# Patient Record
Sex: FEMALE | Race: WHITE | Employment: UNEMPLOYED | URBAN - METROPOLITAN AREA
[De-identification: names, ages, dates, MRNs, and addresses within clinical notes are randomized per-mention and may not be internally consistent; named-entity substitution may affect disease eponyms.]

---

## 2017-05-30 ENCOUNTER — OFFICE VISIT (OUTPATIENT)
Dept: NEUROSURGERY | Facility: CLINIC | Age: 52
End: 2017-05-30

## 2017-05-30 VITALS
WEIGHT: 282 LBS | DIASTOLIC BLOOD PRESSURE: 73 MMHG | RESPIRATION RATE: 20 BRPM | OXYGEN SATURATION: 99 % | HEIGHT: 68 IN | HEART RATE: 61 BPM | SYSTOLIC BLOOD PRESSURE: 154 MMHG | BODY MASS INDEX: 42.74 KG/M2

## 2017-05-30 DIAGNOSIS — R56.9 SEIZURE (H): ICD-10-CM

## 2017-05-30 DIAGNOSIS — D32.0 BENIGN NEOPLASM OF CEREBRAL MENINGES (H): Primary | ICD-10-CM

## 2017-05-30 RX ORDER — FLURANDRENOLIDE 0.5 MG/G
1 OINTMENT TOPICAL DAILY PRN
Refills: 1 | COMMUNITY
Start: 2017-04-13 | End: 2020-10-26

## 2017-05-30 RX ORDER — METHOTREXATE 2.5 MG/1
17.5 TABLET ORAL WEEKLY
COMMUNITY

## 2017-05-30 RX ORDER — LISINOPRIL 2.5 MG/1
2.5 TABLET ORAL DAILY
COMMUNITY

## 2017-05-30 RX ORDER — LEVOTHYROXINE SODIUM 125 UG/1
125 TABLET ORAL DAILY
Status: ON HOLD | COMMUNITY
End: 2021-07-07

## 2017-05-30 ASSESSMENT — PAIN SCALES - GENERAL: PAINLEVEL: MODERATE PAIN (4)

## 2017-05-30 NOTE — LETTER
"5/30/2017      RE: Marlyn Wilder  [address] [facility] [address]       Dear Dr. Neal:    We saw Mrs. 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 She continues to work full time but she struggles towards the end of the work week. She has been having \"foot issues\", presumably arthritis, and is having trouble walking. Her hands are also bothering her after recent carpel tunnel surgery. She plans on seeing Dr. Castañeda in the next week. Her vision has been stable. She continues to have episodes of nausea, but no other seizure symptoms. These occur 2-3 times a week, and will vary in length. Some with be very short, and feel more similar to a seizure, while others are prolonged and can last for a few hours. She recently discovered her neurologist is no longer here and is currently seeking a new one. She started Humira, but it has caused two persistent colds/cough in the last few months and needed antibiotics. She denies any other problems with her right extremity. She also reports her liver function has declined once she started Humira, and she gets blood work done regularly. Her vision has been stable, but is blurred in her left eye.  On exam, she appears well. Extraocular movements intact. She moves upper and lower extremities equally and with good coordination. Fluent and coherent speech. Visual fields on confrontation show diffusely constricted fields in the left eye.  We reviewed her MRI from earlier this month and compared to previous studies. Her complex residual left sided medial sphenoid wing meningioma appears stable at 30 mm. The left supraclinoid ICA and MCA appear to be very small and may be occluded. Overall, there has been no major changes.  We will refer her to Dr. Madrid to establish care for her continued seizures and follow-up. We continue to favor observation over surgical intervention and will see her " back in 1 year with another MRI. As before, our threshold to consider any more surgery will be if the right sided vision is threatened. She will contact us in the interim if anything changes or worsens. Please do not hesitate to contact us with questions. We will keep you informed of her progress.     RUBY SIDHU MD  I, Josefa Bernard, am serving as a scribe to document services personally performed by Ruby Sidhu MD, based upon my observations and the provider's statements to me. All documentation has been reviewed by the aforementioned doctor prior to being entered into the official medical record.    I, Ruby Sidhu, attest that above named individual is acting in scribe capacity, has observed my performance of the services and has documented them in accordance with my direction. The documentation recorded by the scribe accurately reflects the service I personally performed and the decisions made by me.    Ruby Sidhu MD

## 2017-05-30 NOTE — LETTER
"5/30/2017       RE: Marlyn Wilder  [address] [facility] [address]     Dear Colleague,    Thank you for referring your patient, Marlyn Wilder, to the [facility] NEUROSURGERY at [facility]. Please see a copy of my visit note below.    Dear Dr. Neal:    We saw Mrs. 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 She continues to work full time but she struggles towards the end of the work week. She has been having \"foot issues\", presumably arthritis, and is having trouble walking. Her hands are also bothering her after recent carpel tunnel surgery. She plans on seeing Dr. Castañeda in the next week. Her vision has been stable. She continues to have episodes of nausea, but no other seizure symptoms. These occur 2-3 times a week, and will vary in length. Some with be very short, and feel more similar to a seizure, while others are prolonged and can last for a few hours. She recently discovered her neurologist is no longer here and is currently seeking a new one. She started Humira, but it has caused two persistent colds/cough in the last few months and needed antibiotics. She denies any other problems with her right extremity. She also reports her liver function has declined once she started Humira, and she gets blood work done regularly. Her vision has been stable, but is blurred in her left eye.  On exam, she appears well. Extraocular movements intact. She moves upper and lower extremities equally and with good coordination. Fluent and coherent speech. Visual fields on confrontation show diffusely constricted fields in the left eye.  We reviewed her MRI from earlier this month and compared to previous studies. Her complex residual left sided medial sphenoid wing meningioma appears stable at 30 mm. The left supraclinoid ICA and MCA appear to be very small and may be occluded. Overall, there " has been no major changes.  We will refer her to Dr. Madrid to establish care for her continued seizures and follow-up. We continue to favor observation over surgical intervention and will see her back in 1 year with another MRI. As before, our threshold to consider any more surgery will be if the right sided vision is threatened. She will contact us in the interim if anything changes or worsens. Please do not hesitate to contact us with questions. We will keep you informed of her progress.   RUBY SIDHU MD  I, Josefa Bernard, am serving as a scribe to document services personally performed by Ruby Sidhu MD, based upon my observations and the provider's statements to me. All documentation has been reviewed by the aforementioned doctor prior to being entered into the official medical record.    I, Ruby Sidhu, attest that above named individual is acting in scribe capacity, has observed my performance of the services and has documented them in accordance with my direction. The documentation recorded by the scribe accurately reflects the service I personally performed and the decisions made by me.    Again, thank you for allowing me to participate in the care of your patient.      Sincerely,  Ruby Sidhu MD

## 2017-05-30 NOTE — NURSING NOTE
Chief Complaint   Patient presents with     RECHECK     UMP- SPHENOID WING MENINGIOMA F/U, MRI PRIOR

## 2017-05-30 NOTE — MR AVS SNAPSHOT
After Visit Summary   5/30/2017    Marlyn Wilder    MRN: [id number]           Patient Information     Date Of Birth          1965        Visit Information        Provider Department      5/30/2017 11:00 AM Pan Calero MD [facility] Neurosurgery        Today's Diagnoses     Benign neoplasm of cerebral meninges (H)    -  1    Seizure (H)           Follow-ups after your visit        Additional Services     Neurology Referral [3646]       Your provider has referred you to: [facility]: Neurology Clinic - [place] [phone]   [email or web address]  Epilepsy    Reason for Referral: transfer care - Dr Madrid, please    Please be aware that coverage of these services is subject to the terms and limitations of your health insurance plan.  Call member services at your health plan with any benefit or coverage questions.      Please bring the following with you to your appointment:    (1) Any X-Rays, CTs or MRIs which have been performed.  Contact the facility where they were done to arrange for  prior to your scheduled appointment.    (2) List of current medications  (3) This referral request   (4) Any documents/labs given to you for this referral                  Follow-up notes from your care team     Return in about 1 year (around 5/30/2018).      Your next 10 appointments already scheduled     Jun 13, 2017  8:30 AM CDT   (Arrive by 8:15 AM)   New Seizure with Emmanuel Madrid MD   [facility] Neurology ([facility])    [address]  3rd Floor  [place] [address] [phone]            May 17, 2018  9:15 AM CDT   (Arrive by 9:00 AM)   MR BRAIN W/O & W CONTRAST with [id number]   [facility] Imaging Center MRI ([facility])    [address]  1st Floor  [place] [address] [phone]           Take your medicines as usual, unless your doctor tells you not to. Bring a list of your  current medicines to your exam (including vitamins, minerals and over-the-counter drugs).  You will be given intravenous contrast for this exam. To prepare:   The day before your exam, drink extra fluids at least six 8-ounce glasses (unless your doctor tells you to restrict your fluids).   Have a blood test (creatinine test) within 30 days of your exam. Go to your clinic or Diagnostic Imaging Department for this test.  The MRI machine uses a strong magnet. Please wear clothes without metal (snaps, zippers). A sweatsuit works well, or we may give you a hospital gown.  Please remove any body piercings and hair extensions before you arrive. You will also remove watches, jewelry, hairpins, wallets, dentures, partial dental plates and hearing aids. You may wear contact lenses, and you may be able to wear your rings. We have a safe place to keep your personal items, but it is safer to leave them at home.   **IMPORTANT** THE INSTRUCTIONS BELOW ARE ONLY FOR THOSE PATIENTS WHO HAVE BEEN TOLD THEY WILL RECEIVE SEDATION OR GENERAL ANESTHESIA DURING THEIR MRI PROCEDURE:  IF YOU WILL RECEIVE SEDATION (take medicine to help you relax during your exam):   You must get the medicine from your doctor before you arrive. Bring the medicine to the exam. Do not take it at home.   Arrive one hour early. Bring someone who can take you home after the test. Your medicine will make you sleepy. After the exam, you may not drive, take a bus or take a taxi by yourself.   No eating 8 hours before your exam. You may have clear liquids up until 4 hours before your exam. (Clear liquids include water, clear tea, black coffee and fruit juice without pulp.)  IF YOU WILL RECEIVE ANESTHESIA (be asleep for your exam):   Arrive 1 1/2 hours early. Bring someone who can take you home after the test. You may not drive, take a bus or take a taxi by yourself.   No eating 8 hours before your exam. You may have clear liquids up until 4 hours before your exam.  (Clear liquids include water, clear tea, black coffee and fruit juice without pulp.)  Please call the Imaging Department at your exam site with any questions.            May 29, 2018 10:00 AM CDT   (Arrive by 9:45 AM)   Return Visit with Pan Calero MD   [facility] Neurosurgery ([facility])    [address]  [address] [facility] [id number] [phone]              Future tests that were ordered for you today     Open Future Orders        Priority Expected Expires Ordered    MRI Brain with & without gadolinium for tumor follow-up [HOR468] Routine 5/30/2018 5/30/2018 5/30/2017            Who to contact     Please call your clinic at [phone] to:    Ask questions about your health    Make or cancel appointments    Discuss your medicines    Learn about your test results    Speak to your doctor   If you have compliments or concerns about an experience at your clinic, or if you wish to file a complaint, please contact [facility] Patient Relations at [phone] or email us at [email or web address]         Additional Information About Your Visit        [email or web address] Information     [employer] gives you secure access to your electronic health record. If you see a primary care provider, you can also send messages to your care team and make appointments. If you have questions, please call your primary care clinic.  If you do not have a primary care provider, please call [phone] and they will assist you.      [employer] is an electronic gateway that provides easy, online access to your medical records. With [employer], you can request a clinic appointment, read your test results, renew a prescription or communicate with your care team.     To access your existing account, please contact your [facility] or call [phone] for assistance.        Care EveryWhere ID     This is your Care EveryWhere ID. This could  "be used by other organizations to access your [facility] medical records  [id number]        Your Vitals Were     Pulse Respirations Height Last Period Pulse Oximetry Breastfeeding?    61 20 1.727 m (5' 8\") 10/17/2012 99% No    BMI (Body Mass Index)                   42.88 kg/m2            Blood Pressure from Last 3 Encounters:   05/30/17 154/73   06/21/16 155/48   09/29/15 155/68    Weight from Last 3 Encounters:   05/30/17 127.9 kg (282 lb)   06/21/16 124 kg (273 lb 6.4 oz)   02/24/15 113.4 kg (250 lb)              We Performed the Following     Neurology Referral [9019]          Today's Medication Changes          These changes are accurate as of: 5/30/17 11:47 AM.  If you have any questions, ask your nurse or doctor.               These medicines have changed or have updated prescriptions.        Dose/Directions    LISINOPRIL PO   Indication:  High Blood Pressure   This may have changed:  Another medication with the same name was removed. Continue taking this medication, and follow the directions you see here.   Changed by:  Pan Calero MD        Dose:  2.5 mg   Take 2.5 mg by mouth daily   Refills:  0       methotrexate sodium 2.5 MG Tabs   This may have changed:  Another medication with the same name was removed. Continue taking this medication, and follow the directions you see here.   Changed by:  Pan Calero MD        Dose:  5 tablet   Take 5 tablets by mouth once a week   Refills:  0       SYNTHROID 100 MCG tablet   Indication:  Underactive Thyroid   This may have changed:  Another medication with the same name was removed. Continue taking this medication, and follow the directions you see here.   Generic drug:  levothyroxine   Changed by:  Pan Calero MD        Dose:  100 mcg   Take 100 mcg by mouth daily   Refills:  0                Primary Care Provider Office Phone # Fax #    Carlos Enrique Ibarrakayleen [phone]       [facility] [place] [address] " [address] [address]        Thank you!     Thank you for choosing [facility] NEUROSURGERY  for your care. Our goal is always to provide you with excellent care. Hearing back from our patients is one way we can continue to improve our services. Please take a few minutes to complete the written survey that you may receive in the mail after your visit with us. Thank you!             Your Updated Medication List - Protect others around you: Learn how to safely use, store and throw away your medicines at www.disposemymeds.org.          This list is accurate as of: 5/30/17 11:47 AM.  Always use your most recent med list.                   Brand Name Dispense Instructions for use    adalimumab 10 MG/0.2ML prefilled syringe kit    HUMIRA     Inject 10 mg Subcutaneous every 14 days       aspirin 81 MG tablet      Take 1 tablet by mouth daily.       Flurandrenolide 0.05 % Oint      Apply 1 Application topically daily as needed       FOLIC ACID PO      Take 1 mg by mouth daily       gabapentin 300 MG capsule    NEURONTIN     Take 600 mg by mouth daily as needed       KEPPRA PO      Take 500 mg by mouth 2 times daily 3 tabs in AM, 4 tabs in PM       LASIX 20 MG tablet   Generic drug:  furosemide      Take 20 mg by mouth as needed       LISINOPRIL PO      Take 2.5 mg by mouth daily       methotrexate sodium 2.5 MG Tabs      Take 5 tablets by mouth once a week       MULTIVITAMIN & MINERAL PO      Take 1 tablet by mouth daily       SYNTHROID 100 MCG tablet   Generic drug:  levothyroxine      Take 100 mcg by mouth daily

## 2017-05-30 NOTE — PROGRESS NOTES
"Dear Dr. Neal:    We saw Mrs. 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 She continues to work full time but she struggles towards the end of the work week. She has been having \"foot issues\", presumably arthritis, and is having trouble walking. Her hands are also bothering her after recent carpel tunnel surgery. She plans on seeing Dr. Castañeda in the next week. Her vision has been stable. She continues to have episodes of nausea, but no other seizure symptoms. These occur 2-3 times a week, and will vary in length. Some with be very short, and feel more similar to a seizure, while others are prolonged and can last for a few hours. She recently discovered her neurologist is no longer here and is currently seeking a new one. She started Humira, but it has caused two persistent colds/cough in the last few months and needed antibiotics. She denies any other problems with her right extremity. She also reports her liver function has declined once she started Humira, and she gets blood work done regularly. Her vision has been stable, but is blurred in her left eye.  On exam, she appears well. Extraocular movements intact. She moves upper and lower extremities equally and with good coordination. Fluent and coherent speech. Visual fields on confrontation show diffusely constricted fields in the left eye.  We reviewed her MRI from earlier this month and compared to previous studies. Her complex residual left sided medial sphenoid wing meningioma appears stable at 30 mm. The left supraclinoid ICA and MCA appear to be very small and may be occluded. Overall, there has been no major changes.  We will refer her to Dr. Madrid to establish care for her continued seizures and follow-up. We continue to favor observation over surgical intervention and will see her back in 1 year with another MRI. As before, our threshold to consider any more " surgery will be if the right sided vision is threatened. She will contact us in the interim if anything changes or worsens. Please do not hesitate to contact us with questions. We will keep you informed of her progress.     MD SHAKEEL PATEL, Josefa Bernard, am serving as a scribe to document services personally performed by Pan Calero MD, based upon my observations and the provider's statements to me. All documentation has been reviewed by the aforementioned doctor prior to being entered into the official medical record.    I, Pan Calero, attest that above named individual is acting in scribe capacity, has observed my performance of the services and has documented them in accordance with my direction. The documentation recorded by the scribe accurately reflects the service I personally performed and the decisions made by me.

## 2017-06-07 ENCOUNTER — PRE VISIT (OUTPATIENT)
Dept: NEUROLOGY | Facility: CLINIC | Age: 52
End: 2017-06-07

## 2017-06-07 NOTE — TELEPHONE ENCOUNTER
1.  Date/reason for appt: 6/13/17, Seizures   2.  Referring provider: RUBY SIDHU  3.  Call to patient (Yes / No - short description): No, referred   4.  [place]   [facility] [place]

## 2017-06-10 ASSESSMENT — ENCOUNTER SYMPTOMS
POSTURAL DYSPNEA: 0
FEVER: 0
HEMOPTYSIS: 0
WHEEZING: 0
HEADACHES: 1
RESPIRATORY PAIN: 0
DECREASED APPETITE: 0
DOUBLE VISION: 0
WEIGHT GAIN: 1
TINGLING: 0
EYE PAIN: 0
DISTURBANCES IN COORDINATION: 0
MYALGIAS: 1
CHILLS: 0
WEIGHT LOSS: 0
EYE REDNESS: 0
SKIN CHANGES: 0
EYE WATERING: 0
LOSS OF CONSCIOUSNESS: 0
WEAKNESS: 0
HALLUCINATIONS: 0
NECK PAIN: 0
PARALYSIS: 0
SNORES LOUDLY: 0
FATIGUE: 1
SPUTUM PRODUCTION: 0
POLYPHAGIA: 0
TREMORS: 0
DYSPNEA ON EXERTION: 0
MEMORY LOSS: 0
MUSCLE CRAMPS: 0
COUGH DISTURBING SLEEP: 1
BACK PAIN: 0
SPEECH CHANGE: 0
MUSCLE WEAKNESS: 0
EYE IRRITATION: 0
NUMBNESS: 0
COUGH: 1
SEIZURES: 1
ALTERED TEMPERATURE REGULATION: 0
INCREASED ENERGY: 0
NAIL CHANGES: 0
NIGHT SWEATS: 0
POLYDIPSIA: 0
STIFFNESS: 1
JOINT SWELLING: 0
POOR WOUND HEALING: 0
ARTHRALGIAS: 1
SHORTNESS OF BREATH: 0
DIZZINESS: 0

## 2017-06-13 ENCOUNTER — OFFICE VISIT (OUTPATIENT)
Dept: NEUROLOGY | Facility: CLINIC | Age: 52
End: 2017-06-13

## 2017-06-13 VITALS
HEIGHT: 68 IN | HEART RATE: 70 BPM | WEIGHT: 284 LBS | BODY MASS INDEX: 43.04 KG/M2 | DIASTOLIC BLOOD PRESSURE: 53 MMHG | SYSTOLIC BLOOD PRESSURE: 146 MMHG

## 2017-06-13 DIAGNOSIS — N20.0 CALCULUS OF KIDNEY: ICD-10-CM

## 2017-06-13 DIAGNOSIS — G40.119 PARTIAL SYMPTOMATIC EPILEPSY WITH SIMPLE PARTIAL SEIZURES, INTRACTABLE, WITHOUT STATUS EPILEPTICUS (H): Primary | ICD-10-CM

## 2017-06-13 RX ORDER — LEVETIRACETAM 500 MG/1
TABLET ORAL
Qty: 210 TABLET | Refills: 11 | Status: SHIPPED | OUTPATIENT
Start: 2017-06-13 | End: 2017-09-12

## 2017-06-13 ASSESSMENT — PAIN SCALES - GENERAL: PAINLEVEL: NO PAIN (0)

## 2017-06-13 NOTE — LETTER
6/13/2017       RE: Marlyn Wilder  [address]  [facility] [address]     Dear Colleague,    Thank you for referring your patient, Marlyn Wilder, to the [facility] NEUROLOGY at [facility]. Please see a copy of my visit note below.      [facility]:  NEW PATIENT EVALUATION    HISTORY:  Ms. Marlyn Wilder is a 51-year-old, right-handed woman who was referred by Dr. Pan Caleor for evaluation of seizures in the setting of a left-sided intracranial meningioma.  She came to the visit today with her  and both were able to provide detailed medical history.  I reviewed extensive medical records on her [facility] chart.        Ictal semiology-history:   The patient reported that she had a single febrile convulsion of early childhood, but otherwise had no seizures until the initial manifestation of her meningioma in 2011.  She has had only 1 type of seizure since 2011.      The patient has had exclusively simple partial seizures.  These first began in 2011, and have continued on since then.  They have been quite stable in semiology and frequency.  She has at least 20 per year, but has only up to 2 on a single day and usually only one at a time.  The full form of the aura lasts almost 1 minute and features a froilan vu phenomenon associated with a sense that she has just viewed a complex visual scene briefly, although she cannot remember the nature of the scene, with an olfactory hallucination of burning electronics, and waves of intense nausea.  She does not have a postictal state with this.  She is always able to speak if she is with someone.  Her  has seen many of these, which she described the auras in detail to him and she is always able to respond to him quickly.  She never has any automatic behaviors as best he can determine.  Sometimes she has briefer events that feature only 1-2 of her 3 aura phenomena.      The patient has not had  any staring spells with unresponsiveness or confusion, and has not had any grand mal seizures following that of early childhood.  She only has involuntary jerks on falling asleep in the evening.      Auras appear to have been triggered or exacerbated by forgetting to take medications, which happens only rarely, by emotional stress, and by tiredness.      Epilepsy-seizure predispositions:   The patient's epilepsy risk factor is a left middle fossa meningioma with extension into the left parasellar space.  This was resected 3 times in , and pathology showed that there was a grade I meningothelial meningioma.  The portion that is in the parasellar space has surrounded the left optic nerve, and she does have visual deficits on the left.  She had radiation therapy following the last resection by Dr. Calero.  She has had followup scans since that time, which showed a stable lesion as recently as 2017.        There is no family history of seizures or epilepsy.      The patient had a single febrile convulsion of early childhood when she was approximately 1 years old.      She has no history of gestational or  injury, developmental delay, stroke, meningitis, encephalitis, and significant head injury.  She denied a history of physical or sexual abuse by an adult during her childhood or adulthood.      Laboratory evaluations:   The most recent brain MRI was performed at [facility] on 2017, and was reported to show a stable left medial middle fossa lesion extending into the parasellar space in the left orbital apex, with mild mass effect and with enhancement.      A routine EEG was performed in [place] in , and the report indicated left hemispheric slowing and spikes.      Epilepsy therapeutics:   The only anti-seizure medication that the patient has used has been levetiracetam.  This was started in .  It has been consistently associated with irritability.  The patient has been able to control her  irritability and has continued to function well as an  for the [employer] [facility] [employer].      PAST MEDICAL-SURGICAL HISTORY:    1. 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   2. History of left middle fossa meningothelial meningioma, grade 1, with extension to the left parasellar area, left orbital apex and cavernous sinus; status post resections (2012) and radiation therapy.   3. History of probable transient ischemic attack with aphasia (2013).   4. Obstructive sleep apnea, treated with CPAP.   5. Systemic hypertension, treated.   6. Hyperlipidemia.   7. History of recurring renal calculi.   8. History of arthralgia attributed to atypical rheumatoid arthritis or fibromyalgia.   9. Hypothyroidism.   10. Status post left carpal tunnel release.   11. History of TMJ syndrome.     FAMILY HISTORY:  There is no family history of seizures, epilepsy or other neurological conditions.      PERSONAL AND SOCIAL HISTORY:  The patient completed her education through a [employer] degree.  She is working as an  for a [place] [employer].  She lives with her .  They have 3 adult children, no longer in the home.      REVIEW OF SYSTEMS:  The patient has noted recent left flank pains over the last week, which are somewhat reminiscent of a milder version of her pain with renal calculi; she has not noted any gross hematuria.   She denied history of attention and memory disturbance, difficulties with comprehension and expression, difficulty in solving problems, weakness, tremors or other abnormal involuntary movements, numbness or tingling, incoordination, falling or difficulty in walking, urinary or fecal incontinence, headache and other pain, except as described above.  The patient denied any history of severe depression or suicidal ideation, severe anxiety, panic attacks, hallucinations, delusions, psychosis, substance abuse, or psychiatric hospitalization.  She denied rashes and easy bruising.  The  remainder of a 14-system symptom review was negative except as noted above.       MEDICATIONS:  Levetiracetam 2000/1500 mg daily, and other medications as per the electronic medical record.     PHYSICAL EXAMINATION:    The patient was an alert woman in no apparent distress.  Pulse was 70 and regular.  Blood pressure was 146/53.  Respirations were 18 per minute.  Height was 68 inches.  Weight was 284 pounds.  There was a skull defect consistent with reported surgery.  Neck was supple, without signs of meningeal irritation.      On neurological examination the patient appeared alert and was fully oriented to person, place, time, and reason for visit.  Speech showed normal articulation, fluency, repetitions, naming, syntax and comprehension.  She accurately repeated digits sequences, repeating 8 forward and 5 reversed.  At 10 minutes from presentation, 4 of 4 phrases were recalled.  No apraxias or atavistic signs were elicited.  Fundoscopy was normal bilaterally.  Cranial nerves III through XII were normal.  Muscle masses, tones and strengths were normal throughout.  There was no pronator drift.  Sequential fine finger movements were performed normally with each hand.  No spontaneous tremors, myoclonus, or other abnormal movements were observed.  Sensations of light touch, pin prick, vibration and proprioception were reportedly normal throughout.  The rapid alternating movements, and finger-nose-finger and heel-shin maneuvers were performed normally bilaterally.  Romberg maneuver was negative.  Regular, heel, toe, tandem and reverse tandem walking were normal.  Deep tendon reflexes were normal and symmetric throughout.  Toes were downgoing bilaterally.      IMPRESSION:    The patient has symptoms virtually diagnostic of simple partial seizures of mesial temporal origin, given the location of the meningioma adjacent to the left mesial temporal lobe.  Fortunately, she has not had any seizures with impaired awareness,  either as complex partial seizures or grand mal seizures (except for a single febrile convulsion of early childhood).  I told her that it will be very important to stay on an anti-seizure medication given the location of the tumor and her history.  We discussed in detail the appearance of complex partial seizures or grand mal seizures, and her  would take her to the emergency room if either occurred.      I told the patient that there are many other medication options other than levetiracetam.  Apparently she has been working hard to keep her irritability under control for many years on levetiracetam, with good success.  Nonetheless, this is rather bothersome, and crossover to lamotrigine monotherapy or other therapies may be indicated.  She would like to consider this after we completed a brief reevaluation with electroencephalography.  We also will obtain a levetiracetam level today.      The patient has not seen her urologist for many years.  I told her that we could check a urinalysis today, and if there is microhematuria, she will definitely contact her urologist back again for further evaluation.  She is continuing to drink large amounts of water, in the interim.      We discussed [place] regulations on seizures and driving.  She has not had any seizures in adulthood of types that would impair her driving.      PLAN:    1. Check serum levetiracetam level today.     2. Urinalysis today.   3. Continue levetiracetam at 2000/1500 mg daily.   4. Outpatient 3-hour video EEG.   5. Return visit in approximately 3 months.   I spent 65 minutes in this patient care, more than 50% of which consisted of counseling and coordinating care.       Emmanuel Madrid M.D.   Professor of Neurology

## 2017-06-13 NOTE — MR AVS SNAPSHOT
After Visit Summary   6/13/2017    Marlyn Wilder    MRN: [id number]           Patient Information     Date Of Birth          1965        Visit Information        Provider Department      6/13/2017 8:30 AM Emmanuel Madrid MD [facility] Neurology        Today's Diagnoses     Partial symptomatic epilepsy with simple partial seizures, intractable, without status epilepticus (H)    -  1    Calculus of kidney           Follow-ups after your visit        Follow-up notes from your care team     Return in about 3 months (around 9/13/2017).      Your next 10 appointments already scheduled     Jun 13, 2017 10:30 AM CDT   LAB with  LAB   [facility] Lab ([facility])    [address] [place] [id number] [phone]           Patient must bring picture ID.  Patient should be prepared to give a urine specimen  Please do not eat 10-12 hours before your appointment if you are coming in fasting for labs on lipids, cholesterol, or glucose (sugar).  Pregnant women should follow their Care Team instructions. Water with medications is okay. Do not drink coffee or other fluids.   If you have concerns about taking  your medications, please ask at office or if scheduling via [employer], send a message by clicking on Secure Messaging, Message Your Care Team.            Mariano 15, 2017 12:00 PM CDT   (Arrive by 11:45 AM)   In Lab Video Visit with  EEG TECH 2   [facility] ([facility])    [address] [place] [id number] [phone]            Sep 12, 2017  4:30 PM CDT   (Arrive by 4:15 PM)   Return Seizure with Emmanuel Madrid MD   [facility] Neurology ([facility])    [address] [place] [id number] [phone]            May 17, 2018  9:15 AM CDT   (Arrive by 9:00 AM)   MR BRAIN W/O & W CONTRAST with [id number]   [facility] MRI ([facility])    [address] [address] [phone]           Take your medicines as usual, unless your doctor tells you not to. Bring a list of your current medicines to your exam (including vitamins, minerals and over-the-counter drugs).  You will be given intravenous contrast for this exam. To prepare:   The day before your exam, drink extra fluids at least six 8-ounce glasses (unless your doctor tells you to restrict your fluids).   Have a blood test (creatinine test) within 30 days of your exam. Go to your clinic or Diagnostic Imaging Department for this test.  The MRI machine uses a strong magnet. Please wear clothes without metal (snaps, zippers). A sweatsuit works well, or we may give you a hospital gown.  Please remove any body piercings and hair extensions before you arrive. You will also remove watches, jewelry, hairpins, wallets, dentures, partial dental plates and hearing aids. You may wear contact lenses, and you may be able to wear your rings. We have a safe place to keep your personal items, but it is safer to leave them at home.   **IMPORTANT** THE INSTRUCTIONS BELOW ARE ONLY FOR THOSE PATIENTS WHO HAVE BEEN TOLD THEY WILL RECEIVE SEDATION OR GENERAL ANESTHESIA DURING THEIR MRI PROCEDURE:  IF YOU WILL RECEIVE SEDATION (take medicine to help you relax during your exam):   You must get the medicine from your doctor before you arrive. Bring the medicine to the exam. Do not take it at home.   Arrive one hour early. Bring someone who can take you home after the test. Your medicine will make you sleepy. After the exam, you may not drive, take a bus or take a taxi by yourself.   No eating 8 hours before your exam. You may have clear liquids up until 4 hours before your exam. (Clear liquids include water, clear tea, black coffee and fruit juice without pulp.)  IF YOU WILL RECEIVE ANESTHESIA (be asleep for your exam):   Arrive 1 1/2 hours early. Bring someone who can  take you home after the test. You may not drive, take a bus or take a taxi by yourself.   No eating 8 hours before your exam. You may have clear liquids up until 4 hours before your exam. (Clear liquids include water, clear tea, black coffee and fruit juice without pulp.)  Please call the Imaging Department at your exam site with any questions.            May 29, 2018 10:00 AM CDT   (Arrive by 9:45 AM)   Return Visit with Pan Calero MD   [facility] Neurosurgery ([facility])    [address] [place] [id number] [phone]              Future tests that were ordered for you today     Open Future Orders        Priority Expected Expires Ordered    EEG Video Monitoring Routine 6/14/2017 6/13/2018 6/13/2017    Routine UA with microscopic - No culture Routine  6/13/2018 6/13/2017            Who to contact     Please call your clinic at [phone] to:    Ask questions about your health    Make or cancel appointments    Discuss your medicines    Learn about your test results    Speak to your doctor   If you have compliments or concerns about an experience at your clinic, or if you wish to file a complaint, please contact [facility] Patient Relations at [phone] or email us at [email or web address]         Additional Information About Your Visit        [employer] Information     [employer] gives you secure access to your electronic health record. If you see a primary care provider, you can also send messages to your care team and make appointments. If you have questions, please call your primary care clinic.  If you do not have a primary care provider, please call [phone] and they will assist you.      [employer] is an electronic gateway that provides easy, online access to your medical records. With [employer] you can request a clinic appointment, read your test results, renew a prescription or communicate with your care  "team.     To access your existing account, please contact your [facility] or call [phone] for assistance.        Care EveryWhere ID     This is your Care EveryWhere ID. This could be used by other organizations to access your [facility] medical records  [id number]        Your Vitals Were     Pulse Height Last Period BMI (Body Mass Index)          70 1.727 m (5' 8\") 10/17/2012 43.18 kg/m2         Blood Pressure from Last 3 Encounters:   06/13/17 146/53   05/30/17 154/73   06/21/16 155/48    Weight from Last 3 Encounters:   06/13/17 128.8 kg (284 lb)   05/30/17 127.9 kg (282 lb)   06/21/16 124 kg (273 lb 6.4 oz)              We Performed the Following     Keppra (Levetiracetam) Level          Today's Medication Changes          These changes are accurate as of: 6/13/17 10:21 AM.  If you have any questions, ask your nurse or doctor.               These medicines have changed or have updated prescriptions.        Dose/Directions    levETIRAcetam 500 MG tablet   Commonly known as:  KEPPRA   This may have changed:    - medication strength  - how much to take  - how to take this  - when to take this  - additional instructions   Used for:  Partial symptomatic epilepsy with simple partial seizures, intractable, without status epilepticus (H)   Changed by:  Emmanuel Madrid MD        Take 4 tablets (2000 mg) in the AM and 3 tablets (1500 mg) in the PM, by mouth.   Quantity:  210 tablet   Refills:  11            Where to get your medicines      These medications were sent to [facility] [address] [place] [address] [address] [address] [facility] [address]     Phone:  [phone]     levETIRAcetam 500 MG tablet                Primary Care Provider Office Phone # Fax #    Carlos Enrique Ibarrakayleen [phone]       [address] [address]        Thank you!     Thank you for choosing [facility] " NEUROLOGY  for your care. Our goal is always to provide you with excellent care. Hearing back from our patients is one way we can continue to improve our services. Please take a few minutes to complete the written survey that you may receive in the mail after your visit with us. Thank you!             Your Updated Medication List - Protect others around you: Learn how to safely use, store and throw away your medicines at www.disposemymeds.org.          This list is accurate as of: 6/13/17 10:21 AM.  Always use your most recent med list.                   Brand Name Dispense Instructions for use    adalimumab 10 MG/0.2ML prefilled syringe kit    HUMIRA     Inject 10 mg Subcutaneous every 14 days       aspirin 81 MG tablet      Take 1 tablet by mouth daily.       Flurandrenolide 0.05 % Oint      Apply 1 Application topically daily as needed       FOLIC ACID PO      Take 1 mg by mouth daily       gabapentin 300 MG capsule    NEURONTIN     Take 600 mg by mouth daily as needed       LASIX 20 MG tablet   Generic drug:  furosemide      Take 20 mg by mouth as needed       levETIRAcetam 500 MG tablet    KEPPRA    210 tablet    Take 4 tablets (2000 mg) in the AM and 3 tablets (1500 mg) in the PM, by mouth.       LISINOPRIL PO      Take 2.5 mg by mouth daily       methotrexate sodium 2.5 MG Tabs      Take 5 tablets by mouth once a week       MULTIVITAMIN & MINERAL PO      Take 1 tablet by mouth daily       SYNTHROID 100 MCG tablet   Generic drug:  levothyroxine      Take 100 mcg by mouth daily

## 2017-06-13 NOTE — PROGRESS NOTES
[facility]:  NEW PATIENT EVALUATION    HISTORY:  Ms. Marlyn Wilder is a 51-year-old, right-handed woman who was referred by Dr. Pan Calero for evaluation of seizures in the setting of a left-sided intracranial meningioma.  She came to the visit today with her  and both were able to provide detailed medical history.  I reviewed extensive medical records on her [facility] chart.        Ictal semiology-history:   The patient reported that she had a single febrile convulsion of early childhood, but otherwise had no seizures until the initial manifestation of her meningioma in 2011.  She has had only 1 type of seizure since 2011.      The patient has had exclusively simple partial seizures.  These first began in 2011, and have continued on since then.  They have been quite stable in semiology and frequency.  She has at least 20 per year, but has only up to 2 on a single day and usually only one at a time.  The full form of the aura lasts almost 1 minute and features a froilan vu phenomenon associated with a sense that she has just viewed a complex visual scene briefly, although she cannot remember the nature of the scene, with an olfactory hallucination of burning electronics, and waves of intense nausea.  She does not have a postictal state with this.  She is always able to speak if she is with someone.  Her  has seen many of these, which she described the auras in detail to him and she is always able to respond to him quickly.  She never has any automatic behaviors as best he can determine.  Sometimes she has briefer events that feature only 1-2 of her 3 aura phenomena.      The patient has not had any staring spells with unresponsiveness or confusion, and has not had any grand mal seizures following that of early childhood.  She only has involuntary jerks on falling asleep in the evening.      Auras appear to have been triggered or exacerbated by forgetting to take  medications, which happens only rarely, by emotional stress, and by tiredness.      Epilepsy-seizure predispositions:   The patient's epilepsy risk factor is a left middle fossa meningioma with extension into the left parasellar space.  This was resected 3 times in , and pathology showed that there was a grade I meningothelial meningioma.  The portion that is in the parasellar space has surrounded the left optic nerve, and she does have visual deficits on the left.  She had radiation therapy following the last resection by Dr. Calero.  She has had followup scans since that time, which showed a stable lesion as recently as 2017.        There is no family history of seizures or epilepsy.      The patient had a single febrile convulsion of early childhood when she was approximately 1 years old.      She has no history of gestational or  injury, developmental delay, stroke, meningitis, encephalitis, and significant head injury.  She denied a history of physical or sexual abuse by an adult during her childhood or adulthood.      Laboratory evaluations:   The most recent brain MRI was performed at [facility] on 2017, and was reported to show a stable left medial middle fossa lesion extending into the parasellar space in the left orbital apex, with mild mass effect and with enhancement.      A routine EEG was performed in [facility] in , and the report indicated left hemispheric slowing and spikes.      Epilepsy therapeutics:   The only anti-seizure medication that the patient has used has been levetiracetam.  This was started in .  It has been consistently associated with irritability.  The patient has been able to control her irritability and has continued to function well as an  for the [facility].      PAST MEDICAL-SURGICAL HISTORY:    1. 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   2. History of left middle fossa meningothelial meningioma, grade 1, with extension to  the left parasellar area, left orbital apex and cavernous sinus; status post resections (2012) and radiation therapy.   3. History of probable transient ischemic attack with aphasia (2013).   4. Obstructive sleep apnea, treated with CPAP.   5. Systemic hypertension, treated.   6. Hyperlipidemia.   7. History of recurring renal calculi.   8. History of arthralgia attributed to atypical rheumatoid arthritis or fibromyalgia.   9. Hypothyroidism.   10. Status post left carpal tunnel release.   11. History of TMJ syndrome.     FAMILY HISTORY:  There is no family history of seizures, epilepsy or other neurological conditions.      PERSONAL AND SOCIAL HISTORY:  The patient completed her education through a [employer] degree.  She is working as an  for a [employer].  She lives with her .  They have 3 adult children, no longer in the home.      REVIEW OF SYSTEMS:  The patient has noted recent left flank pains over the last week, which are somewhat reminiscent of a milder version of her pain with renal calculi; she has not noted any gross hematuria.   She denied history of attention and memory disturbance, difficulties with comprehension and expression, difficulty in solving problems, weakness, tremors or other abnormal involuntary movements, numbness or tingling, incoordination, falling or difficulty in walking, urinary or fecal incontinence, headache and other pain, except as described above.  The patient denied any history of severe depression or suicidal ideation, severe anxiety, panic attacks, hallucinations, delusions, psychosis, substance abuse, or psychiatric hospitalization.  She denied rashes and easy bruising.  The remainder of a 14-system symptom review was negative except as noted above.       MEDICATIONS:  Levetiracetam 2000/1500 mg daily, and other medications as per the electronic medical record.     PHYSICAL EXAMINATION:    The patient was an alert woman in no apparent distress.  Pulse  was 70 and regular.  Blood pressure was 146/53.  Respirations were 18 per minute.  Height was 68 inches.  Weight was 284 pounds.  There was a skull defect consistent with reported surgery.  Neck was supple, without signs of meningeal irritation.      On neurological examination the patient appeared alert and was fully oriented to person, place, time, and reason for visit.  Speech showed normal articulation, fluency, repetitions, naming, syntax and comprehension.  She accurately repeated digits sequences, repeating 8 forward and 5 reversed.  At 10 minutes from presentation, 4 of 4 phrases were recalled.  No apraxias or atavistic signs were elicited.  Fundoscopy was normal bilaterally.  Cranial nerves III through XII were normal.  Muscle masses, tones and strengths were normal throughout.  There was no pronator drift.  Sequential fine finger movements were performed normally with each hand.  No spontaneous tremors, myoclonus, or other abnormal movements were observed.  Sensations of light touch, pin prick, vibration and proprioception were reportedly normal throughout.  The rapid alternating movements, and finger-nose-finger and heel-shin maneuvers were performed normally bilaterally.  Romberg maneuver was negative.  Regular, heel, toe, tandem and reverse tandem walking were normal.  Deep tendon reflexes were normal and symmetric throughout.  Toes were downgoing bilaterally.      IMPRESSION:    The patient has symptoms virtually diagnostic of simple partial seizures of mesial temporal origin, given the location of the meningioma adjacent to the left mesial temporal lobe.  Fortunately, she has not had any seizures with impaired awareness, either as complex partial seizures or grand mal seizures (except for a single febrile convulsion of early childhood).  I told her that it will be very important to stay on an anti-seizure medication given the location of the tumor and her history.  We discussed in detail the  appearance of complex partial seizures or grand mal seizures, and her  would take her to the emergency room if either occurred.      I told the patient that there are many other medication options other than levetiracetam.  Apparently she has been working hard to keep her irritability under control for many years on levetiracetam, with good success.  Nonetheless, this is rather bothersome, and crossover to lamotrigine monotherapy or other therapies may be indicated.  She would like to consider this after we completed a brief reevaluation with electroencephalography.  We also will obtain a levetiracetam level today.      The patient has not seen her urologist for many years.  I told her that we could check a urinalysis today, and if there is microhematuria, she will definitely contact her urologist back again for further evaluation.  She is continuing to drink large amounts of water, in the interim.      We discussed [place] regulations on seizures and driving.  She has not had any seizures in adulthood of types that would impair her driving.      PLAN:    1. Check serum levetiracetam level today.     2. Urinalysis today.   3. Continue levetiracetam at 2000/1500 mg daily.   4. Outpatient 3-hour video EEG.   5. Return visit in approximately 3 months.   I spent 65 minutes in this patient care, more than 50% of which consisted of counseling and coordinating care.       Emmanuel Madrid M.D.   Professor of Neurology      D: 2017 11:04   T: 2017 12:39   MT: tb      Name:     GRADY KRISHNAN   MRN:      [id number]        Account:      [id number]   :      1965           Service Date: 2017      Document: [id number]

## 2017-06-14 LAB — LEVETIRACETAM SERPL-MCNC: 43 UG/ML

## 2017-06-15 ENCOUNTER — OFFICE VISIT (OUTPATIENT)
Dept: NEUROLOGY | Facility: CLINIC | Age: 52
End: 2017-06-15

## 2017-06-15 DIAGNOSIS — G40.119 PARTIAL SYMPTOMATIC EPILEPSY WITH SIMPLE PARTIAL SEIZURES, INTRACTABLE, WITHOUT STATUS EPILEPTICUS (H): ICD-10-CM

## 2017-06-15 NOTE — MR AVS SNAPSHOT
After Visit Summary   6/15/2017    Marlyn Wilder    MRN: [id number]           Patient Information     Date Of Birth          1965        Visit Information        Provider Department      6/15/2017 12:00 PM  EEG TECH 2 [facility]        Today's Diagnoses     Partial symptomatic epilepsy with simple partial seizures, intractable, without status epilepticus (H)           Follow-ups after your visit        Your next 10 appointments already scheduled     Sep 12, 2017  4:30 PM CDT   (Arrive by 4:15 PM)   Return Seizure with Emmanuel Madrid MD   [facility] Neurology ([facility])    [address] [place] [id number]   [phone]            May 17, 2018  9:15 AM CDT   (Arrive by 9:00 AM)   MR BRAIN W/O & W CONTRAST with [id number]   [facility] MRI ([facility])    [address] [place] [id number] [phone]           Take your medicines as usual, unless your doctor tells you not to. Bring a list of your current medicines to your exam (including vitamins, minerals and over-the-counter drugs).  You will be given intravenous contrast for this exam. To prepare:   The day before your exam, drink extra fluids at least six 8-ounce glasses (unless your doctor tells you to restrict your fluids).   Have a blood test (creatinine test) within 30 days of your exam. Go to your clinic or Diagnostic Imaging Department for this test.  The MRI machine uses a strong magnet. Please wear clothes without metal (snaps, zippers). A sweatsuit works well, or we may give you a hospital gown.  Please remove any body piercings and hair extensions before you arrive. You will also remove watches, jewelry, hairpins, wallets, dentures, partial dental plates and hearing aids. You may wear contact lenses, and you may be able to wear your rings. We have a safe place to keep your personal items, but it is safer to  leave them at home.   **IMPORTANT** THE INSTRUCTIONS BELOW ARE ONLY FOR THOSE PATIENTS WHO HAVE BEEN TOLD THEY WILL RECEIVE SEDATION OR GENERAL ANESTHESIA DURING THEIR MRI PROCEDURE:  IF YOU WILL RECEIVE SEDATION (take medicine to help you relax during your exam):   You must get the medicine from your doctor before you arrive. Bring the medicine to the exam. Do not take it at home.   Arrive one hour early. Bring someone who can take you home after the test. Your medicine will make you sleepy. After the exam, you may not drive, take a bus or take a taxi by yourself.   No eating 8 hours before your exam. You may have clear liquids up until 4 hours before your exam. (Clear liquids include water, clear tea, black coffee and fruit juice without pulp.)  IF YOU WILL RECEIVE ANESTHESIA (be asleep for your exam):   Arrive 1 1/2 hours early. Bring someone who can take you home after the test. You may not drive, take a bus or take a taxi by yourself.   No eating 8 hours before your exam. You may have clear liquids up until 4 hours before your exam. (Clear liquids include water, clear tea, black coffee and fruit juice without pulp.)  Please call the Imaging Department at your exam site with any questions.            May 29, 2018 10:00 AM CDT   (Arrive by 9:45 AM)   Return Visit with Pan Calero MD   [facility] Neurosurgery ([facility])    [address] [place] [id number] [phone]              Who to contact     Please call your clinic at [phone] to:    Ask questions about your health    Make or cancel appointments    Discuss your medicines    Learn about your test results    Speak to your doctor   If you have compliments or concerns about an experience at your clinic, or if you wish to file a complaint, please contact [facility] Patient Relations at [phone] or email us at [email or web address]         Additional  Information About Your Visit        [employer] Information     [employer] gives you secure access to your electronic health record. If you see a primary care provider, you can also send messages to your care team and make appointments. If you have questions, please call your primary care clinic.  If you do not have a primary care provider, please call [phone] and they will assist you.      [employer] is an electronic gateway that provides easy, online access to your medical records. With [employer], you can request a clinic appointment, read your test results, renew a prescription or communicate with your care team.     To access your existing account, please contact your [facility] or call [phone] for assistance.        Care EveryWhere ID     This is your Care EveryWhere ID. This could be used by other organizations to access your [place] medical records  [id number]        Your Vitals Were     Last Period                   10/17/2012            Blood Pressure from Last 3 Encounters:   06/13/17 146/53   05/30/17 154/73   06/21/16 155/48    Weight from Last 3 Encounters:   06/13/17 128.8 kg (284 lb)   05/30/17 127.9 kg (282 lb)   06/21/16 124 kg (273 lb 6.4 oz)              We Performed the Following     <12 hour EEG Monitoring/Video (72022 mod 52)     EEG Video Monitoring     EEG        Primary Care Provider Office Phone # Fax #    Carlos Enrique Neal [phone]       [place] [address]        Thank you!     Thank you for choosing [facility]  for your care. Our goal is always to provide you with excellent care. Hearing back from our patients is one way we can continue to improve our services. Please take a few minutes to complete the written survey that you may receive in the mail after your visit with us. Thank you!             Your Updated Medication List - Protect others around you: Learn how to safely use, store and  throw away your medicines at www.disposemymeds.org.          This list is accurate as of: 6/15/17 11:59 PM.  Always use your most recent med list.                   Brand Name Dispense Instructions for use    adalimumab 10 MG/0.2ML prefilled syringe kit    HUMIRA     Inject 10 mg Subcutaneous every 14 days       aspirin 81 MG tablet      Take 1 tablet by mouth daily.       Flurandrenolide 0.05 % Oint      Apply 1 Application topically daily as needed       FOLIC ACID PO      Take 1 mg by mouth daily       gabapentin 300 MG capsule    NEURONTIN     Take 600 mg by mouth daily as needed       LASIX 20 MG tablet   Generic drug:  furosemide      Take 20 mg by mouth as needed       levETIRAcetam 500 MG tablet    KEPPRA    210 tablet    Take 4 tablets (2000 mg) in the AM and 3 tablets (1500 mg) in the PM, by mouth.       LISINOPRIL PO      Take 2.5 mg by mouth daily       methotrexate sodium 2.5 MG Tabs      Take 5 tablets by mouth once a week       MULTIVITAMIN & MINERAL PO      Take 1 tablet by mouth daily       SYNTHROID 100 MCG tablet   Generic drug:  levothyroxine      Take 100 mcg by mouth daily

## 2017-06-16 ENCOUNTER — MYC MEDICAL ADVICE (OUTPATIENT)
Dept: NEUROLOGY | Facility: CLINIC | Age: 52
End: 2017-06-16

## 2017-06-16 NOTE — PROCEDURES
[facility] # (Out-Patient Video-EEG Monitoring)    RE: Marlyn Wilder   MRN: [id number]   : 1965   Procedure Date: 06/15/2017  Duration of Recording:  3 hours, 2 minutes.      CLINICAL SUMMARY:  This diagnostic video-EEG monitoring procedure was performed in evaluation of seizures in Marlyn Wilder, who was reported to have undergone resection of a left middle fossa meningioma.  She was reported to be receiving gabapentin and levetiracetam at the time of this recording.      TECHNICAL SUMMARY:  This continuous EEG monitoring procedure was performed with 23 scalp electrodes in 10-20 system placements, and additional scalp, precordial and other surface electrodes used for electrical referencing and artifact detection.  A single channel of EKG was recorded for purposes of analyzing EKG artifacts in the EEG channels.  Video monitoring was utilized and periodically reviewed by EEG technologist and the physician for electroclinical correlation.    INTERICTAL EEG ACTIVITIES:  During maximal waking, there was a symmetric, well-modulated, approximately 10 Hz posterior dominant rhythm, which was attenuated on eye opening.  Lower amplitude faster activities predominated anteriorly.  Drowsiness was manifested by predominance of centrally maximum semirhythmic theta slowing and dropout of the posterior dominant rhythm during deeper drowsiness.  Symmetric sleep spindles were seen during stage II sleep.    Left frontotemporal polymorphic 2-4 Hz delta slowing occurred very frequently during waking and drowsiness.    There was an ongoing breach effect that was maximal over the left mid-temporal area.    During drowsiness and stage II sleep, there were frequent left temporal wickets, usually occurring during a run of alpha activities over the left temporal lobe.    Photic stimulation and hyperventilation were not performed.    No pathological interictal epileptiform abnormalities were seen.     ICTAL RECORDINGS:  No  electrographic seizures and no paroxysmal behavioral events occurred during this procedure.     SUMMARY OF VIDEO-EEG MONITORING:    The interictal EEG recording during waking, drowsiness and stage II sleep was abnormal due to very frequent left frontotemporal polymorphic delta activities and a left mid-temporal breach effect.    No specific interictal epileptiform abnormalities, no electrographic seizures, and no paroxysmal behavioral events were recorded during the period of monitoring.    These abnormalities indicate left frontotemporal neuronal dysfunction and are consistent with the reported left middle fossa surgical procedure.  Clinical correlation is recommended.   Emmanuel Madrid M.D., Professor of Neurology         D: 06/15/2017 18:03   T: 06/15/2017 19:03   MT: MARIO      Name:     GRADY KRISHNAN   MRN:      [id number]        Account:        [id number]   :      1965           Procedure Date: 06/15/2017      Document: [id number]

## 2017-06-20 NOTE — TELEPHONE ENCOUNTER
Call placed to Lab at [facility] to follow up on unreported lab results.  Per lab personnel, the original order was cancelled, and then reordered as a future order. It is not clear why this happened.  There may be paper copies of the results still waiting to be filed, as there was a backlog following the EMR downtime last week.  Left pager number for call back. Patient may need to provide another urine sample.

## 2017-06-20 NOTE — TELEPHONE ENCOUNTER
Return call received from lab.  There is no record of having received the urine sample, and the test was reordered as future so that a sample could be delivered and tested.  Will update .  Will contact the patient and request a new sample.

## 2017-06-27 ENCOUNTER — MYC MEDICAL ADVICE (OUTPATIENT)
Dept: OTHER | Age: 52
End: 2017-06-27

## 2017-06-27 ENCOUNTER — TELEPHONE (OUTPATIENT)
Dept: NEUROLOGY | Facility: CLINIC | Age: 52
End: 2017-06-27

## 2017-06-27 NOTE — TELEPHONE ENCOUNTER
"MyChart message sent to patient with message below     Keppra (Levetiracetam) Level   Status:  Final result   Visible to patient:  Yes (MyChart) Dx:  Partial symptomatic epilepsy with sim... Order: [id number]       Notes Recorded by Emmanuel Madrid MD on 6/26/2017 at 8:12 PM    For pt letter:  \"The levetiracetam level was in the range expected for the dose.\"            2wk ago       Keppra (Levetiracetam) Level 43     Comments: Reference range: 12 to 46   Unit: ug/mL   (Note)   INTERPRETIVE INFORMATION: Keppra (Levetiracetam)   Therapeutic Range:  12-46 ug/mL              Toxic:  Not well Established   Pharmacokinetics of levetiracetam are affected by renal   function. Adverse effects may include somnolence, weakness,   headache and vomiting.   Performed by [employer],   [address] [place] [id number] [phone]   www.[employer], Maykel Wolfe MD, Lab. Director        Resulting Agency [facility]          Specimen Collected: 06/13/17 10:51 AM Last Resulted: 06/14/17  4:33 PM                                 Status of Other Orders        Order    Lab Status Result Date Provider Status       EEG Video Monitoring No Result  Ordered              Canceled By On Released       Routine UA with microscopic - No culture Iban Yun 6/13/2017  7:50 PM 6/13/2017 10:42 AM       Reason: Needs to be ordered as FUTURE             "

## 2017-09-07 ASSESSMENT — ENCOUNTER SYMPTOMS
FEVER: 0
ARTHRALGIAS: 1
WEIGHT GAIN: 0
POOR WOUND HEALING: 0
TINGLING: 1
HEMATURIA: 0
BACK PAIN: 0
CHILLS: 0
WEAKNESS: 0
DISTURBANCES IN COORDINATION: 0
MYALGIAS: 1
NAIL CHANGES: 0
SPEECH CHANGE: 0
FATIGUE: 1
DIFFICULTY URINATING: 0
JOINT SWELLING: 1
NIGHT SWEATS: 0
WEIGHT LOSS: 0
FLANK PAIN: 1
NUMBNESS: 1
POLYDIPSIA: 0
MEMORY LOSS: 0
HALLUCINATIONS: 0
LOSS OF CONSCIOUSNESS: 0
TREMORS: 0
SKIN CHANGES: 0
POLYPHAGIA: 0
MUSCLE WEAKNESS: 0
HEADACHES: 1
DIZZINESS: 0
DYSURIA: 0
ALTERED TEMPERATURE REGULATION: 0
NECK PAIN: 0
INCREASED ENERGY: 0
PARALYSIS: 0
STIFFNESS: 1
MUSCLE CRAMPS: 1
DECREASED APPETITE: 0

## 2017-09-12 ENCOUNTER — OFFICE VISIT (OUTPATIENT)
Dept: NEUROLOGY | Facility: CLINIC | Age: 52
End: 2017-09-12

## 2017-09-12 VITALS
BODY MASS INDEX: 43.65 KG/M2 | OXYGEN SATURATION: 99 % | RESPIRATION RATE: 28 BRPM | WEIGHT: 288 LBS | DIASTOLIC BLOOD PRESSURE: 75 MMHG | HEART RATE: 63 BPM | HEIGHT: 68 IN | SYSTOLIC BLOOD PRESSURE: 164 MMHG

## 2017-09-12 DIAGNOSIS — G40.119 PARTIAL SYMPTOMATIC EPILEPSY WITH SIMPLE PARTIAL SEIZURES, INTRACTABLE, WITHOUT STATUS EPILEPTICUS (H): Primary | ICD-10-CM

## 2017-09-12 RX ORDER — LEVETIRACETAM 500 MG/1
TABLET ORAL
Qty: 180 TABLET | Refills: 11 | Status: SHIPPED | OUTPATIENT
Start: 2017-09-12 | End: 2018-01-16

## 2017-09-12 NOTE — LETTER
9/12/2017       RE: Marlyn Wilder  [address]  [facility] [address]     Dear Colleague,    Thank you for referring your patient, Marlyn Wilder, to the [facility] NEUROLOGY at [facility]. Please see a copy of my visit note below.        [facility]: RETURN VISIT     HISTORY:  Ms. 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  She came to the visit today with her .     Following the most recent visit to this clinic on 06/13/2017, the patient reportedly has not had any seizures with LOC, unresponsiveness, confusion, falling or convulsive movements.  She has been having isolated auras about 5-6 times per month, most recently on 09/08/2017.  She has had mild lethargy increase and mild irritability after increasing levetiracetam.     Ictal semiology-history:    The patient confirmed previously presented history in detail today. She again noted that she had a single febrile convulsion of early childhood, but otherwise had no seizures until the initial manifestation of her meningioma in 2011.  She has had only 1 type of seizure since 2011.      The patient has had exclusively simple partial seizures.  These first began in 2011, and have continued on since then.  They have been quite stable in semiology and frequency.  She has at least 20 per year, but has only up to 2 on a single day and usually only one at a time.  The full form of the aura lasts almost 1 minute and features a froilan vu phenomenon associated with a sense that she has just viewed a complex visual scene briefly, although she cannot remember the nature of the scene, with an olfactory hallucination of burning electronics, and waves of intense nausea.  She does not have a postictal state with this.  She is always able to speak if she is with someone.  Her  has seen many of these, which  she described the auras in detail to him and she is always able to respond to him quickly.  She never has any automatic behaviors as best he can determine.  Sometimes she has briefer events that feature only 1-2 of her 3 aura phenomena.      The patient has not had any staring spells with unresponsiveness or confusion, and has not had any grand mal seizures following that of early childhood.  She only has involuntary jerks on falling asleep in the evening.      Auras appear to have been triggered or exacerbated by forgetting to take medications, which happens only rarely, by emotional stress, and by tiredness.      Epilepsy-seizure predispositions:   The patient's epilepsy risk factor is a left middle fossa meningioma with extension into the left parasellar space.  This was resected 3 times in , and pathology showed that there was a grade I meningothelial meningioma.  The portion that is in the parasellar space has surrounded the left optic nerve, and she does have visual deficits on the left.  She had radiation therapy following the last resection by Dr. Calero.  She has had followup scans since that time, which showed a stable lesion as recently as 2017.        There is no family history of seizures or epilepsy.      The patient had a single febrile convulsion of early childhood when she was approximately 1 years old.      She has no history of gestational or  injury, developmental delay, stroke, meningitis, encephalitis, and significant head injury.  She denied a history of physical or sexual abuse by an adult during her childhood or adulthood.      Laboratory evaluations:   The most recent brain MRI was performed at [facility] on 2017, and was reported to show a stable left medial middle fossa lesion extending into the parasellar space in the left orbital apex, with mild mass effect and with enhancement.      A routine EEG was performed in [place] in , and the report indicated left  hemispheric slowing and spikes.   An out-patient EEG at [facility] on 06/15/2016 was abnormal due to left frontotemporal delta slowing and breach effect.     Epilepsy therapeutics:   The only anti-seizure medication that the patient has used has been levetiracetam.  This was started in 2012.  It has been consistently associated with irritability.  The patient has been able to control her irritability and has continued to function well as an  for the [facility] [employer].      PAST MEDICAL-SURGICAL HISTORY:    1. 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   2. History of left middle fossa meningothelial meningioma, grade 1, with extension to the left parasellar area, left orbital apex and cavernous sinus; status post resections (2012) and radiation therapy.   3. History of probable transient ischemic attack with aphasia (2013).   4. Obstructive sleep apnea, treated with CPAP.   5. Systemic hypertension, treated.   6. Hyperlipidemia.   7. History of recurring renal calculi.   8. History of arthralgia attributed to atypical rheumatoid arthritis or fibromyalgia.   9. Hypothyroidism.   10. Status post left carpal tunnel release.   11. History of TMJ syndrome.     FAMILY HISTORY:  There is no family history of seizures, epilepsy or other neurological conditions.      PERSONAL AND SOCIAL HISTORY:  The patient completed her education through a [employer] degree.  She is working as an  for a [place] [employer].  She lives with her .  They have 3 adult children, no longer in the home.      REVIEW OF SYSTEMS:  The patient has noted recent left flank pains over the last week, which are somewhat reminiscent of a milder version of her pain with renal calculi; she has not noted any gross hematuria.   She denied history of attention and memory disturbance, difficulties with comprehension and expression, difficulty in solving problems, weakness, tremors or other abnormal involuntary movements, numbness or  tingling, incoordination, falling or difficulty in walking, urinary or fecal incontinence, headache and other pain, except as described above.  The patient denied any history of severe depression or suicidal ideation, severe anxiety, panic attacks, hallucinations, delusions, psychosis, substance abuse, or psychiatric hospitalization.  She denied rashes and easy bruising.  The remainder of a 14-system symptom review was negative except as noted above.       MEDICATIONS:  Levetiracetam 2000/1500 mg daily, and other medications as per the electronic medical record.     PHYSICAL EXAMINATION:    The patient was an alert woman in no apparent distress.  Vital signs were as per the electronic medical record.  There was a skull defect consistent with reported surgery.  Neck was supple, without signs of meningeal irritation.      On neurological examination the patient appeared alert and was fully oriented to person, place, time, and reason for visit.  Speech showed normal articulation, fluency, repetitions, naming, syntax and comprehension.  Cranial nerves III through XII were normal.  Muscle masses, tones and strengths were normal throughout.  There was no pronator drift.  Sequential fine finger movements were performed normally with each hand.  No spontaneous tremors, myoclonus, or other abnormal movements were observed.  Sensations of light touch, pin prick, vibration and proprioception were reportedly normal throughout.  The rapid alternating movements, and finger-nose-finger and heel-shin maneuvers were performed normally bilaterally.  Romberg maneuver was negative.  Regular, heel, toe, tandem and reverse tandem walking were normal.  Deep tendon reflexes were normal and symmetric throughout.  Toes were downgoing bilaterally.      IMPRESSION:    The patient has had no complex partial or grand mal seizures on levetiracetam.    She has symptoms virtually diagnostic of simple partial seizures of mesial temporal origin,  given the location of the meningioma adjacent to the left mesial temporal lobe.  Fortunately, she has not had any seizures with impaired awareness, either as complex partial seizures or grand mal seizures (except for a single febrile convulsion of early childhood).  I told her that it will be very important to stay on an anti-seizure medication given the location of the tumor and her history.  We discussed in detail the appearance of complex partial seizures or grand mal seizures, and her  would take her to the emergency room if either occurred.      I again reviewed with the patient that there are many medication options other than levetiracetam.  Apparently she has been working hard to keep her irritability under control for many years on levetiracetam, with good success.  Nonetheless, this is rather bothersome, and crossover to lamotrigine monotherapy or other therapies may be indicated.  She would like to consider this after we completed a brief reevaluation with electroencephalography.  We also will obtain a levetiracetam level today.      We discussed [place] regulations on seizures and driving.  She has not had any seizures in adulthood of types that would impair her driving.      PLAN:    1. Decrease levetiracetam to 1500/1500 mg daily.   2. Return visit in approximately 4 months.   I spent 2minutes in this patient care, more than 50% of which consisted of counseling and coordinating care.       Emmanuel Madrid M.D.   Professor of Neurology

## 2017-09-12 NOTE — MR AVS SNAPSHOT
After Visit Summary   9/12/2017    Marlyn Wilder    MRN: [id number]           Patient Information     Date Of Birth          1965        Visit Information        Provider Department      9/12/2017 4:30 PM Emmanuel Madrid MD [facility] Neurology        Today's Diagnoses     Partial symptomatic epilepsy with simple partial seizures, intractable, without status epilepticus (H)    -  1       Follow-ups after your visit        Follow-up notes from your care team     Return in about 4 months (around 1/12/2018).      Your next 10 appointments already scheduled     Jan 16, 2018  2:30 PM CST   (Arrive by 2:15 PM)   Return Seizure with Emmanuel Madrid MD   [facility] Neurology ([facility])    [address]  3rd Floor  [place] [facility] [address] [phone]            May 17, 2018  9:15 AM CDT   (Arrive by 9:00 AM)   MR BRAIN W/O & W CONTRAST with [id number]   [facility] MRI ([facility])    [address]  1st Floor  [place] [facility] [address] [phone]           Take your medicines as usual, unless your doctor tells you not to. Bring a list of your current medicines to your exam (including vitamins, minerals and over-the-counter drugs).  You will be given intravenous contrast for this exam. To prepare:   The day before your exam, drink extra fluids at least six 8-ounce glasses (unless your doctor tells you to restrict your fluids).   Have a blood test (creatinine test) within 30 days of your exam. Go to your clinic or Diagnostic Imaging Department for this test.  The MRI machine uses a strong magnet. Please wear clothes without metal (snaps, zippers). A sweatsuit works well, or we may give you a hospital gown.  Please remove any body piercings and hair extensions before you arrive. You will also remove watches, jewelry, hairpins, wallets, dentures, partial dental plates and hearing aids. You may wear contact lenses, and you may  be able to wear your rings. We have a safe place to keep your personal items, but it is safer to leave them at home.   **IMPORTANT** THE INSTRUCTIONS BELOW ARE ONLY FOR THOSE PATIENTS WHO HAVE BEEN TOLD THEY WILL RECEIVE SEDATION OR GENERAL ANESTHESIA DURING THEIR MRI PROCEDURE:  IF YOU WILL RECEIVE SEDATION (take medicine to help you relax during your exam):   You must get the medicine from your doctor before you arrive. Bring the medicine to the exam. Do not take it at home.   Arrive one hour early. Bring someone who can take you home after the test. Your medicine will make you sleepy. After the exam, you may not drive, take a bus or take a taxi by yourself.   No eating 8 hours before your exam. You may have clear liquids up until 4 hours before your exam. (Clear liquids include water, clear tea, black coffee and fruit juice without pulp.)  IF YOU WILL RECEIVE ANESTHESIA (be asleep for your exam):   Arrive 1 1/2 hours early. Bring someone who can take you home after the test. You may not drive, take a bus or take a taxi by yourself.   No eating 8 hours before your exam. You may have clear liquids up until 4 hours before your exam. (Clear liquids include water, clear tea, black coffee and fruit juice without pulp.)  Please call the Imaging Department at your exam site with any questions.            May 29, 2018 10:00 AM CDT   (Arrive by 9:45 AM)   Return Visit with Pan Calero MD   [facility] Neurosurgery ([facility])    [address] [address] [phone]              Who to contact     Please call your clinic at [phone] to:    Ask questions about your health    Make or cancel appointments    Discuss your medicines    Learn about your test results    Speak to your doctor   If you have compliments or concerns about an experience at your clinic, or if you wish to file a complaint, please contact [facility]  "Patient Relations at [phone] or email us at [email or web address]         Additional Information About Your Visit        SPark!hart Information     Re[employer]X gives you secure access to your electronic health record. If you see a primary care provider, you can also send messages to your care team and make appointments. If you have questions, please call your primary care clinic.  If you do not have a primary care provider, please call [phone] and they will assist you.      Re[employer] is an electronic gateway that provides easy, online access to your medical records. With [employer], you can request a clinic appointment, read your test results, renew a prescription or communicate with your care team.     To access your existing account, please contact your [facility] or call [phone] for assistance.        Care EveryWhere ID     This is your Care EveryWhere ID. This could be used by other organizations to access your [facility] medical records  [id number]        Your Vitals Were     Pulse Respirations Height Last Period Pulse Oximetry Breastfeeding?    63 28 1.727 m (5' 8\") 10/17/2012 99% No    BMI (Body Mass Index)                   43.79 kg/m2            Blood Pressure from Last 3 Encounters:   09/12/17 164/75   06/13/17 146/53   05/30/17 154/73    Weight from Last 3 Encounters:   09/12/17 130.6 kg (288 lb)   06/13/17 128.8 kg (284 lb)   05/30/17 127.9 kg (282 lb)              Today, you had the following     No orders found for display         Today's Medication Changes          These changes are accurate as of: 9/12/17  5:56 PM.  If you have any questions, ask your nurse or doctor.               These medicines have changed or have updated prescriptions.        Dose/Directions    levETIRAcetam 500 MG tablet   Commonly known as:  KEPPRA   This may have changed:  additional instructions   Used for:  Partial symptomatic epilepsy with simple partial seizures, intractable, " without status epilepticus (H)   Changed by:  Emmanuel Madrid MD        Take 3 tablets (1500 mg) in the AM and 3 tablets (1500 mg) in the PM, by mouth.   Quantity:  180 tablet   Refills:  11            Where to get your medicines      These medications were sent to [id number] - [facility] [address]  [address] [address], [facility] [address]     Phone:  [phone]     levETIRAcetam 500 MG tablet                Primary Care Provider Office Phone # Fax #    Carlos Enrique Neal [phone]       [address] [address]        Equal Access to Services     ESA SANCHEZ : Hadii emani omalleyo Soclarissa, waaxda luqadaha, qaybta kaalmada adeegyada, michelle chung . So [facility] [phone].    ATENCIÓN: Si habla español, tiene a carvajal disposición servicios gratuitos de asistencia lingüística. [facility] [phone].    We comply with applicable federal civil rights laws and [place] laws. We do not discriminate on the basis of race, color, national origin, age, disability sex, sexual orientation or gender identity.            Thank you!     Thank you for choosing [facility] NEUROLOGY  for your care. Our goal is always to provide you with excellent care. Hearing back from our patients is one way we can continue to improve our services. Please take a few minutes to complete the written survey that you may receive in the mail after your visit with us. Thank you!             Your Updated Medication List - Protect others around you: Learn how to safely use, store and throw away your medicines at www.disposemymeds.org.          This list is accurate as of: 9/12/17  5:56 PM.  Always use your most recent med list.                   Brand Name Dispense Instructions for use Diagnosis    adalimumab 10 MG/0.2ML prefilled syringe kit    HUMIRA     Inject 10 mg Subcutaneous every 14 days        aspirin 81 MG  tablet      Take 1 tablet by mouth daily.    TIA (transient ischaemic attack)       Flurandrenolide 0.05 % Oint      Apply 1 Application topically daily as needed        FOLIC ACID PO      Take 1 mg by mouth daily        gabapentin 300 MG capsule    NEURONTIN     Take 600 mg by mouth daily as needed        LASIX 20 MG tablet   Generic drug:  furosemide      Take 20 mg by mouth as needed        levETIRAcetam 500 MG tablet    KEPPRA    180 tablet    Take 3 tablets (1500 mg) in the AM and 3 tablets (1500 mg) in the PM, by mouth.    Partial symptomatic epilepsy with simple partial seizures, intractable, without status epilepticus (H)       LISINOPRIL PO      Take 2.5 mg by mouth daily        methotrexate sodium 2.5 MG Tabs      Take 5 tablets by mouth once a week        MULTIVITAMIN & MINERAL PO      Take 1 tablet by mouth daily        SYNTHROID 100 MCG tablet   Generic drug:  levothyroxine      Take 100 mcg by mouth daily

## 2017-09-12 NOTE — NURSING NOTE
Chief Complaint   Patient presents with     RECHECK     [facility]- SEIZURE DISORDER, 3 MONTHS F/U     Tomer Galan, CMA

## 2017-09-19 ENCOUNTER — HOSPITAL ENCOUNTER (OUTPATIENT)
Dept: MAMMOGRAPHY | Facility: CLINIC | Age: 52
Discharge: HOME | End: 2017-09-19
Attending: OBSTETRICS & GYNECOLOGY | Admitting: OBSTETRICS & GYNECOLOGY
Payer: COMMERCIAL

## 2017-09-19 DIAGNOSIS — Z12.31 ENCOUNTER FOR SCREENING MAMMOGRAM FOR HIGH-RISK PATIENT: ICD-10-CM

## 2017-09-19 PROCEDURE — G0202 SCR MAMMO BI INCL CAD: HCPCS

## 2017-09-19 PROCEDURE — 77063 BREAST TOMOSYNTHESIS BI: CPT

## 2017-09-20 ENCOUNTER — HOSPITAL ENCOUNTER (OUTPATIENT)
Dept: ULTRASOUND IMAGING | Facility: CLINIC | Age: 52
Discharge: HOME | End: 2017-09-20
Attending: OBSTETRICS & GYNECOLOGY | Admitting: OBSTETRICS & GYNECOLOGY
Payer: COMMERCIAL

## 2017-09-20 DIAGNOSIS — R92.8 ABNORMAL MAMMOGRAM: ICD-10-CM

## 2017-09-20 PROCEDURE — 76642 ULTRASOUND BREAST LIMITED: CPT | Mod: LT

## 2018-01-16 ENCOUNTER — OFFICE VISIT (OUTPATIENT)
Dept: NEUROLOGY | Facility: CLINIC | Age: 53
End: 2018-01-16
Payer: COMMERCIAL

## 2018-01-16 VITALS
HEART RATE: 72 BPM | WEIGHT: 285.6 LBS | BODY MASS INDEX: 43.29 KG/M2 | OXYGEN SATURATION: 95 % | DIASTOLIC BLOOD PRESSURE: 67 MMHG | SYSTOLIC BLOOD PRESSURE: 142 MMHG | HEIGHT: 68 IN

## 2018-01-16 DIAGNOSIS — G40.119 PARTIAL SYMPTOMATIC EPILEPSY WITH SIMPLE PARTIAL SEIZURES, INTRACTABLE, WITHOUT STATUS EPILEPTICUS (H): Primary | ICD-10-CM

## 2018-01-16 RX ORDER — PREDNISONE 20 MG/1
TABLET ORAL DAILY
COMMUNITY
End: 2018-07-16

## 2018-01-16 RX ORDER — LEVETIRACETAM 500 MG/1
TABLET ORAL
Qty: 180 TABLET | Refills: 11 | Status: SHIPPED | OUTPATIENT
Start: 2018-01-16 | End: 2018-07-16

## 2018-01-16 ASSESSMENT — PAIN SCALES - GENERAL: PAINLEVEL: NO PAIN (0)

## 2018-01-16 NOTE — LETTER
"2018       RE: Marlyn Wilder  [address] [facility] [address]     Dear Colleague,    Thank you for referring your patient, Marlyn Wilder, to the [facility] NEUROLOGY at [facility]. Please see a copy of my visit note below.      [facility] Progress Note  Patient Name: Marlyn Wilder  : 1965  Date of Service:  2018    SUBJECTIVE  Marlyn Wilder is a 52 year old female who presents for follow-up of localization related epilepsy. She is unaccompanied.    She was last seen in clinic 17. At that visit, we decided to decrease keppra from 5868-3265 to 7005-9140. She reports that immediately after making this decrease, she noticed a marked increase in her auras of nausea and fatigue - these were happening either daily or every other day for a while, then subsided. At present these are occurring roughly once per week - which she feels is similar to their frequency before the keppra decrease. She also noted one or two simple partial seizures with a smell of \"burning\" immediately after the keppra decrease, but none in the last month. She denies having any seizures with loss of awareness at any time.    Reports tendency towards anger outbursts which she attributes to keppra. Did not feel that this improved after decreasing her dose.    Reports frequent fatigue and excess yawning, impairing her ability to work. Tends to be worse after her auras, but also feels tired often at other times. Getting 8 hours sleep/night-  Sleep at 2230, wake at 0630 - get up once overnight to use bathroom. Uses CPAP overnight. Denies AM headaches. Will still feel sleepy even after getting full night's sleep.    ROS  Reports a current cold/URI. Reports declining vision in her left eye over the last few months. No hospitalizations. No falls. Reports mild occasional headaches - these are rare and not bothering her or getting more frequent.  10-point review of systems " "completed and negative, except as noted above.      OBJECTIVE  /67 (BP Location: Right arm, Patient Position: Sitting, Cuff Size: Adult Large)  Pulse 72  Ht 1.727 m (5' 8\")  Wt 129.5 kg (285 lb 9.6 oz)  LMP 10/17/2012  SpO2 95%  BMI 43.43 kg/m2  GEN Appears well nourished and well groomed.  HEENT Sclerae anicteric. Oropharynx moist.  CVS Regular rate and rhythm. No murmurs noted.   Peripheral Pulse palpable. No edema.  PULM No respiratory distress. CTAB  DERM Dark papular rash down the R leg. Recent skin biopsy scar around the R ankle.  PSYC Affect constricted - occasionally slightly tearful.  EYE Right optic disc round with fairly crisp margins. Left optic disc with hazy border, with irregular upper lateral boundary, and small irregularly shaped cup, L C/D ratio of 0.2. Vessel pulsations noted bilaterally. No obvious hemorrhages.  NEURO   MS Oriented to date, place, person. Memory of recent and remote events intact. Recalls 3/3 objects.    Attention appropriate. Good fund of knowledge. No overt aphasia.   CN Visual fields intact to finger counting in all 4 quadrants, but does report vision in the left eye's lateral field is hazy. Reports diplopia with left gaze. RAPD on the left. Pupils round and reactive. Extraocular range of motion intact. Facial sensation decreased to light touch on the left. Facial movements symmetric. Hearing intact to conversation. Palate elevation symmetric, uvula midline. Neck flexion/extension strong. Tongue protrusion midline.  No dysarthria.   MTR No abnormal movements observed. Normal tone. No atrophy noted. No drift.   Strength full in upper and lower extremities, proximally and distally.   SNS Symmetric to light touch in the arm and leg.    RFLX 3+ (with spread) and symmetric in the biceps, brach. 2+ patella - symmetric. 2+ R achilles, 1+ L achilles. Babinski downgoing.   CRD Finger-nose and heel-shin intact bilaterally.   GAIT Normal gait width and stride length. Negative " romberg.      IMPRESSION  # Partial symptomatic epilepsy, with simple partial seizures, intractable, without status epilepticus  Patient with epilepsy, secondary to left perisellar meningioma, s/p multiple resective surgeries and prior radiation therapy. Simple partial seizures have been refractory to keppra - occurring roughly once per week. Furthermore, keppra is poorly tolerated, due to irritability/anger. Discussed at length today doing a trial of lamotrigine, in hopes of eventually taking her off keppra. She is receptive to trying this plan in the future, but declined starting lamotrigine at the present, due to the recent development of a suspected autoimmune rash down her R leg. She is currently completing a topical steroid regimen for this rash - once her rash has resolved she would be willing to reconsider lamotrigine.    She continues to have frequent partial seizures, despite Keppra, it is imperative that she continue on anticonvulsant therapy for the foreseeable future.  As none of her recent seizures have had any associated loss of awareness, she is not precluded from driving at this time. As these seizures are not impairing, and as the Keppra appears to be doing some good for the seizures, it would be reasonable to continue this as monotherapy for the time being, until the patient is ready to begin a trial of another AED.  Should she develop convulsive seizures, or seizures with loss of awareness in the future, I would urgently consider advancing her anticonvulsant regimen with either lamotrigine or Trileptal.    # Vision Loss  # Meningioma  Patient reports progressive left eye vision loss which has been ongoing for several years is notably worse in the last few months.  Exam today reveals irregular optic disc margin on the left, concerning for papilledema, possibly chronic.  Last MRI on 5/18/17 demonstrated a stable left parasellar meningioma with invasion into the left optic chiasm, compressing the  left optic nerve.  It is impossible for me to determine the acuity of her optic disc changes my exam today.  She will benefit from close follow-up with her neuro-ophthalmologist at [facility].  I discussed these findings in person with both Dr. Madrid and Dr. Calero.  Dr. Santos was well aware of the left eye visual loss, and explained that the complexity and location of her tumor and the high risk in reoperating, would likely result in eventual vision loss in her left eye.  Given the more recent visual change however, he did discussed that he would attempt to see her back in clinic sooner than her currently scheduled May follow-up, sometime after she has been seen by neuro-ophthalmology in March.    PLAN  1. Check keppra level at next scheduled blood draw  2. Close follow-up with neuro-ophthalmology and with neurosurgery  3. Continue keppra 1500mg BID  4. Follow-up in 6 months - consider trial of alternate AED at that time    Patient seen and discussed with staff neurologist, Dr. Madrid, who agrees with the plan.  Renzo Woodruff MD. Epilepsy Fellow.    Report Prepared By: Renzo Woodruff MD, Neurology Epilepsy Fellow  I agree with the findings and plan of care as documented.  I personally examined the patient, and discussed our diagnostic impressions and therapeutic recommendations with the patient.  She was agreeable to this plan.      I spent 25 minutes in this patient care, more than 50% of which consisted of counseling and coordinating care.   Sincerely,    Emmanuel Madrid MD

## 2018-01-16 NOTE — NURSING NOTE
Chief Complaint   Patient presents with     RECHECK     [facility] RETURN - SEIZURE     Sandra Foster MA

## 2018-01-16 NOTE — PROGRESS NOTES
"  [facility] / [facility] Epilepsy Clinic Progress Note  Patient Name: Marlyn Wilder  : 1965  Date of Service:  2018    SUBJECTIVE  Marlyn Wilder is a 52 year old female who presents for follow-up of localization related epilepsy. She is unaccompanied.    She was last seen in clinic 17. At that visit, we decided to decrease keppra from 2954-3624 to 9778-3145. She reports that immediately after making this decrease, she noticed a marked increase in her auras of nausea and fatigue - these were happening either daily or every other day for a while, then subsided. At present these are occurring roughly once per week - which she feels is similar to their frequency before the keppra decrease. She also noted one or two simple partial seizures with a smell of \"burning\" immediately after the keppra decrease, but none in the last month. She denies having any seizures with loss of awareness at any time.    Reports tendency towards anger outbursts which she attributes to keppra. Did not feel that this improved after decreasing her dose.    Reports frequent fatigue and excess yawning, impairing her ability to work. Tends to be worse after her auras, but also feels tired often at other times. Getting 8 hours sleep/night-  Sleep at 2230, wake at 0630 - get up once overnight to use bathroom. Uses CPAP overnight. Denies AM headaches. Will still feel sleepy even after getting full night's sleep.    ROS  Reports a current cold/URI. Reports declining vision in her left eye over the last few months. No hospitalizations. No falls. Reports mild occasional headaches - these are rare and not bothering her or getting more frequent.  10-point review of systems completed and negative, except as noted above.      OBJECTIVE  /67 (BP Location: Right arm, Patient Position: Sitting, Cuff Size: Adult Large)  Pulse 72  Ht 1.727 m (5' 8\")  Wt 129.5 kg (285 lb 9.6 oz)  LMP 10/17/2012  SpO2 95%  BMI 43.43 kg/m2  GEN Appears well " nourished and well groomed.  HEENT Sclerae anicteric. Oropharynx moist.  CVS Regular rate and rhythm. No murmurs noted.   Peripheral Pulse palpable. No edema.  PULM No respiratory distress. CTAB  DERM Dark papular rash down the R leg. Recent skin biopsy scar around the R ankle.  PSYC Affect constricted - occasionally slightly tearful.  EYE Right optic disc round with fairly crisp margins. Left optic disc with hazy border, with irregular upper lateral boundary, and small irregularly shaped cup, L C/D ratio of 0.2. Vessel pulsations noted bilaterally. No obvious hemorrhages.  NEURO   MS Oriented to date, place, person. Memory of recent and remote events intact. Recalls 3/3 objects.    Attention appropriate. Good fund of knowledge. No overt aphasia.   CN Visual fields intact to finger counting in all 4 quadrants, but does report vision in the left eye's lateral field is hazy. Reports diplopia with left gaze. RAPD on the left. Pupils round and reactive. Extraocular range of motion intact. Facial sensation decreased to light touch on the left. Facial movements symmetric. Hearing intact to conversation. Palate elevation symmetric, uvula midline. Neck flexion/extension strong. Tongue protrusion midline.  No dysarthria.   MTR No abnormal movements observed. Normal tone. No atrophy noted. No drift.   Strength full in upper and lower extremities, proximally and distally.   SNS Symmetric to light touch in the arm and leg.    RFLX 3+ (with spread) and symmetric in the biceps, brach. 2+ patella - symmetric. 2+ R achilles, 1+ L achilles. Babinski downgoing.   CRD Finger-nose and heel-shin intact bilaterally.   GAIT Normal gait width and stride length. Negative romberg.      IMPRESSION  # Partial symptomatic epilepsy, with simple partial seizures, intractable, without status epilepticus  Patient with epilepsy, secondary to left perisellar meningioma, s/p multiple resective surgeries and prior radiation therapy. Simple partial  seizures have been refractory to keppra - occurring roughly once per week. Furthermore, keppra is poorly tolerated, due to irritability/anger. Discussed at length today doing a trial of lamotrigine, in hopes of eventually taking her off keppra. She is receptive to trying this plan in the future, but declined starting lamotrigine at the present, due to the recent development of a suspected autoimmune rash down her R leg. She is currently completing a topical steroid regimen for this rash - once her rash has resolved she would be willing to reconsider lamotrigine.    She continues to have frequent partial seizures, despite Keppra, it is imperative that she continue on anticonvulsant therapy for the foreseeable future.  As none of her recent seizures have had any associated loss of awareness, she is not precluded from driving at this time. As these seizures are not impairing, and as the Keppra appears to be doing some good for the seizures, it would be reasonable to continue this as monotherapy for the time being, until the patient is ready to begin a trial of another AED.  Should she develop convulsive seizures, or seizures with loss of awareness in the future, I would urgently consider advancing her anticonvulsant regimen with either lamotrigine or Trileptal.    # Vision Loss  # Meningioma  Patient reports progressive left eye vision loss which has been ongoing for several years is notably worse in the last few months.  Exam today reveals irregular optic disc margin on the left, concerning for papilledema, possibly chronic.  Last MRI on 5/18/17 demonstrated a stable left parasellar meningioma with invasion into the left optic chiasm, compressing the left optic nerve.  It is impossible for me to determine the acuity of her optic disc changes my exam today.  She will benefit from close follow-up with her neuro-ophthalmologist at [facility].  I discussed these findings in person with both Dr. Madrid and Dr. Calero.    Tom was well aware of the left eye visual loss, and explained that the complexity and location of her tumor and the high risk in reoperating, would likely result in eventual vision loss in her left eye.  Given the more recent visual change however, he did discussed that he would attempt to see her back in clinic sooner than her currently scheduled May follow-up, sometime after she has been seen by neuro-ophthalmology in March.    PLAN  1. Check keppra level at next scheduled blood draw  2. Close follow-up with neuro-ophthalmology and with neurosurgery  3. Continue keppra 1500mg BID  4. Follow-up in 6 months - consider trial of alternate AED at that time    Patient seen and discussed with staff neurologist, Dr. Madrid, who agrees with the plan.  Renzo Woodruff MD. Epilepsy Fellow.    Report Prepared By: Renzo Woodruff MD, Neurology Epilepsy Fellow  I agree with the findings and plan of care as documented.  I personally examined the patient, and discussed our diagnostic impressions and therapeutic recommendations with the patient.  She was agreeable to this plan.      I spent 25 minutes in this patient care, more than 50% of which consisted of counseling and coordinating care.       Emmanuel Madrid M.D.   Professor of Neurology

## 2018-01-16 NOTE — MR AVS SNAPSHOT
After Visit Summary   1/16/2018    Marlyn Wilder    MRN: [id number]           Patient Information     Date Of Birth          1965        Visit Information        Provider Department      1/16/2018 2:30 PM Emmanuel Madrid MD [facility] Neurology        Today's Diagnoses     Partial symptomatic epilepsy with simple partial seizures, intractable, without status epilepticus (H)    -  1       Follow-ups after your visit        Follow-up notes from your care team     Return in about 6 months (around 7/16/2018).      Your next 10 appointments already scheduled     May 17, 2018  9:15 AM CDT   (Arrive by 9:00 AM)   MR BRAIN W/O & W CONTRAST with [id number]   [facility] [place] [facility] ([facility])    [address] [address] [phone]           Take your medicines as usual, unless your doctor tells you not to. Bring a list of your current medicines to your exam (including vitamins, minerals and over-the-counter drugs).  You will be given intravenous contrast for this exam. To prepare:   The day before your exam, drink extra fluids at least six 8-ounce glasses (unless your doctor tells you to restrict your fluids).   Have a blood test (creatinine test) within 30 days of your exam. Go to your clinic or Diagnostic Imaging Department for this test.  The MRI machine uses a strong magnet. Please wear clothes without metal (snaps, zippers). A sweatsuit works well, or we may give you a hospital gown.  Please remove any body piercings and hair extensions before you arrive. You will also remove watches, jewelry, hairpins, wallets, dentures, partial dental plates and hearing aids. You may wear contact lenses, and you may be able to wear your rings. We have a safe place to keep your personal items, but it is safer to leave them at home.   **IMPORTANT** THE INSTRUCTIONS BELOW ARE ONLY FOR THOSE PATIENTS WHO HAVE BEEN TOLD THEY WILL RECEIVE SEDATION OR  GENERAL ANESTHESIA DURING THEIR MRI PROCEDURE:  IF YOU WILL RECEIVE SEDATION (take medicine to help you relax during your exam):   You must get the medicine from your doctor before you arrive. Bring the medicine to the exam. Do not take it at home.   Arrive one hour early. Bring someone who can take you home after the test. Your medicine will make you sleepy. After the exam, you may not drive, take a bus or take a taxi by yourself.   No eating 8 hours before your exam. You may have clear liquids up until 4 hours before your exam. (Clear liquids include water, clear tea, black coffee and fruit juice without pulp.)  IF YOU WILL RECEIVE ANESTHESIA (be asleep for your exam):   Arrive 1 1/2 hours early. Bring someone who can take you home after the test. You may not drive, take a bus or take a taxi by yourself.   No eating 8 hours before your exam. You may have clear liquids up until 4 hours before your exam. (Clear liquids include water, clear tea, black coffee and fruit juice without pulp.)  Please call the Imaging Department at your exam site with any questions.            May 29, 2018 10:00 AM CDT   (Arrive by 9:45 AM)   Return Visit with Pan Calero MD   [facility] ([facility])    [address] [place] [address] [phone]            Jul 16, 2018  2:30 PM CDT   (Arrive by 2:15 PM)   Return Seizure with Emmanuel Madrid MD   [facility] ([facility])    [address] [place] [address] [phone]              Who to contact     Please call your clinic at [phone] to:    Ask questions about your health    Make or cancel appointments    Discuss your medicines    Learn about your test results    Speak to your doctor   If you have compliments or concerns about an experience at your clinic, or if you wish to file a complaint, please contact [facility]  "Patient Relations at [phone] or email us at [email or web address]         Additional Information About Your Visit        Socitivehart Information     studentSNt gives you secure access to your electronic health record. If you see a primary care provider, you can also send messages to your care team and make appointments. If you have questions, please call your primary care clinic.  If you do not have a primary care provider, please call [phone] and they will assist you.      Monitor Backlinks is an electronic gateway that provides easy, online access to your medical records. With [employer], you can request a clinic appointment, read your test results, renew a prescription or communicate with your care team.     To access your existing account, please contact your [facility] or call [phone] for assistance.        Care EveryWhere ID     This is your Care EveryWhere ID. This could be used by other organizations to access your [facility] medical records  [id number]        Your Vitals Were     Pulse Height Last Period Pulse Oximetry BMI (Body Mass Index)       72 1.727 m (5' 8\") 10/17/2012 95% 43.43 kg/m2        Blood Pressure from Last 3 Encounters:   01/16/18 142/67   09/12/17 164/75   06/13/17 146/53    Weight from Last 3 Encounters:   01/16/18 129.5 kg (285 lb 9.6 oz)   09/12/17 130.6 kg (288 lb)   06/13/17 128.8 kg (284 lb)              Today, you had the following     No orders found for display         Where to get your medicines      These medications were sent to [facility] [address] [facility] [address] [facility] [address] [facility], [facility] [address]     Phone:  [phone]     levETIRAcetam 500 MG tablet          Primary Care Provider Office Phone # Fax #    Carlos Enrique Ibarrakayleen [phone]       [address] [address]        Equal Access to Services     ESA SANCHEZ AH: Hadii aad " giuliana Rubio, watashiada luqadaha, qaybta kajos hughes, michelle elbain hayaakayleen galvankee donis laloikayleen leeanne. So [facility] [phone].    ATENCIÓN: Si habla dawson, tiene a carvajal disposición servicios gratuitos de asistencia lingüística. Stan al [phone].    We comply with applicable federal civil rights laws and [place] laws. We do not discriminate on the basis of race, color, national origin, age, disability, sex, sexual orientation, or gender identity.            Thank you!     Thank you for choosing [facility] NEUROLOGY  for your care. Our goal is always to provide you with excellent care. Hearing back from our patients is one way we can continue to improve our services. Please take a few minutes to complete the written survey that you may receive in the mail after your visit with us. Thank you!             Your Updated Medication List - Protect others around you: Learn how to safely use, store and throw away your medicines at www.disposemymeds.org.          This list is accurate as of: 1/16/18  4:30 PM.  Always use your most recent med list.                   Brand Name Dispense Instructions for use Diagnosis    aspirin 81 MG tablet      Take 1 tablet by mouth daily.    TIA (transient ischaemic attack)       BENZONATATE PO      Take 100 mg by mouth every 4 hours as needed for cough        ENBREL SC      Inject Subcutaneous once a week        Flurandrenolide 0.05 % Oint      Apply 1 Application topically daily as needed        FOLIC ACID PO      Take 1 mg by mouth daily        gabapentin 300 MG capsule    NEURONTIN     Take 600 mg by mouth daily as needed        LASIX 20 MG tablet   Generic drug:  furosemide      Take 20 mg by mouth as needed        levETIRAcetam 500 MG tablet    KEPPRA    180 tablet    Take 3 tablets (1500 mg) in the AM and 3 tablets (1500 mg) in the PM, by mouth.    Partial symptomatic epilepsy with simple partial seizures, intractable, without status epilepticus (H)       LISINOPRIL PO       Take 2.5 mg by mouth daily        methotrexate sodium 2.5 MG Tabs      Take 5 tablets by mouth once a week        MULTIVITAMIN & MINERAL PO      Take 1 tablet by mouth daily        predniSONE 20 MG tablet    DELTASONE     Take by mouth daily        PROAIR HFA IN      Inhale 8.5 g into the lungs        SYNTHROID 100 MCG tablet   Generic drug:  levothyroxine      Take 100 mcg by mouth daily

## 2018-05-17 ENCOUNTER — RADIANT APPOINTMENT (OUTPATIENT)
Dept: MRI IMAGING | Facility: CLINIC | Age: 53
End: 2018-05-17
Attending: NEUROLOGICAL SURGERY
Payer: COMMERCIAL

## 2018-05-17 DIAGNOSIS — D32.0 BENIGN NEOPLASM OF CEREBRAL MENINGES (H): ICD-10-CM

## 2018-05-17 RX ORDER — GADOBUTROL 604.72 MG/ML
15 INJECTION INTRAVENOUS ONCE
Status: COMPLETED | OUTPATIENT
Start: 2018-05-17 | End: 2018-05-17

## 2018-05-17 RX ADMIN — GADOBUTROL 13 ML: 604.72 INJECTION INTRAVENOUS at 09:06

## 2018-05-17 NOTE — DISCHARGE INSTRUCTIONS
MRI Contrast Discharge Instructions    The IV contrast you received today will pass out of your body in your  urine. This will happen in the next 24 hours. You will not feel this process.  Your urine will not change color.    Drink at least 4 extra glasses of water or juice today (unless your doctor  has restricted your fluids). This reduces the stress on your kidneys.  You may take your regular medicines.    If you are on dialysis: It is best to have dialysis today.    If you have a reaction: Most reactions happen right away. If you have  any new symptoms after leaving the hospital (such as hives or swelling),  call your hospital at the correct number below. Or call your family doctor.  If you have breathing distress or wheezing, call 911.    Special instructions: ***    I have read and understand the above information.    Signature:______________________________________ Date:___________    Staff:__________________________________________ Date:___________     Time:__________    [place] [facility] Departments:    ___[place]: [phone]  ___[facility]: [phone]  ___[place]: [phone] ___[facility]: [phone]  ___[facility]: [phone]  ___[facility]: [phone]  ___[phone]  ___[facility]: [phone]  ___[phone]

## 2018-05-29 ENCOUNTER — OFFICE VISIT (OUTPATIENT)
Dept: NEUROSURGERY | Facility: CLINIC | Age: 53
End: 2018-05-29
Payer: COMMERCIAL

## 2018-05-29 VITALS
HEART RATE: 76 BPM | BODY MASS INDEX: 42.59 KG/M2 | HEIGHT: 68 IN | SYSTOLIC BLOOD PRESSURE: 139 MMHG | WEIGHT: 281 LBS | DIASTOLIC BLOOD PRESSURE: 81 MMHG

## 2018-05-29 DIAGNOSIS — D32.0 BENIGN NEOPLASM OF CEREBRAL MENINGES (H): Primary | ICD-10-CM

## 2018-05-29 RX ORDER — ONDANSETRON 4 MG/1
4 TABLET, FILM COATED ORAL DAILY PRN
COMMUNITY
Start: 2018-04-30

## 2018-05-29 ASSESSMENT — PAIN SCALES - GENERAL: PAINLEVEL: MODERATE PAIN (4)

## 2018-05-29 NOTE — PROGRESS NOTES
Dear Dr. Neal:    We saw Mrs. 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 Her initial surgery was done in two stages at [facility] in 02/2012. We performed a third operation on 11/2012 followed by fractionated radiotherapy. We left residual tumor behind due to its adherence to the perforating branches off the posterior communicating artery. We are following her residual tumor with annual MRIs.    In the last few weeks to months, she has had a number of issues present themselves. The first is persistent nausea without vomiting. Her appetite is decreased, though she still eats normally and she has not lost significant weight. Despite drinking much water, her urine has been dark. She is having hot flashes and she reports cramping in her muscles more easily. In addition, she has episodes of bilateral vision blurring that quickly resolves and her vision returns to baseline. She follows closely with her neuro-ophthalmologist, Dr. Castañeda. Another new symptom is spells of dystonia in her left hand that is painful. Collectively, all these symptoms have occurred over the same interval but do not necessarily overlap. These episodes occurred more frequently and had different symptomology than her typical seizures, for which she follows with Dr. Madrid. Her inability to keep up at work causes her great stress. She frequently cries due to her pain and considers quitting her job very often. She does want to work, but subjectively feels her performance at work is not adequate. Her mood and performance fluctuate day-to-day. She is having more fatigue and makes more mistakes as a result. Her performance is best early in the week and tapers off as the week progresses.    Her gross neurological examination is unchanged. She has a sluggish pupil on the left. She moves all extremities with good strength and coordination. She has good  facial strength. Speech and language are normal.     REVIEW OF STUDIES: The enhancing left medial sphenoid wing meningioma is unchanged in size. It is nearly 4 cm in maximum diameter but considerably smaller than its original size. The left optic nerve remains elevated by the tumor but the right optic nerve remains uninvolved. There has been no change in her imaging over the past 2-3 years. She has stable gliotic changes in the left temporal and frontal operculi.     IMPRESSION AND PLAN: She has experienced progressive decline in her cognitive performance over the past few years. This is likely multifactorial and likely represents a combination of medication effect, tumor treatment effect, and hormone deficiency. Given the demands of her work, we believe that she can no longer perform them adequately and we will support her claim for permanent disability. From a tumor standpoint, our main criterion for reoperation would be involvement of her right-sided vision. Short of that, re-operation carries too much risk at this point. We will repeat an MRI in one year. She is due for an eye exam with Dr. Castañeda in the next few months and we will follow up on those results. Please do not hesitate to contact us with questions. We will keep you informed of her progress.    MD SHAKEEL PATEL Timothy Suek, am serving as a scribe to document services personally performed by Pan Calero MD, based upon my observations and the provider's statements to me. All documentation has been reviewed by the aforementioned doctor prior to being entered into the official medical record.    I, Pan Calero, attest that above named individual is acting in scribe capacity, has observed my performance of the services and has documented them in accordance with my direction. The documentation recorded by the scribe accurately reflects the service I personally performed and the decisions made by me.

## 2018-05-29 NOTE — LETTER
5/29/2018    RE: Marlyn Wilder  [address] [facility] [address]     Dear Dr. Neal:    We saw Mrs. 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 Her initial surgery was done in two stages at [facility] in 02/2012. We performed a third operation on 11/2012 followed by fractionated radiotherapy. We left residual tumor behind due to its adherence to the perforating branches off the posterior communicating artery. We are following her residual tumor with annual MRIs.    In the last few weeks to months, she has had a number of issues present themselves. The first is persistent nausea without vomiting. Her appetite is decreased, though she still eats normally and she has not lost significant weight. Despite drinking much water, her urine has been dark. She is having hot flashes and she reports cramping in her muscles more easily. In addition, she has episodes of bilateral vision blurring that quickly resolves and her vision returns to baseline. She follows closely with her neuro-ophthalmologist, Dr. Castañeda. Another new symptom is spells of dystonia in her left hand that is painful. Collectively, all these symptoms have occurred over the same interval but do not necessarily overlap. These episodes occurred more frequently and had different symptomology than her typical seizures, for which she follows with Dr. Madrid. Her inability to keep up at work causes her great stress. She frequently cries due to her pain and considers quitting her job very often. She does want to work, but subjectively feels her performance at work is not adequate. Her mood and performance fluctuate day-to-day. She is having more fatigue and makes more mistakes as a result. Her performance is best early in the week and tapers off as the week progresses.    Her gross neurological examination is unchanged. She has a sluggish pupil on the left.  She moves all extremities with good strength and coordination. She has good facial strength. Speech and language are normal.     REVIEW OF STUDIES: The enhancing left medial sphenoid wing meningioma is unchanged in size. It is nearly 4 cm in maximum diameter but considerably smaller than its original size. The left optic nerve remains elevated by the tumor but the right optic nerve remains uninvolved. There has been no change in her imaging over the past 2-3 years. She has stable gliotic changes in the left temporal and frontal operculi.     IMPRESSION AND PLAN: She has experienced progressive decline in her cognitive performance over the past few years. This is likely multifactorial and likely represents a combination of medication effect, tumor treatment effect, and hormone deficiency. Given the demands of her work, we believe that she can no longer perform them adequately and we will support her claim for permanent disability. From a tumor standpoint, our main criterion for reoperation would be involvement of her right-sided vision. Short of that, re-operation carries too much risk at this point. We will repeat an MRI in one year. She is due for an eye exam with Dr. Castañeda in the next few months and we will follow up on those results. Please do not hesitate to contact us with questions. We will keep you informed of her progress.    RUBY SIDHU MD    I, Kurt Kramer, am serving as a scribe to document services personally performed by Ruby Sidhu MD, based upon my observations and the provider's statements to me. All documentation has been reviewed by the aforementioned doctor prior to being entered into the official medical record.    I, Ruby Sidhu, attest that above named individual is acting in scribe capacity, has observed my performance of the services and has documented them in accordance with my direction. The documentation recorded by the scribe accurately reflects the service  I personally performed and the decisions made by me.    Pan Calero MD

## 2018-05-29 NOTE — LETTER
5/29/2018       RE: Marlyn Wilder  [address]  [place] [address]     Dear Colleague,    Thank you for referring your patient, Marlyn Wilder, to the [facility] NEUROSURGERY at [facility]. Please see a copy of my visit note below.    Dear Dr. Neal:    We saw Mrs. 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 Her initial surgery was done in two stages at [facility] in 02/2012. We performed a third operation on 11/2012 followed by fractionated radiotherapy. We left residual tumor behind due to its adherence to the perforating branches off the posterior communicating artery. We are following her residual tumor with annual MRIs.    In the last few weeks to months, she has had a number of issues present themselves. The first is persistent nausea without vomiting. Her appetite is decreased, though she still eats normally and she has not lost significant weight. Despite drinking much water, her urine has been dark. She is having hot flashes and she reports cramping in her muscles more easily. In addition, she has episodes of bilateral vision blurring that quickly resolves and her vision returns to baseline. She follows closely with her neuro-ophthalmologist, Dr. Castañeda. Another new symptom is spells of dystonia in her left hand that is painful. Collectively, all these symptoms have occurred over the same interval but do not necessarily overlap. These episodes occurred more frequently and had different symptomology than her typical seizures, for which she follows with Dr. Madrid. Her inability to keep up at work causes her great stress. She frequently cries due to her pain and considers quitting her job very often. She does want to work, but subjectively feels her performance at work is not adequate. Her mood and performance fluctuate day-to-day. She is having more fatigue and  makes more mistakes as a result. Her performance is best early in the week and tapers off as the week progresses.    Her gross neurological examination is unchanged. She has a sluggish pupil on the left. She moves all extremities with good strength and coordination. She has good facial strength. Speech and language are normal.     REVIEW OF STUDIES: The enhancing left medial sphenoid wing meningioma is unchanged in size. It is nearly 4 cm in maximum diameter but considerably smaller than its original size. The left optic nerve remains elevated by the tumor but the right optic nerve remains uninvolved. There has been no change in her imaging over the past 2-3 years. She has stable gliotic changes in the left temporal and frontal operculi.     IMPRESSION AND PLAN: She has experienced progressive decline in her cognitive performance over the past few years. This is likely multifactorial and likely represents a combination of medication effect, tumor treatment effect, and hormone deficiency. Given the demands of her work, we believe that she can no longer perform them adequately and we will support her claim for permanent disability. From a tumor standpoint, our main criterion for reoperation would be involvement of her right-sided vision. Short of that, re-operation carries too much risk at this point. We will repeat an MRI in one year. She is due for an eye exam with Dr. Castañeda in the next few months and we will follow up on those results. Please do not hesitate to contact us with questions. We will keep you informed of her progress.    MD SHAKEEL PATEL Timothy Suek, am serving as a scribe to document services personally performed by Pan Calero MD, based upon my observations and the provider's statements to me. All documentation has been reviewed by the aforementioned doctor prior to being entered into the official medical record.    I, Pan Calero, attest that above named individual  is acting in scribe capacity, has observed my performance of the services and has documented them in accordance with my direction. The documentation recorded by the scribe accurately reflects the service I personally performed and the decisions made by me.    Again, thank you for allowing me to participate in the care of your patient.      Sincerely,    Pan Calero MD

## 2018-05-29 NOTE — PATIENT INSTRUCTIONS
Follow up with Dr. Calero in one year with a MRI prior to the appointment.  We will help you with the paper work regarding permanent disability.

## 2018-05-29 NOTE — MR AVS SNAPSHOT
After Visit Summary   5/29/2018    Marlyn Wilder    MRN: [id number]           Patient Information     Date Of Birth          1965        Visit Information        Provider Department      5/29/2018 10:00 AM Pan Calero MD [facility] Neurosurgery        Today's Diagnoses     Benign neoplasm of cerebral meninges (H)    -  1      Care Instructions    Follow up with Dr. Calero in one year with a MRI prior to the appointment.  We will help you with the paper work regarding permanent disability.             Follow-ups after your visit        Follow-up notes from your care team     Return in about 1 year (around 5/29/2019).      Your next 10 appointments already scheduled     Jul 16, 2018  2:30 PM CDT   (Arrive by 2:15 PM)   Return Seizure with Emmanuel Madrdi MD   [facility] Neurology ([facility])    [address]  3rd [address]  [place] [address] [phone]            May 28, 2019 10:45 AM CDT   MR BRAIN W/O & W CONTRAST with [id number]   [facility] [place] [facility] ([facility] [place])    [address] [id number] [phone]           Take your medicines as usual, unless your doctor tells you not to. Bring a list of your current medicines to your exam (including vitamins, minerals and over-the-counter drugs).  You may or may not receive intravenous (IV) contrast for this exam pending the discretion of the Radiologist.  You do not need to do anything special to prepare.  The MRI machine uses a strong magnet. Please wear clothes without metal (snaps, zippers). A sweatsuit works well, or we may give you a hospital gown.  Please remove any body piercings and hair extensions before you arrive. You will also remove watches, jewelry, hairpins, wallets, dentures, partial dental plates and hearing aids. You may wear contact lenses, and you may be able to wear your rings. We have a safe place to keep your  personal items, but it is safer to leave them at home.  **IMPORTANT** THE INSTRUCTIONS BELOW ARE ONLY FOR THOSE PATIENTS WHO HAVE BEEN PRESCRIBED SEDATION OR GENERAL ANESTHESIA DURING THEIR MRI PROCEDURE:  IF YOUR DOCTOR PRESCRIBED ORAL SEDATION (take medicine to help you relax during your exam):   You must get the medicine from your doctor (oral medication) before you arrive. Bring the medicine to the exam. Do not take it at home. You ll be told when to take it upon arriving for your exam.   Arrive one hour early. Bring someone who can take you home after the test. Your medicine will make you sleepy. After the exam, you may not drive, take a bus or take a taxi by yourself.  IF YOUR DOCTOR PRESCRIBED IV SEDATION:   Arrive one hour early. Bring someone who can take you home after the test. Your medicine will make you sleepy. After the exam, you may not drive, take a bus or take a taxi by yourself.   No eating 6 hours before your exam. You may have clear liquids up until 4 hours before your exam. (Clear liquids include water, clear tea, black coffee and fruit juice without pulp.)  IF YOUR DOCTOR PRESCRIBED ANESTHESIA (be asleep for your exam):   Arrive 1 1/2 hours early. Bring someone who can take you home after the test. You may not drive, take a bus or take a taxi by yourself.   No eating 8 hours before your exam. You may have clear liquids up until 4 hours before your exam. (Clear liquids include water, clear tea, black coffee and fruit juice without pulp.)   You will spend four to five hours in the recovery room.  Please call the Imaging Department at your exam site with any questions.            May 28, 2019 12:00 PM CDT   (Arrive by 11:45 AM)   Return Visit with Pan Calero MD   [facility] Neurosurgery ([facility] [place])    [address] [address] [phone]              Future tests that were ordered for you today     Open Future Orders      "   Priority Expected Expires Ordered    MRI Brain with & without gadolinium for tumor follow-up [PIY656] Routine  5/29/2019 5/29/2018            Who to contact     Please call your clinic at [phone] to:    Ask questions about your health    Make or cancel appointments    Discuss your medicines    Learn about your test results    Speak to your doctor            Additional Information About Your Visit        [employer] Information     [employer] gives you secure access to your electronic health record. If you see a primary care provider, you can also send messages to your care team and make appointments. If you have questions, please call your primary care clinic.  If you do not have a primary care provider, please call [phone] and they will assist you.      [employer] is an electronic gateway that provides easy, online access to your medical records. With [employer], you can request a clinic appointment, read your test results, renew a prescription or communicate with your care team.     To access your existing account, please contact your [facility] or call [phone] for assistance.        Care EveryWhere ID     This is your Care EveryWhere ID. This could be used by other organizations to access your [place] medical records  [id number]        Your Vitals Were     Pulse Height Last Period BMI (Body Mass Index)          76 1.715 m (5' 7.5\") 10/17/2012 43.36 kg/m2         Blood Pressure from Last 3 Encounters:   05/29/18 139/81   01/16/18 142/67   09/12/17 164/75    Weight from Last 3 Encounters:   05/29/18 127.5 kg (281 lb)   01/16/18 129.5 kg (285 lb 9.6 oz)   09/12/17 130.6 kg (288 lb)              Today, you had the following     No orders found for display       Primary Care Provider Office Phone # Fax #    Carlos Enrique Neal [phone]       [address] [address]        Equal Access to Services     ESA SANCHEZ AH: Marco joaquin " giuliana Rubio, watashiada luqadaha, qaybta kaalmada ellen, michelle elbain hayaakayleen galvankee sathishnikunj laloikayleen leeanne. So [facility] [phone].    ATENCIÓN: Si habla brentañol, tiene a carvajal disposición servicios gratuitos de asistencia lingüística. Stan al [phone].    We comply with applicable federal civil rights laws and [place] laws. We do not discriminate on the basis of race, color, national origin, age, disability, sex, sexual orientation, or gender identity.            Thank you!     Thank you for choosing [facility] NEUROSURGERY  for your care. Our goal is always to provide you with excellent care. Hearing back from our patients is one way we can continue to improve our services. Please take a few minutes to complete the written survey that you may receive in the mail after your visit with us. Thank you!             Your Updated Medication List - Protect others around you: Learn how to safely use, store and throw away your medicines at www.disposemymeds.org.          This list is accurate as of 5/29/18 11:15 AM.  Always use your most recent med list.                   Brand Name Dispense Instructions for use Diagnosis    aspirin 81 MG tablet      Take 1 tablet by mouth daily.    TIA (transient ischaemic attack)       BENZONATATE PO      Take 100 mg by mouth every 4 hours as needed for cough        ENBREL SC      Inject Subcutaneous once a week        Flurandrenolide 0.05 % Oint      Apply 1 Application topically daily as needed        FOLIC ACID PO      Take 1 mg by mouth daily        gabapentin 300 MG capsule    NEURONTIN     Take 600 mg by mouth daily as needed        LASIX 20 MG tablet   Generic drug:  furosemide      Take 20 mg by mouth as needed        levETIRAcetam 500 MG tablet    KEPPRA    180 tablet    Take 3 tablets (1500 mg) in the AM and 3 tablets (1500 mg) in the PM, by mouth.    Partial symptomatic epilepsy with simple partial seizures, intractable, without status epilepticus (H)       LISINOPRIL PO       Take 2.5 mg by mouth daily        methotrexate sodium 2.5 MG Tabs      Take 5 tablets by mouth once a week        MULTIVITAMIN & MINERAL PO      Take 1 tablet by mouth daily        omeprazole 20 MG CR capsule    priLOSEC     Take 20 mg by mouth        ondansetron 4 MG tablet    ZOFRAN     Take 4 mg by mouth        predniSONE 20 MG tablet    DELTASONE     Take by mouth daily        PROAIR HFA IN      Inhale 8.5 g into the lungs        SYNTHROID 100 MCG tablet   Generic drug:  levothyroxine      Take 100 mcg by mouth daily

## 2018-05-29 NOTE — LETTER
5/29/2018     RE: Marlyn Wilder  [address] [address] [facility] [address]       Dear Dr. Neal:    We saw Mrs. 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 Her initial surgery was done in two stages at [facility] in 02/2012. We performed a third operation on 11/2012 followed by fractionated radiotherapy. We left residual tumor behind due to its adherence to the perforating branches off the posterior communicating artery. We are following her residual tumor with annual MRIs.    In the last few weeks to months, she has had a number of issues present themselves. The first is persistent nausea without vomiting. Her appetite is decreased, though she still eats normally and she has not lost significant weight. Despite drinking much water, her urine has been dark. She is having hot flashes and she reports cramping in her muscles more easily. In addition, she has episodes of bilateral vision blurring that quickly resolves and her vision returns to baseline. She follows closely with her neuro-ophthalmologist, Dr. Castañeda. Another new symptom is spells of dystonia in her left hand that is painful. Collectively, all these symptoms have occurred over the same interval but do not necessarily overlap. These episodes occurred more frequently and had different symptomology than her typical seizures, for which she follows with Dr. Madrid. Her inability to keep up at work causes her great stress. She frequently cries due to her pain and considers quitting her job very often. She does want to work, but subjectively feels her performance at work is not adequate. Her mood and performance fluctuate day-to-day. She is having more fatigue and makes more mistakes as a result. Her performance is best early in the week and tapers off as the week progresses.    Her gross neurological examination is unchanged. She has a sluggish pupil on the  left. She moves all extremities with good strength and coordination. She has good facial strength. Speech and language are normal.     REVIEW OF STUDIES: The enhancing left medial sphenoid wing meningioma is unchanged in size. It is nearly 4 cm in maximum diameter but considerably smaller than its original size. The left optic nerve remains elevated by the tumor but the right optic nerve remains uninvolved. There has been no change in her imaging over the past 2-3 years. She has stable gliotic changes in the left temporal and frontal operculi.     IMPRESSION AND PLAN: She has experienced progressive decline in her cognitive performance over the past few years. This is likely multifactorial and likely represents a combination of medication effect, tumor treatment effect, and hormone deficiency. Given the demands of her work, we believe that she can no longer perform them adequately and we will support her claim for permanent disability. From a tumor standpoint, our main criterion for reoperation would be involvement of her right-sided vision. Short of that, re-operation carries too much risk at this point. We will repeat an MRI in one year. She is due for an eye exam with Dr. Castañeda in the next few months and we will follow up on those results. Please do not hesitate to contact us with questions. We will keep you informed of her progress.    RUBY SIDHU MD    I, Kurt Kramer, am serving as a scribe to document services personally performed by Ruby Sidhu MD, based upon my observations and the provider's statements to me. All documentation has been reviewed by the aforementioned doctor prior to being entered into the official medical record.    I, Ruby Sidhu, attest that above named individual is acting in scribe capacity, has observed my performance of the services and has documented them in accordance with my direction. The documentation recorded by the scribe accurately reflects the  service I personally performed and the decisions made by me.    Pan Calero MD

## 2018-06-25 ENCOUNTER — TELEPHONE (OUTPATIENT)
Dept: NEUROLOGY | Facility: CLINIC | Age: 53
End: 2018-06-25

## 2018-06-25 NOTE — TELEPHONE ENCOUNTER
Writer completed   Disability Form,signed by provider, scanned to PT chart and PER the [place] Department of Labor, forms are to be returned to the patient and NOT mailed. Employer needs to completed Required information regarding employee. Form mailed to PT and voice mail left for PT to watch for mailed/completed disability form.

## 2018-07-11 ASSESSMENT — ENCOUNTER SYMPTOMS
ALTERED TEMPERATURE REGULATION: 0
NAIL CHANGES: 0
HALLUCINATIONS: 0
DOUBLE VISION: 0
JOINT SWELLING: 1
BACK PAIN: 0
POOR WOUND HEALING: 0
EYE IRRITATION: 0
EYE WATERING: 0
FATIGUE: 1
SKIN CHANGES: 0
NECK PAIN: 0
MUSCLE WEAKNESS: 1
EYE PAIN: 0
MYALGIAS: 1
NIGHT SWEATS: 0
POLYPHAGIA: 0
POLYDIPSIA: 0
FEVER: 0
EYE REDNESS: 0
MUSCLE CRAMPS: 1
ARTHRALGIAS: 1
STIFFNESS: 1

## 2018-07-16 ENCOUNTER — OFFICE VISIT (OUTPATIENT)
Dept: NEUROLOGY | Facility: CLINIC | Age: 53
End: 2018-07-16
Payer: COMMERCIAL

## 2018-07-16 VITALS
BODY MASS INDEX: 43.3 KG/M2 | HEIGHT: 68 IN | SYSTOLIC BLOOD PRESSURE: 139 MMHG | WEIGHT: 285.7 LBS | HEART RATE: 69 BPM | DIASTOLIC BLOOD PRESSURE: 72 MMHG

## 2018-07-16 DIAGNOSIS — G40.119 PARTIAL SYMPTOMATIC EPILEPSY WITH SIMPLE PARTIAL SEIZURES, INTRACTABLE, WITHOUT STATUS EPILEPTICUS (H): Primary | ICD-10-CM

## 2018-07-16 RX ORDER — LEVETIRACETAM 500 MG/1
TABLET ORAL
Qty: 180 TABLET | Refills: 11 | Status: SHIPPED | OUTPATIENT
Start: 2018-07-16 | End: 2019-06-10 | Stop reason: DRUGHIGH

## 2018-07-16 NOTE — LETTER
7/16/2018       RE: Marlyn Wilder  [address] [facility] [address]     Dear Colleague,    Thank you for referring your patient, Marlyn Wilder, to the [facility] NEUROLOGY at [facility]. Please see a copy of my visit note below.        [facility]: RETURN VISIT     I spent 15 minutes in this patient care, more than 50% of which consisted of counseling and coordinating care.   Emmanuel Madrid M.D.

## 2018-07-16 NOTE — MR AVS SNAPSHOT
After Visit Summary   7/16/2018    Marlyn Wilder    MRN: [id number]           Patient Information     Date Of Birth          1965        Visit Information        Provider Department      7/16/2018 2:30 PM Emmanuel aMdrid MD [facility] Neurology        Today's Diagnoses     Partial symptomatic epilepsy with simple partial seizures, intractable, without status epilepticus (H)    -  1       Follow-ups after your visit        Follow-up notes from your care team     Return in about 11 months (around 6/16/2019).      Your next 10 appointments already scheduled     May 28, 2019 10:45 AM CDT   MR BRAIN W/O & W CONTRAST with ESYF2C2   [facility] [place] [facility] ([facility] [place])    [address] [address] [phone]           Take your medicines as usual, unless your doctor tells you not to. Bring a list of your current medicines to your exam (including vitamins, minerals and over-the-counter drugs).  You may or may not receive intravenous (IV) contrast for this exam pending the discretion of the Radiologist.  You do not need to do anything special to prepare.  The MRI machine uses a strong magnet. Please wear clothes without metal (snaps, zippers). A sweatsuit works well, or we may give you a hospital gown.  Please remove any body piercings and hair extensions before you arrive. You will also remove watches, jewelry, hairpins, wallets, dentures, partial dental plates and hearing aids. You may wear contact lenses, and you may be able to wear your rings. We have a safe place to keep your personal items, but it is safer to leave them at home.  **IMPORTANT** THE INSTRUCTIONS BELOW ARE ONLY FOR THOSE PATIENTS WHO HAVE BEEN PRESCRIBED SEDATION OR GENERAL ANESTHESIA DURING THEIR MRI PROCEDURE:  IF YOUR DOCTOR PRESCRIBED ORAL SEDATION (take medicine to help you relax during your exam):   You must get the medicine from your doctor (oral medication)  before you arrive. Bring the medicine to the exam. Do not take it at home. You ll be told when to take it upon arriving for your exam.   Arrive one hour early. Bring someone who can take you home after the test. Your medicine will make you sleepy. After the exam, you may not drive, take a bus or take a taxi by yourself.  IF YOUR DOCTOR PRESCRIBED IV SEDATION:   Arrive one hour early. Bring someone who can take you home after the test. Your medicine will make you sleepy. After the exam, you may not drive, take a bus or take a taxi by yourself.   No eating 6 hours before your exam. You may have clear liquids up until 4 hours before your exam. (Clear liquids include water, clear tea, black coffee and fruit juice without pulp.)  IF YOUR DOCTOR PRESCRIBED ANESTHESIA (be asleep for your exam):   Arrive 1 1/2 hours early. Bring someone who can take you home after the test. You may not drive, take a bus or take a taxi by yourself.   No eating 8 hours before your exam. You may have clear liquids up until 4 hours before your exam. (Clear liquids include water, clear tea, black coffee and fruit juice without pulp.)   You will spend four to five hours in the recovery room.  Please call the Imaging Department at your exam site with any questions.            May 28, 2019 12:00 PM CDT   (Arrive by 11:45 AM)   Return Visit with Pan Calero MD   [facility] ([facility])    [address] [place] [address] [phone]            Jun 17, 2019  2:30 PM CDT   (Arrive by 2:15 PM)   Return Seizure with Emmanuel Madrid MD   [facility] ([facility])    [address] [place] [address] [phone]              Who to contact     Please call your clinic at [phone] to:    Ask questions about your health    Make or cancel appointments    Discuss your medicines    Learn about your test  "results    Speak to your doctor            Additional Information About Your Visit        [employer] Information     [employer] gives you secure access to your electronic health record. If you see a primary care provider, you can also send messages to your care team and make appointments. If you have questions, please call your primary care clinic.  If you do not have a primary care provider, please call [phone] and they will assist you.      [employer] is an electronic gateway that provides easy, online access to your medical records. With [employer], you can request a clinic appointment, read your test results, renew a prescription or communicate with your care team.     To access your existing account, please contact your [facility] or call [phone] for assistance.        Care EveryWhere ID     This is your Care EveryWhere ID. This could be used by other organizations to access your [place] medical records  [id number]        Your Vitals Were     Pulse Height Last Period BMI (Body Mass Index)          69 1.715 m (5' 7.5\") 10/17/2012 44.09 kg/m2         Blood Pressure from Last 3 Encounters:   07/16/18 139/72   05/29/18 139/81   01/16/18 142/67    Weight from Last 3 Encounters:   07/16/18 129.6 kg (285 lb 11.2 oz)   05/29/18 127.5 kg (281 lb)   01/16/18 129.5 kg (285 lb 9.6 oz)              Today, you had the following     No orders found for display         Where to get your medicines      These medications were sent to [facility] [address] [place] [address] [id number]  [address] [facility] [address]     Phone:  [phone]     levETIRAcetam 500 MG tablet          Primary Care Provider Office Phone # Fax #    Carlos Enrique Ibarrakayleen [phone]       [address] [address]        Equal Access to Services     ESA SANCHEZ AH: Hadii emani giordano hadasho Soomaali, waaxda luqadaha, qaybta kaalmada " michelle hugheskee schulzaan ah. Amalia [facility] [phone].    ATENCIÓN: Si clevelandla dawson, tiene a carvajal disposición servicios gratuitos de asistencia lingüística. Stan al [phone].    We comply with applicable federal civil rights laws and [place] laws. We do not discriminate on the basis of race, color, national origin, age, disability, sex, sexual orientation, or gender identity.            Thank you!     Thank you for choosing [facility] NEUROLOGY  for your care. Our goal is always to provide you with excellent care. Hearing back from our patients is one way we can continue to improve our services. Please take a few minutes to complete the written survey that you may receive in the mail after your visit with us. Thank you!             Your Updated Medication List - Protect others around you: Learn how to safely use, store and throw away your medicines at www.disposemymeds.org.          This list is accurate as of 7/16/18  3:24 PM.  Always use your most recent med list.                   Brand Name Dispense Instructions for use Diagnosis    aspirin 81 MG tablet      Take 1 tablet by mouth daily.    TIA (transient ischaemic attack)       BENZONATATE PO      Take 100 mg by mouth every 4 hours as needed for cough        ENBREL SC      Inject Subcutaneous once a week        Flurandrenolide 0.05 % Oint      Apply 1 Application topically daily as needed        FOLIC ACID PO      Take 1 mg by mouth daily        gabapentin 300 MG capsule    NEURONTIN     Take 600 mg by mouth daily as needed        LASIX 20 MG tablet   Generic drug:  furosemide      Take 20 mg by mouth as needed        levETIRAcetam 500 MG tablet    KEPPRA    180 tablet    Take 3 tablets (1500 mg) in the AM and 3 tablets (1500 mg) in the PM, by mouth.    Partial symptomatic epilepsy with simple partial seizures, intractable, without status epilepticus (H)       LISINOPRIL PO      Take 2.5 mg by mouth daily        methotrexate sodium 2.5  MG Tabs      Take 5 tablets by mouth once a week        MULTIVITAMIN & MINERAL PO      Take 1 tablet by mouth daily        omeprazole 20 MG CR capsule    priLOSEC     Take 20 mg by mouth daily        ondansetron 4 MG tablet    ZOFRAN     Take 4 mg by mouth        PROAIR HFA IN      Inhale 8.5 g into the lungs        SYNTHROID 100 MCG tablet   Generic drug:  levothyroxine      Take 100 mcg by mouth daily

## 2018-07-17 NOTE — PROGRESS NOTES
[facility]psy [facility]:  RETURN VISIT      HISTORY:  Ms. 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  She came to the visit today with her .      Following the most recent visit to this clinic on 01/16/2018, the patient reportedly has not had any seizures with LOC, unresponsiveness, confusion, falling or convulsive movements.  Isolated auras were occurring 5-6 times per month, but ceased with addition of levetiracetam; the most recent auras occurred on 09/08/2017.  She has had mild lethargy and mild irritability after increasing levetiracetam, but this largely resolved with a dose decrease later in 2017.      Ictal semiology-history:                       The patient re-confirmed previously presented history in detail today. She again noted that she had a single febrile convulsion of early childhood, but otherwise had no seizures until the initial manifestation of her meningioma in 2011.  She has had only 1 type of seizure since 2011.       The patient has had exclusively simple partial seizures.  These first began in 2011, and have continued on since then.  They have been quite stable in semiology and frequency.  She has at least 20 per year, but has only up to 2 on a single day and usually only one at a time.  The full form of the aura lasts almost 1 minute and features a froilan vu phenomenon associated with a sense that she has just viewed a complex visual scene briefly, although she cannot remember the nature of the scene, with an olfactory hallucination of burning electronics, and waves of intense nausea.  She does not have a postictal state with this.  She is always able to speak if she is with someone.  Her  has seen many of these, which she described the auras in detail to him and she is always able to respond to him quickly.  She never has any automatic behaviors as best he can  determine.  Sometimes she has briefer events that feature only 1-2 of her 3 aura phenomena.       The patient has not had any staring spells with unresponsiveness or confusion, and has not had any grand mal seizures following that of early childhood.  She only has involuntary jerks on falling asleep in the evening.       Auras appear to have been triggered or exacerbated by forgetting to take medications, which happens only rarely, by emotional stress, and by tiredness.       Epilepsy-seizure predispositions:   The patient's epilepsy risk factor is a left middle fossa meningioma with extension into the left parasellar space.  This was resected 3 times in , and pathology showed that there was a grade I meningothelial meningioma.  The portion that is in the parasellar space has surrounded the left optic nerve, and she does have visual deficits on the left.  She had radiation therapy following the last resection by Dr. Calero.  She has had followup scans since that time, which showed a stable lesion as recently as 2017.         There is no family history of seizures or epilepsy.       The patient had a single febrile convulsion of early childhood when she was approximately 1 years old.       She has no history of gestational or  injury, developmental delay, stroke, meningitis, encephalitis, and significant head injury.  She denied a history of physical or sexual abuse by an adult during her childhood or adulthood.       Laboratory evaluations:   The most recent brain MRI was performed at [facility] on 2017, and was reported to show a stable left medial middle fossa lesion extending into the parasellar space in the left orbital apex, with mild mass effect and with enhancement.       A routine EEG was performed in [place] in , and the report indicated left hemispheric slowing and spikes.   An out-patient EEG at [facility] on 06/15/2016 was abnormal due to left frontotemporal delta slowing and breach  effect.      Epilepsy therapeutics:   The only anti-seizure medication that the patient has used has been levetiracetam.  This was started in 2012.  It has been consistently associated with irritability.  The patient has been able to control her irritability and has continued to function well as an  for the [facility].       PAST MEDICAL-SURGICAL HISTORY:    1. 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   2. History of left middle fossa meningothelial meningioma, grade 1, with extension to the left parasellar area, left orbital apex and cavernous sinus; status post resections (2012) and radiation therapy.   3. History of probable transient ischemic attack with aphasia (2013).   4. Obstructive sleep apnea, treated with CPAP.   5. Systemic hypertension, treated.   6. Hyperlipidemia.   7. History of recurring renal calculi.   8. History of arthralgia attributed to atypical rheumatoid arthritis or fibromyalgia.   9. Hypothyroidism.   10. Status post left carpal tunnel release.   11. History of TMJ syndrome.      FAMILY HISTORY:  There is no family history of seizures, epilepsy or other neurological conditions.       PERSONAL AND SOCIAL HISTORY:  The patient completed her education through a [employer] degree.  She is working as an  for a [place] [employer].  She lives with her .  They have 3 adult children, no longer in the home.       REVIEW OF SYSTEMS:  The patient has noted nausea in recent months, which has responded to omeprazole and ondansetron.   She denied history of attention and memory disturbance, difficulties with comprehension and expression, difficulty in solving problems, weakness, tremors or other abnormal involuntary movements, numbness or tingling, incoordination, falling or difficulty in walking, urinary or fecal incontinence, headache and other pain, except as described above.  The patient denied any history of severe depression or suicidal ideation, severe  anxiety, panic attacks, hallucinations, delusions, psychosis, substance abuse, or psychiatric hospitalization.  She denied rashes and easy bruising.  The remainder of a 14-system symptom review was negative except as noted above.        MEDICATIONS:  Levetiracetam 1500 mg b.i.d., aspirin 81 mg daily, and other medications as per the electronic medical record.      PHYSICAL EXAMINATION:    The patient was an alert woman in no apparent distress.  Vital signs were as per the electronic medical record.  There was a skull defect consistent with reported surgery.  Neck was supple, without signs of meningeal irritation.       On neurological examination the patient appeared alert and was fully oriented to person, place, time, and reason for visit.  Speech showed normal articulation, fluency, repetitions, naming, syntax and comprehension.  Cranial nerves III through XII were normal.  Muscle masses, tones and strengths were normal throughout.  There was no pronator drift.  Sequential fine finger movements were performed normally with each hand.  No spontaneous tremors, myoclonus, or other abnormal movements were observed.  Sensations of light touch, pin prick, vibration and proprioception were reportedly normal throughout.  The rapid alternating movements, and finger-nose-finger and heel-shin maneuvers were performed normally bilaterally.  Romberg maneuver was negative.  Regular, heel, toe, tandem and reverse tandem walking were normal.  Deep tendon reflexes were normal and symmetric throughout.  Toes were downgoing bilaterally.       IMPRESSION:    The patient has had no simple partial, complex partial or grand mal seizures on levetiracetam.     She has symptoms virtually diagnostic of simple partial seizures of mesial temporal origin, given the location of the meningioma adjacent to the left mesial temporal lobe.  Fortunately, she has not had any seizures with impaired awareness, either as complex partial seizures or grand  mal seizures (except for a single febrile convulsion of early childhood).  I told her that it will be very important to stay on an anti-seizure medication given the location of the tumor and her history.  We discussed in detail the appearance of complex partial seizures or grand mal seizures, and her  would take her to the emergency room if either occurred.        We discussed [place] regulations on seizures and driving.  She has not had any seizures in adulthood of types that would impair her driving.       PLAN:    1. Continue levetiracetam at 1500 mg b.i.d.   2. Return visit in approximately 11 months.   I spent 15 minutes in this patient care, more than 50% of which consisted of counseling and coordinating care.         Emmanuel Madrid M.D.   Professor of Neurology

## 2018-09-05 ENCOUNTER — CARE COORDINATION (OUTPATIENT)
Dept: NEUROSURGERY | Facility: CLINIC | Age: 53
End: 2018-09-05

## 2018-09-05 NOTE — PROGRESS NOTES
A letter from Dr. Calero was faxed to Nery Oro, Human Resource Generalist, at [facility].  It was a letter regarding pt's medical status so the pt could qualify for the vacation donation program.    [phone] fax

## 2018-10-22 ENCOUNTER — PATIENT OUTREACH (OUTPATIENT)
Dept: NEUROSURGERY | Facility: CLINIC | Age: 53
End: 2018-10-22

## 2018-11-13 ENCOUNTER — OFFICE VISIT (OUTPATIENT)
Dept: NEUROSURGERY | Facility: CLINIC | Age: 53
End: 2018-11-13
Payer: COMMERCIAL

## 2018-11-13 VITALS
HEART RATE: 70 BPM | SYSTOLIC BLOOD PRESSURE: 161 MMHG | OXYGEN SATURATION: 97 % | HEIGHT: 68 IN | WEIGHT: 288.5 LBS | DIASTOLIC BLOOD PRESSURE: 66 MMHG | BODY MASS INDEX: 43.72 KG/M2

## 2018-11-13 DIAGNOSIS — D32.9 MENINGIOMA (H): Primary | ICD-10-CM

## 2018-11-13 RX ORDER — METHOTREXATE SODIUM 25 MG/ML
17.5 INJECTION, SOLUTION INTRA-ARTERIAL; INTRAMUSCULAR; INTRATHECAL; INTRAVENOUS WEEKLY
Status: ON HOLD | COMMUNITY
Start: 2018-08-20 | End: 2020-02-25

## 2018-11-13 ASSESSMENT — PAIN SCALES - GENERAL: PAINLEVEL: MODERATE PAIN (4)

## 2018-11-13 NOTE — NURSING NOTE
Chief Complaint   Patient presents with     RECHECK     [employer] RETURN - INS PAPERWORK       Rk Pinon, EMT

## 2018-11-13 NOTE — PROGRESS NOTES
Dear Dr. Neal:    We saw Mrs. 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 Her initial surgery was done in two stages at [facility] in 02/2012. We performed a third operation on 11/2012 followed by fractionated radiotherapy. We left residual tumor behind due to its adherence to the perforating branches off the posterior communicating artery. We are following her residual tumor with annual MRIs.    Currently, she is doing well and has been busy after her [facility] with her grandchildren. Her fatigue remains and has interfered with her ability to drive occasionally. Her Keppra dosage has decreased but missing it completely leads to a seizure. She is unsure if the Keppra leads to fatigue. She has occasional headaches. Her right eyesight has been mediocre as before and she longer reads due to her vision issues. Her hormone levels were last checked by Dr. Spain on 10/18/18 with an increase in levothyroxine. She continues to have joint pain and is taking a fibromyalgia management class. She feels that she can perform more home duties that she could not perform while working as an , however, she still gets exhausted. Her hand strength has decreased so she does not write or type for long periods. She admits to word-finding difficulty and having froilan-vu sensations after a seizure. She is on aspirin. Her next appointment with Dr. Castañeda for visual field testing is in February.     Her gross neurological examination is unchanged. She has a sluggish pupil on the left. She moves all extremities with good strength and coordination. She has good facial strength. Speech and language are normal.     REVIEW OF STUDIES: There are no new images to review.     IMPRESSION AND PLAN: We believe that Mrs. Wilder should go on long-term disability. She and her  agree and they will initiate the process. We also recommend  doing some baseline neuropsychological testing in the event that she declines further in the future. We will obtain this at her convenience in the next few months. She will be due for a follow-up MRI in the spring of 2019. She will also have a follow-up eye exam with Dr. Castañeda in February.     MD SHAKEEL PATEL, Nasra Trinh, am serving as a scribe to document services personally performed by Pan Calero MD, based upon my observations and the provider's statements to me. All documentation has been reviewed by the aforementioned doctor prior to being entered into the official medical record.    I, Pan Calero, attest that above named individual is acting in scribe capacity, has observed my performance of the services and has documented them in accordance with my direction. The documentation recorded by the scribe accurately reflects the service I personally performed and the decisions made by me.

## 2018-11-13 NOTE — PATIENT INSTRUCTIONS
Follow up with Dr. Calero in 6 months with a MRI before the appointment.  Referral to neuropsychology testing.

## 2018-11-13 NOTE — LETTER
11/13/2018       RE: Marlyn Wilder  [address] [address]     Dear Colleague,    Thank you for referring your patient, Marlyn Wilder, to the [facility] NEUROSURGERY at [facility]. Please see a copy of my visit note below.    Dear Dr. Neal:    We saw Mrs. 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 Her initial surgery was done in two stages at [facility] in 02/2012. We performed a third operation on 11/2012 followed by fractionated radiotherapy. We left residual tumor behind due to its adherence to the perforating branches off the posterior communicating artery. We are following her residual tumor with annual MRIs.    Currently, she is doing well and has been busy after her [employer] with her grandchildren. Her fatigue remains and has interfered with her ability to drive occasionally. Her Keppra dosage has decreased but missing it completely leads to a seizure. She is unsure if the Keppra leads to fatigue. She has occasional headaches. Her right eyesight has been mediocre as before and she longer reads due to her vision issues. Her hormone levels were last checked by Dr. Spain on 10/18/18 with an increase in levothyroxine. She continues to have joint pain and is taking a fibromyalgia management class. She feels that she can perform more home duties that she could not perform while working as an , however, she still gets exhausted. Her hand strength has decreased so she does not write or type for long periods. She admits to word-finding difficulty and having froilan-vu sensations after a seizure. She is on aspirin. Her next appointment with Dr. Castañeda for visual field testing is in February.     Her gross neurological examination is unchanged. She has a sluggish pupil on the left. She moves all extremities with good strength and coordination. She has  good facial strength. Speech and language are normal.     REVIEW OF STUDIES: There are no new images to review.     IMPRESSION AND PLAN: We believe that Mrs. Wilder should go on long-term disability. She and her  agree and they will initiate the process. We also recommend doing some baseline neuropsychological testing in the event that she declines further in the future. We will obtain this at her convenience in the next few months. She will be due for a follow-up MRI in the spring of 2019. She will also have a follow-up eye exam with Dr. Castañeda in February.     INasra, am serving as a scribe to document services personally performed by Pan Calero MD, based upon my observations and the provider's statements to me. All documentation has been reviewed by the aforementioned doctor prior to being entered into the official medical record.    I, Pan Calero, attest that above named individual is acting in scribe capacity, has observed my performance of the services and has documented them in accordance with my direction. The documentation recorded by the scribe accurately reflects the service I personally performed and the decisions made by me.      Pan Calero MD

## 2018-11-13 NOTE — MR AVS SNAPSHOT
After Visit Summary   11/13/2018    Marlyn Wilder    MRN: [id number]           Patient Information     Date Of Birth          1965        Visit Information        Provider Department      11/13/2018 1:30 PM Pan Calero MD [facility] Neurosurgery        Today's Diagnoses     Meningioma (H)    -  1      Care Instructions    Follow up with Dr. Calero in 6 months with a MRI before the appointment.  Referral to neuropsychology testing.            Follow-ups after your visit        Additional Services     NEUROPSYCHOLOGY REFERRAL       Your provider has referred you to:    [facility]: Adult Neuropsychology Clinic [facility] [phone] Preferred Provider: Kely Parikh Ph.D., LP, ABPP   [email or web address]    All scheduling is subject to the client's specific insurance plan & benefits, provider/location availability, and provider clinical specialities.  Please arrive 15 minutes early for your first appointment and bring your completed paperwork.    Please be aware that coverage of these services is subject to the terms and limitations of your health insurance plan.  Call member services at your health plan with any benefit or coverage questions.    Please bring the following to your appointment:  >>   Any x-rays, CTs or MRIs which have been performed.  Contact the facility where they were done to arrange for  prior to your scheduled appointment.  Any new CT, MRI or other procedures ordered by your specialist must be performed at a [place] facility or coordinated by your clinic's referral office.    >>   List of current medications   >>   This referral request   >>   Any documents/labs given to you for this referral                  Follow-up notes from your care team     Return in about 6 months (around 5/13/2019).      Your next 10 appointments already scheduled     May 21, 2019 10:45 AM CDT   MR BRAIN W/O & W CONTRAST with [id number]   [facility]  Imaging Center MRI ([facility] [place])    [address]  [address] [place] [address] [phone]           How do I prepare for my exam? (Food and drink instructions) **If you will be receiving sedation or general anesthesia, please see special notes below.**  How do I prepare for my exam? (Other instructions) Take your medicines as usual, unless your doctor tells you not to. You may or may not receive intravenous (IV) contrast for this exam pending the discretion of the Radiologist.  You do not need to do anything special to prepare.  **If you will be receiving sedation or general anesthesia, please see special notes below.**  What should I wear: The MRI machine uses a strong magnet. Please wear clothes without metal (snaps, zippers). A sweatsuit works well, or we may give you a hospital gown. Please remove any body piercings and hair extensions before you arrive. You will also remove watches, jewelry, hairpins, wallets, dentures, partial dental plates and hearing aids. You may wear contact lenses, and you may be able to wear your rings. We have a safe place to keep your personal items, but it is safer to leave them at home.  How long does the exam take: Most tests take 30 to 60 minutes.  HOWEVER, IF YOUR DOCTOR PRESCRIBES ANESTHESIA please plan on spending four to five hours in the recovery room.  What should I bring:  Bring a list of your current medicines to your exam (including vitamins, minerals and over-the-counter drugs).  Do I need a :  **If you will be receiving sedation or general anesthesia, please see special notes below.**  What should I do after the exam: No Restrictions, You may resume normal activities.  What is this test: MRI (magnetic resonance imaging) uses a strong magnet and radio waves to look inside the body. An MRA (magnetic resonance angiogram) does the same thing, but it lets us look at your blood vessels. A computer turns the radio waves into  pictures showing cross sections of the body, much like slices of bread. This helps us see any problems more clearly. You may receive fluid (called  contrast ) before or during your scan. The fluid helps us see the pictures better. We give the fluid through an IV (small needle in your arm).  Who should I call with questions:  Please call the Imaging Department at your exam site with any questions. Directions, parking instructions, and other information is available on our website, [email or web address].  How do I prepare if I m having sedation or anesthesia? **IMPORTANT** THE INSTRUCTIONS BELOW ARE ONLY FOR THOSE PATIENTS WHO HAVE BEEN TOLD THEY WILL RECEIVE SEDATION OR GENERAL ANESTHESIA DURING THEIR MRI PROCEDURE:  IF YOU WILL RECEIVE SEDATION (take medicine to help you relax during your exam): You must get the medicine from your doctor before you arrive. Bring the medicine to the exam. Do not take it at home. Arrive one hour early. Bring someone who can take you home after the test. Your medicine will make you sleepy. After the exam, you may not drive, take a bus or take a taxi by yourself. No eating 8 hours before your exam. You may have clear liquids up until 4 hours before your exam. (Clear liquids include water, clear tea, black coffee and fruit juice without pulp.)  IF YOU WILL RECEIVE ANESTHESIA (be asleep for your exam): Arrive 1 1/2 hours early. Bring someone who can take you home after the test. You may not drive, take a bus or take a taxi by yourself. No eating 8 hours before your exam. You may have clear liquids up until 4 hours before your exam. (Clear liquids include water, clear tea, black coffee and fruit juice without pulp.)            May 29, 2019  1:30 PM CDT   (Arrive by 1:15 PM)   Return Visit with Pan Calero MD   [facility] Neurosurgery ([facility] [place])    [address]  [address] [place] [address] [phone]            Mariano 10, 2019  4:00  "PM CDT   (Arrive by 3:45 PM)   Return Seizure with Emmanuel Madrid MD   [facility] Neurology ([facility] [place])    [address]  [address] [facility] [address] [phone]              Future tests that were ordered for you today     Open Future Orders        Priority Expected Expires Ordered    MRI Brain with & without gadolinium for tumor follow-up [[id number]] Routine  11/13/2019 11/13/2018            Who to contact     Please call your clinic at [phone] to:    Ask questions about your health    Make or cancel appointments    Discuss your medicines    Learn about your test results    Speak to your doctor            Additional Information About Your Visit        [employer] Information     [employer] gives you secure access to your electronic health record. If you see a primary care provider, you can also send messages to your care team and make appointments. If you have questions, please call your primary care clinic.  If you do not have a primary care provider, please call [phone] and they will assist you.      [employer] is an electronic gateway that provides easy, online access to your medical records. With [employer], you can request a clinic appointment, read your test results, renew a prescription or communicate with your care team.     To access your existing account, please contact your [facility] or call [phone] for assistance.        Care EveryWhere ID     This is your Care EveryWhere ID. This could be used by other organizations to access your [place] medical records  [id number]        Your Vitals Were     Pulse Height Last Period Pulse Oximetry BMI (Body Mass Index)       70 1.715 m (5' 7.5\") 10/17/2012 97% 44.52 kg/m2        Blood Pressure from Last 3 Encounters:   11/13/18 161/66   07/16/18 139/72   05/29/18 139/81    Weight from Last 3 Encounters:   11/13/18 130.9 kg (288 lb 8 oz)   07/16/18 129.6 kg (285 lb 11.2 oz)   05/29/18 " 127.5 kg (281 lb)              We Performed the Following     NEUROPSYCHOLOGY REFERRAL        Primary Care Provider Office Phone # Fax #    Carlos Enrique Neal [phone]       [place] [address]        Equal Access to Services     REMINGTONAMARILYS DANIEL : Hadii emani ku hadmiquelo Soomaali, waaxda luqadaha, qaybta kaalmada adeegyada, michelle elbain haydelphinen colekee donis sheldon fallon. So [facility] [phone].    ATENCIÓN: Si habla español, tiene a carvajal disposición servicios gratuitos de asistencia lingüística. Llame al [phone].    We comply with applicable federal civil rights laws and [place] laws. We do not discriminate on the basis of race, color, national origin, age, disability, sex, sexual orientation, or gender identity.            Thank you!     Thank you for choosing [facility] NEUROSURGERY  for your care. Our goal is always to provide you with excellent care. Hearing back from our patients is one way we can continue to improve our services. Please take a few minutes to complete the written survey that you may receive in the mail after your visit with us. Thank you!             Your Updated Medication List - Protect others around you: Learn how to safely use, store and throw away your medicines at www.disposemymeds.org.          This list is accurate as of 11/13/18  3:21 PM.  Always use your most recent med list.                   Brand Name Dispense Instructions for use Diagnosis    aspirin 81 MG tablet      Take 1 tablet by mouth daily.    TIA (transient ischaemic attack)       BENZONATATE PO      Take 100 mg by mouth every 4 hours as needed for cough        ENBREL SC      Inject Subcutaneous once a week        Flurandrenolide 0.05 % Oint      Apply 1 Application topically daily as needed        FOLIC ACID PO      Take 1 mg by mouth daily        gabapentin 300 MG capsule    NEURONTIN     Take 600 mg by mouth daily as needed        LASIX 20 MG tablet   Generic drug:   furosemide      Take 20 mg by mouth as needed        levETIRAcetam 500 MG tablet    KEPPRA    180 tablet    Take 3 tablets (1500 mg) in the AM and 3 tablets (1500 mg) in the PM, by mouth.    Partial symptomatic epilepsy with simple partial seizures, intractable, without status epilepticus (H)       LISINOPRIL PO      Take 2.5 mg by mouth daily        methotrexate (PF) 25 MG/ML Soln      Inject 17.5 mg Subcutaneous once a week Inject 0.7 mL under the skin once a week.        methotrexate sodium 2.5 MG Tabs      Take 5 tablets by mouth once a week        MULTIVITAMIN & MINERAL PO      Take 1 tablet by mouth daily        omeprazole 20 MG CR capsule    priLOSEC     Take 20 mg by mouth daily        ondansetron 4 MG tablet    ZOFRAN     Take 4 mg by mouth as needed        PROAIR HFA IN      Inhale 8.5 g into the lungs        SYNTHROID 100 MCG tablet   Generic drug:  levothyroxine      Take 100 mcg by mouth daily

## 2018-12-03 ENCOUNTER — MEDICAL CORRESPONDENCE (OUTPATIENT)
Dept: HEALTH INFORMATION MANAGEMENT | Facility: CLINIC | Age: 53
End: 2018-12-03

## 2018-12-10 ENCOUNTER — MYC MEDICAL ADVICE (OUTPATIENT)
Dept: NEUROSURGERY | Facility: CLINIC | Age: 53
End: 2018-12-10

## 2018-12-11 ENCOUNTER — TELEPHONE (OUTPATIENT)
Dept: NEUROSURGERY | Facility: CLINIC | Age: 53
End: 2018-12-11

## 2018-12-12 NOTE — TELEPHONE ENCOUNTER
Completed 06/28/2018  FMLA completed, signed by provider, scanner, per WHO requirements; mailed to PT. PT voice mail to inform of the above, completed and mailed form.   Emailed form to Marlyn Wilder at [email or web address] and to [employer]

## 2018-12-14 ENCOUNTER — OFFICE VISIT (OUTPATIENT)
Dept: NEUROPSYCHOLOGY | Facility: CLINIC | Age: 53
End: 2018-12-14
Payer: COMMERCIAL

## 2018-12-14 DIAGNOSIS — F09 MENTAL DISORDER DUE TO GENERAL MEDICAL CONDITION: ICD-10-CM

## 2018-12-14 DIAGNOSIS — D32.9 MENINGIOMA (H): Primary | ICD-10-CM

## 2018-12-14 NOTE — PROGRESS NOTES
The patient was seen for neuropsychological evaluation at the request of Pan Calero for the purposes of diagnostic clarification and treatment planning.  Two hours of face-to-face testing were provided by this writer.  Please see Dr. Kely Parikh's report for a full interpretation of the findings.

## 2018-12-21 NOTE — PROGRESS NOTES
NAME: Marlyn Wilder  MR#: [id number]  YOB: 1965  DATE OF EXAM: 12/14/2018    Neuropsychology Laboratory  [address], [facility] [address]  [place]  [address] [phone]    NEUROPSYCHOLOGICAL EVALUATION    RELEVANT HISTORY AND REASON FOR REFERRAL    Marlyn Wilder is a 53-year-old, right-handed  with 19 years of formal education. Information was obtained via interview with the patient and review of her medical records. Ms. 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 Her initial surgery was done in two stages at an outside hospital in February 2012, and a third operation occurred in November 2012 followed by fractionated radiotherapy. There was residual tumor left behind due to its adherence to the perforating branches off the posterior communicating artery. She is being followed with annual MRIs. At a [facility] visit on 5/29/2018, she reported persistent nausea and vomiting, decreased appetite, bilateral vision blurring, and spells of dystonia in her left hand. She has a history of seizures for which she is being followed by Neurology. She reported that her inability to keep up at work was causing her great stress, and she was frequently crying due to her pain and was considering quitting her job. She wants to work but subjectively feels her performance at work is not adequate. Her mood and performance fluctuate day by day and she is having more fatigue and makes more mistakes as a result. Her performance is best early in the week and tapers off as the week progresses. Progressive decline in her cognitive performance over the past few years was thought to be multifactorial, representing a combination of medication effect, tumor treatment effect, and hormone deficiency. Dr. Calero supported her for permanent disability. By her next visit with Dr. Calero on 11/13/2018, she had been doing well and had  been busy after her [facility] with her grandchildren. She was continuing to report fatigue which had interfered with her ability to drive occasionally. She has had decreased right eyesight. Long-term disability was recommended. She was also referred for neuropsychological evaluation in order to establish a neurocognitive baseline, in the event that she declines further in the future.    CLINICAL INTERVIEW FINDINGS    Upon interview, Ms. Wilder stated that she was having problems with her eye and had an MRI in 2012 where they found the meningioma. She had two surgeries in February 2012, and the tumor grew again and was resected in November 2012. She had radiation therapy which apparently affected her pituitary. She understands that the meningioma is still there, and that they removed a portion of it but that it is in a vascularized spot. She understands that the meningioma is stable now, and that they will consider surgery if it begins affecting her right eye.    Ms. Wilder has been noticing some difficulty with cognition in the last year. She forgets names and words, and forgets to do things. She has to keep a lot of lists. She has been out of work since June. In the six months before she left work, both she and her boss noticed that she was having cognitive problems. She could not remember case names, or words to describe cases. She feels that this has improved somewhat since she has been off of work, depending on how busy and tired she is. She sometimes forgets details from phone calls. She has difficulty with word finding still and has trouble remembering names of people she should know. She denied difficulty with comprehension. She may be somewhat distracted. She does not read because of her eyes, and at work she would print things and highlight them so that she could see them. She is less organized than she used to be. She denied difficulty with decision-making.    Ms. Wilder lives with her . Their bills  are on autopay. She keeps lots of notes and piles of things that have to be done, and it can cause a problem when people move the piles. She manages her own medications, only having difficulty when she travels and her routine changes. She drives, but not at night or when it is raining because lights are hard on her. She cooks, and finds that she has to look back at her recipes frequently. 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 She handles her personal cares independently.    Ms. Wilder denied any history of psychiatric illness and she has not worked with a psychologist. She described her mood as pretty good, stating that she worries more about how her illness is affecting her family. Her mother has dementia and is on dialysis, and she worries about her  having to take on more jobs around the house. She does not feel that she is depressed. She stated that she is tired of aches and pains, but that God has been good to her. She indicated that she is more irritable since she has been taking Keppra. She has been crying, but does not know if it is more than normal. Her sleep has been poor, especially in the last few days. She has no trouble falling asleep, but she does have trouble staying asleep, waking up constantly. She uses a CPAP regularly. She tends to doze off during the day, falling asleep midmorning. She also has waves of time when she cannot stop yawning. She does fine if she is engaged, but there was at least one time when she was at work and fell asleep in court. She has had a poor appetite lately, but her weight has been stable. Her energy level is fair. She stated that she can get herself to do something but then she crashes. Her interest level is generally good. She stated that she needs to find things to do since she is out of work. 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 She denied suicidal ideation or any history of attempts to commit suicide. She denied visual or auditory hallucinations. She denied alcohol, tobacco, or illicit drug use.    Ms. Wilder completed a GED at [employer]. She works for the [email or web address], as a  and  since 1991. She has been  for 31 years and has three children.    When she was little, Ms. Wilder fell forward and apparently had a seizure. She understands that her grandmother did mouth-to-mouth resuscitation. There were also two times when she hit her head on a radiator and shower stall. She stated that she has frontal lobe seizures that occur whenever she forgets her pills or when she has stress. They are very brief, and consist of a sense of déjà vu, and the smell of something burning, followed by nausea and fatigue and sometimes a headache. When she traveled recently, she had some of these episodes. She had two this month, and they were more frequent when she was working. She stated that she had mini strokes twice since 2012. She had  goofy language,  and she could move but was responding oddly. She went to the hospital and was told that she had TIAs. Her balance has been good but she tends to limp when her joints act up. She has not been falling. She can have numbness in either hand, and both hands can be weak. She sometimes drops things. She denied tremor. She is not experiencing regular headaches. She is bothered by aches and pains in her joints, and has arthritis and fibromyalgia.    PAST MEDICAL HISTORY: Medical records indicate a history of partial symptomatic epilepsy was simple partial seizures, hyperlipidemia, meningioma, hypertension, and calculus of the kidney.    CURRENT MEDICATIONS:  Include albuterol, aspirin 81 mg, benzonatate, etanercept, folic acid, furosemide, gabapentin, levetiracetam, levothyroxine, lisinopril, methotrexate, multiple vitamins-minerals, omeprazole, and  ondansetron.    FAMILY MEDICAL HISTORY:  Significant for a mother with dementia and kidney disease who is on dialysis, was also has also had cerebrovascular disease    BEHAVIORAL OBSERVATIONS    During the evaluation, Ms. Wilder was pleasant, cooperative, and seemed to understand the instructions. She was alert and oriented to person, place, and time. No abnormal movements were observed clinically. She wore glasses but took them off for reading and visual tasks. Mood was euthymic. Speech was fluent, with normal articulation, volume, and rate. Spontaneous conversation was present and appropriate. Performance validity measures fell within normal limits. The results are believed to accurately reflect her current level of functioning.    MEASURES ADMINISTERED    The following measures were administered by a trained psychometrist, under the direct supervision of a licensed psychologist.    Wechsler Abbreviated Scale of Intelligence - 2 (WASI-2); Subtests of the Wechsler Adult Intelligence Scale-4; Reading subtest of the Wide Range Achievement Test-4; subtests of the Wechsler Memory Scale-4; Bishop Complex Figure Test; California Verbal Learning Test-2; Columbia Naming Test; Controlled Oral Word Association Test; Semantic Fluency; DURON Token Test and Repetition, BDAE Complex Ideational Material, Olvera Judgment of Line Orientation; Clock Drawing; Trail Making Test; Stroop; Letter Cancellation Test; Wisconsin Card Sorting Test; Finger Tapping; Grooved Pegboard; Sandoval Depression Inventory - 2 (BDI-2).     RESULTS AND INTERPRETATION    Overall intellectual functioning was estimated to fall in the high average range, above premorbid estimates of average based on single word reading abilities, but consistent with premorbid estimates based on level of educational and occupational attainment. There is a significant difference between estimates of verbal intellectual functioning, falling in the average range, and nonverbal intellectual  functioning, falling in the above average range.    Confrontation naming was high average for her age and level of education. Expressive vocabulary was average. Verbal abstract reasoning was average. Letter fluency was moderately slowed, and generative naming to category was low average. Comprehension of complex ideational material was average. Comprehension of single and multiple step requests was above average. Repetition of sentences was mildly impaired.    Attention span was low average for her age. Divided attention was high average. Performance on a measure of distractibility was average. Performance on a measure of sustained attention fell within normal limits. Psychomotor processing speed was average. Finger tapping speed was average in her right, dominant hand, and low average in her left hand, a nonsignificant difference. Fine manual dexterity was average in her right hand and high average in her left hand.    Basic visual perception, including matching lines and angles, was above average. Construction of a clock fell within normal limits. Construction of a complex design also fell within normal limits. Assembly of visual material was above average. Nonverbal deductive reasoning was above average.    Novel problem-solving, including the ability to generate strategies and solutions, fell within normal limits for her age and level of education, although she had mild difficulty with conceptualization on this measure.    Immediate recall of verbal narrative material was average, with average recall following a 30 minute delay. On a multiple trial list learning task, immediate recall was above average, with average recall following a 20 minute delay. Immediate and 30 minute delayed recall of visual material was average.    On the BDI-2, a self-report questionnaire, Ms. Wilder endorsed a mild level of depressive symptomatology. Specifically, she acknowledged feeling more discouraged about her future than she used  to be and crying more than she used to. She has lost confidence in herself and is more self-critical. She does not enjoy things as much as she used to, and is less interested in sex than she used to be. She has less energy than she used to have and sleeps somewhat less than usual. She is too tired or fatigued to do a lot of the things that she used to do. Her appetite is somewhat less than usual. She is more irritable, finds it more difficult to make decisions, and cannot concentrate as well as usual.    IMPRESSIONS AND RECOMMENDATIONS    Current results indicate subtle inefficiencies in language, including fluency and repetition. Expressive language falls within normal limits, but fall significantly below measures of nonverbal abilities. Attention is variable, ranging from low average to above average, and some cases with greater difficulty on less complex tasks. She endorses a mild level of depressive symptomatology.    This pattern of performance does not reflect dementia at this time, and is only subtly abnormal. There is a subtle indication of dominant hemisphere anterior cerebral involvement. This is consistent with her history of left medial sphenoid wing meningioma, with MRI inidicating stable gliotic changes in the left temporal and frontal opercula. She is also experiencing mild depressive symptomatology, which likely contributes to the subtle variability in attention. She has had significant changes in the last year, as she has taken leave from work and is receiving long term disability. If not already considered, perhaps she would benefit from referral to health psychology. Psychotherapy could focus specifically on strategies to adjust the changes in her lifestyle associated with her medical condition.    In terms of daily functioning, Ms. Wilder s subtle cognitive inefficiencies are not likely to interfere with her ability to actively participate in treatment, or to manage her instrumental activities of  daily living independently. Given her variable attention, she may find it helpful to complete one task before beginning another. She may also find it helpful to work in environments that are relatively free from distractions, such as noises or other interruptions when she is trying to complete a task. Current results may serve as a baseline of her neurocognitive functioning, and the evaluation may be repeated in the future for comparison should a change in mental status occur.    Kely Parikh, Ph.D., ABPP  Licensed Psychologist, LP [id number]  Board Certified in Clinical Neuropsychology    Time spent:  Four hours professional time, including interview, record review, data integration, and report writing (CPT 69196); an additional three hours, including testing administered by a psychometrist and interpreted by a neuropsychologist (CPT 63144). ICD-10 diagnosis: D32.9; F06.8.

## 2019-01-09 NOTE — PROGRESS NOTES
Name: Marlyn Wilder MRN: [id number]  : 1965  MILLER: 2018  Staff:  Tech: VICKY Age: 53  Sex: Female Hand: Right   Educ: 19  Occupation:     WASI-II     Raw T     Vocabulary  42 54     Similarities  31 47     Block Design  55 63     Matrix Reasoning 25 65                       Full 4 IQ:  113     PIQ: 124         WAIS-IV     Raw SSa     Coding  71 11     Digit Span  21 7     WRAT4   SS %ile Grade Equiv.     Word Reading  105 63 >12.9    WMS-4  Raw SS      Info & [id number]      LM I  25 10     LM II  19 9     LM Recog. 28 >75%ile    GIOVANNY-COMPLEX FIGURE TEST      Raw    T %ile     Time to Copy  200      >16     Copy    36     >16     Short Delay Recall 21 54 66     Long Delay Recall 18.5 48 42     Recognition Total 22 58 79    CALIF[place]IA VERBAL LEARNING TEST - 2     Trial 1 2 3 4 5              7 12 12 13 15      Raw  SD      Trial 1-5 Total   59 62(T)     List A: Trial 1   7 0     List A: Trial 5   15 1     List B: Total   5 -0.5     Short Delay Free Recall  12 0.5     Short Delay Cued Recall  14 1     Long Delay Free Recall  12 0     Long Delay Cued Recall  13 0.5     Semantic Clustering  -0.4 -1     Serial Clustering  -0.1 -1     % Recall from Primacy  27 0     % Recall from Middle  41 -1     % Recall from Recency  32 0.5     Total Learning Greenlee  1.7 0.5     Across-Trial Consistency  89 1     Total Repetitions  8 0.5     Total Intrusions   5 0.5     Total Recall Discrim  2.3 0.5     Recognition Hits  15 0     Recognition False Pos  0 -1     Recognition Discrim  3.7 1     Response Bias   0.3 0.5    BOSTON NAMING TEST   Score: 59  T: 56                          [ 59    w/o cues        1   w/phonemic cues]     COWAT (FAS)   Raw: 22       z: -2.03    ANIMAL FLUENCY   Raw: 17  z: -0.91     DURON TOKEN TEST     Score  44     82%ile    CLOCK DRAWING     Command   3/3             Copy     3/3    TRAIL MAKING TEST    Raw         Err  z    A 30        0  0.48    B 49        0  1.20     STROOP   Raw  +  Gage  =      Total     T    Word 84  +  8 = 92 42   Color  53  +  4 = 57 35        Color/Word  41  +  5 = 46 51     WCST    Raw   T/%ile   Categories: 6 >16   % Persev. Err.: 17 40   Concept. Resp.: 66 36   FTMS:  0    FINGER TAPPING    Avg  z   RH  43.67 -0.52    LH 39 -0.78      GROOVED PEGBOARD    Raw  Drops T   RH  63  0 47    LH 63  0 57     LETTER CANCELLATION   Time: 116  Err: 2    DELANEY JUDGMENT OF LINE ORIENTATION Form H   Raw: 30   86+%ile:    BDAE COMPLEX IDEATIONAL MATERIAL   Raw: 12/12 T: 51    DURON REPITITION   Raw: 9  7%ile    TOMM T1: 50  T2: 50    BDI-II:  14

## 2019-02-07 DIAGNOSIS — G40.119 PARTIAL SYMPTOMATIC EPILEPSY WITH SIMPLE PARTIAL SEIZURES, INTRACTABLE, WITHOUT STATUS EPILEPTICUS (H): ICD-10-CM

## 2019-02-07 RX ORDER — LEVETIRACETAM 500 MG/1
TABLET ORAL
Qty: 180 TABLET | Refills: 0 | Status: SHIPPED | OUTPATIENT
Start: 2019-02-07 | End: 2019-03-18

## 2019-02-07 RX ORDER — LEVETIRACETAM 750 MG/1
TABLET ORAL
Qty: 120 TABLET | Refills: 0 | OUTPATIENT
Start: 2019-02-07

## 2019-03-18 DIAGNOSIS — G40.119 PARTIAL SYMPTOMATIC EPILEPSY WITH SIMPLE PARTIAL SEIZURES, INTRACTABLE, WITHOUT STATUS EPILEPTICUS (H): ICD-10-CM

## 2019-03-19 RX ORDER — LEVETIRACETAM 500 MG/1
TABLET ORAL
Qty: 180 TABLET | Refills: 4 | Status: SHIPPED | OUTPATIENT
Start: 2019-03-19 | End: 2019-06-10

## 2019-03-19 NOTE — TELEPHONE ENCOUNTER
Medication request meets [place] Medication Refill Protocol - Seizure Medications (non-controlled) requirements.  Last visit with Dr. Madrid: 7/16/18. RTC: 11 months.

## 2019-05-17 ENCOUNTER — TELEPHONE (OUTPATIENT)
Dept: NEUROSURGERY | Facility: CLINIC | Age: 54
End: 2019-05-17

## 2019-05-17 NOTE — TELEPHONE ENCOUNTER
Writer left message for patient per RN that patient can come to the [facility] after her MRI for shunt adjustment without an appointment. MRI is 05/21/2019.

## 2019-05-21 ENCOUNTER — TELEPHONE (OUTPATIENT)
Dept: NEUROSURGERY | Facility: CLINIC | Age: 54
End: 2019-05-21

## 2019-05-21 ENCOUNTER — ANCILLARY PROCEDURE (OUTPATIENT)
Dept: MRI IMAGING | Facility: CLINIC | Age: 54
End: 2019-05-21
Attending: NEUROLOGICAL SURGERY
Payer: COMMERCIAL

## 2019-05-21 DIAGNOSIS — D32.0 BENIGN NEOPLASM OF CEREBRAL MENINGES (H): ICD-10-CM

## 2019-05-21 RX ORDER — GADOBUTROL 604.72 MG/ML
15 INJECTION INTRAVENOUS ONCE
Status: COMPLETED | OUTPATIENT
Start: 2019-05-21 | End: 2019-05-21

## 2019-05-21 RX ADMIN — GADOBUTROL 13 ML: 604.72 INJECTION INTRAVENOUS at 10:45

## 2019-05-21 NOTE — DISCHARGE INSTRUCTIONS
MRI Contrast Discharge Instructions    The IV contrast you received today will pass out of your body in your  urine. This will happen in the next 24 hours. You will not feel this process.  Your urine will not change color.    Drink at least 4 extra glasses of water or juice today (unless your doctor  has restricted your fluids). This reduces the stress on your kidneys.  You may take your regular medicines.    If you are on dialysis: It is best to have dialysis today.    If you have a reaction: Most reactions happen right away. If you have  any new symptoms after leaving the hospital (such as hives or swelling),  call your hospital at the correct number below. Or call your family doctor.  If you have breathing distress or wheezing, call 911.    Special instructions: ***    I have read and understand the above information.    Signature:______________________________________ Date:___________    Staff:__________________________________________ Date:___________     Time:__________    [facility] Departments:    ___[place]: [phone]  ___[facility]: [phone]  ___[place]: [phone] ___[facility]: [phone]  ___[facility]: [phone]  ___[facility]: [phone]  ___[phone]  ___[facility]: [phone]  ___[phone]

## 2019-05-21 NOTE — TELEPHONE ENCOUNTER
Left message with le to clarify that she needs to have the shunt checked aftyer her MRI at the hospital. Left direct dial for patient to call. Options are to page a resident to check the shunt or come to the [facility] and have it checked. Writer informed patient that the shunt needs to be check after the MRI within a 24 - 48 hour time frame. Gave direct phone number to patient.

## 2019-05-22 ENCOUNTER — TELEPHONE (OUTPATIENT)
Dept: NEUROSURGERY | Facility: CLINIC | Age: 54
End: 2019-05-22

## 2019-05-22 ENCOUNTER — DOCUMENTATION ONLY (OUTPATIENT)
Dept: CARE COORDINATION | Facility: CLINIC | Age: 54
End: 2019-05-22

## 2019-05-22 NOTE — TELEPHONE ENCOUNTER
Contacted patient left voice mail. Patient left  for writer. Patient stated x2 that she does NOT have a shunt. Called patient and spoke with her. Again patient stated x 2 with spouse in the background that patient does NOT have a shunt. Writer sent epic message to RN.

## 2019-05-28 ENCOUNTER — PATIENT OUTREACH (OUTPATIENT)
Dept: NEUROSURGERY | Facility: CLINIC | Age: 54
End: 2019-05-28

## 2019-05-28 NOTE — PROGRESS NOTES
Message from [facility] about an incidental finding on the MRI.  New MCA stenosis.  Writer will bring this to MD's attention.

## 2019-05-29 ENCOUNTER — OFFICE VISIT (OUTPATIENT)
Dept: NEUROSURGERY | Facility: CLINIC | Age: 54
End: 2019-05-29
Payer: COMMERCIAL

## 2019-05-29 VITALS
OXYGEN SATURATION: 99 % | HEIGHT: 68 IN | HEART RATE: 67 BPM | DIASTOLIC BLOOD PRESSURE: 69 MMHG | SYSTOLIC BLOOD PRESSURE: 157 MMHG | WEIGHT: 287.6 LBS | BODY MASS INDEX: 43.59 KG/M2

## 2019-05-29 DIAGNOSIS — D32.9 MENINGIOMA (H): Primary | ICD-10-CM

## 2019-05-29 ASSESSMENT — MIFFLIN-ST. JEOR: SCORE: 1945.11

## 2019-05-29 ASSESSMENT — PAIN SCALES - GENERAL: PAINLEVEL: MODERATE PAIN (5)

## 2019-05-29 NOTE — LETTER
5/29/2019      RE: Marlyn Wilder  [address] [facility] [id number]       Dear Dr. Neal:    We saw Mrs. 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 Her initial surgery was done in two stages at [facility] in 02/2012. We performed a third operation on 11/2012 followed by fractionated radiotherapy. We left residual tumor behind due to its adherence to the perforating branches of the posterior communicating artery. We are following her residual tumor with annual MRIs.     She saw Dr. Castañeda last month and her left-sided vision has slightly declined. Her right-sided vision is intact and stable. She describes two episodes of very quick, sudden, stabbing headaches. These resolved without medication. She has not had any unusual headaches otherwise. She remains on 1500 mg Keppra twice a day. She had seizures in January when she was taken off methotrexate and etanercept during an episode of streptococcal pharyngitis. Currently, she remains on methotrexate. Her endocrinological status is followed by Dr. Spain. She remains on levothyroxine. She reports frustration due to joint pain and weight gain. Her physical activity is limited by her joint pains and fatigue. Her  reports that her speech is normal. However, word-finding has worsened, especially with fatigue.    Her gross neurological examination is unchanged. She has a sluggish pupil on the left. She moves all extremities with good strength and coordination. She has good facial strength. She has moderate left temporalis atrophy. Speech and language are normal.     REVIEW OF STUDIES: We reviewed her MRI of the brain with her and her  and compared it to previous studies. The enhancing left medial sphenoid wing meningioma is unchanged in size at approximately 4 cm. The left optic nerve remains elevated by the tumor but the right optic nerve  remains uninvolved. She has stable gliotic changes in the left temporal and frontal operculi.     IMPRESSION AND PLAN: We are relieved to see that her tumor is stable, though it remains sizable. Assessment of the growth of her tumor primarily depends on the status of her right-sided vision. As her right-sided vision remains intact, we favor continued observation of her meningioma. We predict that tumor progression will occur as indicated by the results of her eye examinations. This will likely need reoperation but this can be discussed in depth if the need arises. For now, we will see her back in 1 year with another MRI of the brain.    RUBY SIDHU MD    I, Nasra Trinh, am serving as a scribe to document services personally performed by Ruby Sidhu MD, based upon my observations and the provider's statements to me. All documentation has been reviewed by the aforementioned doctor prior to being entered into the official medical record.

## 2019-05-29 NOTE — LETTER
5/29/2019       RE: Marlyn Wilder  [address]  [facility] [address]     Dear Colleague,    Thank you for referring your patient, Marlyn Wilder, to the [facility] NEUROSURGERY at [facility]. Please see a copy of my visit note below.    Dear Dr. Neal:    We saw Mrs. 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 Her initial surgery was done in two stages at [facility] in 02/2012. We performed a third operation on 11/2012 followed by fractionated radiotherapy. We left residual tumor behind due to its adherence to the perforating branches of the posterior communicating artery. We are following her residual tumor with annual MRIs.     She saw Dr. Castañeda last month and her left-sided vision has slightly declined. Her right-sided vision is intact and stable. She describes two episodes of very quick, sudden, stabbing headaches. These resolved without medication. She has not had any unusual headaches otherwise. She remains on 1500 mg Keppra twice a day. She had seizures in January when she was taken off methotrexate and etanercept during an episode of streptococcal pharyngitis. Currently, she remains on methotrexate. Her endocrinological status is followed by Dr. Spain. She remains on levothyroxine. She reports frustration due to joint pain and weight gain. Her physical activity is limited by her joint pains and fatigue. Her  reports that her speech is normal. However, word-finding has worsened, especially with fatigue.    Her gross neurological examination is unchanged. She has a sluggish pupil on the left. She moves all extremities with good strength and coordination. She has good facial strength. She has moderate left temporalis atrophy. Speech and language are normal.     REVIEW OF STUDIES: We reviewed her MRI of the brain with her and her  and compared it to  previous studies. The enhancing left medial sphenoid wing meningioma is unchanged in size at approximately 4 cm. The left optic nerve remains elevated by the tumor but the right optic nerve remains uninvolved. She has stable gliotic changes in the left temporal and frontal operculi.     IMPRESSION AND PLAN: We are relieved to see that her tumor is stable, though it remains sizable. Assessment of the growth of her tumor primarily depends on the status of her right-sided vision. As her right-sided vision remains intact, we favor continued observation of her meningioma. We predict that tumor progression will occur as indicated by the results of her eye examinations. This will likely need reoperation but this can be discussed in depth if the need arises. For now, we will see her back in 1 year with another MRI of the brain.    RUBY SIDHU MD    I, Nasra Trinh, am serving as a scribe to document services personally performed by Ruby Sidhu MD, based upon my observations and the provider's statements to me. All documentation has been reviewed by the aforementioned doctor prior to being entered into the official medical record.

## 2019-05-29 NOTE — PROGRESS NOTES
Dear Dr. Neal:    We saw Mrs. 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 Her initial surgery was done in two stages at [facility] in 02/2012. We performed a third operation on 11/2012 followed by fractionated radiotherapy. We left residual tumor behind due to its adherence to the perforating branches of the posterior communicating artery. We are following her residual tumor with annual MRIs.     She saw Dr. Castañeda last month and her left-sided vision has slightly declined. Her right-sided vision is intact and stable. She describes two episodes of very quick, sudden, stabbing headaches. These resolved without medication. She has not had any unusual headaches otherwise. She remains on 1500 mg Keppra twice a day. She had seizures in January when she was taken off methotrexate and etanercept during an episode of streptococcal pharyngitis. Currently, she remains on methotrexate. Her endocrinological status is followed by Dr. Spain. She remains on levothyroxine. She reports frustration due to joint pain and weight gain. Her physical activity is limited by her joint pains and fatigue. Her  reports that her speech is normal. However, word-finding has worsened, especially with fatigue.    Her gross neurological examination is unchanged. She has a sluggish pupil on the left. She moves all extremities with good strength and coordination. She has good facial strength. She has moderate left temporalis atrophy. Speech and language are normal.     REVIEW OF STUDIES: We reviewed her MRI of the brain with her and her  and compared it to previous studies. The enhancing left medial sphenoid wing meningioma is unchanged in size at approximately 4 cm. The left optic nerve remains elevated by the tumor but the right optic nerve remains uninvolved. She has stable gliotic changes in the left temporal and frontal  operculi.     IMPRESSION AND PLAN: We are relieved to see that her tumor is stable, though it remains sizable. Assessment of the growth of her tumor primarily depends on the status of her right-sided vision. As her right-sided vision remains intact, we favor continued observation of her meningioma. We predict that tumor progression will occur as indicated by the results of her eye examinations. This will likely need reoperation but this can be discussed in depth if the need arises. For now, we will see her back in 1 year with another MRI of the brain.    RUBY SIDHU MD    I, Nasra Trinh, am serving as a scribe to document services personally performed by Ruby Sidhu MD, based upon my observations and the provider's statements to me. All documentation has been reviewed by the aforementioned doctor prior to being entered into the official medical record.

## 2019-05-29 NOTE — NURSING NOTE
Chief Complaint   Patient presents with     RECHECK     [facility] RETURN - 6 MONTH FOLLOW UP       Rk Pinon, EMT

## 2019-06-10 ENCOUNTER — APPOINTMENT (OUTPATIENT)
Dept: LAB | Facility: CLINIC | Age: 54
End: 2019-06-10
Payer: COMMERCIAL

## 2019-06-10 ENCOUNTER — OFFICE VISIT (OUTPATIENT)
Dept: NEUROLOGY | Facility: CLINIC | Age: 54
End: 2019-06-10
Payer: COMMERCIAL

## 2019-06-10 VITALS
SYSTOLIC BLOOD PRESSURE: 134 MMHG | BODY MASS INDEX: 43.35 KG/M2 | RESPIRATION RATE: 16 BRPM | HEIGHT: 68 IN | WEIGHT: 286 LBS | OXYGEN SATURATION: 97 % | DIASTOLIC BLOOD PRESSURE: 79 MMHG | TEMPERATURE: 97.8 F | HEART RATE: 71 BPM

## 2019-06-10 DIAGNOSIS — G40.119 PARTIAL SYMPTOMATIC EPILEPSY WITH SIMPLE PARTIAL SEIZURES, INTRACTABLE, WITHOUT STATUS EPILEPTICUS (H): Primary | ICD-10-CM

## 2019-06-10 PROBLEM — E55.9 VITAMIN D DEFICIENCY: Status: ACTIVE | Noted: 2019-06-10

## 2019-06-10 PROBLEM — M79.18 MYOFASCIAL PAIN: Status: ACTIVE | Noted: 2018-10-09

## 2019-06-10 RX ORDER — LEVETIRACETAM 500 MG/1
1500 TABLET ORAL 2 TIMES DAILY
Qty: 540 TABLET | Refills: 3 | Status: ON HOLD | OUTPATIENT
Start: 2019-06-10 | End: 2020-02-25

## 2019-06-10 ASSESSMENT — PAIN SCALES - GENERAL: PAINLEVEL: MODERATE PAIN (4)

## 2019-06-10 ASSESSMENT — MIFFLIN-ST. JEOR: SCORE: 1938.17

## 2019-06-10 NOTE — LETTER
6/10/2019       RE: Marlyn Wilder  [address] [facility] [address]     Dear Colleague,    Thank you for referring your patient, Marlyn Wilder, to the [facility] NEUROLOGY at [facility]. Please see a copy of my visit note below.      [facility]:   RETURN VISIT     I spent 25 minutes in this patient care, more than 50% of which consisted of counseling and coordinating care.   Emmanuel Madrid M.D.

## 2019-06-10 NOTE — PROGRESS NOTES
[facility] Epilepsy Clinic:  RETURN VISIT          Service Date: 06/10/2019     HISTORY:  Ms. Marlyn Wilder is a 53-year-old, right-handed woman who returned for follow up of partial epilepsy due to a left-sided intracranial meningioma.  She came to the visit today with her .      Following the most recent visit to this clinic on 07/16/2018, the patient reportedly has not had any seizures with unconsciousness, unresponsiveness, confusion, falling or convulsive movements.  Isolated auras occurred about 1-2 times per week in January and February 2019, during a period of recurrent febrile pharyngitis and flares of arthritis.  Auras then ceased with no change in levetiracetam dosing.      In 2017, isolated auras were occurring 5-6 times per month, but ceased with addition of levetiracetam; she had non between September 2017 and January 2019. She had mild lethargy and mild irritability after increasing levetiracetam, but this largely resolved with a small dose decrease later in 2017.      Ictal semiology-history:                       The patient once more confirmed previously presented history in detail today. She again noted that she had a single febrile convulsion of early childhood, but otherwise had no seizures until the initial manifestation of her meningioma in 2011.  She has had only 1 type of seizure since 2011.       The patient has had exclusively simple partial seizures.  These first began in 2011, and have continued on since then.  They have been quite stable in semiology and frequency.  She has at least 20 per year, but has only up to 2 on a single day and usually only one at a time.  The full form of the aura lasts almost 1 minute and features a froilan vu phenomenon associated with a sense that she has just viewed a complex visual scene briefly, although she cannot remember the nature of the scene, with an olfactory hallucination of burning electronics, and waves of intense nausea.  She  does not have a postictal state with this.  She is always able to speak if she is with someone.  Her  has seen many of these, which she described the auras in detail to him and she is always able to respond to him quickly.  She never has any automatic behaviors as best he can determine.  Sometimes she has briefer events that feature only 1-2 of her 3 aura phenomena.       The patient has not had any staring spells with unresponsiveness or confusion, and has not had any grand mal seizures following that of early childhood.  She only has involuntary jerks on falling asleep in the evening.       Auras appear to have been triggered or exacerbated by forgetting to take medications, which happens only rarely, by emotional stress, and by tiredness.       Epilepsy-seizure predispositions:   The patient's epilepsy risk factor is a left middle fossa meningioma with extension into the left parasellar space.  This was resected 3 times in , and pathology showed that there was a grade I meningothelial meningioma.  The portion that is in the parasellar space has surrounded the left optic nerve, and she does have visual deficits on the left.  She had radiation therapy following the last resection by Dr. Calero.  She has had followup scans since that time, which showed a stable lesion as recently as 2017.         There is no family history of seizures or epilepsy.       The patient had a single febrile convulsion of early childhood when she was approximately 1 years old.       She has no history of gestational or  injury, developmental delay, stroke, meningitis, encephalitis, and significant head injury.  She denied a history of physical or sexual abuse by an adult during her childhood or adulthood.       Laboratory evaluations:   The most recent brain MRI was performed at [facility] on 2017, and was reported to show a stable left medial middle fossa lesion extending into the parasellar space in the left  orbital apex, with mild mass effect and with enhancement.       A routine EEG was performed in [place] in 2012, and the report indicated left hemispheric slowing and spikes.   An out-patient EEG at [facility] on 06/15/2016 was abnormal due to left frontotemporal delta slowing and breach effect.      Epilepsy therapeutics:   The only anti-seizure medication that the patient has used has been levetiracetam.  This was started in 2012.  It has been consistently associated with irritability.  The patient has been able to control her irritability and has continued to function well as an  for the [facility].       PAST MEDICAL-SURGICAL HISTORY:    1. 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   2. History of left middle fossa meningothelial meningioma, grade 1, with extension to the left parasellar area, left orbital apex and cavernous sinus; status post resections (2012) and radiation therapy.   3. History of probable transient ischemic attack with aphasia (2013).   4. Obstructive sleep apnea, treated with CPAP.   5. Systemic hypertension, treated.   6. Hyperlipidemia.   7. History of recurring renal calculi.   8. History of arthralgia attributed to atypical rheumatoid arthritis or fibromyalgia.   9. Hypothyroidism.   10. Status post left carpal tunnel release.   11. History of TMJ syndrome.      FAMILY HISTORY:  There is no family history of seizures, epilepsy or other neurological conditions.       PERSONAL AND SOCIAL HISTORY:  The patient completed her education through a [employer] degree.  She is working as an  for a [place] [employer].  She lives with her .  They have 3 adult children, no longer in the home.       REVIEW OF SYSTEMS:  The patient has noted nausea in recent months, which has responded to omeprazole and ondansetron.   She denied history of attention and memory disturbance, difficulties with comprehension and expression, difficulty in solving problems, weakness,  tremors or other abnormal involuntary movements, numbness or tingling, incoordination, falling or difficulty in walking, urinary or fecal incontinence, headache and other pain, except as described above.  The patient denied any history of severe depression or suicidal ideation, severe anxiety, panic attacks, hallucinations, delusions, psychosis, substance abuse, or psychiatric hospitalization.  She denied rashes and easy bruising.  The remainder of a 14-system symptom review was negative except as noted above.        MEDICATIONS:  Levetiracetam 1500 mg b.i.d., aspirin 81 mg daily, and other medications as per the electronic medical record.      PHYSICAL EXAMINATION:    The patient was an alert woman in no apparent distress.  Vital signs were as per the electronic medical record.  There was a skull defect consistent with reported surgery.  Neck was supple, without signs of meningeal irritation.       On neurological examination the patient appeared alert and was fully oriented to person, place, time, and reason for visit.  Speech showed normal articulation, fluency, repetitions, naming, syntax and comprehension.  Cranial nerves III through XII were normal.  Muscle masses, tones and strengths were normal throughout.  There was no pronator drift.  Sequential fine finger movements were performed normally with each hand.  No spontaneous tremors, myoclonus, or other abnormal movements were observed.  Sensations of light touch, pin prick, vibration and proprioception were reportedly normal throughout.  The rapid alternating movements, and finger-nose-finger and heel-shin maneuvers were performed normally bilaterally.  Romberg maneuver was negative.  Regular, heel, toe, tandem and reverse tandem walking were normal.  Deep tendon reflexes were normal and symmetric throughout.  Toes were downgoing bilaterally.       IMPRESSION:    The patient has had no complex partial or grand mal seizures on levetiracetam.  Following the  most recent visit to this clinic on 07/16/2018, she had isolated auras occurred about 1-2 times per week in January and February 2019, during a period of recurrent febrile pharyngitis and flares of arthritis.  Auras then ceased with no change in levetiracetam dosing.      We agreed to check a levetiracetam level, but not to increase the dose unless the level has fallen.  Most likely the auras in early 2019 were triggered by intercurrent febrile and painful illnesses.      The symptoms are essentially diagnostic of simple partial seizures of mesial temporal origin, given the location of the meningioma adjacent to the left mesial temporal lobe.  Fortunately, she has not had any seizures with impaired awareness, either as complex partial seizures or grand mal seizures (except for a single febrile convulsion of early childhood).  I told her that it will be very important to stay on an anti-seizure medication given the location of the tumor and her history.  We discussed in detail the appearance of complex partial seizures or grand mal seizures, and her  would take her to the emergency room if either occurred.        We discussed [place] regulations on seizures and driving.  She has not had any seizures in adulthood of types that would impair her driving.       PLAN:    1. Continue levetiracetam at 1500 mg b.i.d.   2. Check serum levetiracetam level today.  3.  Return visit in approximately 11 months.   I spent 25 minutes in this patient care, more than 50% of which consisted of counseling and coordinating care.         Emmanuel Madrid M.D.   Professor of Neurology

## 2019-06-12 LAB — LEVETIRACETAM SERPL-MCNC: 22 UG/ML (ref 12–46)

## 2019-06-24 ENCOUNTER — DOCUMENTATION ONLY (OUTPATIENT)
Dept: NEUROLOGY | Facility: CLINIC | Age: 54
End: 2019-06-24

## 2019-08-02 ENCOUNTER — MYC MEDICAL ADVICE (OUTPATIENT)
Dept: NEUROLOGY | Facility: CLINIC | Age: 54
End: 2019-08-02

## 2019-08-02 ENCOUNTER — TELEPHONE (OUTPATIENT)
Dept: NEUROLOGY | Facility: CLINIC | Age: 54
End: 2019-08-02

## 2019-08-02 ENCOUNTER — MYC MEDICAL ADVICE (OUTPATIENT)
Dept: NEUROSURGERY | Facility: CLINIC | Age: 54
End: 2019-08-02

## 2019-08-02 NOTE — TELEPHONE ENCOUNTER
Called and spoke with patient and gave her the HIM number were she can request her records from, patient was given the Fax and phone number [phone] , Fax [phone]

## 2019-08-02 NOTE — TELEPHONE ENCOUNTER
[facility] Call Center    Phone Message    May a detailed message be left on voicemail: yes    Reason for Call: Other: Mayuri from [facility] needing verbal confirmation from Dr. Emmanuel Madrid/Care Team regarding [facility] physician's statement form that Dr. Madrid completed on 7/2/19. Mayuri is requesting a verbal confirmation that the disabling conditions are correct on this form.  Mayuri is asking for verbal confirmation that the conditions are totally and permanently disabling pt from working. Please call Mayuri.  Her phone # and extension are her private/confidential phone line.Ok to leave a detaile msg on this secured line. Thank you.     Action Taken: Message routed to:  [facility] ([facility]):  Neurology

## 2019-08-08 DIAGNOSIS — G40.119 PARTIAL SYMPTOMATIC EPILEPSY WITH SIMPLE PARTIAL SEIZURES, INTRACTABLE, WITHOUT STATUS EPILEPTICUS (H): Primary | ICD-10-CM

## 2019-08-08 RX ORDER — LEVETIRACETAM 500 MG/1
TABLET ORAL
Qty: 180 TABLET | Refills: 0 | Status: SHIPPED | OUTPATIENT
Start: 2019-08-08 | End: 2020-05-12

## 2019-08-14 NOTE — TELEPHONE ENCOUNTER
Placed call to patient to request she re-fax [facility] as it has not shown up.  Gave her two fax numbers here at the [facility] and she will fax it to both numbers.

## 2019-08-21 NOTE — TELEPHONE ENCOUNTER
Placed call to patient to speak to her about request noted in below message.  Requested patient return call to discuss.

## 2019-09-20 DIAGNOSIS — G40.119 PARTIAL SYMPTOMATIC EPILEPSY WITH SIMPLE PARTIAL SEIZURES, INTRACTABLE, WITHOUT STATUS EPILEPTICUS (H): ICD-10-CM

## 2019-09-23 RX ORDER — LEVETIRACETAM 500 MG/1
TABLET ORAL
Qty: 180 TABLET | Refills: 0 | OUTPATIENT
Start: 2019-09-23

## 2019-09-30 ENCOUNTER — HEALTH MAINTENANCE LETTER (OUTPATIENT)
Age: 54
End: 2019-09-30

## 2019-12-15 ENCOUNTER — HEALTH MAINTENANCE LETTER (OUTPATIENT)
Age: 54
End: 2019-12-15

## 2020-02-03 DIAGNOSIS — N20.0 CALCULUS OF KIDNEY: Primary | ICD-10-CM

## 2020-02-04 ASSESSMENT — ENCOUNTER SYMPTOMS
JOINT SWELLING: 1
DIARRHEA: 1
HEARTBURN: 0
MUSCLE CRAMPS: 0
POLYPHAGIA: 0
ALTERED TEMPERATURE REGULATION: 0
BLOOD IN STOOL: 0
HEMATURIA: 0
BOWEL INCONTINENCE: 0
WEIGHT LOSS: 1
FATIGUE: 1
HALLUCINATIONS: 0
SKIN CHANGES: 0
BLOATING: 0
VOMITING: 0
NAIL CHANGES: 0
ABDOMINAL PAIN: 1
WEIGHT GAIN: 0
FEVER: 0
MUSCLE WEAKNESS: 0
STIFFNESS: 1
POOR WOUND HEALING: 0
ARTHRALGIAS: 1
CHILLS: 0
NIGHT SWEATS: 0
RECTAL PAIN: 0
JAUNDICE: 0
POLYDIPSIA: 0
CONSTIPATION: 0
DECREASED APPETITE: 1
FLANK PAIN: 0
DYSURIA: 0
NAUSEA: 0
NECK PAIN: 1
MYALGIAS: 1
DIFFICULTY URINATING: 0
INCREASED ENERGY: 1
BACK PAIN: 0

## 2020-02-06 ENCOUNTER — PREP FOR PROCEDURE (OUTPATIENT)
Dept: UROLOGY | Facility: CLINIC | Age: 55
End: 2020-02-06

## 2020-02-06 ENCOUNTER — OFFICE VISIT (OUTPATIENT)
Dept: UROLOGY | Facility: CLINIC | Age: 55
End: 2020-02-06
Payer: COMMERCIAL

## 2020-02-06 VITALS
WEIGHT: 280 LBS | HEART RATE: 77 BPM | BODY MASS INDEX: 43.95 KG/M2 | SYSTOLIC BLOOD PRESSURE: 132 MMHG | DIASTOLIC BLOOD PRESSURE: 80 MMHG | OXYGEN SATURATION: 99 % | HEIGHT: 67 IN

## 2020-02-06 DIAGNOSIS — N20.0 CALCULUS OF KIDNEY: ICD-10-CM

## 2020-02-06 DIAGNOSIS — N20.0 CALCULUS OF KIDNEY: Primary | ICD-10-CM

## 2020-02-06 LAB
ALBUMIN UR-MCNC: ABNORMAL MG/DL
APPEARANCE UR: CLEAR
BILIRUB UR QL STRIP: NEGATIVE
COLOR UR AUTO: YELLOW
GLUCOSE UR STRIP-MCNC: NEGATIVE MG/DL
HGB UR QL STRIP: NEGATIVE
KETONES UR STRIP-MCNC: NEGATIVE MG/DL
LEUKOCYTE ESTERASE UR QL STRIP: ABNORMAL
NITRATE UR QL: NEGATIVE
PH UR STRIP: 7 PH (ref 5–7)
SOURCE: ABNORMAL
SP GR UR STRIP: 1.02 (ref 1–1.03)
UROBILINOGEN UR STRIP-ACNC: 1 EU/DL (ref 0.2–1)

## 2020-02-06 PROCEDURE — 81003 URINALYSIS AUTO W/O SCOPE: CPT | Performed by: UROLOGY

## 2020-02-06 PROCEDURE — 99204 OFFICE O/P NEW MOD 45 MIN: CPT | Performed by: UROLOGY

## 2020-02-06 RX ORDER — CEFAZOLIN SODIUM 1 G
1 VIAL (EA) INJECTION SEE ADMIN INSTRUCTIONS
Status: CANCELLED | OUTPATIENT
Start: 2020-02-06

## 2020-02-06 ASSESSMENT — PAIN SCALES - GENERAL: PAINLEVEL: NO PAIN (0)

## 2020-02-06 ASSESSMENT — MIFFLIN-ST. JEOR: SCORE: 1897.7

## 2020-02-06 NOTE — PROGRESS NOTES
History: It is a great pleasure to see this very pleasant 54-year-old lady for the first time in many years.  She does have a past history of urinary stone disease.  She also has a history of gout.  However she had major surgery during  for debulking surgery for meningioma in the brain in the frontal lobe for which he is recovered.  There is been some evidence of the pituitary being affected which is required follow-up by an endocrinologist.  She has been having some right-sided flank discomfort intermittently which is been quite bothersome at times.  Recent CT scan is as follows.  The report stated there was a 1.4 x 0.9 cm stone within the right collecting system without evidence of hydronephrosis and a 6 mm stone noticed in the inferior calyx also of the right kidney.  There were no other stones seen.  She has not observed gross hematuria  Serum creatinine is 0.76    Past Medical History:   Diagnosis Date     Brain tumor (H)      Calculus of kidney      Gout      Hypertension      Seizures (H)        Past Surgical History:   Procedure Laterality Date     BRAIN SURGERY      debulking x 2     C  DELIVERY ONLY      ,     C LIGATE FALLOPIAN TUBE,POSTPARTUM       HC REMOVAL OF TONSILS,<12 Y/O      Tonsils <11y.o.     OPTICAL TRACKING SYSTEM CRANIOTOMY, EXCISE TUMOR, COMBINED  2012    Procedure: COMBINED OPTICAL TRACKING SYSTEM CRANIOTOMY, EXCISE TUMOR;  Stealth Guided Left Redo Craniotomy, Resection Of Tumor Ct @615 am MRI @ 630;  Surgeon: Pan Calero MD;  Location:  OR       Family History   Problem Relation Age of Onset     Breast Cancer Mother         72-alive     Hypertension Father         76-alive     C.A.D. Father      Family History Negative Sister         2 sisters healthy     Family History Negative Brother        Social History     Socioeconomic History     Marital status:      Spouse name: Not on file     Number of children: Not on file     Years  of education: Not on file     Highest education level: Not on file   Occupational History     Not on file   Social Needs     Financial resource strain: Not on file     Food insecurity:     Worry: Not on file     Inability: Not on file     Transportation needs:     Medical: Not on file     Non-medical: Not on file   Tobacco Use     Smoking status: Never Smoker     Smokeless tobacco: Never Used   Substance and Sexual Activity     Alcohol use: No     Drug use: No     Sexual activity: Yes     Partners: Male     Birth control/protection: Surgical   Lifestyle     Physical activity:     Days per week: Not on file     Minutes per session: Not on file     Stress: Not on file   Relationships     Social connections:     Talks on phone: Not on file     Gets together: Not on file     Attends [employer] service: Not on file     Active member of club or organization: Not on file     Attends meetings of clubs or organizations: Not on file     Relationship status: Not on file     Intimate partner violence:     Fear of current or ex partner: Not on file     Emotionally abused: Not on file     Physically abused: Not on file     Forced sexual activity: Not on file   Other Topics Concern     Parent/sibling w/ CABG, MI or angioplasty before 65F 55M? Not Asked   Social History Narrative     Not on file       Current Outpatient Medications   Medication Sig Dispense Refill     aspirin 81 MG tablet Take 1 tablet by mouth daily.       Flurandrenolide 0.05 % OINT Apply 1 Application topically daily as needed  1     FOLIC ACID PO Take 1 mg by mouth daily       gabapentin (NEURONTIN) 300 MG capsule Take 600 mg by mouth daily as needed        levETIRAcetam (KEPPRA) 500 MG tablet TAKE 3 TABLETS(1500 MG) BY MOUTH IN THE MORNING AND IN THE EVENING 180 tablet 0     levothyroxine (SYNTHROID) 100 MCG tablet Take 100 mcg by mouth daily       LISINOPRIL PO Take 2.5 mg by mouth daily       methotrexate sodium 2.5 MG TABS Take 7 tablets by mouth once a week  "       omeprazole (PRILOSEC) 20 MG CR capsule Take 20 mg by mouth daily        Albuterol Sulfate (PROAIR HFA IN) Inhale 8.5 g into the lungs       BENZONATATE PO Take 100 mg by mouth every 4 hours as needed for cough       Etanercept (ENBREL SC) Inject Subcutaneous once a week       Furosemide (LASIX) 20 MG tablet Take 20 mg by mouth as needed        levETIRAcetam (KEPPRA) 500 MG tablet Take 3 tablets (1,500 mg) by mouth 2 times daily 540 tablet 3     Methotrexate, Anti-Rheumatic, (METHOTREXATE, PF,) 25 MG/ML SOLN Inject 17.5 mg Subcutaneous once a week Inject 0.7 mL under the skin once a week.       Multiple Vitamins-Minerals (MULTIVITAMIN & MINERAL PO) Take 1 tablet by mouth daily       ondansetron (ZOFRAN) 4 MG tablet Take 4 mg by mouth as needed          Review Of Systems:  Skin: negative  Eyes: negative  Ears/Nose/Throat: negative  Respiratory: No shortness of breath, dyspnea on exertion, cough, or hemoptysis  Cardiovascular: negative  Gastrointestinal: negative  Genitourinary: kidney stone  Musculoskeletal: negative  Neurologic: Seizure disorder.  History of meningioma  Psychiatric: negative  Hematologic/Lymphatic/Immunologic: negative  Endocrine: Multiple endocrine issues    Exam:  /80   Pulse 77   Ht 1.702 m (5' 7\")   Wt 127 kg (280 lb)   LMP 10/17/2012   SpO2 99%   BMI 43.85 kg/m      General Impression: Very pleasant lady who does not appear in distress and is otherwise well oriented in time place and person.    Mental status.  Normal    HEENT: There is no clinical evidence of jaundice on examination of conjunctiva.  Extraocular eye movement movements are normal.  Mucous membranes are unremarkable    Skin: Skin otherwise normal to examination    Lymph Nodes: Not examined    Respiratory System: The respiratory cycle is normal    Cardiovascular: There is no significant peripheral edema    Abdominal: The abdomen is mildly obese    Extremities: Extremities otherwise unremarkable    Back and Flank: " There is no significant flank tenderness    Genital: Not examined    Rectal: Not examined    Neurologic: There are no residual significant focal abnormal clinical neurological signs, in the central, or peripheral nervous systems    Impression: I carefully reviewed the CT scan.  I note that the 2 stones in the right kidney, one is 1.4 cm in the right renal pelvis, there is a 6 mm stone in the right lower pole calyx and there is no hydronephrosis.  Given the size of the stone, we had a careful discussion about both ESWL and PCNL  Discussions about ESWL considered the likelihood of successfully clearing all the fragments following single treatment.  Given the stone volume there was a significant chance that there may be some difficulty clearing all the fragments which is a great concern to this person.  PCNL on the ahead although more invasive, had the advantage of being more likely to clear the stone in 1 sitting.  This was carefully discussed with the patient in detail.  I did have some concern about, giving her a significant history of surgery for meningioma, and also the recent history of TIAs, whether the anesthetic for either these procedures would be of significant concern and during our consultation therefore discussed this with 1 of our anesthesiologist who reviewed the situation and considered that the risks were certainly acceptable for an elective procedure.  The option of not treating I thought could be problematic and that the stone will continue to enlarge will continue to cause symptoms and be more difficult to deal with this time goes on.  Ultimately we decided that we should proceed with percutaneous nephrolithotomy and I went over a very detail of this procedure including the potential expectations side effects complications etc. in detail with her.  I went over the entire situation with the patient in detail today.  I answered all her questions    Plan: Right percutaneous nephrolithotomy with  cystoscopy and right ureteral catheter placement    This was a physically complicated situation in the review of all records discussions discussions with colleagues included in the consultation continued approximately 60 minutes  Answers for HPI/ROS submitted by the patient on 2/4/2020   General Symptoms: Yes  Skin Symptoms: Yes  HENT Symptoms: No  EYE SYMPTOMS: No  HEART SYMPTOMS: No  LUNG SYMPTOMS: No  INTESTINAL SYMPTOMS: Yes  URINARY SYMPTOMS: Yes  GYNECOLOGIC SYMPTOMS: No  BREAST SYMPTOMS: No  SKELETAL SYMPTOMS: Yes  BLOOD SYMPTOMS: No  NERVOUS SYSTEM SYMPTOMS: No  MENTAL HEALTH SYMPTOMS: No  Fever: No  Loss of appetite: Yes  Weight loss: Yes  Weight gain: No  Fatigue: Yes  Night sweats: No  Chills: No  Increased stress: Yes  Excessive hunger: No  Excessive thirst: No  Feeling hot or cold when others believe the temperature is normal: No  Loss of height: No  Post-operative complications: No  Surgical site pain: No  Hallucinations: No  Change in or Loss of Energy: Yes  Hyperactivity: No  Confusion: No  Changes in hair: No  Changes in moles/birth marks: No  Itching: No  Rashes: Yes  Changes in nails: No  Acne: No  Hair in places you don't want it: No  Change in facial hair: No  Warts: No  Non-healing sores: No  Scarring: No  Flaking of skin: No  Color changes of hands/feet in cold : No  Sun sensitivity: No  Skin thickening: No  Heart burn or indigestion: No  Nausea: No  Vomiting: No  Abdominal pain: Yes  Bloating: No  Constipation: No  Diarrhea: Yes  Blood in stool: No  Black stools: No  Rectal or Anal pain: No  Fecal incontinence: No  Yellowing of skin or eyes: No  Vomit with blood: No  Change in stools: Yes  Trouble holding urine or incontinence: No  Pain or burning: No  Trouble starting or stopping: No  Increased frequency of urination: Yes  Blood in urine: No  Decreased frequency of urination: No  Frequent nighttime urination: Yes  Flank pain: No  Difficulty emptying bladder: No  Back pain: No  Muscle aches:  Yes  Neck pain: Yes  Swollen joints: Yes  Joint pain: Yes  Bone pain: No  Muscle cramps: No  Muscle weakness: No  Joint stiffness: Yes  Bone fracture: No

## 2020-02-06 NOTE — NURSING NOTE
Chief Complaint   Patient presents with     Kidney Problem     Pt here for kidney stones     Neida Duckworth, CMA

## 2020-02-06 NOTE — LETTER
2020     RE: Marlyn Wilder  [address] [facility] [id number]     Dear Colleague,    Thank you for referring your patient, Marlyn Wilder, to the [facility] [place] [facility]. Please see a copy of my visit note below.    History: It is a great pleasure to see this very pleasant 54-year-old lady for the first time in many years.  She does have a past history of urinary stone disease.  She also has a history of gout.  However she had major surgery during  for debulking surgery for meningioma in the brain in the frontal lobe for which he is recovered.  There is been some evidence of the pituitary being affected which is required follow-up by an endocrinologist.  She has been having some right-sided flank discomfort intermittently which is been quite bothersome at times.  Recent CT scan is as follows.  The report stated there was a 1.4 x 0.9 cm stone within the right collecting system without evidence of hydronephrosis and a 6 mm stone noticed in the inferior calyx also of the right kidney.  There were no other stones seen.  She has not observed gross hematuria  Serum creatinine is 0.76    Past Medical History:   Diagnosis Date     Brain tumor (H)      Calculus of kidney      Gout      Hypertension      Seizures (H)        Past Surgical History:   Procedure Laterality Date     BRAIN SURGERY      debulking x 2     C  DELIVERY ONLY      ,     C LIGATE FALLOPIAN TUBE,POSTPARTUM       HC REMOVAL OF TONSILS,<10 Y/O      Tonsils <11y.o.     OPTICAL TRACKING SYSTEM CRANIOTOMY, EXCISE TUMOR, COMBINED  2012    Procedure: COMBINED OPTICAL TRACKING SYSTEM CRANIOTOMY, EXCISE TUMOR;  Stealth Guided Left Redo Craniotomy, Resection Of Tumor Ct @615 am MRI @ 630;  Surgeon: Pan Calero MD;  Location:  OR       Family History   Problem Relation Age of Onset     Breast Cancer Mother         72-alive      Hypertension Father         76-alive     C.A.D. Father      Family History Negative Sister         2 sisters healthy     Family History Negative Brother        Social History     Socioeconomic History     Marital status:      Spouse name: Not on file     Number of children: Not on file     Years of education: Not on file     Highest education level: Not on file   Occupational History     Not on file   Social Needs     Financial resource strain: Not on file     Food insecurity:     Worry: Not on file     Inability: Not on file     Transportation needs:     Medical: Not on file     Non-medical: Not on file   Tobacco Use     Smoking status: Never Smoker     Smokeless tobacco: Never Used   Substance and Sexual Activity     Alcohol use: No     Drug use: No     Sexual activity: Yes     Partners: Male     Birth control/protection: Surgical   Lifestyle     Physical activity:     Days per week: Not on file     Minutes per session: Not on file     Stress: Not on file   Relationships     Social connections:     Talks on phone: Not on file     Gets together: Not on file     Attends [employer] service: Not on file     Active member of club or organization: Not on file     Attends meetings of clubs or organizations: Not on file     Relationship status: Not on file     Intimate partner violence:     Fear of current or ex partner: Not on file     Emotionally abused: Not on file     Physically abused: Not on file     Forced sexual activity: Not on file   Other Topics Concern     Parent/sibling w/ CABG, MI or angioplasty before 65F 55M? Not Asked   Social History Narrative     Not on file       Current Outpatient Medications   Medication Sig Dispense Refill     aspirin 81 MG tablet Take 1 tablet by mouth daily.       Flurandrenolide 0.05 % OINT Apply 1 Application topically daily as needed  1     FOLIC ACID PO Take 1 mg by mouth daily       gabapentin (NEURONTIN) 300 MG capsule Take 600 mg by mouth daily as needed         "levETIRAcetam (KEPPRA) 500 MG tablet TAKE 3 TABLETS(1500 MG) BY MOUTH IN THE MORNING AND IN THE EVENING 180 tablet 0     levothyroxine (SYNTHROID) 100 MCG tablet Take 100 mcg by mouth daily       LISINOPRIL PO Take 2.5 mg by mouth daily       methotrexate sodium 2.5 MG TABS Take 7 tablets by mouth once a week        omeprazole (PRILOSEC) 20 MG CR capsule Take 20 mg by mouth daily        Albuterol Sulfate (PROAIR HFA IN) Inhale 8.5 g into the lungs       BENZONATATE PO Take 100 mg by mouth every 4 hours as needed for cough       Etanercept (ENBREL SC) Inject Subcutaneous once a week       Furosemide (LASIX) 20 MG tablet Take 20 mg by mouth as needed        levETIRAcetam (KEPPRA) 500 MG tablet Take 3 tablets (1,500 mg) by mouth 2 times daily 540 tablet 3     Methotrexate, Anti-Rheumatic, (METHOTREXATE, PF,) 25 MG/ML SOLN Inject 17.5 mg Subcutaneous once a week Inject 0.7 mL under the skin once a week.       Multiple Vitamins-Minerals (MULTIVITAMIN & MINERAL PO) Take 1 tablet by mouth daily       ondansetron (ZOFRAN) 4 MG tablet Take 4 mg by mouth as needed          Review Of Systems:  Skin: negative  Eyes: negative  Ears/Nose/Throat: negative  Respiratory: No shortness of breath, dyspnea on exertion, cough, or hemoptysis  Cardiovascular: negative  Gastrointestinal: negative  Genitourinary: kidney stone  Musculoskeletal: negative  Neurologic: Seizure disorder.  History of meningioma  Psychiatric: negative  Hematologic/Lymphatic/Immunologic: negative  Endocrine: Multiple endocrine issues    Exam:  /80   Pulse 77   Ht 1.702 m (5' 7\")   Wt 127 kg (280 lb)   LMP 10/17/2012   SpO2 99%   BMI 43.85 kg/m       General Impression: Very pleasant lady who does not appear in distress and is otherwise well oriented in time place and person.    Mental status.  Normal    HEENT: There is no clinical evidence of jaundice on examination of conjunctiva.  Extraocular eye movement movements are normal.  Mucous membranes are " unremarkable    Skin: Skin otherwise normal to examination    Lymph Nodes: Not examined    Respiratory System: The respiratory cycle is normal    Cardiovascular: There is no significant peripheral edema    Abdominal: The abdomen is mildly obese    Extremities: Extremities otherwise unremarkable    Back and Flank: There is no significant flank tenderness    Genital: Not examined    Rectal: Not examined    Neurologic: There are no residual significant focal abnormal clinical neurological signs, in the central, or peripheral nervous systems    Impression: I carefully reviewed the CT scan.  I note that the 2 stones in the right kidney, one is 1.4 cm in the right renal pelvis, there is a 6 mm stone in the right lower pole calyx and there is no hydronephrosis.  Given the size of the stone, we had a careful discussion about both ESWL and PCNL  Discussions about ESWL considered the likelihood of successfully clearing all the fragments following single treatment.  Given the stone volume there was a significant chance that there may be some difficulty clearing all the fragments which is a great concern to this person.  PCNL on the ahead although more invasive, had the advantage of being more likely to clear the stone in 1 sitting.  This was carefully discussed with the patient in detail.  I did have some concern about, giving her a significant history of surgery for meningioma, and also the recent history of TIAs, whether the anesthetic for either these procedures would be of significant concern and during our consultation therefore discussed this with 1 of our anesthesiologist who reviewed the situation and considered that the risks were certainly acceptable for an elective procedure.  The option of not treating I thought could be problematic and that the stone will continue to enlarge will continue to cause symptoms and be more difficult to deal with this time goes on.  Ultimately we decided that we should proceed with  percutaneous nephrolithotomy and I went over a very detail of this procedure including the potential expectations side effects complications etc. in detail with her.  I went over the entire situation with the patient in detail today.  I answered all her questions    Plan: Right percutaneous nephrolithotomy with cystoscopy and right ureteral catheter placement    This was a physically complicated situation in the review of all records discussions discussions with colleagues included in the consultation continued approximately 60 minutes  Answers for HPI/ROS submitted by the patient on 2/4/2020   General Symptoms: Yes  Skin Symptoms: Yes  HENT Symptoms: No  EYE SYMPTOMS: No  HEART SYMPTOMS: No  LUNG SYMPTOMS: No  INTESTINAL SYMPTOMS: Yes  URINARY SYMPTOMS: Yes  GYNECOLOGIC SYMPTOMS: No  BREAST SYMPTOMS: No  SKELETAL SYMPTOMS: Yes  BLOOD SYMPTOMS: No  NERVOUS SYSTEM SYMPTOMS: No  MENTAL HEALTH SYMPTOMS: No  Fever: No  Loss of appetite: Yes  Weight loss: Yes  Weight gain: No  Fatigue: Yes  Night sweats: No  Chills: No  Increased stress: Yes  Excessive hunger: No  Excessive thirst: No  Feeling hot or cold when others believe the temperature is normal: No  Loss of height: No  Post-operative complications: No  Surgical site pain: No  Hallucinations: No  Change in or Loss of Energy: Yes  Hyperactivity: No  Confusion: No  Changes in hair: No  Changes in moles/birth marks: No  Itching: No  Rashes: Yes  Changes in nails: No  Acne: No  Hair in places you don't want it: No  Change in facial hair: No  Warts: No  Non-healing sores: No  Scarring: No  Flaking of skin: No  Color changes of hands/feet in cold : No  Sun sensitivity: No  Skin thickening: No  Heart burn or indigestion: No  Nausea: No  Vomiting: No  Abdominal pain: Yes  Bloating: No  Constipation: No  Diarrhea: Yes  Blood in stool: No  Black stools: No  Rectal or Anal pain: No  Fecal incontinence: No  Yellowing of skin or eyes: No  Vomit with blood: No  Change in stools:  Yes  Trouble holding urine or incontinence: No  Pain or burning: No  Trouble starting or stopping: No  Increased frequency of urination: Yes  Blood in urine: No  Decreased frequency of urination: No  Frequent nighttime urination: Yes  Flank pain: No  Difficulty emptying bladder: No  Back pain: No  Muscle aches: Yes  Neck pain: Yes  Swollen joints: Yes  Joint pain: Yes  Bone pain: No  Muscle cramps: No  Muscle weakness: No  Joint stiffness: Yes  Bone fracture: No    Again, thank you for allowing me to participate in the care of your patient.      Sincerely,    Adarsh Livingston MD

## 2020-02-12 ENCOUNTER — TRANSFERRED RECORDS (OUTPATIENT)
Dept: HEALTH INFORMATION MANAGEMENT | Facility: CLINIC | Age: 55
End: 2020-02-12

## 2020-02-25 ENCOUNTER — ANESTHESIA (OUTPATIENT)
Dept: SURGERY | Facility: CLINIC | Age: 55
End: 2020-02-25
Payer: COMMERCIAL

## 2020-02-25 ENCOUNTER — APPOINTMENT (OUTPATIENT)
Dept: INTERVENTIONAL RADIOLOGY/VASCULAR | Facility: CLINIC | Age: 55
End: 2020-02-25
Attending: UROLOGY
Payer: COMMERCIAL

## 2020-02-25 ENCOUNTER — ANESTHESIA EVENT (OUTPATIENT)
Dept: SURGERY | Facility: CLINIC | Age: 55
End: 2020-02-25
Payer: COMMERCIAL

## 2020-02-25 ENCOUNTER — APPOINTMENT (OUTPATIENT)
Dept: GENERAL RADIOLOGY | Facility: CLINIC | Age: 55
End: 2020-02-25
Attending: UROLOGY
Payer: COMMERCIAL

## 2020-02-25 ENCOUNTER — HOSPITAL ENCOUNTER (OUTPATIENT)
Facility: CLINIC | Age: 55
Discharge: HOME | End: 2020-02-26
Attending: UROLOGY | Admitting: UROLOGY
Payer: COMMERCIAL

## 2020-02-25 DIAGNOSIS — N20.0 CALCULUS OF KIDNEY: ICD-10-CM

## 2020-02-25 DIAGNOSIS — N20.0 STONE IN KIDNEY: ICD-10-CM

## 2020-02-25 DIAGNOSIS — N20.0 RENAL STONES: Primary | ICD-10-CM

## 2020-02-25 PROCEDURE — 88300 SURGICAL PATH GROSS: CPT | Performed by: UROLOGY

## 2020-02-25 PROCEDURE — 50081 PERQ NL/PL LITHOTRP CPLX>2CM: CPT | Performed by: UROLOGY

## 2020-02-25 PROCEDURE — 27210794 ZZH OR GENERAL SUPPLY STERILE: Performed by: UROLOGY

## 2020-02-25 PROCEDURE — 71000012 ZZH RECOVERY PHASE 1 LEVEL 1 FIRST HR: Performed by: UROLOGY

## 2020-02-25 PROCEDURE — 25800030 ZZH RX IP 258 OP 636: Performed by: NURSE ANESTHETIST, CERTIFIED REGISTERED

## 2020-02-25 PROCEDURE — 37000009 ZZH ANESTHESIA TECHNICAL FEE, EACH ADDTL 15 MIN: Performed by: UROLOGY

## 2020-02-25 PROCEDURE — 27211024 ZZHC OR SUPPLY OTHER OPNP: Performed by: UROLOGY

## 2020-02-25 PROCEDURE — 25000566 ZZH SEVOFLURANE, EA 15 MIN: Performed by: UROLOGY

## 2020-02-25 PROCEDURE — 36000063 ZZH SURGERY LEVEL 4 EA 15 ADDTL MIN: Performed by: UROLOGY

## 2020-02-25 PROCEDURE — 74420 UROGRAPHY RTRGR +-KUB: CPT | Mod: 26 | Performed by: UROLOGY

## 2020-02-25 PROCEDURE — 99204 OFFICE O/P NEW MOD 45 MIN: CPT | Performed by: NURSE PRACTITIONER

## 2020-02-25 PROCEDURE — 25800030 ZZH RX IP 258 OP 636: Performed by: UROLOGY

## 2020-02-25 PROCEDURE — 25000128 H RX IP 250 OP 636: Performed by: UROLOGY

## 2020-02-25 PROCEDURE — 36000065 ZZH SURGERY LEVEL 4 W FLUORO 1ST 30 MIN: Performed by: UROLOGY

## 2020-02-25 PROCEDURE — 88300 SURGICAL PATH GROSS: CPT | Mod: 26 | Performed by: UROLOGY

## 2020-02-25 PROCEDURE — 82365 CALCULUS SPECTROSCOPY: CPT | Performed by: UROLOGY

## 2020-02-25 PROCEDURE — 74019 RADEX ABDOMEN 2 VIEWS: CPT

## 2020-02-25 PROCEDURE — 25000125 ZZHC RX 250: Performed by: NURSE ANESTHETIST, CERTIFIED REGISTERED

## 2020-02-25 PROCEDURE — 37000008 ZZH ANESTHESIA TECHNICAL FEE, 1ST 30 MIN: Performed by: UROLOGY

## 2020-02-25 PROCEDURE — C1726 CATH, BAL DIL, NON-VASCULAR: HCPCS | Performed by: UROLOGY

## 2020-02-25 PROCEDURE — 25000128 H RX IP 250 OP 636: Performed by: NURSE ANESTHETIST, CERTIFIED REGISTERED

## 2020-02-25 PROCEDURE — C1887 CATHETER, GUIDING: HCPCS | Performed by: UROLOGY

## 2020-02-25 PROCEDURE — 40000003 IR RENAL STONE REMOVAL RIGHT

## 2020-02-25 PROCEDURE — 25800025 ZZH RX 258: Performed by: UROLOGY

## 2020-02-25 PROCEDURE — C1769 GUIDE WIRE: HCPCS | Performed by: UROLOGY

## 2020-02-25 PROCEDURE — 25000132 ZZH RX MED GY IP 250 OP 250 PS 637: Performed by: UROLOGY

## 2020-02-25 PROCEDURE — 40000170 ZZH STATISTIC PRE-PROCEDURE ASSESSMENT II: Performed by: UROLOGY

## 2020-02-25 RX ORDER — CEFAZOLIN SODIUM 1 G/3ML
1 INJECTION, POWDER, FOR SOLUTION INTRAMUSCULAR; INTRAVENOUS SEE ADMIN INSTRUCTIONS
Status: DISCONTINUED | OUTPATIENT
Start: 2020-02-25 | End: 2020-02-25 | Stop reason: HOSPADM

## 2020-02-25 RX ORDER — PROPOFOL 10 MG/ML
INJECTION, EMULSION INTRAVENOUS PRN
Status: DISCONTINUED | OUTPATIENT
Start: 2020-02-25 | End: 2020-02-25

## 2020-02-25 RX ORDER — LIDOCAINE HYDROCHLORIDE 20 MG/ML
INJECTION, SOLUTION INFILTRATION; PERINEURAL PRN
Status: DISCONTINUED | OUTPATIENT
Start: 2020-02-25 | End: 2020-02-25

## 2020-02-25 RX ORDER — LIDOCAINE 40 MG/G
CREAM TOPICAL
Status: DISCONTINUED | OUTPATIENT
Start: 2020-02-25 | End: 2020-02-26 | Stop reason: HOSPADM

## 2020-02-25 RX ORDER — PROPOFOL 10 MG/ML
INJECTION, EMULSION INTRAVENOUS CONTINUOUS PRN
Status: DISCONTINUED | OUTPATIENT
Start: 2020-02-25 | End: 2020-02-25

## 2020-02-25 RX ORDER — FOLIC ACID 1 MG/1
TABLET ORAL
COMMUNITY
Start: 2019-09-17 | End: 2020-03-24

## 2020-02-25 RX ORDER — HYDROMORPHONE HYDROCHLORIDE 1 MG/ML
0.2 INJECTION, SOLUTION INTRAMUSCULAR; INTRAVENOUS; SUBCUTANEOUS
Status: DISCONTINUED | OUTPATIENT
Start: 2020-02-25 | End: 2020-02-26 | Stop reason: HOSPADM

## 2020-02-25 RX ORDER — DEXAMETHASONE SODIUM PHOSPHATE 4 MG/ML
INJECTION, SOLUTION INTRA-ARTICULAR; INTRALESIONAL; INTRAMUSCULAR; INTRAVENOUS; SOFT TISSUE PRN
Status: DISCONTINUED | OUTPATIENT
Start: 2020-02-25 | End: 2020-02-25

## 2020-02-25 RX ORDER — NEOSTIGMINE METHYLSULFATE 1 MG/ML
VIAL (ML) INJECTION PRN
Status: DISCONTINUED | OUTPATIENT
Start: 2020-02-25 | End: 2020-02-25

## 2020-02-25 RX ORDER — CHOLECALCIFEROL (VITAMIN D3) 50 MCG
1 TABLET ORAL DAILY
COMMUNITY

## 2020-02-25 RX ORDER — SODIUM CHLORIDE, SODIUM LACTATE, POTASSIUM CHLORIDE, CALCIUM CHLORIDE 600; 310; 30; 20 MG/100ML; MG/100ML; MG/100ML; MG/100ML
INJECTION, SOLUTION INTRAVENOUS CONTINUOUS PRN
Status: DISCONTINUED | OUTPATIENT
Start: 2020-02-25 | End: 2020-02-25

## 2020-02-25 RX ORDER — NALOXONE HYDROCHLORIDE 0.4 MG/ML
.1-.4 INJECTION, SOLUTION INTRAMUSCULAR; INTRAVENOUS; SUBCUTANEOUS
Status: DISCONTINUED | OUTPATIENT
Start: 2020-02-25 | End: 2020-02-25 | Stop reason: HOSPADM

## 2020-02-25 RX ORDER — LISINOPRIL 2.5 MG/1
2.5 TABLET ORAL DAILY
Status: DISCONTINUED | OUTPATIENT
Start: 2020-02-25 | End: 2020-02-25

## 2020-02-25 RX ORDER — ACETAMINOPHEN 325 MG/1
650 TABLET ORAL EVERY 4 HOURS PRN
Qty: 100 TABLET | Refills: 0 | Status: SHIPPED | OUTPATIENT
Start: 2020-02-25 | End: 2020-05-12

## 2020-02-25 RX ORDER — ONDANSETRON 2 MG/ML
4 INJECTION INTRAMUSCULAR; INTRAVENOUS EVERY 30 MIN PRN
Status: DISCONTINUED | OUTPATIENT
Start: 2020-02-25 | End: 2020-02-25 | Stop reason: HOSPADM

## 2020-02-25 RX ORDER — SODIUM CHLORIDE, SODIUM LACTATE, POTASSIUM CHLORIDE, CALCIUM CHLORIDE 600; 310; 30; 20 MG/100ML; MG/100ML; MG/100ML; MG/100ML
INJECTION, SOLUTION INTRAVENOUS CONTINUOUS
Status: DISCONTINUED | OUTPATIENT
Start: 2020-02-25 | End: 2020-02-25 | Stop reason: HOSPADM

## 2020-02-25 RX ORDER — GABAPENTIN 300 MG/1
600 CAPSULE ORAL AT BEDTIME
Status: DISCONTINUED | OUTPATIENT
Start: 2020-02-25 | End: 2020-02-26 | Stop reason: HOSPADM

## 2020-02-25 RX ORDER — GLYCOPYRROLATE 0.2 MG/ML
INJECTION, SOLUTION INTRAMUSCULAR; INTRAVENOUS PRN
Status: DISCONTINUED | OUTPATIENT
Start: 2020-02-25 | End: 2020-02-25

## 2020-02-25 RX ORDER — LEVOTHYROXINE SODIUM 125 UG/1
125 TABLET ORAL DAILY
Status: DISCONTINUED | OUTPATIENT
Start: 2020-02-26 | End: 2020-02-26 | Stop reason: HOSPADM

## 2020-02-25 RX ORDER — FENTANYL CITRATE 50 UG/ML
INJECTION, SOLUTION INTRAMUSCULAR; INTRAVENOUS PRN
Status: DISCONTINUED | OUTPATIENT
Start: 2020-02-25 | End: 2020-02-25

## 2020-02-25 RX ORDER — HYDRALAZINE HYDROCHLORIDE 20 MG/ML
10 INJECTION INTRAMUSCULAR; INTRAVENOUS EVERY 4 HOURS PRN
Status: DISCONTINUED | OUTPATIENT
Start: 2020-02-25 | End: 2020-02-26 | Stop reason: HOSPADM

## 2020-02-25 RX ORDER — LEVETIRACETAM 500 MG/1
TABLET ORAL
COMMUNITY
End: 2020-03-24

## 2020-02-25 RX ORDER — FOLIC ACID 1 MG/1
1 TABLET ORAL DAILY
Status: DISCONTINUED | OUTPATIENT
Start: 2020-02-26 | End: 2020-02-26 | Stop reason: HOSPADM

## 2020-02-25 RX ORDER — HYDROMORPHONE HYDROCHLORIDE 1 MG/ML
.3-.5 INJECTION, SOLUTION INTRAMUSCULAR; INTRAVENOUS; SUBCUTANEOUS EVERY 10 MIN PRN
Status: DISCONTINUED | OUTPATIENT
Start: 2020-02-25 | End: 2020-02-25 | Stop reason: HOSPADM

## 2020-02-25 RX ORDER — ONDANSETRON 2 MG/ML
INJECTION INTRAMUSCULAR; INTRAVENOUS PRN
Status: DISCONTINUED | OUTPATIENT
Start: 2020-02-25 | End: 2020-02-25

## 2020-02-25 RX ORDER — ACETAMINOPHEN 325 MG/1
650 TABLET ORAL EVERY 6 HOURS PRN
Status: DISCONTINUED | OUTPATIENT
Start: 2020-02-25 | End: 2020-02-26 | Stop reason: HOSPADM

## 2020-02-25 RX ORDER — LIDOCAINE 40 MG/G
CREAM TOPICAL
Status: DISCONTINUED | OUTPATIENT
Start: 2020-02-25 | End: 2020-02-25 | Stop reason: HOSPADM

## 2020-02-25 RX ORDER — FENTANYL CITRATE 50 UG/ML
25-50 INJECTION, SOLUTION INTRAMUSCULAR; INTRAVENOUS EVERY 5 MIN PRN
Status: DISCONTINUED | OUTPATIENT
Start: 2020-02-25 | End: 2020-02-25 | Stop reason: HOSPADM

## 2020-02-25 RX ORDER — ONDANSETRON 4 MG/1
4 TABLET, ORALLY DISINTEGRATING ORAL EVERY 30 MIN PRN
Status: DISCONTINUED | OUTPATIENT
Start: 2020-02-25 | End: 2020-02-25 | Stop reason: HOSPADM

## 2020-02-25 RX ORDER — NALOXONE HYDROCHLORIDE 0.4 MG/ML
.1-.4 INJECTION, SOLUTION INTRAMUSCULAR; INTRAVENOUS; SUBCUTANEOUS
Status: DISCONTINUED | OUTPATIENT
Start: 2020-02-25 | End: 2020-02-26 | Stop reason: HOSPADM

## 2020-02-25 RX ORDER — LEVETIRACETAM 750 MG/1
1500 TABLET ORAL 2 TIMES DAILY
Status: DISCONTINUED | OUTPATIENT
Start: 2020-02-25 | End: 2020-02-26 | Stop reason: HOSPADM

## 2020-02-25 RX ORDER — ONDANSETRON 2 MG/ML
4 INJECTION INTRAMUSCULAR; INTRAVENOUS EVERY 6 HOURS PRN
Status: DISCONTINUED | OUTPATIENT
Start: 2020-02-25 | End: 2020-02-26 | Stop reason: HOSPADM

## 2020-02-25 RX ORDER — LISINOPRIL 2.5 MG/1
2.5 TABLET ORAL
COMMUNITY
Start: 2019-06-09 | End: 2020-03-24

## 2020-02-25 RX ORDER — OXYCODONE HYDROCHLORIDE 5 MG/1
5 TABLET ORAL
Status: DISCONTINUED | OUTPATIENT
Start: 2020-02-25 | End: 2020-02-25 | Stop reason: HOSPADM

## 2020-02-25 RX ORDER — OXYCODONE HYDROCHLORIDE 5 MG/1
5-10 TABLET ORAL
Status: DISCONTINUED | OUTPATIENT
Start: 2020-02-25 | End: 2020-02-26 | Stop reason: HOSPADM

## 2020-02-25 RX ORDER — SODIUM CHLORIDE, SODIUM LACTATE, POTASSIUM CHLORIDE, CALCIUM CHLORIDE 600; 310; 30; 20 MG/100ML; MG/100ML; MG/100ML; MG/100ML
INJECTION, SOLUTION INTRAVENOUS CONTINUOUS
Status: DISCONTINUED | OUTPATIENT
Start: 2020-02-25 | End: 2020-02-26 | Stop reason: HOSPADM

## 2020-02-25 RX ORDER — ONDANSETRON 4 MG/1
4 TABLET, ORALLY DISINTEGRATING ORAL EVERY 6 HOURS PRN
Status: DISCONTINUED | OUTPATIENT
Start: 2020-02-25 | End: 2020-02-26 | Stop reason: HOSPADM

## 2020-02-25 RX ORDER — VECURONIUM BROMIDE 1 MG/ML
INJECTION, POWDER, LYOPHILIZED, FOR SOLUTION INTRAVENOUS PRN
Status: DISCONTINUED | OUTPATIENT
Start: 2020-02-25 | End: 2020-02-25

## 2020-02-25 RX ORDER — GABAPENTIN 300 MG/1
CAPSULE ORAL
COMMUNITY
Start: 2019-05-31 | End: 2020-03-24

## 2020-02-25 RX ORDER — CEFAZOLIN SODIUM IN 0.9 % NACL 3 G/100 ML
3 INTRAVENOUS SOLUTION, PIGGYBACK (ML) INTRAVENOUS
Status: COMPLETED | OUTPATIENT
Start: 2020-02-25 | End: 2020-02-25

## 2020-02-25 RX ADMIN — SODIUM CHLORIDE, POTASSIUM CHLORIDE, SODIUM LACTATE AND CALCIUM CHLORIDE: 600; 310; 30; 20 INJECTION, SOLUTION INTRAVENOUS at 08:20

## 2020-02-25 RX ADMIN — PROPOFOL 30 MCG/KG/MIN: 10 INJECTION, EMULSION INTRAVENOUS at 08:32

## 2020-02-25 RX ADMIN — HYDROMORPHONE HYDROCHLORIDE 0.2 MG: 1 INJECTION, SOLUTION INTRAMUSCULAR; INTRAVENOUS; SUBCUTANEOUS at 16:42

## 2020-02-25 RX ADMIN — DEXMEDETOMIDINE HYDROCHLORIDE 20 MCG: 100 INJECTION, SOLUTION INTRAVENOUS at 09:36

## 2020-02-25 RX ADMIN — LEVETIRACETAM 1500 MG: 750 TABLET, FILM COATED ORAL at 19:44

## 2020-02-25 RX ADMIN — MIDAZOLAM 2 MG: 1 INJECTION INTRAMUSCULAR; INTRAVENOUS at 08:20

## 2020-02-25 RX ADMIN — FENTANYL CITRATE 50 MCG: 50 INJECTION, SOLUTION INTRAMUSCULAR; INTRAVENOUS at 08:41

## 2020-02-25 RX ADMIN — HYDROMORPHONE HYDROCHLORIDE 0.2 MG: 1 INJECTION, SOLUTION INTRAMUSCULAR; INTRAVENOUS; SUBCUTANEOUS at 23:43

## 2020-02-25 RX ADMIN — ROCURONIUM BROMIDE 45 MG: 10 INJECTION INTRAVENOUS at 08:30

## 2020-02-25 RX ADMIN — DEXAMETHASONE SODIUM PHOSPHATE 4 MG: 4 INJECTION, SOLUTION INTRA-ARTICULAR; INTRALESIONAL; INTRAMUSCULAR; INTRAVENOUS; SOFT TISSUE at 08:43

## 2020-02-25 RX ADMIN — GABAPENTIN 600 MG: 300 CAPSULE ORAL at 21:37

## 2020-02-25 RX ADMIN — FENTANYL CITRATE 50 MCG: 50 INJECTION, SOLUTION INTRAMUSCULAR; INTRAVENOUS at 08:25

## 2020-02-25 RX ADMIN — SODIUM CHLORIDE, POTASSIUM CHLORIDE, SODIUM LACTATE AND CALCIUM CHLORIDE: 600; 310; 30; 20 INJECTION, SOLUTION INTRAVENOUS at 12:48

## 2020-02-25 RX ADMIN — SUCCINYLCHOLINE CHLORIDE 140 MG: 20 INJECTION, SOLUTION INTRAMUSCULAR; INTRAVENOUS; PARENTERAL at 08:25

## 2020-02-25 RX ADMIN — GLYCOPYRROLATE 0.8 MG: 0.2 INJECTION, SOLUTION INTRAMUSCULAR; INTRAVENOUS at 10:19

## 2020-02-25 RX ADMIN — LIDOCAINE HYDROCHLORIDE 100 MG: 20 INJECTION, SOLUTION INFILTRATION; PERINEURAL at 08:25

## 2020-02-25 RX ADMIN — HYDROMORPHONE HYDROCHLORIDE 0.2 MG: 1 INJECTION, SOLUTION INTRAMUSCULAR; INTRAVENOUS; SUBCUTANEOUS at 18:54

## 2020-02-25 RX ADMIN — HYDROMORPHONE HYDROCHLORIDE 0.5 MG: 1 INJECTION, SOLUTION INTRAMUSCULAR; INTRAVENOUS; SUBCUTANEOUS at 09:32

## 2020-02-25 RX ADMIN — VECURONIUM BROMIDE 2 MG: 1 INJECTION, POWDER, LYOPHILIZED, FOR SOLUTION INTRAVENOUS at 09:15

## 2020-02-25 RX ADMIN — HYDROMORPHONE HYDROCHLORIDE 0.2 MG: 1 INJECTION, SOLUTION INTRAMUSCULAR; INTRAVENOUS; SUBCUTANEOUS at 12:57

## 2020-02-25 RX ADMIN — VECURONIUM BROMIDE 1 MG: 1 INJECTION, POWDER, LYOPHILIZED, FOR SOLUTION INTRAVENOUS at 10:03

## 2020-02-25 RX ADMIN — SODIUM CHLORIDE, POTASSIUM CHLORIDE, SODIUM LACTATE AND CALCIUM CHLORIDE: 600; 310; 30; 20 INJECTION, SOLUTION INTRAVENOUS at 23:24

## 2020-02-25 RX ADMIN — Medication 3 G: at 08:33

## 2020-02-25 RX ADMIN — PROPOFOL 200 MG: 10 INJECTION, EMULSION INTRAVENOUS at 08:25

## 2020-02-25 RX ADMIN — ONDANSETRON 4 MG: 2 INJECTION INTRAMUSCULAR; INTRAVENOUS at 10:18

## 2020-02-25 RX ADMIN — ROCURONIUM BROMIDE 5 MG: 10 INJECTION INTRAVENOUS at 08:25

## 2020-02-25 RX ADMIN — NEOSTIGMINE METHYLSULFATE 5 MG: 1 INJECTION, SOLUTION INTRAVENOUS at 10:19

## 2020-02-25 RX ADMIN — HYDROMORPHONE HYDROCHLORIDE 0.2 MG: 1 INJECTION, SOLUTION INTRAMUSCULAR; INTRAVENOUS; SUBCUTANEOUS at 21:36

## 2020-02-25 ASSESSMENT — COPD QUESTIONNAIRES: COPD: 0

## 2020-02-25 ASSESSMENT — LIFESTYLE VARIABLES: TOBACCO_USE: 0

## 2020-02-25 ASSESSMENT — MIFFLIN-ST. JEOR: SCORE: 1899.52

## 2020-02-25 ASSESSMENT — ENCOUNTER SYMPTOMS: SEIZURES: 1

## 2020-02-25 NOTE — OP NOTE
Procedure Date: 02/25/2020      PREOPERATIVE DIAGNOSIS:  Large right renal stones.      POSTOPERATIVE DIAGNOSIS:  Large right renal stones.      PROCEDURE:  Cystoscopy, right ureteral catheter, right retrograde pyelogram, right percutaneous nephrolithotomy of large stone volume, total greater than 2 cm, nephrostogram with nephrostomy tube placement and fluoroscopic interpretation of images.      INDICATIONS:  This is a very pleasant 54-year-old lady with who had presented with right-sided flank pain as a result of about a 1.4 cm stone in the right renal pelvis and a second stone about 0.8 mm in diameter in the lower pole hayley of the right kidney.  After careful discussions, we decided that this would be best managed by percutaneous nephrolithotomy in order to remove the stone in as least sessions as possible with the least problems as we can, given her medical history of having a history of meningioma partially excised and also treated with radiation therapy, on multiple medications for seizure issues.      SURGEON:  Adarsh Livingston MD.      RADIOLOGIST:  Michel Anguiano MD.      ANESTHESIA:  General anesthesia.      DESCRIPTION OF PROCEDURE:  The patient was brought to the operative suite in room 50 in the operating room and after the induction of anesthesia with the patient on the anesthesia cart with the genitalia prepped and draped in sterile fashion, the flexible cystoscope was passed gently through the urethra into the bladder.  The interior of the bladder was carefully inspected.  No neoplasm or stones was seen in the bladder.  No other remarkable features.  I then passed an 0.035 Sensor wire into the right ureter and passed it all the way up into the renal pelvis, removed the cystoscope.  I was then able to pass a 6-Telugu open-ended catheter over the guidewire up into the renal pelvis, removed the guidewire.  I then passed a Ovalle catheter into the bladder, inflated the balloon with 10 mL of fluid,  carefully secured the ureteral catheter to this with a single 0 silk tie.  The patient was then moved and turned prone onto the interventional radiology table, and after carefully securing the airway to the satisfaction of the anesthesiologist, the patient's right flank was then prepped and draped in customary fashion.  I then administered retrograde contrast and some carbon dioxide to outline the posterior calices for Dr. Conner and then he was able to obtain percutaneous access to the lower posterior hayley with the aid of an 18-gauge diameter percutaneous nephrostomy needle.  We passed an 0.035 guidewire into the hayley and then carefully negotiated this into the renal pelvis and then guided it down the ureter into the bladder.  At this stage, I then removed the ureteral catheter.  Over the guidewire, the tract was then permanently dilated with a #9 Lithuanian peel-away sheath so a second wire could be inserted down the track through and into the bladder.  One of these wires was then maintained as a safety wire and over the working wire, we dilated the tract with a 30-Lithuanian Trackmaster balloon and then so a 30-Lithuanian Amplatz sheath was then placed in the collecting system extending out to the skin surface.  After removal of the balloon catheter after it was deflated, I was then able to look down the tract with a rigid nephroscope.  The lighting was poor.  Visibility was difficult at first.  The stone was seen with some difficulty after a while and I was able to fragment the stone into small pieces with the ultrasonic lithotripter and evacuate the fragments from the kidney without difficulty.  There was some bleeding encountered.  We had to reposition the sheath a little further into the kidney to tamponade this.  I cleared any further clot that had formed out carefully.  I then inserted the flexible nephroscope to examine the calices and perform a nephrostogram.  The upper pole hayley was clean.  Another fragment was  seen which then dropped into the renal pelvis.  I was then able to remove the flexible scope, reinsert the rigid scope and easily, after evacuating some clot, could see the fragment and evacuate it with the ultrasonic lithotripter.  We then carefully withdrew the sheath and into the lower hayley.  I was able to identify that the smaller stone there and similarly treat that by ultrasonic lithotripsy, fragmenting, and evacuating the fragments.  I then withdrew the nephroscope.  Over the guidewire through the sheath, we then passed a 24-Mongolian Councill nephrostomy tube until it was positioned with the tip in the region of the ureteropelvic junction.  The balloon was dilated with about 3 mL of dilute contrast.  A nephrostogram was performed which showed good position in the collecting system.  We then removed the Amplatz sheath without difficulty.  There was some bleeding around the tube initially.  We applied some pressure to the skin by the tube for about 2 minutes and noticed there was no further bleeding.  The tube was then secured to the skin with a single 0 silk suture around the tube.  The wound was then cleaned and dressed, the nephrostomy tube connected to drainage and the patient then taken to the recovery room, having tolerated the procedure well.      CONCLUSION:  The patient will stay overnight until tomorrow.  She will be seen also by the Hospitalist Service because of a history of incompletely removed meningioma and a number of procedure related issues and a number of medications requiring careful monitoring to prevent seizures.  I do anticipate getting a CT scan without contrast tomorrow morning to see if there are any significant residual fragments.  If they are very small, then we will not consider further treatment.  If there are significant fragments, we may need to have a second procedure at some point in the future.  Whether she goes home with a tube or we can do a nephrostogram and remove the tube  tomorrow will depend on her condition, and we will determine that tomorrow.         GHASSAN LOPEZ MD             D: 2020   T: 2020   MT: ZULY      Name:     GRADY KRISHNAN   MRN:      [id number]        Account:        [id number]   :      1965           Procedure Date: 2020      Document: [id number]

## 2020-02-25 NOTE — ANESTHESIA CARE TRANSFER NOTE
Patient: Marlyn Wilder    Procedure(s):  RIGHT PERCUTANEOUS NEPHROLITHOTOMY  CYSTOSCOPY, RIGHT URETERAL CATHETER PLACEMENT    Diagnosis: Calculus of kidney [N20.0]  Diagnosis Additional Information: No value filed.    Anesthesia Type:   General, ETT     Note:  Airway :Face Mask  Patient transferred to:PACU  Comments: Neuromuscular blockade reversed after TOF 4/4, spontaneous respirations, adequate tidal volumes, followed commands to voice, oropharynx suctioned with soft flexible catheter, extubated atraumatically, extubated with suction, airway patent after extubation.  Oxygen via facemask at 6 liters per minute to PACU. Oxygen tubing connected to wall O2 in PACU, SpO2, NiBP, and EKG monitors and alarms on and functioning, Kelsie Hugger warmer connected to patient gown, report on patient's clinical status given to PACU RN, RN questions answered. Handoff Report: Identifed the Patient, Identified the Reponsible Provider, Reviewed the pertinent medical history, Discussed the surgical course, Reviewed Intra-OP anesthesia mangement and issues during anesthesia, Set expectations for post-procedure period and Allowed opportunity for questions and acknowledgement of understanding      Vitals: (Last set prior to Anesthesia Care Transfer)    CRNA VITALS  2/25/2020 1019 - 2/25/2020 1054      2/25/2020             Pulse:  75    SpO2:  99 %    Resp Rate (observed):  13                Electronically Signed By: ARIANNA Wang CRNA  February 25, 2020  10:54 AM

## 2020-02-25 NOTE — ANESTHESIA POSTPROCEDURE EVALUATION
Patient: Marlyn Wilder    Procedure(s):  RIGHT PERCUTANEOUS NEPHROLITHOTOMY  CYSTOSCOPY, RIGHT URETERAL CATHETER PLACEMENT    Diagnosis:Calculus of kidney [N20.0]  Diagnosis Additional Information: No value filed.    Anesthesia Type:  General, ETT    Note:  Anesthesia Post Evaluation    Patient location during evaluation: PACU  Patient participation: Able to fully participate in evaluation  Level of consciousness: awake  Pain management: adequate  Airway patency: patent  Cardiovascular status: acceptable  Respiratory status: acceptable  Hydration status: acceptable  PONV: none             Last vitals:  Vitals:    02/25/20 1312 02/25/20 1337 02/25/20 1631   BP:  (!) 176/65 (!) 154/69   Pulse:   71   Resp: 16 16 16   Temp:  36.1  C (96.9  F) 36.9  C (98.4  F)   SpO2:  94% 94%         Electronically Signed By: Kurt Hsieh MD  February 25, 2020  4:36 PM

## 2020-02-25 NOTE — PROGRESS NOTES
Medication History Completed by Medication Scribe  Admission medication history interview status for the 2/25/2020  admission is complete. See EPIC admission navigator for prior to admission medications     Medication history sources: Patient, Surescripts and H&P  Medication history source reliability: Good  Adherence assessment: Good    Significant changes made to the medication list:  None      Additional medication history information:   None    Medication reconciliation completed by provider prior to medication history? No    Time spent in this activity: 30 minutes      Prior to Admission medications    Medication Sig Last Dose Taking? Auth Provider   aspirin 81 MG tablet Take 1 tablet by mouth daily. 2/15/2020 at am Yes Yesenia Hernandes MD   Flurandrenolide 0.05 % OINT Apply 1 Application topically daily as needed Over 1 week ago at prn Yes Reported, Patient   folic acid (FOLVITE) 1 MG tablet Take 1 mg by mouth daily  2/25/2020 at 0500 Yes Reported, Patient   gabapentin (NEURONTIN) 300 MG capsule Take 600 mg by mouth At Bedtime (take 2 X 300 mg = 600 mg dose) 2/24/2020 at 2230 Yes Reported, Patient   levETIRAcetam (KEPPRA) 500 MG tablet TAKE 3 TABLETS(1500 MG) BY MOUTH IN THE MORNING AND IN THE EVENING 2/25/2020 at 0500 Yes Emmanuel Madrid MD   levothyroxine (SYNTHROID) 125 MCG tablet Take 125 mcg by mouth daily  2/25/2020 at 0100 Yes Reported, Patient   lisinopril (ZESTRIL) 2.5 MG tablet Take 2.5 mg by mouth daily  2/23/2020 at am Yes Reported, Patient   methotrexate sodium 2.5 MG TABS Take 17.5 mg by mouth once a week (take 7 X 2.5 mg = 17.5 mg dose) 2/11/2020 at am Yes Reported, Patient   Multiple Vitamins-Minerals (MULTIVITAMIN & MINERAL PO) Take 1 tablet by mouth daily Over 1 week ago at am Yes Reported, Patient   omeprazole (PRILOSEC) 20 MG CR capsule Take 20 mg by mouth daily  2/25/2020 at 0500 Yes Reported, Patient   ondansetron (ZOFRAN) 4 MG tablet Take 4 mg by mouth daily as needed for nausea   Over 1 week ago at prn Yes Reported, Patient   vitamin D3 (CHOLECALCIFEROL) 2000 units (50 mcg) tablet Take 1 tablet by mouth daily 2/25/2020 at 0500 Yes Reported, Patient

## 2020-02-25 NOTE — ANESTHESIA PREPROCEDURE EVALUATION
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     OPTICAL TRACKING SYSTEM CRANIOTOMY, EXCISE TUMOR, COMBINED  2012    Procedure: COMBINED OPTICAL TRACKING SYSTEM CRANIOTOMY, EXCISE TUMOR;  Stealth Guided Left Redo Craniotomy, Resection Of Tumor Ct @615 am MRI @ 630;  Surgeon: Pan Calero MD;  Location: [facility] OR       Anesthesia Evaluation     . Pt has had prior anesthetic. Type: General    History of anesthetic complications   - PONV        ROS/MED HX    ENT/Pulmonary:     (+)sleep apnea, uses CPAP , . .   (-) tobacco use, asthma and COPD   Neurologic: Comment: S/p crani and resection tumor     (+)seizures features: frontal lobe - memory, dizziness, TIA    (-) CVA and Neuropathy   Cardiovascular:     (+) hypertension----. : . . . :. .      (-) CAD, irregular heartbeat/palpitations and stent   METS/Exercise Tolerance:     Hematologic:        (-) anemia   Musculoskeletal:         GI/Hepatic:        (-) GERD and liver disease   Renal/Genitourinary:     (+) Nephrolithiasis ,    (-) renal disease   Endo:     (+) thyroid problem hypothyroidism,  "Obesity, .   (-) Type I DM and Type II DM   Psychiatric:         Infectious Disease:  - neg infectious disease ROS       Malignancy:         Other:                          Physical Exam  Normal systems: cardiovascular, pulmonary and dental    Airway   Mallampati: III  TM distance: >3 FB  Neck ROM: full    Dental     Cardiovascular   Rhythm and rate: regular and normal      Pulmonary    breath sounds clear to auscultation            Lab Results   Component Value Date    WBC 9.3 08/15/2014    HGB 11.8 08/15/2014    HCT 37.7 08/15/2014     08/15/2014     08/15/2014    POTASSIUM 4.2 08/15/2014    CHLORIDE 103 08/15/2014    CO2 26 08/15/2014    BUN 14 08/15/2014    CR 0.80 08/15/2014    GLC 98 08/15/2014    BRENDEN 9.5 08/15/2014    PHOS 3.7 03/09/2013    MAG 2.2 03/09/2013    ALBUMIN 3.8 (L) 10/17/2013    PROTTOTAL 7.0 10/17/2013    ALT 30 10/17/2013    AST 17 10/17/2013    ALKPHOS 107 10/17/2013    BILITOTAL 0.5 10/17/2013    PTT 27 03/09/2013    INR 1.02 03/09/2013    FIBR 405 11/10/2012    TSH 3.35 08/27/2013    T4 0.87 08/27/2013    HCG Negative 07/10/2010       Preop Vitals  BP Readings from Last 3 Encounters:   02/25/20 (!) 161/69   02/06/20 132/80   06/10/19 134/79    Pulse Readings from Last 3 Encounters:   02/06/20 77   06/10/19 71   05/29/19 67      Resp Readings from Last 3 Encounters:   02/25/20 16   06/10/19 16   09/12/17 28    SpO2 Readings from Last 3 Encounters:   02/25/20 97%   02/06/20 99%   06/10/19 97%      Temp Readings from Last 1 Encounters:   02/25/20 36.8  C (98.3  F) (Oral)    Ht Readings from Last 1 Encounters:   02/25/20 1.702 m (5' 7\")      Wt Readings from Last 1 Encounters:   02/25/20 127.2 kg (280 lb 6.4 oz)    Estimated body mass index is 43.92 kg/m  as calculated from the following:    Height as of this encounter: 1.702 m (5' 7\").    Weight as of this encounter: 127.2 kg (280 lb 6.4 oz).       Anesthesia Plan      History & Physical Review  History and physical reviewed and " following examination; no interval change.    ASA Status:  3 .    NPO Status:  > 6 hours    Plan for General and ETT with Intravenous and Propofol induction. Maintenance will be Balanced.    PONV prophylaxis:  Ondansetron (or other 5HT-3), Dexamethasone or Solumedrol and Other (See comment)    Propofol infusion      Postoperative Care  Postoperative pain management:  Oral pain medications and Multi-modal analgesia.      Consents  Anesthetic plan, risks, benefits and alternatives discussed with:  Patient..                 Tristian Landon MD

## 2020-02-25 NOTE — IR NOTE
RADIOLOGY POST PROCEDURE NOTE w/ SEDATION  Patient name: Marlyn Wilder  MRN: [id number]  : 1965    Pre-procedure diagnosis: rnal stone  Post-procedure diagnosis: Same    Procedure Date/Time: 2020  1:43 PM  Procedure: percutaneous nephrolithotomy  Estimated blood loss: None  Specimen(s) collected with description: none    I determined this patient to be an appropriate candidate for the planned sedation and procedure and reassessed the patient IMMEDIATELY PRIOR to sedation and procedure.     The patient tolerated the procedure well with no immediate complications.  Significant findings:none    See imaging dictation for procedural details.    Provider name: Michel Anguiano MD  Assistant(s):None

## 2020-02-25 NOTE — PLAN OF CARE
Pt arrived to station 88 at 1230 from PACU, pt is AxOx4, VSS on RA with capno (Wears CPAP at night), IVF infusing, dilaudid given x1 IV, oxy available (not given due to no bowel sounds yet), Diet: ice chips/clear liquids. Some nausea- declined meds, but sea bands and peppermint cotton ball given. gonzalez (cherry red), Nep tube (cherry red). Bowel sounds not heard yet. Right back dressing- CDI, Consults: hospitalist. Discharge: unclear at this time; nursing will continue to monitor  and daughter at bedside.

## 2020-02-25 NOTE — CONSULTS
[facility]  Consult Note - Hospitalist Service     Date of Admission:  2/25/2020  Consult Requested by: Dr. Livingston  Reason for Consult: Medical management    Assessment & Plan   Marlyn Wilder is a 54 year old female admitted on 2/25/2020. She presents for elective cystoscopy, right ureteral catheter, with right percutaneous nephrolithotomy.  Past medical history includes hypertension, obstructive sleep apnea, seizure disorder, meningioma s/p craniotomies and XRT, STML, RA and hx of TIAs.    Elective cystoscopy, right ureteral catheter, right percutaneous nephrolithotomy  Surgery completed by Dr. Livingston, no surgical complications noted.  Procedure was completed under general anesthesia, and a total of 850cc crystalloid administered intraoperatively. Post operative course complicated by nausea.  --Defer postoperative management to primary surgical team including pain management, DVT and on antibiotic prophylaxis, dispositions and therapies    Hypertension  Well controlled on lisinopril.  Preoperative /70s.  Postoperative BP elevated at 160/80s - suspect d/t pain.  Patient denies any symptoms referrable to her hypertension including chest pain, exertional symptoms, dyspnea. Pre operative BMP unremarkable.  --Hold lisinopril today, will recheck BMP in a.m.  --may need hydralazine PRN for BP management if not decreased with improved pain control     Partial Seizures, meningioma s/p multiple craniotomies and XRT; ongoing short term memory loss   Patient diagnosed with left-sided intracranial meningioma, she has had partial seizures since 2011.  Patient underwent 2 surgical procedures to remove these meningiomas in 2012, followed by fractionated radiotherapy.  Followed closely with annual MRIs, she has had no new or changing neurological symptoms over the past several months.  She is typically on aspirin therapy for TIA prophylaxis.  Seizures are well controlled on Keppra, last one >1 month  "ago.  --Partial seizures manifest as a \"burning smell\", nausea and a sense of \"froilan vu\"  --Continue Neurontin, Keppra  --Defer aspirin therapy to surgery team    Rheumatoid arthritis  Diagnosed with inflammatory arthritis versus rheumatoid arthritis after she underwent treatment for her brain tumor.  She follows with rheumatology typically, has been managed on methotrexate with excellent symptom control.  Methotrexate has been held the week prior to surgery. Preoperative CBC unremarkable  --Hold methotrexate today, follow-up with PCP to restart     Obstructive sleep apnea compliant with CPAP  Well managed on CPAP.  High risk for hypercapnia in the setting of acute narcotic use, JARED and body habitus.  --Continue CPAP in the postoperative phase for any naps or sleeping at night    Hx of TIAs  Patient had overnight observation stay for TIA greater than 6 months ago, managed on aspirin daily.  --Restart ASA when surgery team amenable    The patient's care was discussed with the Attending physician Steve Carney DO, patient, bedside nurse and family.     ARIANNA [facility]    ______________________________________________________________________    Chief Complaint   Elective cystoscopy, right nephrolithotomy    History is obtained from the patient and her      History of Present Illness   Marlyn Wilder is a 54 year old female who presents for elective cystoscopy, right ureteral catheter, with right percutaneous nephrolithotomy.  Past medical history includes hypertension, obstructive sleep apnea, seizure disorder, meningioma s/p craniotomies and XRT, STML, RA and hx of TIAs.    I evaluated the patient in the postoperative period with her  and daughter at the bedside.  She endorses uncontrolled pain on my exam, improved with hydromorphone IV.  She also endorses nausea, slowly improving.  She denies any recent chest pain, pressure, exertional symptoms, dyspnea, " fever/chills, urinary symptoms, nausea/vomiting but endorses ongoing diarrhea.  She denies any bloody stools, abdominal pain or abnormal bleeding.    Review of Systems   The 10 point Review of Systems is negative other than noted in the HPI or here.     Past Medical History    I have reviewed this patient's medical history and updated it with pertinent information if needed.   Past Medical History:   Diagnosis Date     Arthritis     atypical rheumatoid arthritis     Brain tumor (H)      Calculus of kidney      Gout      Hypertension      PONV (postoperative nausea and vomiting)      Seizures (H)     frontal lobe seizures, controlled on Keppra, starts with smell, then de ja vu, nausea     Sleep apnea     uses CPAP     TIA (transient ischemic attack)     takes baby ASA       Past Surgical History   I have reviewed this patient's surgical history and updated it with pertinent information if needed.  Past Surgical History:   Procedure Laterality Date     BRAIN SURGERY      debulking x 2, short term memory deficits     C  DELIVERY ONLY      ,     C LIGATE FALLOPIAN TUBE,POSTPARTUM       HC REMOVAL OF TONSILS,<12 Y/O      Tonsils <11y.o.     OPTICAL TRACKING SYSTEM CRANIOTOMY, EXCISE TUMOR, COMBINED  2012    Procedure: COMBINED OPTICAL TRACKING SYSTEM CRANIOTOMY, EXCISE TUMOR;  Stealth Guided Left Redo Craniotomy, Resection Of Tumor Ct @615 am MRI @ 630;  Surgeon: Pan Calero MD;  Location:  OR       Social History   I have reviewed this patient's social history and updated it with pertinent information if needed.  Social History     Tobacco Use     Smoking status: Never Smoker     Smokeless tobacco: Never Used   Substance Use Topics     Alcohol use: No     Drug use: No       Family History   I have reviewed this patient's family history and updated it with pertinent information if needed.   Family History   Problem Relation Age of Onset     Breast Cancer Mother          72-alive     Hypertension Father         76-alive     C.A.D. Father      Family History Negative Sister         2 sisters healthy     Family History Negative Brother        Medications   I have reviewed this patient's current medications    Allergies   Allergies   Allergen Reactions     Duloxetine Nausea       Physical Exam   Vital Signs: Temp: 96.8  F (36  C) Temp src: Temporal BP: (!) 159/70 Pulse: 59 Heart Rate: 58 Resp: 16 SpO2: 96 % O2 Device: Nasal cannula Oxygen Delivery: 2 LPM  Weight: 280 lbs 6.4 oz    Constitutional: awake but drowsy, cooperative, no apparent distress and moderately obese  Eyes: pupils equal, round and reactive to light  Respiratory: no increased work of breathing, no retractions and clear to auscultation  Cardiovascular: regular rate and rhythm, normal S1 and S2, no murmur noted and no edema  GI: non-distended, non-tender and soft  Skin: skin warm and dry. Incision covered, drain in place with sanguinous drainage   Neurologic: Mental Status Exam:  Level of Alertness:   awake  Orientation:   person, place, time  Memory:   abnormal - ongoing STML  No focal neuro deficits   Motor Exam:  moves all extremities well and symmetrically  Neuropsychiatric: General: normal, calm and poor eye contact  Level of consciousness: drowsy  Affect: flat    Data   Results for orders placed or performed during the hospital encounter of 02/25/20 (from the past 24 hour(s))   XR KUB    Narrative    ABDOMEN ONE VIEW  2/25/2020 6:52 AM    HISTORY: Right renal stone    COMPARISON: Radiograph dated 9/13/2010. CT dated 7/10/2010.      Impression    IMPRESSION: There is a new 15 mm x 9 mm calcification over the mid  right kidney which is favored to represent a renal calculus, though  could also represent a gallstone. A calcification over the pubic  symphysis is again seen and is external to the urinary bladder. No  other definite calculi are identified, though evaluation is slightly  limited by bowel.    CHI HARRIS  FAHRNER, MD

## 2020-02-26 ENCOUNTER — APPOINTMENT (OUTPATIENT)
Dept: CT IMAGING | Facility: CLINIC | Age: 55
End: 2020-02-26
Attending: UROLOGY
Payer: COMMERCIAL

## 2020-02-26 VITALS
HEIGHT: 67 IN | DIASTOLIC BLOOD PRESSURE: 46 MMHG | OXYGEN SATURATION: 93 % | BODY MASS INDEX: 44.01 KG/M2 | TEMPERATURE: 97.5 F | RESPIRATION RATE: 18 BRPM | SYSTOLIC BLOOD PRESSURE: 139 MMHG | HEART RATE: 72 BPM | WEIGHT: 280.4 LBS

## 2020-02-26 LAB
ANION GAP SERPL CALCULATED.3IONS-SCNC: 6 MMOL/L (ref 3–14)
BUN SERPL-MCNC: 9 MG/DL (ref 7–30)
CALCIUM SERPL-MCNC: 9 MG/DL (ref 8.5–10.1)
CHLORIDE SERPL-SCNC: 110 MMOL/L (ref 94–109)
CO2 SERPL-SCNC: 25 MMOL/L (ref 20–32)
COPATH REPORT: NORMAL
CREAT SERPL-MCNC: 0.63 MG/DL (ref 0.52–1.04)
ERYTHROCYTE [DISTWIDTH] IN BLOOD BY AUTOMATED COUNT: 14.5 % (ref 10–15)
GFR SERPL CREATININE-BSD FRML MDRD: >90 ML/MIN/{1.73_M2}
GLUCOSE SERPL-MCNC: 141 MG/DL (ref 70–99)
HCT VFR BLD AUTO: 38.7 % (ref 35–47)
HGB BLD-MCNC: 12.3 G/DL (ref 11.7–15.7)
MCH RBC QN AUTO: 29.6 PG (ref 26.5–33)
MCHC RBC AUTO-ENTMCNC: 31.8 G/DL (ref 31.5–36.5)
MCV RBC AUTO: 93 FL (ref 78–100)
PLATELET # BLD AUTO: 249 10E9/L (ref 150–450)
POTASSIUM SERPL-SCNC: 3.9 MMOL/L (ref 3.4–5.3)
RBC # BLD AUTO: 4.15 10E12/L (ref 3.8–5.2)
SODIUM SERPL-SCNC: 141 MMOL/L (ref 133–144)
WBC # BLD AUTO: 11.8 10E9/L (ref 4–11)

## 2020-02-26 PROCEDURE — 25000132 ZZH RX MED GY IP 250 OP 250 PS 637: Performed by: INTERNAL MEDICINE

## 2020-02-26 PROCEDURE — 36415 COLL VENOUS BLD VENIPUNCTURE: CPT | Performed by: UROLOGY

## 2020-02-26 PROCEDURE — 99214 OFFICE O/P EST MOD 30 MIN: CPT | Performed by: INTERNAL MEDICINE

## 2020-02-26 PROCEDURE — 74176 CT ABD & PELVIS W/O CONTRAST: CPT

## 2020-02-26 PROCEDURE — 85027 COMPLETE CBC AUTOMATED: CPT | Performed by: UROLOGY

## 2020-02-26 PROCEDURE — 80048 BASIC METABOLIC PNL TOTAL CA: CPT | Performed by: UROLOGY

## 2020-02-26 PROCEDURE — 25000132 ZZH RX MED GY IP 250 OP 250 PS 637: Performed by: UROLOGY

## 2020-02-26 RX ORDER — LISINOPRIL 2.5 MG/1
2.5 TABLET ORAL DAILY
Status: DISCONTINUED | OUTPATIENT
Start: 2020-02-26 | End: 2020-02-26 | Stop reason: HOSPADM

## 2020-02-26 RX ORDER — SENNOSIDES 8.6 MG
1-3 TABLET ORAL DAILY PRN
Qty: 20 TABLET | Refills: 0 | Status: SHIPPED | OUTPATIENT
Start: 2020-02-26 | End: 2020-05-12

## 2020-02-26 RX ORDER — OXYCODONE HYDROCHLORIDE 5 MG/1
5-10 TABLET ORAL
Qty: 10 TABLET | Refills: 0 | Status: SHIPPED | OUTPATIENT
Start: 2020-02-26 | End: 2020-05-12

## 2020-02-26 RX ORDER — ONDANSETRON 4 MG/1
4 TABLET, FILM COATED ORAL EVERY 8 HOURS PRN
Qty: 10 TABLET | Refills: 0 | Status: SHIPPED | OUTPATIENT
Start: 2020-02-26 | End: 2020-05-12

## 2020-02-26 RX ADMIN — FOLIC ACID 1 MG: 1 TABLET ORAL at 08:19

## 2020-02-26 RX ADMIN — OXYCODONE HYDROCHLORIDE 5 MG: 5 TABLET ORAL at 09:21

## 2020-02-26 RX ADMIN — LISINOPRIL 2.5 MG: 2.5 TABLET ORAL at 12:25

## 2020-02-26 RX ADMIN — OMEPRAZOLE 20 MG: 20 CAPSULE, DELAYED RELEASE ORAL at 08:19

## 2020-02-26 RX ADMIN — LEVETIRACETAM 1500 MG: 750 TABLET, FILM COATED ORAL at 08:19

## 2020-02-26 RX ADMIN — LEVOTHYROXINE SODIUM 125 MCG: 125 TABLET ORAL at 08:18

## 2020-02-26 RX ADMIN — OXYCODONE HYDROCHLORIDE 5 MG: 5 TABLET ORAL at 08:18

## 2020-02-26 RX ADMIN — OXYCODONE HYDROCHLORIDE 5 MG: 5 TABLET ORAL at 12:25

## 2020-02-26 NOTE — PLAN OF CARE
VSS except HTN; hydralazine PRN available if parameters are met.  A&Ox4.  Capno on, Cpap at night.  IVF infusing.  Dilaudid for pain with relief, plan to transition to oral pain pills soon.  Very hypoactive bowel sounds.  Poor po this evening, encouraged.  Denied nausea.  Dangled at side of bed and ambulated in hallways this evening, tolerated well.  Neph tube patent, cherry red output, dressing CDI.  Ovalle patent, kushal.  /friends at bedside.  Will continue to monitor.

## 2020-02-26 NOTE — PROGRESS NOTES
"[facility]    Medicine Progress Note - Hospitalist Service       Date of Admission:  2/25/2020  Assessment & Plan   Marlyn Wilder is a 54 year old female admitted on 2/25/2020. She presents for elective cystoscopy, right ureteral catheter, with right percutaneous nephrolithotomy.  Past medical history includes hypertension, obstructive sleep apnea, seizure disorder, meningioma s/p craniotomies and XRT, STML, RA and hx of TIAs.     Elective cystoscopy, right ureteral catheter, right percutaneous nephrolithotomy  Surgery completed by Dr. Livingston, no surgical complications noted.  Procedure was completed under general anesthesia, and a total of 850cc crystalloid administered intraoperatively. Post operative course complicated by nausea.  - Defer postoperative management to primary surgical team including pain management, DVT and on antibiotic prophylaxis, dispositions and therapies     Hypertension  Well controlled on lisinopril.  Preoperative /70s.  Postoperative BP elevated at 160/80s - suspect d/t pain.  Patient denies any symptoms referrable to her hypertension including chest pain, exertional symptoms, dyspnea. Pre operative BMP unremarkable.  - Restarted PTA Lisinopril  - Hydralazine PRN      Partial Seizures, meningioma s/p multiple craniotomies and XRT; ongoing short term memory loss   Patient diagnosed with left-sided intracranial meningioma, she has had partial seizures since 2011.  Patient underwent 2 surgical procedures to remove these meningiomas in 2012, followed by fractionated radiotherapy.  Followed closely with annual MRIs, she has had no new or changing neurological symptoms over the past several months.  She is typically on aspirin therapy for TIA prophylaxis.  Seizures are well controlled on Keppra, last one >1 month ago.  - Partial seizures manifest as a \"burning smell\", nausea and a sense of \"froilan vu\"  - Continue Neurontin, Keppra     Rheumatoid arthritis  Diagnosed with " inflammatory arthritis versus rheumatoid arthritis after she underwent treatment for her brain tumor.  She follows with rheumatology typically, has been managed on methotrexate with excellent symptom control.  Methotrexate has been held the week prior to surgery. Preoperative CBC unremarkable  - Holding Methotrexate.  Will resume once okay with surgery      Obstructive sleep apnea compliant with CPAP  Well managed on CPAP.  High risk for hypercapnia in the setting of acute narcotic use, JARED and body habitus.  - Continue CPAP in the postoperative phase for any naps or sleeping at night     Hx of TIAs  Patient had overnight observation stay for TIA greater than 6 months ago, managed on aspirin daily.  - Restart ASA when surgery team amenable      Diet: Diet  Regular Diet Adult    DVT Prophylaxis: Defer to primary service  Ovalle Catheter: not present  Code Status: Full Code      Disposition Plan   Expected discharge: Defer to primary service  Entered: Steve Carney DO 02/26/2020, 1:01 PM       The patient's care was discussed with the Patient and Patient's Family.    Steve Carney DO  Hospitalist Service  [facility]    ______________________________________________________________________    Interval History   Patient seen and examined.  No acute events over night. No fevers or chills.  No chest pain or SOB.  Tolerating diet    Data reviewed today: I reviewed all medications, new labs and imaging results over the last 24 hours. I personally reviewed no images or EKG's today.    Physical Exam   Vital Signs: Temp: 97.5  F (36.4  C) Temp src: Oral BP: 139/46 Pulse: 72 Heart Rate: 80 Resp: 18 SpO2: 93 % O2 Device: None (Room air)    Weight: 280 lbs 6.4 oz  General Appearance: Resting comfortably.  NAD   Respiratory: Clear to auscultation.  No respiratory distress  Cardiovascular: RRR.  No murmur  GI: Non-tender.  Non-distended  Skin: No rashes.  No cyanosis  Other: No edema.  No calf tenderness       Data   Recent Labs   Lab 02/26/20  0833   WBC 11.8*   HGB 12.3   MCV 93         POTASSIUM 3.9   CHLORIDE 110*   CO2 25   BUN 9   CR 0.63   ANIONGAP 6   BRENDEN 9.0   *     Recent Results (from the past 24 hour(s))   CT Abdomen Pelvis w/o Contrast    Narrative    CT ABDOMEN AND PELVIS WITHOUT CONTRAST  2/26/2020 8:07 AM    CLINICAL HISTORY: Post right PCNL.    TECHNIQUE: CT scan of the abdomen and pelvis was performed without IV  contrast. Multiplanar reformats were obtained. Dose reduction  techniques were used.  CONTRAST: None.    COMPARISON: 1/31/2020    FINDINGS:   LOWER CHEST: Basilar atelectasis.    HEPATOBILIARY: Normal.    PANCREAS: Normal.    SPLEEN: Normal.    ADRENAL GLANDS: Normal.    KIDNEYS/BLADDER: Since the prior study, a percutaneous nephrostomy  catheter has been placed into the lower pole of the right kidney. A  second catheter passes through the first and into the ureter, with tip  in the distal ureter. Previously seen large right pelvic stone is no  longer present. There is a small residual calculus in the lower pole  of the right kidney measuring 5 x 2 mm. No hydronephrosis. The left  kidney is negative.    BOWEL: Normal.    LYMPH NODES: Normal.    VASCULATURE: Unremarkable.    PELVIC ORGANS: Normal uterus and adnexa. Ovalle catheter in the  bladder.    OTHER: None.    MUSCULOSKELETAL: Normal.      Impression    IMPRESSION:   1.  Small residual 5 x 2 mm nonobstructing calculus lower pole of the  right kidney. Large right renal pelvic calculus on the prior study has  been removed.  2.  New percutaneous nephrostomy tube with additional catheter  extending into the ureter. No hydronephrosis.    FREDDY CANTU MD

## 2020-02-26 NOTE — PLAN OF CARE
Patient discharged at 4:00 PM to Discharged to home  IV was discontinued. Pain at time of discharge was 0/10. Belongings returned to patient.  Discharge instructions and medications reviewed with patient and spouse- including care of nep tube.  Patient verbalized understanding and all questions were answered.  Prescriptions given to patient.  At time of discharge, patient condition was stable and left the unit escorted by CNA.

## 2020-02-26 NOTE — PLAN OF CARE
A&Ox4. VSS on CPAP overnight. 8/10 pain with movement. PRN Dilaudid given; effective. BS Hypoactive. Diet advanced on eves to full liquid. Denies nausea; Sea Bands in place. Ovalle patent; red UOP. Neph tube; Cherry UOP. Right back dressing CDI. Possible discharge home today.

## 2020-02-28 ENCOUNTER — APPOINTMENT (OUTPATIENT)
Dept: INTERVENTIONAL RADIOLOGY/VASCULAR | Facility: CLINIC | Age: 55
End: 2020-02-28
Attending: UROLOGY
Payer: COMMERCIAL

## 2020-02-28 ENCOUNTER — HOSPITAL ENCOUNTER (OUTPATIENT)
Facility: CLINIC | Age: 55
Discharge: HOME | End: 2020-02-28
Attending: RADIOLOGY | Admitting: RADIOLOGY
Payer: COMMERCIAL

## 2020-02-28 VITALS
TEMPERATURE: 97.4 F | OXYGEN SATURATION: 96 % | RESPIRATION RATE: 18 BRPM | DIASTOLIC BLOOD PRESSURE: 49 MMHG | HEART RATE: 71 BPM | SYSTOLIC BLOOD PRESSURE: 118 MMHG

## 2020-02-28 DIAGNOSIS — N20.0 CALCULUS OF KIDNEY: Primary | ICD-10-CM

## 2020-02-28 DIAGNOSIS — N20.0 RENAL STONES: ICD-10-CM

## 2020-02-28 PROCEDURE — 40000854 ZZH STATISTIC SIMPLE TUBE INSERTION/CHARGE, PORT, CATH, FISTULOGRAM

## 2020-02-28 PROCEDURE — 50431 NJX PX NFROSGRM &/URTRGRM: CPT

## 2020-02-28 RX ORDER — DEXTROSE MONOHYDRATE 25 G/50ML
25-50 INJECTION, SOLUTION INTRAVENOUS
Status: DISCONTINUED | OUTPATIENT
Start: 2020-02-28 | End: 2020-02-28 | Stop reason: HOSPADM

## 2020-02-28 RX ORDER — NICOTINE POLACRILEX 4 MG
15-30 LOZENGE BUCCAL
Status: DISCONTINUED | OUTPATIENT
Start: 2020-02-28 | End: 2020-02-28 | Stop reason: HOSPADM

## 2020-02-28 NOTE — PROGRESS NOTES
0830: Pt returned from IR. Gauze drsg CDI to right back nephrostomy tube removal site. No oozing or hematoma noted. Pt denies pain. Pt instructed on activity restrictions. Verbal understanding received from pt. Pt's family at bedside. Detailed update given.  Discharge teaching & instructions given to both pt & spouse w/ verbal understanding received. All questions & concerns addressed.

## 2020-02-28 NOTE — PROGRESS NOTES
Care Suites Admission Nursing Note    Patient Information  Name: Marlyn Wilder  Age: 54 year old  Reason for admission: Neph tube check   Care Suites arrival time:0645    Patient Admission/Assessment   Pre-procedure assessment complete: Yes  If abnormal assessment/labs, provider notified: N/A  NPO: yes  Medications held per instructions/orders: N/A  Consent: obtained  Patient oriented to room: Yes  Education/questions answered: Yes  Plan/other: proceed    Discharge Planning  Accompanied by: Juan Diego -spouse  Discharge name/phone number: [phone]  Overnight post sedation caregiver: Spouse  Discharge location: home    Dave Haji RN

## 2020-02-28 NOTE — DISCHARGE INSTRUCTIONS
Nephrostomy Tube Removal Discharge Instructions    After you go home:      You may resume your normal diet    Care of Insertion Site:      For the first 48 hrs, check your puncture site every couple hours while you are awake     You may change the dressing daily, but more often if dressing becomes wet or dirty. It is not unusual to have drainage from the opening until the site is completely healed.     You may remove the dressing when the site is completely healed     You may shower tomorrow    No tub baths, whirlpools or swimming until your puncture site has fully healed    Activity:      You may go back to normal activity in 24 hours    Wait 48 hours before lifting, straining, exercise or other strenuous activity    Medicines:      You may resume all medications    For minor pain, you may take Acetaminophen (Tylenol) or Ibuprofen (Advil)               Call the provider who ordered this procedure if:      The site is red, swollen, hot or tender    There is foul-smelling drainage from the tube site    You have pain that is getting worse or that does not improve with pain medication    You have chills or a fever greater than 101 F (38 C)    If the leaking continues for more than 3 days    Any questions or concerns    Call  911 or go to the Emergency Room if you have:      Severe pain or trouble breathing    Bleeding that you cannot control      If you have questions call:          [facility] Radiology Dept @ [phone]      _

## 2020-03-02 LAB
APPEARANCE STONE: NORMAL
COMPN STONE: NORMAL
NUMBER STONE: NORMAL
SIZE STONE: NORMAL MM
WT STONE: 617 MG

## 2020-03-10 ENCOUNTER — DOCUMENTATION ONLY (OUTPATIENT)
Dept: CARE COORDINATION | Facility: CLINIC | Age: 55
End: 2020-03-10

## 2020-03-10 ENCOUNTER — TRANSFERRED RECORDS (OUTPATIENT)
Dept: HEALTH INFORMATION MANAGEMENT | Facility: CLINIC | Age: 55
End: 2020-03-10

## 2020-03-24 ENCOUNTER — VIRTUAL VISIT (OUTPATIENT)
Dept: UROLOGY | Facility: CLINIC | Age: 55
End: 2020-03-24
Payer: COMMERCIAL

## 2020-03-24 VITALS — HEIGHT: 67 IN | BODY MASS INDEX: 43.92 KG/M2

## 2020-03-24 DIAGNOSIS — E66.01 MORBID OBESITY (H): ICD-10-CM

## 2020-03-24 DIAGNOSIS — N20.0 KIDNEY STONE: Primary | ICD-10-CM

## 2020-03-24 PROCEDURE — 99213 OFFICE O/P EST LOW 20 MIN: CPT | Mod: 95 | Performed by: UROLOGY

## 2020-03-24 ASSESSMENT — PAIN SCALES - GENERAL: PAINLEVEL: NO PAIN (0)

## 2020-03-24 NOTE — LETTER
"3/24/2020       RE: Marlyn Wilder  [address] [facility] [id number]     Dear Colleague,    Thank you for referring your patient, Marlyn Wilder, to the [facility] [place] [facility]. Please see a copy of my visit note below.    Marlyn Wilder is a 54 year old female who is being evaluated via a billable telephone visit.      The patient has been notified of following:     \"This telephone visit will be conducted via a call between you and your physician/provider. We have found that certain health care needs can be provided without the need for a physical exam.  This service lets us provide the care you need with a short phone conversation.  If a prescription is necessary we can send it directly to your pharmacy.  If lab work is needed we can place an order for that and you can then stop by our lab to have the test done at a later time.    If during the course of the call the physician/provider feels a telephone visit is not appropriate, you will not be charged for this service.\"     Marlyn Wilder complains of    Chief Complaint   Patient presents with     Kidney Stone Related       I have reviewed and updated the patient's Past Medical History, Social History, Family History and Medication List.    ALLERGIES  Duloxetine    Additional provider notes: na    Assessment/Plan:      Phone call duration: 15 minutes            It is a pleasure to have the opportunity to have a telephone consultation with this very pleasant 54-year-old lady.  She has a complicated history.  She has a long history of urinary stone disease dating back 30 years.  She also has a history of gout.  She had debulking surgery in 2012 for meningioma in the brain requiring further surgery and subsequently radiation therapy.  She does require follow-up by an endocrinologist as there is some evidence of the pituitary being affected as a result.  On this occasion with seen there " with developing right-sided flank pain which is intermittently very troublesome to her.  CT scan showed that this she developed a 1.4 x 0.9 cm stone in the right renal pelvis with a 6 mm stone in the inferior calyx of the right kidney.  There was no evidence of hydronephrosis at that time.  I that she observe gross hematuria  At that time the serum creatinine was 0.76.    After careful discussion we had decided that we would proceed with a percutaneous nephrolithotomy of the large stone on the right side.  After careful review of the situation by an anesthesiologist who felt comfortable with anesthesia we proceededWith percutaneous nephrolithotomy on the right side on 25 February   The procedure went very well and she made an uneventful recovery being discharged the following day.  A small fragment was seen in the lower calyx of the right kidney which we decided to observe.  This was approximately 4 mm in diameter.  There is a very small 2 mm stone seen in the left kidney.    Stone analysis is as follows  The stone is combination of 10% calcium oxalate monohydrate, 80% calcium oxalate dihydrate and 10% calcium phosphate.    We discussed the situation.  She is recovering very well from surgery there is no leakage from the side, she is not complaining significant postoperative pain she is resuming normal activity and has no other major complaints at this point.  I went over the stone analysis with her in detail today explaining it in detail to her.  She is a recurrent stone former and should see a nephrologist for more definitive metabolic stone evaluation at this point.  I carefully explained her what this would involve.  In addition I also had a discussion with her about the basic meds that she would try and follow for basic prevention of stones.  This would include increasing hydration so he produces about 2 L of urine per day if possible, reducing sodium in the diet is much as possible, increasing calcium, citrate  "and magnesium in the diet and eating red meat in moderation.  I did explain to her the mechanisms of increasing citrate, i.e. subclass 11 juice every day which would increase urinary citrate levels in order to improve reduce the propensi for crystallization in the urine.  In addition I recommended she continue or distant even increase dietary sources of calcium in order to enable the calcium in the gastrointestinal tract, to bind with oxalate and prevent its absorption.  We did have a reaction about dietary factors including whether shot reduction in oxalate in the diet is beneficial.  My own opinion is that low oxalate diets are highly unpalatable, but drastic reduction in the oxalate in the diet by enlarge is not associated with a concomitant reduction of oxalate in the urine, although I have pointed out to a certain things in the diet which are very rich in oxalate, notably spinach and other things like it which perhaps should be eaten in moderation.    I did go over these issues with her very carefully in detail today.  I am very satisfied with the progress thus far.  I would like to see after the coronavirus issues have declined and I would recommend we meet in about 6 months for KUB and to assess her ongoing progress.  Like in addition to I would like to arrange for her to see a nephrologist for more formal metabolic stone evaluation.  I did discuss the entire situation with the patient in detail today.  I answered all her questions.    Plan.  6 months for KUB and follow-up examination.  Also to see nephrologist for metabolic stone evaluation    Time.  Telephone time today was 12 minutes    \"This dictation was performed with voice recognition software and may contain errors,  omissions and inadvertent word substitution.\"      Again, thank you for allowing me to participate in the care of your patient.      Sincerely,    Adarsh Livingston MD      "

## 2020-03-24 NOTE — PROGRESS NOTES
It is a pleasure to have the opportunity to have a telephone consultation with this very pleasant 54-year-old lady.  She has a complicated history.  She has a long history of urinary stone disease dating back 30 years.  She also has a history of gout.  She had debulking surgery in 2012 for meningioma in the brain requiring further surgery and subsequently radiation therapy.  She does require follow-up by an endocrinologist as there is some evidence of the pituitary being affected as a result.  On this occasion with seen there with developing right-sided flank pain which is intermittently very troublesome to her.  CT scan showed that this she developed a 1.4 x 0.9 cm stone in the right renal pelvis with a 6 mm stone in the inferior calyx of the right kidney.  There was no evidence of hydronephrosis at that time.  I that she observe gross hematuria  At that time the serum creatinine was 0.76.    After careful discussion we had decided that we would proceed with a percutaneous nephrolithotomy of the large stone on the right side.  After careful review of the situation by an anesthesiologist who felt comfortable with anesthesia we proceededWith percutaneous nephrolithotomy on the right side on 25 February   The procedure went very well and she made an uneventful recovery being discharged the following day.  A small fragment was seen in the lower calyx of the right kidney which we decided to observe.  This was approximately 4 mm in diameter.  There is a very small 2 mm stone seen in the left kidney.    Stone analysis is as follows  The stone is combination of 10% calcium oxalate monohydrate, 80% calcium oxalate dihydrate and 10% calcium phosphate.    We discussed the situation.  She is recovering very well from surgery there is no leakage from the side, she is not complaining significant postoperative pain she is resuming normal activity and has no other major complaints at this point.  I went over the stone analysis  with her in detail today explaining it in detail to her.  She is a recurrent stone former and should see a nephrologist for more definitive metabolic stone evaluation at this point.  I carefully explained her what this would involve.  In addition I also had a discussion with her about the basic meds that she would try and follow for basic prevention of stones.  This would include increasing hydration so he produces about 2 L of urine per day if possible, reducing sodium in the diet is much as possible, increasing calcium, citrate and magnesium in the diet and eating red meat in moderation.  I did explain to her the mechanisms of increasing citrate, i.e. subclass 11 juice every day which would increase urinary citrate levels in order to improve reduce the propensi for crystallization in the urine.  In addition I recommended she continue or distant even increase dietary sources of calcium in order to enable the calcium in the gastrointestinal tract, to bind with oxalate and prevent its absorption.  We did have a reaction about dietary factors including whether shot reduction in oxalate in the diet is beneficial.  My own opinion is that low oxalate diets are highly unpalatable, but drastic reduction in the oxalate in the diet by enlarge is not associated with a concomitant reduction of oxalate in the urine, although I have pointed out to a certain things in the diet which are very rich in oxalate, notably spinach and other things like it which perhaps should be eaten in moderation.    I did go over these issues with her very carefully in detail today.  I am very satisfied with the progress thus far.  I would like to see after the coronavirus issues have declined and I would recommend we meet in about 6 months for KUB and to assess her ongoing progress.  Like in addition to I would like to arrange for her to see a nephrologist for more formal metabolic stone evaluation.  I did discuss the entire situation with the  "patient in detail today.  I answered all her questions.    Plan.  6 months for KUB and follow-up examination.  Also to see nephrologist for metabolic stone evaluation    Time.  Telephone time today was 12 minutes    \"This dictation was performed with voice recognition software and may contain errors,  omissions and inadvertent word substitution.\"        "

## 2020-03-24 NOTE — PROGRESS NOTES
"Marlyn Wilder is a 54 year old female who is being evaluated via a billable telephone visit.      The patient has been notified of following:     \"This telephone visit will be conducted via a call between you and your physician/provider. We have found that certain health care needs can be provided without the need for a physical exam.  This service lets us provide the care you need with a short phone conversation.  If a prescription is necessary we can send it directly to your pharmacy.  If lab work is needed we can place an order for that and you can then stop by our lab to have the test done at a later time.    If during the course of the call the physician/provider feels a telephone visit is not appropriate, you will not be charged for this service.\"     Marlyn Wilder complains of    Chief Complaint   Patient presents with     Kidney Stone Related       I have reviewed and updated the patient's Past Medical History, Social History, Family History and Medication List.    ALLERGIES  Duloxetine    Additional provider notes: na    Assessment/Plan:      Phone call duration: 15 minutes          "

## 2020-03-24 NOTE — LETTER
Date:September 2, 2020      Provider requested that no letter be sent. Do not send.       [facility] Health Information

## 2020-04-17 NOTE — PROGRESS NOTES
[facility]: RETURN VISIT     HISTORY:  Ms. 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  She came to the visit today with her .     Following the most recent visit to this clinic on 06/13/2017, the patient reportedly has not had any seizures with LOC, unresponsiveness, confusion, falling or convulsive movements.  She has been having isolated auras about 5-6 times per month, most recently on 09/08/2017.  She has had mild lethargy increase and mild irritability after increasing levetiracetam.     Ictal semiology-history:    The patient confirmed previously presented history in detail today. She again noted that she had a single febrile convulsion of early childhood, but otherwise had no seizures until the initial manifestation of her meningioma in 2011.  She has had only 1 type of seizure since 2011.      The patient has had exclusively simple partial seizures.  These first began in 2011, and have continued on since then.  They have been quite stable in semiology and frequency.  She has at least 20 per year, but has only up to 2 on a single day and usually only one at a time.  The full form of the aura lasts almost 1 minute and features a froilan vu phenomenon associated with a sense that she has just viewed a complex visual scene briefly, although she cannot remember the nature of the scene, with an olfactory hallucination of burning electronics, and waves of intense nausea.  She does not have a postictal state with this.  She is always able to speak if she is with someone.  Her  has seen many of these, which she described the auras in detail to him and she is always able to respond to him quickly.  She never has any automatic behaviors as best he can determine.  Sometimes she has briefer events that feature only 1-2 of her 3 aura phenomena.      The patient has not had any  Pt taken to room 2, oriented to room, bed/chair controls, and call light. Needs met at present. Call light in reach. Pt agreeable for plan of care. staring spells with unresponsiveness or confusion, and has not had any grand mal seizures following that of early childhood.  She only has involuntary jerks on falling asleep in the evening.      Auras appear to have been triggered or exacerbated by forgetting to take medications, which happens only rarely, by emotional stress, and by tiredness.      Epilepsy-seizure predispositions:   The patient's epilepsy risk factor is a left middle fossa meningioma with extension into the left parasellar space.  This was resected 3 times in , and pathology showed that there was a grade I meningothelial meningioma.  The portion that is in the parasellar space has surrounded the left optic nerve, and she does have visual deficits on the left.  She had radiation therapy following the last resection by Dr. Calero.  She has had followup scans since that time, which showed a stable lesion as recently as 2017.        There is no family history of seizures or epilepsy.      The patient had a single febrile convulsion of early childhood when she was approximately 1 years old.      She has no history of gestational or  injury, developmental delay, stroke, meningitis, encephalitis, and significant head injury.  She denied a history of physical or sexual abuse by an adult during her childhood or adulthood.      Laboratory evaluations:   The most recent brain MRI was performed at [facility] on 2017, and was reported to show a stable left medial middle fossa lesion extending into the parasellar space in the left orbital apex, with mild mass effect and with enhancement.      A routine EEG was performed in [place] in , and the report indicated left hemispheric slowing and spikes.   An out-patient EEG at [facility] on 06/15/2016 was abnormal due to left frontotemporal delta slowing and breach effect.     Epilepsy therapeutics:   The only anti-seizure medication that the patient has used has been levetiracetam.  This was started  in 2012.  It has been consistently associated with irritability.  The patient has been able to control her irritability and has continued to function well as an  for the [employer].      PAST MEDICAL-SURGICAL HISTORY:    1. 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   2. History of left middle fossa meningothelial meningioma, grade 1, with extension to the left parasellar area, left orbital apex and cavernous sinus; status post resections (2012) and radiation therapy.   3. History of probable transient ischemic attack with aphasia (2013).   4. Obstructive sleep apnea, treated with CPAP.   5. Systemic hypertension, treated.   6. Hyperlipidemia.   7. History of recurring renal calculi.   8. History of arthralgia attributed to atypical rheumatoid arthritis or fibromyalgia.   9. Hypothyroidism.   10. Status post left carpal tunnel release.   11. History of TMJ syndrome.     FAMILY HISTORY:  There is no family history of seizures, epilepsy or other neurological conditions.      PERSONAL AND SOCIAL HISTORY:  The patient completed her education through a [employer] degree.  She is working as an  for a [place] [employer].  She lives with her .  They have 3 adult children, no longer in the home.      REVIEW OF SYSTEMS:  The patient has noted recent left flank pains over the last week, which are somewhat reminiscent of a milder version of her pain with renal calculi; she has not noted any gross hematuria.   She denied history of attention and memory disturbance, difficulties with comprehension and expression, difficulty in solving problems, weakness, tremors or other abnormal involuntary movements, numbness or tingling, incoordination, falling or difficulty in walking, urinary or fecal incontinence, headache and other pain, except as described above.  The patient denied any history of severe depression or suicidal ideation, severe anxiety, panic attacks, hallucinations, delusions,  psychosis, substance abuse, or psychiatric hospitalization.  She denied rashes and easy bruising.  The remainder of a 14-system symptom review was negative except as noted above.       MEDICATIONS:  Levetiracetam 2000/1500 mg daily, and other medications as per the electronic medical record.     PHYSICAL EXAMINATION:    The patient was an alert woman in no apparent distress.  Vital signs were as per the electronic medical record.  There was a skull defect consistent with reported surgery.  Neck was supple, without signs of meningeal irritation.      On neurological examination the patient appeared alert and was fully oriented to person, place, time, and reason for visit.  Speech showed normal articulation, fluency, repetitions, naming, syntax and comprehension.  Cranial nerves III through XII were normal.  Muscle masses, tones and strengths were normal throughout.  There was no pronator drift.  Sequential fine finger movements were performed normally with each hand.  No spontaneous tremors, myoclonus, or other abnormal movements were observed.  Sensations of light touch, pin prick, vibration and proprioception were reportedly normal throughout.  The rapid alternating movements, and finger-nose-finger and heel-shin maneuvers were performed normally bilaterally.  Romberg maneuver was negative.  Regular, heel, toe, tandem and reverse tandem walking were normal.  Deep tendon reflexes were normal and symmetric throughout.  Toes were downgoing bilaterally.      IMPRESSION:    The patient has had no complex partial or grand mal seizures on levetiracetam.    She has symptoms virtually diagnostic of simple partial seizures of mesial temporal origin, given the location of the meningioma adjacent to the left mesial temporal lobe.  Fortunately, she has not had any seizures with impaired awareness, either as complex partial seizures or grand mal seizures (except for a single febrile convulsion of early childhood).  I told her  that it will be very important to stay on an anti-seizure medication given the location of the tumor and her history.  We discussed in detail the appearance of complex partial seizures or grand mal seizures, and her  would take her to the emergency room if either occurred.      I again reviewed with the patient that there are many medication options other than levetiracetam.  Apparently she has been working hard to keep her irritability under control for many years on levetiracetam, with good success.  Nonetheless, this is rather bothersome, and crossover to lamotrigine monotherapy or other therapies may be indicated.  She would like to consider this after we completed a brief reevaluation with electroencephalography.  We also will obtain a levetiracetam level today.      We discussed [place] regulations on seizures and driving.  She has not had any seizures in adulthood of types that would impair her driving.      PLAN:    1. Decrease levetiracetam to 1500/1500 mg daily.   2. Return visit in approximately 4 months.   I spent 2minutes in this patient care, more than 50% of which consisted of counseling and coordinating care.       Emmanuel Madrid M.D.   Professor of Neurology

## 2020-04-22 NOTE — TELEPHONE ENCOUNTER
Placed call to patient to discuss issues about MSRS request for verbal confirmation.  Patient was very understanding and will attempt to clear issue with MSRS.    She will call back if there are further issues.

## 2020-05-12 ENCOUNTER — VIRTUAL VISIT (OUTPATIENT)
Dept: NEUROLOGY | Facility: CLINIC | Age: 55
End: 2020-05-12
Payer: COMMERCIAL

## 2020-05-12 DIAGNOSIS — G40.119 PARTIAL SYMPTOMATIC EPILEPSY WITH SIMPLE PARTIAL SEIZURES, INTRACTABLE, WITHOUT STATUS EPILEPTICUS (H): Primary | ICD-10-CM

## 2020-05-12 RX ORDER — LEVETIRACETAM 500 MG/1
TABLET ORAL
Qty: 180 TABLET | Refills: 11 | Status: SHIPPED | OUTPATIENT
Start: 2020-05-12 | End: 2020-10-06

## 2020-05-12 NOTE — PROGRESS NOTES
"Marlyn Wilder is a 54 year old female who is being evaluated via a billable video visit.      The patient has been notified of following:     \"This video visit will be conducted via a call between you and your physician/provider. We have found that certain health care needs can be provided without the need for an in-person physical exam.  This service lets us provide the care you need with a video conversation.  If a prescription is necessary we can send it directly to your pharmacy.  If lab work is needed we can place an order for that and you can then stop by our lab to have the test done at a later time.    Video visits are billed at different rates depending on your insurance coverage.  Please reach out to your insurance provider with any questions.    If during the course of the call the physician/provider feels a video visit is not appropriate, you will not be charged for this service.\"    Patient has given verbal consent for Video visit? YES    How would you like to obtain your AVS? [email or web address]    Patient would like the video invitation sent by: Email [email or web address]     Will anyone else be joining your video visit? no      Jeri Morgan, EMT        EPILEPSY CLINIC VIDEO VISIT NOTE  The scheduled clinic visit was changed to a video visit to reduce the risk of COVID-19 transmission.           Service Date: 05/12/2020    HISTORY: I spoke with Ms. 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      The patient denied having recent fever or cough, and exposure to anyone thought to have COVID-19.     Following the most recent visit to this clinic on 06/10/2019, the patient reportedly has had no isolated auras and no seizures with impaired awareness.      In 2017, isolated auras were occurring 5-6 times per month, but ceased with addition of levetiracetam; she had non between September 2017 and January 2019. She had mild lethargy and mild " irritability after increasing levetiracetam, but this largely resolved with a small dose decrease later in 2017.  Today she denied adverse effects of levetiracetam over the last year.        She was placed on total disability when her memory problems had progressed to the point that she no longer could perform necessary duties as an  for a [place] [employer].  She feels that memory has continued to worsen.     MEDICATIONS:  Levetiracetam 1500 mg b.i.d., and other medications as per the electronic medical record.   VIDEO OBSERVATIONS:   The patient spoke with normal articulation, fluency and syntax.  On command, the patient moved the direction of gaze into all 4 quadrants conjugately and without nystagmus.   Facial movements were normal.    The patient did not display any resting tremors or action tremors of the outstretched arms.  Limb movements were normal.      IMPRESSION:   Seizures are well controlled on a tolerable dose of levetiracetam.      She is concerned about progression in her memory deficits.  I suspect that this represents mainly hippocampal dysfunction, as her immediate recall seemed fairly good during this visit, but delayed recall of autobiographic facts was clearly quite impaired.  Possible the tumor has progressed with direct left mesial temporal injury or with compromised vascular supply to this area.      She also is concerned about the status of the meningioma.  I noted that a brain MRI has been arranged by Dr. Calero for tomorrow.  She indicated that she plans to keep this appointment.      LABORATORY DATA:   AED levels on 06/10/2019:   Levetiracetam: 22 mcg/ml.     PLAN:   1)  Continue levetiracetam at the current dose.  2)  Return in 6 months for in-person clinic visit.    Video Conference via [employer].   Video Start Time: 11:10 a.m.   Video Stop Time: 11:35 a.m.   Provider location: [facility]   Reported Patient Location: Home     Emmanuel Madrid M.D.,   of Neurology

## 2020-05-13 ENCOUNTER — ANCILLARY PROCEDURE (OUTPATIENT)
Dept: MRI IMAGING | Facility: CLINIC | Age: 55
End: 2020-05-13
Attending: NEUROLOGICAL SURGERY
Payer: COMMERCIAL

## 2020-05-13 DIAGNOSIS — D32.9 MENINGIOMA (H): ICD-10-CM

## 2020-05-13 RX ORDER — GADOBUTROL 604.72 MG/ML
10 INJECTION INTRAVENOUS ONCE
Status: COMPLETED | OUTPATIENT
Start: 2020-05-13 | End: 2020-05-13

## 2020-05-13 RX ADMIN — GADOBUTROL 10 ML: 604.72 INJECTION INTRAVENOUS at 11:28

## 2020-05-19 ENCOUNTER — VIRTUAL VISIT (OUTPATIENT)
Dept: NEUROSURGERY | Facility: CLINIC | Age: 55
End: 2020-05-19
Payer: COMMERCIAL

## 2020-05-19 DIAGNOSIS — D32.0 BENIGN NEOPLASM OF CEREBRAL MENINGES (H): Primary | ICD-10-CM

## 2020-05-19 ASSESSMENT — PAIN SCALES - GENERAL: PAINLEVEL: SEVERE PAIN (6)

## 2020-05-19 NOTE — LETTER
"5/19/2020      RE: Marlyn Wilder  [address] [facility] [id number]       Marlyn Wilder is a 54 year old female who is being evaluated via a billable video visit.      The patient has been notified of following:     \"This video visit will be conducted via a call between you and your physician/provider. We have found that certain health care needs can be provided without the need for an in-person physical exam.  This service lets us provide the care you need with a video conversation.  If a prescription is necessary we can send it directly to your pharmacy.  If lab work is needed we can place an order for that and you can then stop by our lab to have the test done at a later time.    Video visits are billed at different rates depending on your insurance coverage.  Please reach out to your insurance provider with any questions.    If during the course of the call the physician/provider feels a video visit is not appropriate, you will not be charged for this service.\"    Patient has given verbal consent for Video visit? Yes    How would you like to obtain your AVS? Mail a copy    Patient would like the video invitation sent by: [email or web address]    Will anyone else be joining your video visit? No        Video-Visit Details    Type of service:  Video Visit    Video Start Time: 1130  Video End Time: 1145    Originating Location (pt. Location): Home    Distant Location (provider location):  [facility] NEUROSURGERY     Platform used for Video Visit: Mirza Luis, EMT    See dictated note.  MD Pan Herrera MD      "

## 2020-05-19 NOTE — LETTER
"5/19/2020       RE: Marlyn Wilder  [address] [facility] [address]     Dear Colleague,    Thank you for referring your patient, Marlyn Wilder, to the [facility] NEUROSURGERY at [facility]. Please see a copy of my visit note below.    Marlyn Wilder is a 54 year old female who is being evaluated via a billable video visit.      The patient has been notified of following:     \"This video visit will be conducted via a call between you and your physician/provider. We have found that certain health care needs can be provided without the need for an in-person physical exam.  This service lets us provide the care you need with a video conversation.  If a prescription is necessary we can send it directly to your pharmacy.  If lab work is needed we can place an order for that and you can then stop by our lab to have the test done at a later time.    Video visits are billed at different rates depending on your insurance coverage.  Please reach out to your insurance provider with any questions.    If during the course of the call the physician/provider feels a video visit is not appropriate, you will not be charged for this service.\"    Patient has given verbal consent for Video visit? Yes    How would you like to obtain your AVS? Mail a copy    Patient would like the video invitation sent by: [email or web address]    Will anyone else be joining your video visit? No        Video-Visit Details    Type of service:  Video Visit    Video Start Time: 1130  Video End Time: 1145    Originating Location (pt. Location): Home    Distant Location (provider location):  [facility] NEUROSURGERY     Platform used for Video Visit: Mirza Luis, EMT    See dictated note.  JAKUB Calero MD      Again, thank you for allowing me to participate in the care of your patient.      Sincerely,    Pan Calero MD    "

## 2020-05-19 NOTE — LETTER
"5/19/2020       RE: Marlyn Wilder  [address] [facility] [address]     Dear Colleague,    Thank you for referring your patient, Marlyn Wilder, to the [facility] NEUROSURGERY at [facility]. Please see a copy of my visit note below.    Marlyn Wilder is a 54 year old female who is being evaluated via a billable video visit.      The patient has been notified of following:     \"This video visit will be conducted via a call between you and your physician/provider. We have found that certain health care needs can be provided without the need for an in-person physical exam.  This service lets us provide the care you need with a video conversation.  If a prescription is necessary we can send it directly to your pharmacy.  If lab work is needed we can place an order for that and you can then stop by our lab to have the test done at a later time.    Video visits are billed at different rates depending on your insurance coverage.  Please reach out to your insurance provider with any questions.    If during the course of the call the physician/provider feels a video visit is not appropriate, you will not be charged for this service.\"    Patient has given verbal consent for Video visit? Yes    How would you like to obtain your AVS? Mail a copy    Patient would like the video invitation sent by: [email or web address]    Will anyone else be joining your video visit? No        Video-Visit Details    Type of service:  Video Visit    Video Start Time: 1130  Video End Time: 1145    Originating Location (pt. Location): Home    Distant Location (provider location):  [facility] NEUROSURGERY     Platform used for Video Visit: Mirza Luis, EMT    See dictated note.  JAKUB Calero MD      Again, thank you for allowing me to participate in the care of your patient.      Sincerely,    Pan Calero MD      "

## 2020-05-19 NOTE — LETTER
"5/19/2020      RE: Marlyn Wilder  [address] [facility] [address]       Marlyn Wilder is a 54 year old female who is being evaluated via a billable video visit.      The patient has been notified of following:     \"This video visit will be conducted via a call between you and your physician/provider. We have found that certain health care needs can be provided without the need for an in-person physical exam.  This service lets us provide the care you need with a video conversation.  If a prescription is necessary we can send it directly to your pharmacy.  If lab work is needed we can place an order for that and you can then stop by our lab to have the test done at a later time.    Video visits are billed at different rates depending on your insurance coverage.  Please reach out to your insurance provider with any questions.    If during the course of the call the physician/provider feels a video visit is not appropriate, you will not be charged for this service.\"    Patient has given verbal consent for Video visit? Yes    How would you like to obtain your AVS? Mail a copy    Patient would like the video invitation sent by: [email or web address]    Will anyone else be joining your video visit? No        Video-Visit Details    Type of service:  Video Visit    Video Start Time: 1130  Video End Time: 1145    Originating Location (pt. Location): Home    Distant Location (provider location):  [facility] NEUROSURGERY     Platform used for Video Visit: Mirza Luis, EMT    See dictated note.  MD Pan Herrera MD    "

## 2020-05-19 NOTE — LETTER
"5/19/2020       RE: Marlyn Wilder  [address] [facility] [address]     Dear Colleague,    Thank you for referring your patient, Marlyn Wilder, to the [facility] NEUROSURGERY at [facility]. Please see a copy of my visit note below.    Marlyn Wilder is a 54 year old female who is being evaluated via a billable video visit.      The patient has been notified of following:     \"This video visit will be conducted via a call between you and your physician/provider. We have found that certain health care needs can be provided without the need for an in-person physical exam.  This service lets us provide the care you need with a video conversation.  If a prescription is necessary we can send it directly to your pharmacy.  If lab work is needed we can place an order for that and you can then stop by our lab to have the test done at a later time.    Video visits are billed at different rates depending on your insurance coverage.  Please reach out to your insurance provider with any questions.    If during the course of the call the physician/provider feels a video visit is not appropriate, you will not be charged for this service.\"    Patient has given verbal consent for Video visit? Yes    How would you like to obtain your AVS? Mail a copy    Patient would like the video invitation sent by: [email or web address]    Will anyone else be joining your video visit? No        Video-Visit Details    Type of service:  Video Visit    Video Start Time: 1130  Video End Time: 1145    Originating Location (pt. Location): Home    Distant Location (provider location):  [facility] NEUROSURGERY     Platform used for Video Visit: Mirza Luis, EMT    See dictated note.  JAKUB Calero MD      Again, thank you for allowing me to participate in the care of your patient.      Sincerely,    Pan Calero MD    "

## 2020-05-19 NOTE — LETTER
"5/19/2020      RE: Marlyn Wilder  [address] [facility] [address]       Marlyn Wilder is a 54 year old female who is being evaluated via a billable video visit.      The patient has been notified of following:     \"This video visit will be conducted via a call between you and your physician/provider. We have found that certain health care needs can be provided without the need for an in-person physical exam.  This service lets us provide the care you need with a video conversation.  If a prescription is necessary we can send it directly to your pharmacy.  If lab work is needed we can place an order for that and you can then stop by our lab to have the test done at a later time.    Video visits are billed at different rates depending on your insurance coverage.  Please reach out to your insurance provider with any questions.    If during the course of the call the physician/provider feels a video visit is not appropriate, you will not be charged for this service.\"    Patient has given verbal consent for Video visit? Yes    How would you like to obtain your AVS? Mail a copy    Patient would like the video invitation sent by: [email or web address]    Will anyone else be joining your video visit? No        Video-Visit Details    Type of service:  Video Visit    Video Start Time: 1130  Video End Time: 1145    Originating Location (pt. Location): Home    Distant Location (provider location):  [facility] NEUROSURGERY     Platform used for Video Visit: Mirza Luis, EMT    See dictated note.  MD Pan Herrera MD    "

## 2020-05-19 NOTE — PROGRESS NOTES
"Marlyn Wilder is a 54 year old female who is being evaluated via a billable video visit.      The patient has been notified of following:     \"This video visit will be conducted via a call between you and your physician/provider. We have found that certain health care needs can be provided without the need for an in-person physical exam.  This service lets us provide the care you need with a video conversation.  If a prescription is necessary we can send it directly to your pharmacy.  If lab work is needed we can place an order for that and you can then stop by our lab to have the test done at a later time.    Video visits are billed at different rates depending on your insurance coverage.  Please reach out to your insurance provider with any questions.    If during the course of the call the physician/provider feels a video visit is not appropriate, you will not be charged for this service.\"    Patient has given verbal consent for Video visit? Yes    How would you like to obtain your AVS? Mail a copy    Patient would like the video invitation sent by: [email or web address]    Will anyone else be joining your video visit? No        Video-Visit Details    Type of service:  Video Visit    Video Start Time: 1130  Video End Time: 1145    Originating Location (pt. Location): Home    Distant Location (provider location):  [facility] NEUROSURGERY     Platform used for Video Visit: Mirza Luis EMT    See dictated note.  JAKUB Calero MD    "

## 2020-05-21 NOTE — PATIENT INSTRUCTIONS
As long as your eye sight is stable, follow up with Dr. Calero in one year with a MRI prior to your appointment.    If you experience changes in your vision please contact Dr. Calero's nurse, Enio.  She can be reached at [phone].    Thank you for choosing the [facility] for your care.

## 2020-05-26 ENCOUNTER — HOSPITAL ENCOUNTER (OUTPATIENT)
Dept: MAMMOGRAPHY | Facility: CLINIC | Age: 55
Discharge: HOME | End: 2020-05-26
Attending: INTERNAL MEDICINE | Admitting: INTERNAL MEDICINE
Payer: COMMERCIAL

## 2020-05-26 DIAGNOSIS — Z12.31 VISIT FOR SCREENING MAMMOGRAM: ICD-10-CM

## 2020-05-26 PROCEDURE — 77067 SCR MAMMO BI INCL CAD: CPT

## 2020-07-22 ENCOUNTER — MYC MEDICAL ADVICE (OUTPATIENT)
Dept: NEUROSURGERY | Facility: CLINIC | Age: 55
End: 2020-07-22

## 2020-09-06 DIAGNOSIS — G40.119 PARTIAL SYMPTOMATIC EPILEPSY WITH SIMPLE PARTIAL SEIZURES, INTRACTABLE, WITHOUT STATUS EPILEPTICUS (H): ICD-10-CM

## 2020-09-11 RX ORDER — LEVETIRACETAM 500 MG/1
TABLET ORAL
Qty: 540 TABLET | OUTPATIENT
Start: 2020-09-11

## 2020-09-18 ENCOUNTER — TELEPHONE (OUTPATIENT)
Dept: NEUROSURGERY | Facility: CLINIC | Age: 55
End: 2020-09-18

## 2020-09-18 ENCOUNTER — VIRTUAL VISIT (OUTPATIENT)
Dept: NEUROLOGY | Facility: CLINIC | Age: 55
End: 2020-09-18
Payer: COMMERCIAL

## 2020-09-18 DIAGNOSIS — D32.9 MENINGIOMA (H): ICD-10-CM

## 2020-09-18 DIAGNOSIS — G40.119 PARTIAL SYMPTOMATIC EPILEPSY WITH SIMPLE PARTIAL SEIZURES, INTRACTABLE, WITHOUT STATUS EPILEPTICUS (H): Primary | ICD-10-CM

## 2020-09-18 RX ORDER — DEXAMETHASONE 4 MG/1
TABLET ORAL
COMMUNITY
Start: 2020-09-18 | End: 2020-10-26

## 2020-09-18 RX ORDER — DIVALPROEX SODIUM 250 MG/1
250 TABLET, EXTENDED RELEASE ORAL 2 TIMES DAILY
COMMUNITY
Start: 2020-09-03 | End: 2020-10-06

## 2020-09-18 RX ORDER — PANTOPRAZOLE SODIUM 40 MG/1
40 TABLET, DELAYED RELEASE ORAL DAILY PRN
Status: ON HOLD | COMMUNITY
Start: 2020-09-04 | End: 2021-07-07

## 2020-09-18 RX ORDER — LACOSAMIDE 200 MG/1
200 TABLET, FILM COATED ORAL 2 TIMES DAILY
COMMUNITY
End: 2020-10-06

## 2020-09-18 NOTE — TELEPHONE ENCOUNTER
Spoke with patient, [facility] went  ED [facility] on 8/31 because she did not feel well.   Patient in [facility] Aug 31 - Sept 3 And medication changes below..  Patient seen by PCP on 9/10 no change in care.  Patient seen by Neurology at [facility] on 9/17  and patient informed to call Dr Calero to update medications.  Patient sees Dr Madrid for Neurology.  Information will be sent to Neurology.      Medications are  Dexamethazone, was on 4mg qid, NOW on 4mg TID.  Divalproex 500mg 3 tablets TID, lowered to 500mg two tablets BID.  Vimpat 200mg BID.  Keppra 500mg TID, now on 1000mg 2 tablets BID  Lisinopril 2.5mg  now increased to BID    Patient asking if medications can be lowered.  Explained the neurologist at [facility] completed medication changes.  Will Inform Dr Madrid.in Neurolog  Voices understanding

## 2020-09-18 NOTE — TELEPHONE ENCOUNTER
[facility] Call Center    Phone Message    May a detailed message be left on voicemail: yes     Reason for Call: pt saw Dr. Annie Fonseca, neurologist at an Ep[facility]psy [facility], an [facility] provider thru [facility] at yesterday. Dr. Fonseca told pt to contact Dr. Calero, main issue: to make a plan to start getting off of some meds.  Pt was hospitalized a couple of weeks ago at [facility], pt was put on 2-3 additional seizure meds and a steroid.  Pt needs a call back from Dr. Calero to start getting off of some of these meds.  Meds are very hard on pt's body.  Please call pt asap.       Action Taken: Message routed to:  [facility] ([facility]  Neurosurgery    Travel Screening: Not Applicable

## 2020-09-18 NOTE — TELEPHONE ENCOUNTER
Call placed to the patient to offer a return visit with Dr. Madrid to review medication changes and plan. Voicemail left for the patient with a request for a return call to our office.

## 2020-09-18 NOTE — LETTER
"2020       RE: Marlyn Wilder  : 1965   MRN: [id number]      Dear Colleague,    Thank you for referring your patient, Marlyn Wilder, to the [facility] EPILEPSY CARE at [facility]. Please see a copy of my visit note below.    Marlyn Wilder is a 55 year old female who is being evaluated via a billable video visit.      The patient has been notified of following:     \"This video visit will be conducted via a call between you and your physician/provider. We have found that certain health care needs can be provided without the need for an in-person physical exam.  This service lets us provide the care you need with a video conversation.  If a prescription is necessary we can send it directly to your pharmacy.  If lab work is needed we can place an order for that and you can then stop by our lab to have the test done at a later time.    Video visits are billed at different rates depending on your insurance coverage.  Please reach out to your insurance provider with any questions.    If during the course of the call the physician/provider feels a video visit is not appropriate, you will not be charged for this service.\"    Patient has given verbal consent for Video visit? Yes  How would you like to obtain your AVS? MyChart  If you are dropped from the video visit, the video invite should be resent to: Send to e-mail at: [email or web address]  Will anyone else be joining your video visit? No        EPILEPSY CLINIC VIDEO VISIT NOTE  The scheduled clinic visit was changed to a video visit to reduce the risk of COVID-19 transmission.            Service Date: 2020     HISTORY: I spoke with Ms. 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       The patient denied having recent fever or cough, and exposure to anyone thought to have COVID-19.      Following the most recent visit to this clinic on 2020, " the patient reportedly has had no seizures with impaired awareness.  In August 2020, she had  a few  isolated aura, which she did not report to any physician at the time.    She had no apparent adverse effects of levetiracetam monotherapy at that time.       Then on 08/31/2020, she began to have marked nausea, anorexia and malaise.  Her PCP [facility] her to the [facility] [place] [place].  Available medical records indicate that she was found to have increased left temporal lobe edema on MRI (but probably no increase in meningioma lesion mass), and she was placed on dexamethasone, with varying doses since then.  Ongoing VEEG was run, and noes indicate that left parasagittal subclinical or subtle clinic seizures were recorded on 09/01/2020, after which levetiracetam was increased to 2000 mg BID, and she was placed on lacosamide at 200 mg BID and on divalproex-ER at 750 mg BID.  Reportedly the electrographic seizures ceased, and she was discharged home on 09/03/2020.    At home she had marked increase in pre-admission lethargy.  She has been in bed about 9-10 hours per night, actually sleeping about 8 hours per night, and drowsy to various degrees all day.  She is using CPC as directed every night.    She contacted the neurologist who saw her at [facility], Dr. Annie Fonseca, and was instructed to decrease divalproex-ER to 500 mg BID on 09/17/2020, which she did.      She was placed on total disability when her memory problems had progressed to the point that she no longer could perform necessary duties as an  for a [place] [employer].  She feels that memory has continued to worsen.     MEDICATIONS:  Levetiracetam 2000 mg BID, lacosamide 200 mg BID, divalproex- mg BID, and other medications as per the electronic medical record.     VIDEO OBSERVATIONS:   The patient appear lethargic.  She spoke with normal articulation, fluency and syntax.  On  command, the patient moved the direction of gaze into all 4 quadrants conjugately and without nystagmus.   Facial movements were normal.    The patient did not display any resting tremors or action tremors of the outstretched arms.  Limb movements were normal.       IMPRESSION:   Seizures causing impaired awareness were well controlled on a tolerable dose of levetiracetam, and the patient denied having any events with impaired awareness during VEEG at [facility], which was reported to show left parasagittal subclinical or subtle clinic seizures on 09/01/2020.  These were treated with addition of divalproex and lacosamide, and reports indicated that electrographic seizures ceased.    She now complains of a considerable increase in daytime lethargy, which may be due to interactions of JARED, steroid effects (as she previously has had insomnia and nocturnal arousals when treated with dexamethasone), AED effects and other factors.      I told her that it may a week or longer to determine whether the divalproex dose decrease will reduce her daytime lethargy, and that it is of concern that this agent alone may not be responsible for the recent marked increase in lethargy.  It is not clear to me whether she had focal seizures with cognitive impairment in recent months; she does not think so, but it is possible that she was unaware of deficits during electrographic seizures.  Her  was not present at home during this video visit, but he generally observes her closely and she agreed to arrange for him to attend a clinic visit with me soon.  If he has not witnessed any episodic cognitive dysfunction or unresponsiveness, it may be advisable to make multiple AED deceases.     She should review the recent imaging of the meningioma and dexamethasone dosing with Dr. Calero which she plans to do.       PLAN:   1)  Continue AEDs at the current doses for now.  2)  Return for in-person clinic visit as scheduled on  10/06/2020, with  present.     Video Conference via [employer].   Video Start Time: 4:00 p.m.   Video Stop Time: 4:42 p.m.   Provider location: [facility] Neurology   Reported Patient Location: Home      Emmanuel Madrid M.D., Professor of Neurology

## 2020-09-18 NOTE — PROGRESS NOTES
"Marlyn Wilder is a 55 year old female who is being evaluated via a billable video visit.      The patient has been notified of following:     \"This video visit will be conducted via a call between you and your physician/provider. We have found that certain health care needs can be provided without the need for an in-person physical exam.  This service lets us provide the care you need with a video conversation.  If a prescription is necessary we can send it directly to your pharmacy.  If lab work is needed we can place an order for that and you can then stop by our lab to have the test done at a later time.    Video visits are billed at different rates depending on your insurance coverage.  Please reach out to your insurance provider with any questions.    If during the course of the call the physician/provider feels a video visit is not appropriate, you will not be charged for this service.\"    Patient has given verbal consent for Video visit? Yes  How would you like to obtain your AVS? MyChart  If you are dropped from the video visit, the video invite should be resent to: Send to e-mail at: [email or web address]  Will anyone else be joining your video visit? No        EPILEPSY CLINIC VIDEO VISIT NOTE  The scheduled clinic visit was changed to a video visit to reduce the risk of COVID-19 transmission.            Service Date: 09/18/2020     HISTORY: I spoke with Ms. 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       The patient denied having recent fever or cough, and exposure to anyone thought to have COVID-19.      Following the most recent visit to this clinic on 05/12/2020, the patient reportedly has had no seizures with impaired awareness.  In August 2020, she had  a few  isolated aura, which she did not report to any physician at the time.    She had no apparent adverse effects of levetiracetam monotherapy at that time.       Then on " 08/31/2020, she began to have marked nausea, anorexia and malaise.  Her PCP directed her to the [facility] E.D. in [place], [place], from [facility] she was admitted to that hospital.  Available medical records indicate that she was found to have increased left temporal lobe edema on MRI (but probably no increase in meningioma lesion mass), and she was placed on dexamethasone, with varying doses since then.  Ongoing VEEG was run, and noes indicate that left parasagittal subclinical or subtle clinic seizures were recorded on 09/01/2020, after which levetiracetam was increased to 2000 mg BID, and she was placed on lacosamide at 200 mg BID and on divalproex-ER at 750 mg BID.  Reportedly the electrographic seizures ceased, and she was discharged home on 09/03/2020.    At home she had marked increase in pre-admission lethargy.  She has been in bed about 9-10 hours per night, actually sleeping about 8 hours per night, and drowsy to various degrees all day.  She is using CPC as directed every night.    She contacted the neurologist who saw her at [facility], Dr. Annie Fonseca, and was instructed to decrease divalproex-ER to 500 mg BID on 09/17/2020, which she did.      She was placed on total disability when her memory problems had progressed to the point that she no longer could perform necessary duties as an  for a [place] [employer].  She feels that memory has continued to worsen.     MEDICATIONS:  Levetiracetam 2000 mg BID, lacosamide 200 mg BID, divalproex- mg BID, and other medications as per the electronic medical record.     VIDEO OBSERVATIONS:   The patient appear lethargic.  She spoke with normal articulation, fluency and syntax.  On command, the patient moved the direction of gaze into all 4 quadrants conjugately and without nystagmus.   Facial movements were normal.    The patient did not display any resting tremors or action tremors of the outstretched arms.  Limb movements were normal.        IMPRESSION:   Seizures causing impaired awareness were well controlled on a tolerable dose of levetiracetam, and the patient denied having any events with impaired awareness during VEEG at [facility], which was reported to show left parasagittal subclinical or subtle clinic seizures on 09/01/2020.  These were treated with addition of divalproex and lacosamide, and reports indicated that electrographic seizures ceased.    She now complains of a considerable increase in daytime lethargy, which may be due to interactions of JARED, steroid effects (as she previously has had insomnia and nocturnal arousals when treated with dexamethasone), AED effects and other factors.      I told her that it may a week or longer to determine whether the divalproex dose decrease will reduce her daytime lethargy, and that it is of concern that this agent alone may not be responsible for the recent marked increase in lethargy.  It is not clear to me whether she had focal seizures with cognitive impairment in recent months; she does not think so, but it is possible that she was unaware of deficits during electrographic seizures.  Her  was not present at home during this video visit, but he generally observes her closely and she agreed to arrange for him to attend a clinic visit with me soon.  If he has not witnessed any episodic cognitive dysfunction or unresponsiveness, it may be advisable to make multiple AED deceases.     She should review the recent imaging of the meningioma and dexamethasone dosing with Dr. Calero which she plans to do.       PLAN:   1)  Continue AEDs at the current doses for now.  2)  Return for in-person clinic visit as scheduled on 10/06/2020, with  present.     Video Conference via [employer].   Video Start Time: 4:00 p.m.   Video Stop Time: 4:42 p.m.   Provider location: [facility] Neurology   Reported Patient Location: Home      Emmanuel Madrid M.D., Professor of Neurology

## 2020-09-21 ENCOUNTER — HOSPITAL ENCOUNTER (OUTPATIENT)
Dept: GENERAL RADIOLOGY | Facility: CLINIC | Age: 55
Discharge: HOME | End: 2020-09-21
Attending: UROLOGY | Admitting: UROLOGY
Payer: COMMERCIAL

## 2020-09-21 ENCOUNTER — TELEPHONE (OUTPATIENT)
Dept: NEUROSURGERY | Facility: CLINIC | Age: 55
End: 2020-09-21

## 2020-09-21 DIAGNOSIS — N20.0 KIDNEY STONE: ICD-10-CM

## 2020-09-21 PROCEDURE — 74019 RADEX ABDOMEN 2 VIEWS: CPT

## 2020-09-21 NOTE — TELEPHONE ENCOUNTER
[facility] Call Center    Phone Message    May a detailed message be left on voicemail: yes     Reason for Call: Other: Patient calling to get an appointment with Dr. Calero to speak with him about seizure medications.     Please advise     Action Taken: Other:  NEUROSURGERY     Travel Screening: Not Applicable

## 2020-09-28 NOTE — TELEPHONE ENCOUNTER
Returned call.    Pt wants to speak with MD regarding decreasing/stopping steroids.  Writer sent a note to MD.

## 2020-09-30 ENCOUNTER — MYC MEDICAL ADVICE (OUTPATIENT)
Dept: NEUROSURGERY | Facility: CLINIC | Age: 55
End: 2020-09-30

## 2020-10-05 ASSESSMENT — ENCOUNTER SYMPTOMS
POLYDIPSIA: 0
TINGLING: 0
FATIGUE: 1
NECK PAIN: 0
HEADACHES: 0
BACK PAIN: 0
DIZZINESS: 0
DECREASED APPETITE: 0
INCREASED ENERGY: 0
LEG PAIN: 0
HEMATURIA: 0
LOSS OF CONSCIOUSNESS: 0
HALLUCINATIONS: 0
WEAKNESS: 0
NUMBNESS: 0
MUSCLE WEAKNESS: 0
DIFFICULTY URINATING: 0
POLYPHAGIA: 0
EXERCISE INTOLERANCE: 0
WEIGHT GAIN: 1
ORTHOPNEA: 0
MEMORY LOSS: 0
HYPOTENSION: 0
DYSURIA: 0
HYPERTENSION: 0
SLEEP DISTURBANCES DUE TO BREATHING: 0
NIGHT SWEATS: 1
PALPITATIONS: 0
TREMORS: 0
FLANK PAIN: 0
DISTURBANCES IN COORDINATION: 0
CHILLS: 1
JOINT SWELLING: 0
ALTERED TEMPERATURE REGULATION: 0
MYALGIAS: 0
WEIGHT LOSS: 0
LIGHT-HEADEDNESS: 0
SPEECH CHANGE: 0
ARTHRALGIAS: 0
STIFFNESS: 0
SYNCOPE: 0
SEIZURES: 1
MUSCLE CRAMPS: 0
PARALYSIS: 0
FEVER: 0

## 2020-10-06 ENCOUNTER — OFFICE VISIT (OUTPATIENT)
Dept: NEUROLOGY | Facility: CLINIC | Age: 55
End: 2020-10-06
Payer: COMMERCIAL

## 2020-10-06 VITALS — DIASTOLIC BLOOD PRESSURE: 83 MMHG | OXYGEN SATURATION: 94 % | HEART RATE: 76 BPM | SYSTOLIC BLOOD PRESSURE: 147 MMHG

## 2020-10-06 DIAGNOSIS — G40.119 PARTIAL SYMPTOMATIC EPILEPSY WITH SIMPLE PARTIAL SEIZURES, INTRACTABLE, WITHOUT STATUS EPILEPTICUS (H): Primary | ICD-10-CM

## 2020-10-06 PROCEDURE — 99215 OFFICE O/P EST HI 40 MIN: CPT | Performed by: PSYCHIATRY & NEUROLOGY

## 2020-10-06 RX ORDER — DIVALPROEX SODIUM 250 MG/1
250 TABLET, EXTENDED RELEASE ORAL 2 TIMES DAILY
Qty: 60 TABLET | Refills: 11 | Status: SHIPPED | OUTPATIENT
Start: 2020-10-06 | End: 2021-01-12

## 2020-10-06 RX ORDER — LEVETIRACETAM 500 MG/1
TABLET ORAL
Qty: 180 TABLET | Refills: 11 | Status: SHIPPED | OUTPATIENT
Start: 2020-10-06 | End: 2021-01-12

## 2020-10-06 RX ORDER — LACOSAMIDE 200 MG/1
200 TABLET, FILM COATED ORAL 2 TIMES DAILY
Qty: 60 TABLET | Refills: 5 | Status: SHIPPED | OUTPATIENT
Start: 2020-10-06 | End: 2021-01-12

## 2020-10-06 ASSESSMENT — PAIN SCALES - GENERAL: PAINLEVEL: NO PAIN (0)

## 2020-10-06 NOTE — TELEPHONE ENCOUNTER
Pt was hospitalized in the beginning of September and was put on steroids.  The steroid, Dexamethazone 4mg, has been taper down to 4mg twice a day.  Pt would like to get off the steroid if possible.  Prescribing MD comfortable taping the Dexamethazone 4mg BID.  MD would like neurosurgeon to taper further if appropriate.  Text sent to MD.

## 2020-10-06 NOTE — LETTER
10/6/2020       RE: Marlyn Wilder  [id number] [facility] [id number]     Dear Colleague,    Thank you for referring your patient, Marlyn Wilder, to the [facility] [place] at [facility]. Please see a copy of my visit note below.      [facility] Epilepsy Clinic:  RETURN VISIT          Service Date: 10/06/2020    HISTORY:  Ms. 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  She came to the visit today with her .      Following the most recent virtual visit to this clinic on 09/18/2020, the patient reportedly has not had any seizures with unconsciousness, unresponsiveness, confusion, falling or convulsive movements, and has not had any isolated auras.      On 08/31/2020, she began to have marked nausea, anorexia and malaise.  Her PCP directed her to the [facility]. in [place], [place], from which she was admitted to that hospital.  Available medical records indicate that she was found to have increased left temporal lobe edema on MRI (but probably no increase in meningioma lesion mass), and she was placed on dexamethasone, with varying doses since then.  Ongoing VEEG was run, and noes indicate that left parasagittal subclinical or subtle clinic seizures were recorded on 09/01/2020, after which levetiracetam was increased to 2000 mg BID, and she was placed on lacosamide at 200 mg BID and on divalproex-ER at 750 mg BID.  Reportedly the electrographic seizures ceased, and she was discharged home on 09/03/2020.  She later decreased divalproex-ER to 500 mg BID on 09/17/2020, due to excessive daytime lethargy.    She continues to have severe daytime lethargy.  She has been in bed about 9-10 hours per night, actually sleeping about 8 hours per night, and drowsy to various degrees all day.  She is using CPC as directed every  night.          Ictal semiology-history:                       The patient once more confirmed previously presented history in detail today. She again noted that she had a single febrile convulsion of early childhood, but otherwise had no seizures until the initial manifestation of her meningioma in 2011.  She has had only 1 type of seizure since 2011.       The patient has had exclusively simple partial seizures.  These first began in 2011, and have continued on since then.  They have been quite stable in semiology and frequency.  She has at least 20 per year, but has only up to 2 on a single day and usually only one at a time.  The full form of the aura lasts almost 1 minute and features a froilan vu phenomenon associated with a sense that she has just viewed a complex visual scene briefly, although she cannot remember the nature of the scene, with an olfactory hallucination of burning electronics, and waves of intense nausea.  She does not have a postictal state with this.  She is always able to speak if she is with someone.  Her  has seen many of these, which she described the auras in detail to him and she is always able to respond to him quickly.  She never has any automatic behaviors as best he can determine.  Sometimes she has briefer events that feature only 1-2 of her 3 aura phenomena.       The patient has not had any staring spells with unresponsiveness or confusion, and has not had any grand mal seizures following that of early childhood.  She only has involuntary jerks on falling asleep in the evening.       Auras appear to have been triggered or exacerbated by forgetting to take medications, which happens only rarely, by emotional stress, and by tiredness.       Epilepsy-seizure predispositions:   The patient's epilepsy risk factor is a left middle fossa meningioma with extension into the left parasellar space.  This was resected 3 times in 2012, and pathology showed that there was a grade I  meningothelial meningioma.  The portion that is in the parasellar space has surrounded the left optic nerve, and she does have visual deficits on the left.  She had radiation therapy following the last resection by Dr. Calero.  She has had followup scans since that time, which showed a stable lesion as recently as 2017.         There is no family history of seizures or epilepsy.       The patient had a single febrile convulsion of early childhood when she was approximately 1 years old.       She has no history of gestational or  injury, developmental delay, stroke, meningitis, encephalitis, and significant head injury.  She denied a history of physical or sexual abuse by an adult during her childhood or adulthood.       Laboratory evaluations:   The most recent brain MRI was performed at [facility] on 2017, and was reported to show a stable left medial middle fossa lesion extending into the parasellar space in the left orbital apex, with mild mass effect and with enhancement.       A routine EEG was performed in [place] in , and the report indicated left hemispheric slowing and spikes.   An out-patient EEG at [facility] on 06/15/2016 was abnormal due to left frontotemporal delta slowing and breach effect.      Epilepsy therapeutics:   The only anti-seizure medication that the patient has used has been levetiracetam.  This was started in .  It has been consistently associated with irritability.  The patient has been able to control her irritability and has continued to function well as an  for the [employer].       PAST MEDICAL-SURGICAL HISTORY:    1. 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   2. History of left middle fossa meningothelial meningioma, grade 1, with extension to the left parasellar area, left orbital apex and cavernous sinus; status post resections () and radiation therapy.   3. History of probable transient ischemic attack with aphasia ().   4.  Obstructive sleep apnea, treated with CPAP.   5. Systemic hypertension, treated.   6. Hyperlipidemia.   7. History of recurring renal calculi.   8. History of arthralgia attributed to atypical rheumatoid arthritis or fibromyalgia.   9. Hypothyroidism.   10. Status post left carpal tunnel release.   11. History of TMJ syndrome.      FAMILY HISTORY:  There is no family history of seizures, epilepsy or other neurological conditions.       PERSONAL AND SOCIAL HISTORY:  The patient completed her education through a [employer] degree.  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  She was placed on total disability when her memory problems had progressed to the point that she no longer could perform necessary duties as an  for a [employer].    She lives with her .  They have 3 adult children, no longer in the home.       REVIEW OF SYSTEMS:  The patient has noted nausea in recent months, which has responded to omeprazole and ondansetron.   She denied history of attention and memory disturbance, difficulties with comprehension and expression, difficulty in solving problems, weakness, tremors or other abnormal involuntary movements, numbness or tingling, incoordination, falling or difficulty in walking, urinary or fecal incontinence, headache and other pain, except as described above.  The patient denied any history of severe depression or suicidal ideation, severe anxiety, panic attacks, hallucinations, delusions, psychosis, substance abuse, or psychiatric hospitalization.  She denied rashes and easy bruising.  The remainder of a 14-system symptom review was negative except as noted above.        MEDICATIONS:  Levetiracetam 1500 mg b.i.d., lacosamide 200 mg b.i.d., divalproex- mg b.i.d., and aspirin 81 mg daily, and other medications as per the electronic medical record.      PHYSICAL EXAMINATION:    The patient was an alert woman in no apparent distress.  Vital signs were as per  the electronic medical record.  There was a skull defect consistent with reported surgery.  Neck was supple, without signs of meningeal irritation.       On neurological examination the patient appeared mildly lethargic, and was fully oriented to person, place, time, and reason for visit.  Speech showed normal articulation, fluency, repetitions, naming, syntax and comprehension.  Cranial nerves III through XII were normal.  Muscle masses, tones and strengths were normal throughout.  There was no pronator drift.  Sequential fine finger movements were performed normally with each hand.  No spontaneous tremors, myoclonus, or other abnormal movements were observed.  Sensations of light touch, pin prick, vibration and proprioception were reportedly normal throughout.  The rapid alternating movements, and finger-nose-finger and heel-shin maneuvers were performed normally bilaterally.  Romberg maneuver was negative.  Regular, heel, toe, tandem and reverse tandem walking were normal.  Deep tendon reflexes were normal and symmetric throughout.  Toes were downgoing bilaterally.       IMPRESSION:    The patient has had no complex partial or grand mal seizures on levetiracetam.  Isolated auras then ceased on levetiracetam at 1500 mg b.i.d.    Later, levetiracetam was increased to 2000 mg b.i.d., and she was placed on lacosamide at 200 mg b.i.d. and on divalproex-ER at 750 mg b.i.d., due to EEG diagnosis of recurring subclinical or subtle clinic electrographic seizures at [facility] in [place], [place] in early September 2020.  She later decreased divalproex-ER to 500 mg BID on 09/17/2020, due to excessive daytime lethargy.    She remains excessively lethargic.  In the absence of any clinical seizures, it seems that she might do well on lacosamide and levetiracetam, with tapering off divalproex.  Today we agreed to make a one-time decrease in levetiracetam and a partial decrease in divalproex dosing, with no change in  lacosamide.      At the next visit, we will discuss obtaining an out-patient VEEG, to exclude subclinical electrographic seizures.      We discussed [place] regulations on seizures and driving.  She has not had any seizures in adulthood of types that would impair her driving.       PLAN:    1.  Decrease levetiracetam to 1500 mg b.i.d.   2.  Continue lacosamide at 200 mg b.i.d.   3.  Continue divalproex-ER to 250 mg b.i.d.   3.  Return visit in approximately 3 months.   I spent 40 minutes in this patient care, more than 50% of which consisted of counseling and coordinating care.         Again, thank you for allowing me to participate in the care of your patient.  Sincerely,    Emmanuel Madrid M.D.   Professor of Neurology

## 2020-10-06 NOTE — PATIENT INSTRUCTIONS
Decrease Keppra (levetiracetam) to 1500 mg twice daily.     Decrease Depakote (divalproex) to 250 mg twice daily.     Continue Vimpat (lacosamide) at 200 mg twice daily.

## 2020-10-07 NOTE — TELEPHONE ENCOUNTER
MD prescribed taper.    Pt currently taking 4mg of Dexamethasone BID    MD would like pt to take 2mg of Dexamethasone BID for 5 days and then take 2mg of Dexamethasone once a day for five days and then stop.

## 2020-10-07 NOTE — PROGRESS NOTES
[facility] Epilepsy Clinic:  RETURN VISIT          Service Date: 10/06/2020    HISTORY:  Ms. 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  She came to the visit today with her .      Following the most recent virtual visit to this clinic on 09/18/2020, the patient reportedly has not had any seizures with unconsciousness, unresponsiveness, confusion, falling or convulsive movements, and has not had any isolated auras.      On 08/31/2020, she began to have marked nausea, anorexia and malaise.  Her PCP directed her to the [facility]. in [place], [place], from which she was admitted to that hospital.  Available medical records indicate that she was found to have increased left temporal lobe edema on MRI (but probably no increase in meningioma lesion mass), and she was placed on dexamethasone, with varying doses since then.  Ongoing VEEG was run, and noes indicate that left parasagittal subclinical or subtle clinic seizures were recorded on 09/01/2020, after which levetiracetam was increased to 2000 mg BID, and she was placed on lacosamide at 200 mg BID and on divalproex-ER at 750 mg BID.  Reportedly the electrographic seizures ceased, and she was discharged home on 09/03/2020.  She later decreased divalproex-ER to 500 mg BID on 09/17/2020, due to excessive daytime lethargy.    She continues to have severe daytime lethargy.  She has been in bed about 9-10 hours per night, actually sleeping about 8 hours per night, and drowsy to various degrees all day.  She is using CPC as directed every night.    Ictal semiology-history:                       The patient once more confirmed previously presented history in detail today. She again noted that she had a single febrile convulsion of early childhood, but otherwise had no seizures until the initial manifestation of her meningioma in 2011.  She has had only  1 type of seizure since 2011.       The patient has had exclusively simple partial seizures.  These first began in 2011, and have continued on since then.  They have been quite stable in semiology and frequency.  She has at least 20 per year, but has only up to 2 on a single day and usually only one at a time.  The full form of the aura lasts almost 1 minute and features a froilan vu phenomenon associated with a sense that she has just viewed a complex visual scene briefly, although she cannot remember the nature of the scene, with an olfactory hallucination of burning electronics, and waves of intense nausea.  She does not have a postictal state with this.  She is always able to speak if she is with someone.  Her  has seen many of these, which she described the auras in detail to him and she is always able to respond to him quickly.  She never has any automatic behaviors as best he can determine.  Sometimes she has briefer events that feature only 1-2 of her 3 aura phenomena.       The patient has not had any staring spells with unresponsiveness or confusion, and has not had any grand mal seizures following that of early childhood.  She only has involuntary jerks on falling asleep in the evening.       Auras appear to have been triggered or exacerbated by forgetting to take medications, which happens only rarely, by emotional stress, and by tiredness.       Epilepsy-seizure predispositions:   The patient's epilepsy risk factor is a left middle fossa meningioma with extension into the left parasellar space.  This was resected 3 times in 2012, and pathology showed that there was a grade I meningothelial meningioma.  The portion that is in the parasellar space has surrounded the left optic nerve, and she does have visual deficits on the left.  She had radiation therapy following the last resection by Dr. Calero.  She has had followup scans since that time, which showed a stable lesion as recently as 05/2017.          There is no family history of seizures or epilepsy.       The patient had a single febrile convulsion of early childhood when she was approximately 1 years old.       She has no history of gestational or  injury, developmental delay, stroke, meningitis, encephalitis, and significant head injury.  She denied a history of physical or sexual abuse by an adult during her childhood or adulthood.       Laboratory evaluations:   The most recent brain MRI was performed at [facility] on 2017, and was reported to show a stable left medial middle fossa lesion extending into the parasellar space in the left orbital apex, with mild mass effect and with enhancement.       A routine EEG was performed in [place] in , and the report indicated left hemispheric slowing and spikes.   An out-patient EEG at [email or web address] on 06/15/2016 was abnormal due to left frontotemporal delta slowing and breach effect.      Epilepsy therapeutics:   The only anti-seizure medication that the patient has used has been levetiracetam.  This was started in .  It has been consistently associated with irritability.  The patient has been able to control her irritability and has continued to function well as an  for the [employer].       PAST MEDICAL-SURGICAL HISTORY:    1. 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   2. History of left middle fossa meningothelial meningioma, grade 1, with extension to the left parasellar area, left orbital apex and cavernous sinus; status post resections () and radiation therapy.   3. History of probable transient ischemic attack with aphasia ().   4. Obstructive sleep apnea, treated with CPAP.   5. Systemic hypertension, treated.   6. Hyperlipidemia.   7. History of recurring renal calculi.   8. History of arthralgia attributed to atypical rheumatoid arthritis or fibromyalgia.   9. Hypothyroidism.   10. Status post left carpal tunnel release.   11. History of TMJ syndrome.       FAMILY HISTORY:  There is no family history of seizures, epilepsy or other neurological conditions.       PERSONAL AND SOCIAL HISTORY:  The patient completed her education through a [employer] degree.  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  She was placed on total disability when her memory problems had progressed to the point that she no longer could perform necessary duties as an  for a [place] [employer].    She lives with her .  They have 3 adult children, no longer in the home.       REVIEW OF SYSTEMS:  The patient has noted nausea in recent months, which has responded to omeprazole and ondansetron.   She denied history of attention and memory disturbance, difficulties with comprehension and expression, difficulty in solving problems, weakness, tremors or other abnormal involuntary movements, numbness or tingling, incoordination, falling or difficulty in walking, urinary or fecal incontinence, headache and other pain, except as described above.  The patient denied any history of severe depression or suicidal ideation, severe anxiety, panic attacks, hallucinations, delusions, psychosis, substance abuse, or psychiatric hospitalization.  She denied rashes and easy bruising.  The remainder of a 14-system symptom review was negative except as noted above.        MEDICATIONS:  Levetiracetam 1500 mg b.i.d., lacosamide 200 mg b.i.d., divalproex- mg b.i.d., and aspirin 81 mg daily, and other medications as per the electronic medical record.      PHYSICAL EXAMINATION:    The patient was an alert woman in no apparent distress.  Vital signs were as per the electronic medical record.  There was a skull defect consistent with reported surgery.  Neck was supple, without signs of meningeal irritation.       On neurological examination the patient appeared mildly lethargic, and was fully oriented to person, place, time, and reason for visit.  Speech showed normal articulation,  fluency, repetitions, naming, syntax and comprehension.  Cranial nerves III through XII were normal.  Muscle masses, tones and strengths were normal throughout.  There was no pronator drift.  Sequential fine finger movements were performed normally with each hand.  No spontaneous tremors, myoclonus, or other abnormal movements were observed.  Sensations of light touch, pin prick, vibration and proprioception were reportedly normal throughout.  The rapid alternating movements, and finger-nose-finger and heel-shin maneuvers were performed normally bilaterally.  Romberg maneuver was negative.  Regular, heel, toe, tandem and reverse tandem walking were normal.  Deep tendon reflexes were normal and symmetric throughout.  Toes were downgoing bilaterally.       IMPRESSION:    The patient has had no complex partial or grand mal seizures on levetiracetam.  Isolated auras then ceased on levetiracetam at 1500 mg b.i.d.    Later, levetiracetam was increased to 2000 mg b.i.d., and she was placed on lacosamide at 200 mg b.i.d. and on divalproex-ER at 750 mg b.i.d., due to EEG diagnosis of recurring subclinical or subtle clinic electrographic seizures at [facility] in [place], [place] in early September 2020.  She later decreased divalproex-ER to 500 mg BID on 09/17/2020, due to excessive daytime lethargy.    She remains excessively lethargic.  In the absence of any clinical seizures, it seems that she might do well on lacosamide and levetiracetam, with tapering off divalproex.  Today we agreed to make a one-time decrease in levetiracetam and a partial decrease in divalproex dosing, with no change in lacosamide.      At the next visit, we will discuss obtaining an out-patient VEEG, to exclude subclinical electrographic seizures.      We discussed [place] regulations on seizures and driving.  She has not had any seizures in adulthood of types that would impair her driving.       PLAN:    1.  Decrease levetiracetam to 1500 mg  b.i.d.   2.  Continue lacosamide at 200 mg b.i.d.   3.  Continue divalproex-ER to 250 mg b.i.d.   3.  Return visit in approximately 3 months.   I spent 40 minutes in this patient care, more than 50% of which consisted of counseling and coordinating care.         Emmanuel Madrid M.D.   Professor of Neurology

## 2020-10-07 NOTE — TELEPHONE ENCOUNTER
Pt wants to stop taking the Dethamexasone.  Current prescription is 4mg BID.  Writer sent a text to MD regarding taper.

## 2020-10-23 ENCOUNTER — MYC MEDICAL ADVICE (OUTPATIENT)
Dept: NEUROLOGY | Facility: CLINIC | Age: 55
End: 2020-10-23

## 2020-10-23 NOTE — TELEPHONE ENCOUNTER
"Per   \"  Emmanuel Madrid MD Hamley, David RN   Phone Number: [phone]               We reduced the levetiracetam dose at the last visit, which would not require a blood level re-check.   If she is reporting AED side effects or has had any seizures this month, I would check levels on all 3 AEDs.  Thanks.      \"    Message sent to patient  "

## 2020-10-26 ENCOUNTER — HOSPITAL ENCOUNTER (INPATIENT)
Facility: CLINIC | Age: 55
LOS: 4 days | Discharge: HOME | End: 2020-10-30
Attending: EMERGENCY MEDICINE | Admitting: INTERNAL MEDICINE
Payer: COMMERCIAL

## 2020-10-26 ENCOUNTER — APPOINTMENT (OUTPATIENT)
Dept: GENERAL RADIOLOGY | Facility: CLINIC | Age: 55
End: 2020-10-26
Attending: EMERGENCY MEDICINE
Payer: COMMERCIAL

## 2020-10-26 DIAGNOSIS — U07.1 PNEUMONIA DUE TO 2019 NOVEL CORONAVIRUS: ICD-10-CM

## 2020-10-26 DIAGNOSIS — J96.01 ACUTE RESPIRATORY FAILURE WITH HYPOXIA (H): ICD-10-CM

## 2020-10-26 DIAGNOSIS — J12.82 PNEUMONIA DUE TO 2019 NOVEL CORONAVIRUS: ICD-10-CM

## 2020-10-26 LAB
ABO + RH BLD: NORMAL
ABO + RH BLD: NORMAL
ALBUMIN SERPL-MCNC: 2.4 G/DL (ref 3.4–5)
ALP SERPL-CCNC: 72 U/L (ref 40–150)
ALT SERPL W P-5'-P-CCNC: 31 U/L (ref 0–50)
ANION GAP SERPL CALCULATED.3IONS-SCNC: 7 MMOL/L (ref 3–14)
ANION GAP SERPL CALCULATED.3IONS-SCNC: 9 MMOL/L (ref 3–14)
APTT PPP: 30 SEC (ref 22–37)
AST SERPL W P-5'-P-CCNC: 21 U/L (ref 0–45)
BASE EXCESS BLDV CALC-SCNC: 2.3 MMOL/L
BASOPHILS # BLD AUTO: 0.1 10E9/L (ref 0–0.2)
BASOPHILS # BLD AUTO: 0.1 10E9/L (ref 0–0.2)
BASOPHILS NFR BLD AUTO: 0.5 %
BASOPHILS NFR BLD AUTO: 0.6 %
BILIRUB SERPL-MCNC: 0.9 MG/DL (ref 0.2–1.3)
BUN SERPL-MCNC: 8 MG/DL (ref 7–30)
BUN SERPL-MCNC: 9 MG/DL (ref 7–30)
CALCIUM SERPL-MCNC: 8 MG/DL (ref 8.5–10.1)
CALCIUM SERPL-MCNC: 8.6 MG/DL (ref 8.5–10.1)
CHLORIDE SERPL-SCNC: 100 MMOL/L (ref 94–109)
CHLORIDE SERPL-SCNC: 103 MMOL/L (ref 94–109)
CK SERPL-CCNC: 38 U/L (ref 30–225)
CO2 SERPL-SCNC: 23 MMOL/L (ref 20–32)
CO2 SERPL-SCNC: 25 MMOL/L (ref 20–32)
CREAT SERPL-MCNC: 0.61 MG/DL (ref 0.52–1.04)
CREAT SERPL-MCNC: 0.66 MG/DL (ref 0.52–1.04)
CRP SERPL-MCNC: 171 MG/L (ref 0–8)
D DIMER PPP FEU-MCNC: 1.6 UG/ML FEU (ref 0–0.5)
DIFFERENTIAL METHOD BLD: ABNORMAL
DIFFERENTIAL METHOD BLD: ABNORMAL
EOSINOPHIL # BLD AUTO: 0.1 10E9/L (ref 0–0.7)
EOSINOPHIL # BLD AUTO: 0.1 10E9/L (ref 0–0.7)
EOSINOPHIL NFR BLD AUTO: 0.6 %
EOSINOPHIL NFR BLD AUTO: 0.7 %
ERYTHROCYTE [DISTWIDTH] IN BLOOD BY AUTOMATED COUNT: 17.6 % (ref 10–15)
ERYTHROCYTE [DISTWIDTH] IN BLOOD BY AUTOMATED COUNT: 17.8 % (ref 10–15)
FERRITIN SERPL-MCNC: 232 NG/ML (ref 8–252)
FIBRINOGEN PPP-MCNC: 738 MG/DL (ref 200–420)
GFR SERPL CREATININE-BSD FRML MDRD: >90 ML/MIN/{1.73_M2}
GFR SERPL CREATININE-BSD FRML MDRD: >90 ML/MIN/{1.73_M2}
GLUCOSE SERPL-MCNC: 108 MG/DL (ref 70–99)
GLUCOSE SERPL-MCNC: 122 MG/DL (ref 70–99)
HCO3 BLDV-SCNC: 26 MMOL/L (ref 21–28)
HCT VFR BLD AUTO: 35.1 % (ref 35–47)
HCT VFR BLD AUTO: 37.6 % (ref 35–47)
HGB BLD-MCNC: 11.2 G/DL (ref 11.7–15.7)
HGB BLD-MCNC: 12.2 G/DL (ref 11.7–15.7)
IMM GRANULOCYTES # BLD: 0.4 10E9/L (ref 0–0.4)
IMM GRANULOCYTES # BLD: 0.4 10E9/L (ref 0–0.4)
IMM GRANULOCYTES NFR BLD: 3.7 %
IMM GRANULOCYTES NFR BLD: 4.3 %
INR PPP: 1.13 (ref 0.86–1.14)
LDH SERPL L TO P-CCNC: 411 U/L (ref 81–234)
LYMPHOCYTES # BLD AUTO: 2 10E9/L (ref 0.8–5.3)
LYMPHOCYTES # BLD AUTO: 2 10E9/L (ref 0.8–5.3)
LYMPHOCYTES NFR BLD AUTO: 17.5 %
LYMPHOCYTES NFR BLD AUTO: 19.7 %
MCH RBC QN AUTO: 29.5 PG (ref 26.5–33)
MCH RBC QN AUTO: 30 PG (ref 26.5–33)
MCHC RBC AUTO-ENTMCNC: 31.9 G/DL (ref 31.5–36.5)
MCHC RBC AUTO-ENTMCNC: 32.4 G/DL (ref 31.5–36.5)
MCV RBC AUTO: 91 FL (ref 78–100)
MCV RBC AUTO: 94 FL (ref 78–100)
MONOCYTES # BLD AUTO: 0.7 10E9/L (ref 0–1.3)
MONOCYTES # BLD AUTO: 0.8 10E9/L (ref 0–1.3)
MONOCYTES NFR BLD AUTO: 6.8 %
MONOCYTES NFR BLD AUTO: 7.3 %
NEUTROPHILS # BLD AUTO: 6.9 10E9/L (ref 1.6–8.3)
NEUTROPHILS # BLD AUTO: 8 10E9/L (ref 1.6–8.3)
NEUTROPHILS NFR BLD AUTO: 68 %
NEUTROPHILS NFR BLD AUTO: 70.3 %
NRBC # BLD AUTO: 0 10*3/UL
NRBC # BLD AUTO: 0 10*3/UL
NRBC BLD AUTO-RTO: 0 /100
NRBC BLD AUTO-RTO: 0 /100
NT-PROBNP SERPL-MCNC: 881 PG/ML (ref 0–900)
O2/TOTAL GAS SETTING VFR VENT: ABNORMAL %
OXYHGB MFR BLDV: 88 %
PCO2 BLDV: 36 MM HG (ref 40–50)
PH BLDV: 7.46 PH (ref 7.32–7.43)
PLATELET # BLD AUTO: 223 10E9/L (ref 150–450)
PLATELET # BLD AUTO: 244 10E9/L (ref 150–450)
PO2 BLDV: 58 MM HG (ref 25–47)
POTASSIUM SERPL-SCNC: 4 MMOL/L (ref 3.4–5.3)
POTASSIUM SERPL-SCNC: 4.1 MMOL/L (ref 3.4–5.3)
PROCALCITONIN SERPL-MCNC: 0.11 NG/ML
PROT SERPL-MCNC: 6.4 G/DL (ref 6.8–8.8)
RBC # BLD AUTO: 3.73 10E12/L (ref 3.8–5.2)
RBC # BLD AUTO: 4.13 10E12/L (ref 3.8–5.2)
RETICS # AUTO: 101.8 10E9/L (ref 25–95)
RETICS/RBC NFR AUTO: 2.7 % (ref 0.5–2)
SARS-COV-2 RNA SPEC QL NAA+PROBE: NORMAL
SODIUM SERPL-SCNC: 132 MMOL/L (ref 133–144)
SODIUM SERPL-SCNC: 135 MMOL/L (ref 133–144)
SPECIMEN EXP DATE BLD: NORMAL
SPECIMEN SOURCE: NORMAL
TROPONIN I SERPL-MCNC: 0.02 UG/L (ref 0–0.04)
WBC # BLD AUTO: 10.1 10E9/L (ref 4–11)
WBC # BLD AUTO: 11.3 10E9/L (ref 4–11)

## 2020-10-26 PROCEDURE — 83615 LACTATE (LD) (LDH) ENZYME: CPT | Performed by: INTERNAL MEDICINE

## 2020-10-26 PROCEDURE — 85025 COMPLETE CBC W/AUTO DIFF WBC: CPT | Performed by: EMERGENCY MEDICINE

## 2020-10-26 PROCEDURE — U0003 INFECTIOUS AGENT DETECTION BY NUCLEIC ACID (DNA OR RNA); SEVERE ACUTE RESPIRATORY SYNDROME CORONAVIRUS 2 (SARS-COV-2) (CORONAVIRUS DISEASE [COVID-19]), AMPLIFIED PROBE TECHNIQUE, MAKING USE OF HIGH THROUGHPUT TECHNOLOGIES AS DESCRIBED BY CMS-2020-01-R: HCPCS | Performed by: EMERGENCY MEDICINE

## 2020-10-26 PROCEDURE — 85379 FIBRIN DEGRADATION QUANT: CPT | Performed by: INTERNAL MEDICINE

## 2020-10-26 PROCEDURE — 86140 C-REACTIVE PROTEIN: CPT | Performed by: INTERNAL MEDICINE

## 2020-10-26 PROCEDURE — 85384 FIBRINOGEN ACTIVITY: CPT | Performed by: INTERNAL MEDICINE

## 2020-10-26 PROCEDURE — 85730 THROMBOPLASTIN TIME PARTIAL: CPT | Performed by: INTERNAL MEDICINE

## 2020-10-26 PROCEDURE — 80048 BASIC METABOLIC PNL TOTAL CA: CPT | Performed by: EMERGENCY MEDICINE

## 2020-10-26 PROCEDURE — 83880 ASSAY OF NATRIURETIC PEPTIDE: CPT | Performed by: EMERGENCY MEDICINE

## 2020-10-26 PROCEDURE — 84145 PROCALCITONIN (PCT): CPT | Performed by: EMERGENCY MEDICINE

## 2020-10-26 PROCEDURE — 85300 ANTITHROMBIN III ACTIVITY: CPT | Performed by: INTERNAL MEDICINE

## 2020-10-26 PROCEDURE — 250N000011 HC RX IP 250 OP 636: Performed by: INTERNAL MEDICINE

## 2020-10-26 PROCEDURE — 83520 IMMUNOASSAY QUANT NOS NONAB: CPT | Performed by: INTERNAL MEDICINE

## 2020-10-26 PROCEDURE — 82728 ASSAY OF FERRITIN: CPT | Performed by: INTERNAL MEDICINE

## 2020-10-26 PROCEDURE — 82550 ASSAY OF CK (CPK): CPT | Performed by: INTERNAL MEDICINE

## 2020-10-26 PROCEDURE — 120N000001 HC R&B MED SURG/OB

## 2020-10-26 PROCEDURE — 85610 PROTHROMBIN TIME: CPT | Performed by: INTERNAL MEDICINE

## 2020-10-26 PROCEDURE — 86901 BLOOD TYPING SEROLOGIC RH(D): CPT | Performed by: INTERNAL MEDICINE

## 2020-10-26 PROCEDURE — 96375 TX/PRO/DX INJ NEW DRUG ADDON: CPT

## 2020-10-26 PROCEDURE — 258N000003 HC RX IP 258 OP 636: Performed by: EMERGENCY MEDICINE

## 2020-10-26 PROCEDURE — 99223 1ST HOSP IP/OBS HIGH 75: CPT | Mod: AI | Performed by: INTERNAL MEDICINE

## 2020-10-26 PROCEDURE — 99285 EMERGENCY DEPT VISIT HI MDM: CPT | Mod: 25

## 2020-10-26 PROCEDURE — 250N000013 HC RX MED GY IP 250 OP 250 PS 637: Performed by: INTERNAL MEDICINE

## 2020-10-26 PROCEDURE — 80053 COMPREHEN METABOLIC PANEL: CPT | Performed by: INTERNAL MEDICINE

## 2020-10-26 PROCEDURE — C9803 HOPD COVID-19 SPEC COLLECT: HCPCS

## 2020-10-26 PROCEDURE — 36415 COLL VENOUS BLD VENIPUNCTURE: CPT | Performed by: INTERNAL MEDICINE

## 2020-10-26 PROCEDURE — 86900 BLOOD TYPING SEROLOGIC ABO: CPT | Performed by: INTERNAL MEDICINE

## 2020-10-26 PROCEDURE — 85045 AUTOMATED RETICULOCYTE COUNT: CPT | Performed by: INTERNAL MEDICINE

## 2020-10-26 PROCEDURE — 250N000011 HC RX IP 250 OP 636: Performed by: EMERGENCY MEDICINE

## 2020-10-26 PROCEDURE — 84484 ASSAY OF TROPONIN QUANT: CPT | Performed by: INTERNAL MEDICINE

## 2020-10-26 PROCEDURE — 96365 THER/PROPH/DIAG IV INF INIT: CPT

## 2020-10-26 PROCEDURE — 82805 BLOOD GASES W/O2 SATURATION: CPT | Performed by: EMERGENCY MEDICINE

## 2020-10-26 PROCEDURE — 71045 X-RAY EXAM CHEST 1 VIEW: CPT

## 2020-10-26 PROCEDURE — 85025 COMPLETE CBC W/AUTO DIFF WBC: CPT | Performed by: INTERNAL MEDICINE

## 2020-10-26 PROCEDURE — 96361 HYDRATE IV INFUSION ADD-ON: CPT

## 2020-10-26 RX ORDER — POTASSIUM CHLORIDE 1500 MG/1
20-40 TABLET, EXTENDED RELEASE ORAL
Status: DISCONTINUED | OUTPATIENT
Start: 2020-10-26 | End: 2020-10-28

## 2020-10-26 RX ORDER — CEFTRIAXONE 2 G/1
2 INJECTION, POWDER, FOR SOLUTION INTRAMUSCULAR; INTRAVENOUS EVERY 24 HOURS
Status: DISCONTINUED | OUTPATIENT
Start: 2020-10-27 | End: 2020-10-26

## 2020-10-26 RX ORDER — CEFTRIAXONE 1 G/1
1 INJECTION, POWDER, FOR SOLUTION INTRAMUSCULAR; INTRAVENOUS ONCE
Status: COMPLETED | OUTPATIENT
Start: 2020-10-26 | End: 2020-10-26

## 2020-10-26 RX ORDER — NALOXONE HYDROCHLORIDE 0.4 MG/ML
.1-.4 INJECTION, SOLUTION INTRAMUSCULAR; INTRAVENOUS; SUBCUTANEOUS
Status: DISCONTINUED | OUTPATIENT
Start: 2020-10-26 | End: 2020-10-30 | Stop reason: HOSPADM

## 2020-10-26 RX ORDER — DIVALPROEX SODIUM 250 MG/1
250 TABLET, EXTENDED RELEASE ORAL 2 TIMES DAILY
Status: DISCONTINUED | OUTPATIENT
Start: 2020-10-26 | End: 2020-10-30 | Stop reason: HOSPADM

## 2020-10-26 RX ORDER — MAGNESIUM SULFATE HEPTAHYDRATE 40 MG/ML
4 INJECTION, SOLUTION INTRAVENOUS EVERY 4 HOURS PRN
Status: DISCONTINUED | OUTPATIENT
Start: 2020-10-26 | End: 2020-10-28

## 2020-10-26 RX ORDER — METOCLOPRAMIDE 10 MG/1
10 TABLET ORAL EVERY 6 HOURS PRN
Status: DISCONTINUED | OUTPATIENT
Start: 2020-10-26 | End: 2020-10-30 | Stop reason: HOSPADM

## 2020-10-26 RX ORDER — POTASSIUM CHLORIDE 1.5 G/1.58G
20-40 POWDER, FOR SOLUTION ORAL
Status: DISCONTINUED | OUTPATIENT
Start: 2020-10-26 | End: 2020-10-28

## 2020-10-26 RX ORDER — LIDOCAINE 40 MG/G
CREAM TOPICAL
Status: DISCONTINUED | OUTPATIENT
Start: 2020-10-26 | End: 2020-10-26

## 2020-10-26 RX ORDER — ONDANSETRON 4 MG/1
4 TABLET, FILM COATED ORAL DAILY PRN
Status: DISCONTINUED | OUTPATIENT
Start: 2020-10-26 | End: 2020-10-26

## 2020-10-26 RX ORDER — LIDOCAINE 40 MG/G
CREAM TOPICAL
Status: DISCONTINUED | OUTPATIENT
Start: 2020-10-26 | End: 2020-10-30 | Stop reason: HOSPADM

## 2020-10-26 RX ORDER — DEXAMETHASONE SODIUM PHOSPHATE 4 MG/ML
6 INJECTION, SOLUTION INTRA-ARTICULAR; INTRALESIONAL; INTRAMUSCULAR; INTRAVENOUS; SOFT TISSUE EVERY 24 HOURS
Status: DISCONTINUED | OUTPATIENT
Start: 2020-10-26 | End: 2020-10-30 | Stop reason: HOSPADM

## 2020-10-26 RX ORDER — PANTOPRAZOLE SODIUM 40 MG/1
40 TABLET, DELAYED RELEASE ORAL
Status: DISCONTINUED | OUTPATIENT
Start: 2020-10-27 | End: 2020-10-30 | Stop reason: HOSPADM

## 2020-10-26 RX ORDER — ACETAMINOPHEN 325 MG/1
650 TABLET ORAL EVERY 4 HOURS PRN
Status: DISCONTINUED | OUTPATIENT
Start: 2020-10-26 | End: 2020-10-30 | Stop reason: HOSPADM

## 2020-10-26 RX ORDER — CEFTRIAXONE 1 G/1
1 INJECTION, POWDER, FOR SOLUTION INTRAMUSCULAR; INTRAVENOUS EVERY 24 HOURS
Status: DISCONTINUED | OUTPATIENT
Start: 2020-10-27 | End: 2020-10-30 | Stop reason: HOSPADM

## 2020-10-26 RX ORDER — METOCLOPRAMIDE HYDROCHLORIDE 5 MG/ML
10 INJECTION INTRAMUSCULAR; INTRAVENOUS EVERY 6 HOURS PRN
Status: DISCONTINUED | OUTPATIENT
Start: 2020-10-26 | End: 2020-10-30 | Stop reason: HOSPADM

## 2020-10-26 RX ORDER — POTASSIUM CL/LIDO/0.9 % NACL 10MEQ/0.1L
10 INTRAVENOUS SOLUTION, PIGGYBACK (ML) INTRAVENOUS
Status: DISCONTINUED | OUTPATIENT
Start: 2020-10-26 | End: 2020-10-28

## 2020-10-26 RX ORDER — PROCHLORPERAZINE 25 MG
25 SUPPOSITORY, RECTAL RECTAL EVERY 12 HOURS PRN
Status: DISCONTINUED | OUTPATIENT
Start: 2020-10-26 | End: 2020-10-30 | Stop reason: HOSPADM

## 2020-10-26 RX ORDER — POTASSIUM CHLORIDE 7.45 MG/ML
10 INJECTION INTRAVENOUS
Status: DISCONTINUED | OUTPATIENT
Start: 2020-10-26 | End: 2020-10-28

## 2020-10-26 RX ORDER — LISINOPRIL 2.5 MG/1
2.5 TABLET ORAL 2 TIMES DAILY
Status: DISCONTINUED | OUTPATIENT
Start: 2020-10-26 | End: 2020-10-30 | Stop reason: HOSPADM

## 2020-10-26 RX ORDER — PROCHLORPERAZINE MALEATE 5 MG
10 TABLET ORAL EVERY 6 HOURS PRN
Status: DISCONTINUED | OUTPATIENT
Start: 2020-10-26 | End: 2020-10-30 | Stop reason: HOSPADM

## 2020-10-26 RX ORDER — GABAPENTIN 300 MG/1
600 CAPSULE ORAL AT BEDTIME
Status: DISCONTINUED | OUTPATIENT
Start: 2020-10-26 | End: 2020-10-26

## 2020-10-26 RX ORDER — ONDANSETRON 4 MG/1
4 TABLET, ORALLY DISINTEGRATING ORAL EVERY 6 HOURS PRN
Status: DISCONTINUED | OUTPATIENT
Start: 2020-10-26 | End: 2020-10-30 | Stop reason: HOSPADM

## 2020-10-26 RX ORDER — ONDANSETRON 2 MG/ML
4 INJECTION INTRAMUSCULAR; INTRAVENOUS EVERY 6 HOURS PRN
Status: DISCONTINUED | OUTPATIENT
Start: 2020-10-26 | End: 2020-10-30 | Stop reason: HOSPADM

## 2020-10-26 RX ORDER — POTASSIUM CHLORIDE 29.8 MG/ML
20 INJECTION INTRAVENOUS
Status: DISCONTINUED | OUTPATIENT
Start: 2020-10-26 | End: 2020-10-28

## 2020-10-26 RX ORDER — LACOSAMIDE 200 MG/1
200 TABLET ORAL 2 TIMES DAILY
Status: DISCONTINUED | OUTPATIENT
Start: 2020-10-26 | End: 2020-10-30 | Stop reason: HOSPADM

## 2020-10-26 RX ADMIN — ENOXAPARIN SODIUM 40 MG: 40 INJECTION SUBCUTANEOUS at 19:06

## 2020-10-26 RX ADMIN — CEFTRIAXONE 1 G: 1 INJECTION, POWDER, FOR SOLUTION INTRAMUSCULAR; INTRAVENOUS at 12:55

## 2020-10-26 RX ADMIN — LACOSAMIDE 200 MG: 200 TABLET, FILM COATED ORAL at 21:57

## 2020-10-26 RX ADMIN — LISINOPRIL 2.5 MG: 2.5 TABLET ORAL at 21:57

## 2020-10-26 RX ADMIN — AZITHROMYCIN MONOHYDRATE 500 MG: 500 INJECTION, POWDER, LYOPHILIZED, FOR SOLUTION INTRAVENOUS at 13:37

## 2020-10-26 RX ADMIN — SODIUM CHLORIDE 500 ML: 9 INJECTION, SOLUTION INTRAVENOUS at 11:57

## 2020-10-26 RX ADMIN — DIVALPROEX SODIUM 250 MG: 250 TABLET, FILM COATED, EXTENDED RELEASE ORAL at 22:34

## 2020-10-26 RX ADMIN — DEXAMETHASONE SODIUM PHOSPHATE 6 MG: 4 INJECTION, SOLUTION INTRAMUSCULAR; INTRAVENOUS at 19:05

## 2020-10-26 ASSESSMENT — ENCOUNTER SYMPTOMS
MYALGIAS: 1
COUGH: 1
SHORTNESS OF BREATH: 1
HEADACHES: 0
SORE THROAT: 0
RHINORRHEA: 1

## 2020-10-26 ASSESSMENT — MIFFLIN-ST. JEOR
SCORE: 2001.56
SCORE: 2010.63

## 2020-10-26 ASSESSMENT — ACTIVITIES OF DAILY LIVING (ADL)
ADLS_ACUITY_SCORE: 13
ADLS_ACUITY_SCORE: 13

## 2020-10-26 NOTE — ED NOTES
Bed: ED12  Expected date: 10/26/20  Expected time: 11:10 AM  Means of arrival:   Comments:  Triage pt-hypoxic

## 2020-10-26 NOTE — ED PROVIDER NOTES
History     Chief Complaint:  Shortness of Breath      The history is provided by the patient.      Marlyn Wilder is a 55 year old female with status DNR/DNI and history of brain tumor who presents with shortness of breath and dry cough that have been worsening over the last couple of weeks, most notably over the last several days. The patient reports that she has had symptoms of general malaise, and runny nose that have been present since the end of August. She was reportedly hospitalized around this time for treatment of seizures and was started on antiepileptic medications, but she does not attribute onset her symptoms to the medication she was given. Since this time, she has been feeling achy every night. Over the last several days, she noticed a marked increase in her shortness of breath, stating that she was unable to be very active without feeling notably winded. She states that she is only here today as her  wanted her to come in for evaluation. Here, she denies any sore throat or headaches. She mentions that her legs had been swollen but this has resolved. She states that her  is healthy and she has no other known ill contacts. The patient is no longer undergoing radiation for her brain tumor.    Allergies:  Duloxetine    Medications:    Dexamethasone  Depakote  Gabapentin  Levatiracetam  Levothyroxine  Lisinopril  Omeprazole  Pantoprazole  Vimpat  Vitamin D    Past Medical History:    Arthritis  Brain tumor  Calculus of kidney  Gout  Hypertension  Morbid obesity  Vitamin D deficiency  Meningioma  Partial symptomatic epilepsy  TIA  Sleep apnea    Past Surgical History:    Brain debulking x2   section  Tubal ligation  Combined cystoscopy and insertion of ureter catheter  Tonsillectomy  Right renal stone removal  Craniotomy with tumor excision  Right percutaneous nephrolithotomy    Family History:    Father - hypertension, CAD  Mother - breast cancer    Social History:  The patient was  not accompanied to the ED.  Smoking Status: Never smoker  Smokeless Tobacco: Never used  Alcohol Use: No  Drug Use: No  Marital Status:       Review of Systems   HENT: Positive for rhinorrhea. Negative for sore throat.    Respiratory: Positive for cough and shortness of breath.    Cardiovascular: Negative for leg swelling.   Musculoskeletal: Positive for myalgias.   Neurological: Negative for headaches.   All other systems reviewed and are negative.    Physical Exam     Patient Vitals for the past 24 hrs:   BP Temp Temp src Pulse Resp SpO2   10/26/20 1215 (!) 147/72 -- -- 101 -- 98 %   10/26/20 1200 134/78 -- -- 105 -- 94 %   10/26/20 1145 133/62 -- -- 105 -- 94 %   10/26/20 1140 -- -- -- 105 -- 94 %   10/26/20 1130 117/84 -- -- 108 29 98 %   10/26/20 1052 131/88 97.8  F (36.6  C) Temporal 115 (!) 40 (!) 72 %       Physical Exam  Constitutional: Alert, attentive, GCS 15  HENT:    Nose: Nose normal.    Mouth/Throat: Oropharynx is clear, mucous membranes are moist  Eyes: EOM are normal, anicteric, conjugate gaze  CV: regular rate and rhythm; no murmurs  Chest: Tachypneic, increased work of breathing no wheezing.  On oxygen mask at 3 L  GI:  non tender. No distension. No guarding or rebound.    MSK: No LE edema, no tenderness to palpation of BLE.  Neurological: Alert, attentive, moving all extremities equally.   Skin: Skin is warm and dry.    Emergency Department Course     Imaging:  Radiology findings were communicated with the patient who voiced understanding of the findings.    XR Chest Port 1 View:  Portable chest. Subtle groundglass opacity noted in the  mid to lower lateral right lung possibly reflecting viral pneumonitis.  Retrocardiac opacity could indicate left lower lobe atelectasis or  consolidation. No pneumothorax or significant pleural fluid. Heart is  normal in size.  Report per radiology.    Laboratory:  Laboratory findings were communicated with the patient who voiced understanding of the  findings.    CBC: WBC 11.3 (H), HGB 12.2,   BMP: Sodium 132 (L)Glucose 122 (H), o/w WNL (Creatinine 0.66)    Blood gas venous: pH Venous 7.46 (H), PCO2 Venous 36 (L), PO2 Venous 58 (H), Bicarbonate 26, Base Deficit 2.3, FIO2 10 L OxyMask, Oxyhemoglobin 88    Procalcitonin: Pending    N-Terminal Pro BNP: 881    COVID-19 Virus (Coronavirus) by Nasopharyngeal (NP) Swab: Pending    Procedures  None.    Interventions:  1157 NS 1L IV Bolus   1255 Rocephin 1 g IV Bolus  1337 Zithromax 500 mg IV Bolus    Emergency Department Course:  Past medical records, nursing notes, and vitals reviewed.    1127 I performed an exam of the patient as documented above.     IV was inserted and blood was drawn for laboratory testing, results above.    The patient was swabbed for COVID-19, noted above.    The patient had a chest X-ray while in the emergency department, results above.     1320 I rechecked the patient and discussed the results of her workup thus far.     Findings and plan explained to the Patient who consents to admission. Discussed the patient with Dr. Pérez, who will admit the patient to a medical bed for further monitoring, evaluation, and treatment.    I personally reviewed the laboratory and imaging results with the Patient and answered all related questions prior to admission.     Impression & Plan     Covid-19  Marlyn Wilder was evaluated during a global COVID-19 pandemic, which necessitated consideration that the patient might be at risk for infection with the SARS-CoV-2 virus that causes COVID-19.   Applicable protocols for evaluation were followed during the patient's care.   COVID-19 was considered as part of the patient's evaluation. A test was obtained during this visit prior to the patient's admission.     Medical Decision Makin-year-old woman past medical history significant for brain meningioma status post multiple resections and radiation with continued growth, seizure secondary to brain tumor  recently completed steroid burst presenting for evaluation of 4 days increasing shortness of breath cough with loss of smell.  History is highly suggestive of COVID-19.  On arrival she was noted to be hypoxic to 70% on room air after walking in from her car, she was immediately placed on 15 L in triage and at time of exam back in the exam room, her O2 requirements have decreased to 8 L.  This was quickly down titrated to 3 L.  Chest x-ray shows patchy infiltrates again suggestive of COVID-19.  Her labs show respiratory alkalosis likely secondary to hypoxia induced hyperventilation.  No indication for BiPAP at this time.  Pro-Ernie is pending, will initiated antibiotics for possible secondary pneumonia low suspicion for COVID-19 remains high.  Given gradual onset of symptoms, low suspicion for PE at this time.  Will admit to medicine for continued monitoring of acute hypoxic respiratory failure likely secondary to COVID-19 pneumonia. Given her tumor hx, I did review with the patient her CODE STATUS and she initially reported that she is DNR/DNI however in discussion with the patient again and her  this is on her will not a POLST and she is amenable to a trial of intubation and resuscitation should this be required.      Diagnosis:    ICD-10-CM    1. Pneumonia due to 2019 novel coronavirus  U07.1     J12.89    2. Acute respiratory failure with hypoxia (H)  J96.01        Disposition:  Admitted to Dr. Pérez.    Adarsh Graf MD  [employer]  1:25 PM 10/26/20     Scribe Disclosure:  Jazmin BECKFORD, am serving as a scribe at 11:27 AM on 10/26/2020 to document services personally performed by Adarsh Graf MD based on my observations and the provider's statements to me.     Jazmin Bernal  10/26/2020   [facility] EMERGENCY DEPT       Adarsh Graf MD  10/26/20 0332

## 2020-10-26 NOTE — ED NOTES
[facility]  ED Nurse Handoff Report    Marlyn Wilder is a 55 year old female   ED Chief complaint: Shortness of Breath  . ED Diagnosis:   Final diagnoses:   Pneumonia due to 2019 novel coronavirus   Acute respiratory failure with hypoxia (H)     Allergies:   Allergies   Allergen Reactions     Duloxetine Nausea       Code Status: Full Code  Activity level - Baseline/Home:  Independent. Activity Level - Current:   Assist X 1. Lift room needed: No. Bariatric: No   Needed: No   Isolation: Yes. Infection: COVID r/o and special precautions.     Vital Signs:   Vitals:    10/26/20 1300 10/26/20 1315 10/26/20 1330 10/26/20 1345   BP: (!) 146/64 137/69 134/64 120/63   Pulse: 100 100 96 98   Resp:       Temp:       TempSrc:       SpO2: 95% 93% 94% 90%       Cardiac Rhythm:  ,      Pain level:    Patient confused: No. Patient Falls Risk: Yes.   Elimination Status: Due to void.   Patient Report - Initial Complaint: SOB/diarrhea/loss of taste/smell, generalized weakness. Focused Assessment: Pt presents to ED with above complaints. In triage, pt was 73% on RA. Pt placed on 15L via OxyMask and improved. I have weaned patient down to 3L via Oxymask. Chest xray shows viral pneumonitis. IV antibiotics started in ED. Pt has history of brain tumor, so has some difficulty answering questions and slower to answer. Pt A&OX4. ABcs intact.    Tests Performed: Labs, chest xray. Abnormal Results:   Labs Ordered and Resulted from Time of ED Arrival Up to the Time of Departure from the ED   BLOOD GAS VENOUS AND OXYHGB - Abnormal; Notable for the following components:       Result Value    Ph Venous 7.46 (*)     PCO2 Venous 36 (*)     PO2 Venous 58 (*)     All other components within normal limits   CBC WITH PLATELETS DIFFERENTIAL - Abnormal; Notable for the following components:    WBC 11.3 (*)     RDW 17.6 (*)     All other components within normal limits   BASIC METABOLIC PANEL - Abnormal; Notable for the following  components:    Sodium 132 (*)     Glucose 122 (*)     All other components within normal limits   COVID-19 VIRUS (CORONAVIRUS) BY PCR   NT PROBNP INPATIENT   PROCALCITONIN     XR Chest Port 1 View   Final Result   IMPRESSION: Portable chest. Subtle groundglass opacity noted in the   mid to lower lateral right lung possibly reflecting viral pneumonitis.   Retrocardiac opacity could indicate left lower lobe atelectasis or   consolidation. No pneumothorax or significant pleural fluid. Heart is   normal in size.      DAFNE VERAS MD        .   Treatments provided: 500 ml IV bolus, IV Rocephin, IV Zithromax  Family Comments:  aware.   OBS brochure/video discussed/provided to patient:  N/A  ED Medications:   Medications   azithromycin (ZITHROMAX) 500 mg in sodium chloride 0.9 % 250 mL intermittent infusion (500 mg Intravenous New Bag 10/26/20 1337)   0.9% sodium chloride BOLUS (0 mLs Intravenous Stopped 10/26/20 1257)   cefTRIAXone (ROCEPHIN) 1 g vial to attach to  mL bag for ADULTS or NS 50 mL bag for PEDS (0 g Intravenous Stopped 10/26/20 1325)     Drips infusing:  Yes - if Zithromax not complete  For the majority of the shift, the patient's behavior Green. Interventions performed were N/A.    Sepsis treatment initiated: No     Patient tested for COVID 19 prior to admission: YES    ED Nurse Name/Phone Number: Shannon Canales RN,   1:51 PM    RECEIVING UNIT ED HANDOFF REVIEW    Above ED Nurse Handoff Report was reviewed: Yes  Reviewed by: Andreia Corey RN on October 26, 2020 at 2:09 PM

## 2020-10-26 NOTE — PHARMACY-ADMISSION MEDICATION HISTORY
Admission medication history interview status for this patient is complete. See [facility] admission navigator for allergy information, prior to admission medications and immunization status.     Medication history interview done via telephone during Covid-19 pandemic, indicate source(s): Patient  Medication history resources (including written lists, pill bottles, clinic record):None  [facility]     Changes made to PTA medication list:  Added: none  Deleted: decadron, Flurandrenolide ointment, gabapentinm omeprazole,   Changed: scheduled protonix --> PRN protonix     Actions taken by pharmacist (provider contacted, etc):None     Additional medication history information: Left reminder for provider to review med history     Medication reconciliation/reorder completed by provider prior to medication history?  Yes          Prior to Admission medications    Medication Sig Last Dose Taking? Auth Provider   divalproex sodium extended-release (DEPAKOTE ER) 250 MG 24 hr tablet Take 1 tablet (250 mg) by mouth 2 times daily 10/26/2020 at am Yes Emmanuel Madrid MD   folic acid (FOLVITE) 1 MG tablet Take 1 mg by mouth daily  10/26/2020 at am Yes Reported, Patient   levETIRAcetam (KEPPRA) 500 MG tablet TAKE 3 TABLETS(1500 MG) BY MOUTH IN THE MORNING AND IN THE EVENING 10/26/2020 at am Yes Emmanuel Madrid MD   levothyroxine (SYNTHROID) 125 MCG tablet Take 125 mcg by mouth daily  10/26/2020 at am Yes Reported, Patient   lisinopril (ZESTRIL) 2.5 MG tablet Take 2.5 mg by mouth 2 times daily  10/26/2020 at am Yes Reported, Patient   Multiple Vitamins-Minerals (MULTIVITAMIN & MINERAL PO) Take 1 tablet by mouth daily 10/25/2020 at am Yes Reported, Patient   ondansetron (ZOFRAN) 4 MG tablet Take 4 mg by mouth daily as needed for nausea   Yes Reported, Patient   pantoprazole (PROTONIX) 40 MG EC tablet Take 40 mg by mouth daily as needed for heartburn  10/26/2020 at Unknown time Yes Reported, Patient   VIMPAT 200 MG  TABS tablet Take 1 tablet (200 mg) by mouth 2 times daily 10/26/2020 at am Yes Emmanuel Madrid MD   vitamin D3 (CHOLECALCIFEROL) 2000 units (50 mcg) tablet Take 1 tablet by mouth daily 10/26/2020 at am Yes Reported, Patient   methotrexate sodium 2.5 MG TABS Take 17.5 mg by mouth once a week (take 7 X 2.5 mg = 17.5 mg dose) 10/20/2020  Reported, Patient

## 2020-10-26 NOTE — ED TRIAGE NOTES
Patient presents with shortness of breath, cough, loss of taste/smell, and diarrhea since last Thursday. 72% of room air, 95% on 15 liters oxymask.

## 2020-10-27 LAB
ALBUMIN SERPL-MCNC: 2.5 G/DL (ref 3.4–5)
ALP SERPL-CCNC: 76 U/L (ref 40–150)
ALT SERPL W P-5'-P-CCNC: 31 U/L (ref 0–50)
ANION GAP SERPL CALCULATED.3IONS-SCNC: 7 MMOL/L (ref 3–14)
AST SERPL W P-5'-P-CCNC: 18 U/L (ref 0–45)
AT III ACT/NOR PPP CHRO: 84 % (ref 85–135)
BASOPHILS # BLD AUTO: 0.1 10E9/L (ref 0–0.2)
BASOPHILS NFR BLD AUTO: 0.6 %
BILIRUB SERPL-MCNC: 0.7 MG/DL (ref 0.2–1.3)
BUN SERPL-MCNC: 8 MG/DL (ref 7–30)
CALCIUM SERPL-MCNC: 8.5 MG/DL (ref 8.5–10.1)
CHLORIDE SERPL-SCNC: 104 MMOL/L (ref 94–109)
CO2 SERPL-SCNC: 26 MMOL/L (ref 20–32)
CREAT SERPL-MCNC: 0.61 MG/DL (ref 0.52–1.04)
CRP SERPL-MCNC: 198 MG/L (ref 0–8)
D DIMER PPP FEU-MCNC: 1.7 UG/ML FEU (ref 0–0.5)
DIFFERENTIAL METHOD BLD: ABNORMAL
EOSINOPHIL # BLD AUTO: 0 10E9/L (ref 0–0.7)
EOSINOPHIL NFR BLD AUTO: 0.1 %
ERYTHROCYTE [DISTWIDTH] IN BLOOD BY AUTOMATED COUNT: 17.5 % (ref 10–15)
FIBRINOGEN PPP-MCNC: 804 MG/DL (ref 200–420)
GFR SERPL CREATININE-BSD FRML MDRD: >90 ML/MIN/{1.73_M2}
GLUCOSE SERPL-MCNC: 165 MG/DL (ref 70–99)
HCT VFR BLD AUTO: 37.2 % (ref 35–47)
HGB BLD-MCNC: 11.4 G/DL (ref 11.7–15.7)
IL6 SERPL-MCNC: 121.69 PG/ML
IMM GRANULOCYTES # BLD: 0.4 10E9/L (ref 0–0.4)
IMM GRANULOCYTES NFR BLD: 4.6 %
INR PPP: 1.17 (ref 0.86–1.14)
LABORATORY COMMENT REPORT: NORMAL
LDH SERPL L TO P-CCNC: 412 U/L (ref 81–234)
LYMPHOCYTES # BLD AUTO: 1.5 10E9/L (ref 0.8–5.3)
LYMPHOCYTES NFR BLD AUTO: 17.3 %
MCH RBC QN AUTO: 29.8 PG (ref 26.5–33)
MCHC RBC AUTO-ENTMCNC: 30.6 G/DL (ref 31.5–36.5)
MCV RBC AUTO: 97 FL (ref 78–100)
MONOCYTES # BLD AUTO: 0.4 10E9/L (ref 0–1.3)
MONOCYTES NFR BLD AUTO: 3.9 %
NEUTROPHILS # BLD AUTO: 6.5 10E9/L (ref 1.6–8.3)
NEUTROPHILS NFR BLD AUTO: 73.5 %
NRBC # BLD AUTO: 0 10*3/UL
NRBC BLD AUTO-RTO: 0 /100
PLATELET # BLD AUTO: 237 10E9/L (ref 150–450)
POTASSIUM SERPL-SCNC: 4.7 MMOL/L (ref 3.4–5.3)
PROT SERPL-MCNC: 6.6 G/DL (ref 6.8–8.8)
RBC # BLD AUTO: 3.83 10E12/L (ref 3.8–5.2)
RETICS # AUTO: 108.8 10E9/L (ref 25–95)
RETICS/RBC NFR AUTO: 2.8 % (ref 0.5–2)
SARS-COV-2 RNA SPEC QL NAA+PROBE: NEGATIVE
SODIUM SERPL-SCNC: 137 MMOL/L (ref 133–144)
SPECIMEN SOURCE: NORMAL
TROPONIN I SERPL-MCNC: <0.015 UG/L (ref 0–0.04)
WBC # BLD AUTO: 8.9 10E9/L (ref 4–11)

## 2020-10-27 PROCEDURE — 120N000001 HC R&B MED SURG/OB

## 2020-10-27 PROCEDURE — 250N000011 HC RX IP 250 OP 636: Performed by: INTERNAL MEDICINE

## 2020-10-27 PROCEDURE — 80053 COMPREHEN METABOLIC PANEL: CPT | Performed by: INTERNAL MEDICINE

## 2020-10-27 PROCEDURE — 85025 COMPLETE CBC W/AUTO DIFF WBC: CPT | Performed by: INTERNAL MEDICINE

## 2020-10-27 PROCEDURE — 85384 FIBRINOGEN ACTIVITY: CPT | Performed by: INTERNAL MEDICINE

## 2020-10-27 PROCEDURE — 84484 ASSAY OF TROPONIN QUANT: CPT | Performed by: INTERNAL MEDICINE

## 2020-10-27 PROCEDURE — 99233 SBSQ HOSP IP/OBS HIGH 50: CPT | Performed by: INTERNAL MEDICINE

## 2020-10-27 PROCEDURE — 86140 C-REACTIVE PROTEIN: CPT | Performed by: INTERNAL MEDICINE

## 2020-10-27 PROCEDURE — 85610 PROTHROMBIN TIME: CPT | Performed by: INTERNAL MEDICINE

## 2020-10-27 PROCEDURE — 250N000013 HC RX MED GY IP 250 OP 250 PS 637: Performed by: HOSPITALIST

## 2020-10-27 PROCEDURE — 250N000013 HC RX MED GY IP 250 OP 250 PS 637: Performed by: INTERNAL MEDICINE

## 2020-10-27 PROCEDURE — 36415 COLL VENOUS BLD VENIPUNCTURE: CPT | Performed by: INTERNAL MEDICINE

## 2020-10-27 PROCEDURE — 83615 LACTATE (LD) (LDH) ENZYME: CPT | Performed by: INTERNAL MEDICINE

## 2020-10-27 PROCEDURE — 85379 FIBRIN DEGRADATION QUANT: CPT | Performed by: INTERNAL MEDICINE

## 2020-10-27 PROCEDURE — 258N000003 HC RX IP 258 OP 636: Performed by: INTERNAL MEDICINE

## 2020-10-27 PROCEDURE — 85045 AUTOMATED RETICULOCYTE COUNT: CPT | Performed by: INTERNAL MEDICINE

## 2020-10-27 RX ORDER — AZITHROMYCIN 250 MG/1
250 TABLET, FILM COATED ORAL DAILY
Status: DISCONTINUED | OUTPATIENT
Start: 2020-10-28 | End: 2020-10-30 | Stop reason: HOSPADM

## 2020-10-27 RX ORDER — LEVETIRACETAM 500 MG/1
1500 TABLET ORAL 2 TIMES DAILY
Status: DISCONTINUED | OUTPATIENT
Start: 2020-10-27 | End: 2020-10-30 | Stop reason: HOSPADM

## 2020-10-27 RX ADMIN — LEVOTHYROXINE SODIUM 125 MCG: 0.1 TABLET ORAL at 09:11

## 2020-10-27 RX ADMIN — LEVETIRACETAM 1500 MG: 500 TABLET, FILM COATED ORAL at 22:27

## 2020-10-27 RX ADMIN — CEFTRIAXONE 1 G: 1 INJECTION, POWDER, FOR SOLUTION INTRAMUSCULAR; INTRAVENOUS at 13:52

## 2020-10-27 RX ADMIN — LISINOPRIL 2.5 MG: 2.5 TABLET ORAL at 21:28

## 2020-10-27 RX ADMIN — ENOXAPARIN SODIUM 40 MG: 40 INJECTION SUBCUTANEOUS at 17:04

## 2020-10-27 RX ADMIN — LACOSAMIDE 200 MG: 200 TABLET, FILM COATED ORAL at 21:27

## 2020-10-27 RX ADMIN — DIVALPROEX SODIUM 250 MG: 250 TABLET, FILM COATED, EXTENDED RELEASE ORAL at 09:11

## 2020-10-27 RX ADMIN — AZITHROMYCIN MONOHYDRATE 250 MG: 500 INJECTION, POWDER, LYOPHILIZED, FOR SOLUTION INTRAVENOUS at 15:08

## 2020-10-27 RX ADMIN — ENOXAPARIN SODIUM 40 MG: 40 INJECTION SUBCUTANEOUS at 05:35

## 2020-10-27 RX ADMIN — PANTOPRAZOLE SODIUM 40 MG: 40 TABLET, DELAYED RELEASE ORAL at 06:56

## 2020-10-27 RX ADMIN — LISINOPRIL 2.5 MG: 2.5 TABLET ORAL at 09:11

## 2020-10-27 RX ADMIN — DEXAMETHASONE SODIUM PHOSPHATE 6 MG: 4 INJECTION, SOLUTION INTRAMUSCULAR; INTRAVENOUS at 15:08

## 2020-10-27 RX ADMIN — LACOSAMIDE 200 MG: 200 TABLET, FILM COATED ORAL at 09:11

## 2020-10-27 RX ADMIN — DIVALPROEX SODIUM 250 MG: 250 TABLET, FILM COATED, EXTENDED RELEASE ORAL at 21:28

## 2020-10-27 ASSESSMENT — ACTIVITIES OF DAILY LIVING (ADL)
ADLS_ACUITY_SCORE: 13

## 2020-10-27 ASSESSMENT — MIFFLIN-ST. JEOR: SCORE: 1980.24

## 2020-10-27 NOTE — PLAN OF CARE
End of Shift Summary  For vital signs and complete assessments, please see documentation flowsheets.     Pertinent assessments: lungs diminished in right base, MILLER, non-productive cough at times. O2 92% 3LPM, desats to mid 80's on room air. A&O. Await Covid results.   Major Shift Events desats with activity, recovers quickly  Treatment Plan: IV antibiotics, Decadron

## 2020-10-27 NOTE — PROGRESS NOTES
[facility]  Hospitalist Progress Note  Will Pérez MD 10/27/2020    Reason for Stay        Acute hypoxic respiratory failure    Bilateral infiltrates    Suspected COVID-19  pneumonia         Assessment and Plan:        Summary of Stay:     Marlyn Wilder is a 55 year old  female with a significant past medical history of brain tumor and meningioma, nephrolithiasis, hypertension, obesity, TIA and sleep apnea who presents with shortness of breath and cough of the last 2 weeks.  On arrival, patient was hypoxic with oxygen saturation 73% on room air and she was placed on 15 L of oxymask which improved her oxygenation rapidly.  She was alert and oriented x3     Vital signs on arrival showed blood pressure 131/88, temperature 97.8, respiratory rate of 40 and oxygen saturation 72% and heart rate of 115.     Basic metabolic panel is significant for mild hyponatremia with sodium 132 and complete blood count is evident for leukocytosis with WBC count of 11.3.  Chest x-ray showed evidence of subtle groundglass opacity noted in the mid to lower lateral right lung possibly reflecting viral pneumonitis.     Patient was given a bolus of normal saline, 1 g of Rocephin and 500 mg of IV Zithromax.  Hospitalist service was consulted for admission and inpatient management of this patient        Patient progress during stay:    Patient was admitted and was closely monitored on IV antibiotic and Decadron.  SARS-CoV-2 PCR obtained on October 26 came back negative.        Problem List with Assessment and Plan      1. Acute hypoxic respiratory failure: Due to bilateral pneumonia    Continue supplemental oxygen, follow cultures      2. Bilateral pneumonia: Bilateral pulmonary chest x-ray      Concern for COVID-19 infection despite negative test    Inflammatory markers are up including LDH, CRP, D-dimer and interleukin-6    Continue Decadron, continue empiric antibiotic    With repeat SARS-CoV-2 PCR tomorrow.  " Continue Covid isolation    3. Hyponatremia: Resolved          Plan for today :    Continue Covid isolations    Continue Decadron and antibiotic    Repeat SARS-CoV-2 PCR tomorrow        LDA     Access : Peripheral    Ovalle Catheter: not present        FEN :    Orders Placed This Encounter      Combination Diet Regular Diet Adult           Intake/Output Summary (Last 24 hours) at 10/27/2020 1841  Last data filed at 10/27/2020 0528  Gross per 24 hour   Intake 200 ml   Output 1000 ml   Net -800 ml          DVT Prophylaxis: Enoxaparin (Lovenox) SQ    Code Status:  Full Code    Estimated discharge  disposition and plan:  May discharge  to home in 2-3 day(s) if patient remains stable           Interval History (Subjective):        Patient is seen and examined by me today and medical record reviewed.Overnight events noted and care discussed with nursing staff.  Patient feels better this morning.  She denies any significant shortness of breath at rest.  Continues to have some shortness of breath with activity.  Continues to require supplemental oxygen.  No fever or chills.  No chest pain.  No nausea vomiting or diarrhea.             Physical Exam:        Last Vital Signs:  BP (P) 127/57   Pulse (P) 81   Temp (P) 97.8  F (36.6  C) (Oral)   Resp (P) 20   Ht 1.727 m (5' 8\")   Wt 134.2 kg (295 lb 12.8 oz)   LMP 10/17/2012   SpO2 (P) 99%   BMI 44.98 kg/m      I/O last 3 completed shifts:  In: 200 [P.O.:200]  Out: 1000 [Urine:1000]    Wt Readings from Last 5 Encounters:   10/27/20 134.2 kg (295 lb 12.8 oz)   02/25/20 127.2 kg (280 lb 6.4 oz)   02/06/20 127 kg (280 lb)   06/10/19 129.7 kg (286 lb)   05/29/19 130.5 kg (287 lb 9.6 oz)      Patient was remotely seen and examined by me this morning.  She appears to be alert and oriented x3.  No respiratory distress noted.  She has supplemental nasal oxygen.         Medications:        All current medications were reviewed with changes reflected in problem list.         Data:  "     All new lab and imaging data was reviewed.      Data reviewed today: I reviewed all new labs and imaging results over the last 24 hours. I personally reviewed no images or EKG's today      Recent Labs   Lab 10/27/20  0620 10/26/20  1525 10/26/20  1136   WBC 8.9 10.1 11.3*   HGB 11.4* 11.2* 12.2   HCT 37.2 35.1 37.6   MCV 97 94 91    223 244     No results for input(s): CULT in the last 168 hours.  Recent Labs   Lab 10/27/20  0620 10/26/20  1525 10/26/20  1136    135 132*   POTASSIUM 4.7 4.1 4.0   CHLORIDE 104 103 100   CO2 26 25 23   ANIONGAP 7 7 9   * 108* 122*   BUN 8 8 9   CR 0.61 0.61 0.66   GFRESTIMATED >90 >90 >90   GFRESTBLACK >90 >90 >90   BRENDEN 8.5 8.0* 8.6   PROTTOTAL 6.6* 6.4*  --    ALBUMIN 2.5* 2.4*  --    BILITOTAL 0.7 0.9  --    ALKPHOS 76 72  --    AST 18 21  --    ALT 31 31  --        Recent Labs   Lab 10/27/20  0620 10/26/20  1525 10/26/20  1136   * 108* 122*       Recent Labs   Lab 10/27/20  0620 10/26/20  1525   INR 1.17* 1.13         Recent Labs   Lab 10/27/20  0620 10/26/20  1525   TROPI <0.015 0.018       Recent Results (from the past 48 hour(s))   XR Chest Port 1 View    Narrative    CHEST ONE VIEW PORTABLE  10/26/2020 11:56 AM     HISTORY:  Hypoxia, COVID.    COMPARISON: Chest x-ray 11/9/2012.      Impression    IMPRESSION: Portable chest. Subtle groundglass opacity noted in the  mid to lower lateral right lung possibly reflecting viral pneumonitis.  Retrocardiac opacity could indicate left lower lobe atelectasis or  consolidation. No pneumothorax or significant pleural fluid. Heart is  normal in size.    DAFNE VERAS MD       COVID Status:  COVID-19 PCR Results    COVID-19 PCR Results 8/31/20 10/26/20 10/26/20     1136 1136   COVID-19 Virus PCR to [facility] - Result  Test received-See reflex to IDDL test SARS CoV2 (COVID-19) Virus RT-PCR    COVID-19 Virus PCR to [facility] - Source  Nasopharyngeal    COVID-19 Virus by PCR (External Result) Negative     SARS-CoV-2  Virus Specimen Source   Nasopharyngeal   SARS-CoV-2 PCR Result   NEGATIVE      Comments are available for some flowsheets but are not being displayed.         COVID-19 Antibody Results, Testing for Immunity    COVID-19 Antibody Results, Testing for Immunity   No data to display.              Disclaimer: This note consists of symbols derived from keyboarding, dictation and/or voice recognition software. As a result, there may be errors in the script that have gone undetected. Please consider this when interpreting information found in this chart.

## 2020-10-27 NOTE — PLAN OF CARE
Pt A&O, VSS, denies pain. Airborne precautions maintained. Pt ambulated to bathroom, O2 decreased to 85% on RA,  placed on 3L O2. Cough and SOB with activity. O2 sat remaining 93-94% on 3L. Awaiting COVID result.

## 2020-10-27 NOTE — PLAN OF CARE
End of Shift Summary  For vital signs and complete assessments, please see documentation flowsheets.     Pertinent assessments: SOB with activity. O2 sats down to 83% on room air after activity. Requiring 2-3L of O2 to keep sats > 90%. Harsh, dry cough with activity.    Major Shift Events: Initiated on abx (Rocephin, Zithromax) and IV steroids.     Treatment Plan: IV abx & steroids. Await COVID swab results.   Bedside Nurse: Andreia Corey RN

## 2020-10-28 LAB
ALBUMIN SERPL-MCNC: 2.5 G/DL (ref 3.4–5)
ALP SERPL-CCNC: 73 U/L (ref 40–150)
ALT SERPL W P-5'-P-CCNC: 32 U/L (ref 0–50)
ANION GAP SERPL CALCULATED.3IONS-SCNC: 11 MMOL/L (ref 3–14)
AST SERPL W P-5'-P-CCNC: 16 U/L (ref 0–45)
BASOPHILS # BLD AUTO: 0 10E9/L (ref 0–0.2)
BASOPHILS NFR BLD AUTO: 0.2 %
BILIRUB SERPL-MCNC: 0.3 MG/DL (ref 0.2–1.3)
BUN SERPL-MCNC: 9 MG/DL (ref 7–30)
CALCIUM SERPL-MCNC: 8.6 MG/DL (ref 8.5–10.1)
CHLORIDE SERPL-SCNC: 107 MMOL/L (ref 94–109)
CO2 SERPL-SCNC: 23 MMOL/L (ref 20–32)
CREAT SERPL-MCNC: 0.6 MG/DL (ref 0.52–1.04)
CRP SERPL-MCNC: 113 MG/L (ref 0–8)
D DIMER PPP FEU-MCNC: 2.4 UG/ML FEU (ref 0–0.5)
DIFFERENTIAL METHOD BLD: ABNORMAL
EOSINOPHIL # BLD AUTO: 0 10E9/L (ref 0–0.7)
EOSINOPHIL NFR BLD AUTO: 0 %
ERYTHROCYTE [DISTWIDTH] IN BLOOD BY AUTOMATED COUNT: 17.5 % (ref 10–15)
FIBRINOGEN PPP-MCNC: 559 MG/DL (ref 200–420)
GFR SERPL CREATININE-BSD FRML MDRD: >90 ML/MIN/{1.73_M2}
GLUCOSE SERPL-MCNC: 120 MG/DL (ref 70–99)
HCT VFR BLD AUTO: 35.5 % (ref 35–47)
HGB BLD-MCNC: 11 G/DL (ref 11.7–15.7)
IMM GRANULOCYTES # BLD: 0.3 10E9/L (ref 0–0.4)
IMM GRANULOCYTES NFR BLD: 2.7 %
INR PPP: 1.09 (ref 0.86–1.14)
LABORATORY COMMENT REPORT: NORMAL
LDH SERPL L TO P-CCNC: 389 U/L (ref 81–234)
LYMPHOCYTES # BLD AUTO: 2.8 10E9/L (ref 0.8–5.3)
LYMPHOCYTES NFR BLD AUTO: 22.9 %
MCH RBC QN AUTO: 29.8 PG (ref 26.5–33)
MCHC RBC AUTO-ENTMCNC: 31 G/DL (ref 31.5–36.5)
MCV RBC AUTO: 96 FL (ref 78–100)
MONOCYTES # BLD AUTO: 0.7 10E9/L (ref 0–1.3)
MONOCYTES NFR BLD AUTO: 5.6 %
NEUTROPHILS # BLD AUTO: 8.5 10E9/L (ref 1.6–8.3)
NEUTROPHILS NFR BLD AUTO: 68.6 %
NRBC # BLD AUTO: 0 10*3/UL
NRBC BLD AUTO-RTO: 0 /100
PLATELET # BLD AUTO: 267 10E9/L (ref 150–450)
POTASSIUM SERPL-SCNC: 4.2 MMOL/L (ref 3.4–5.3)
PROT SERPL-MCNC: 6.6 G/DL (ref 6.8–8.8)
RBC # BLD AUTO: 3.69 10E12/L (ref 3.8–5.2)
RETICS # AUTO: 109.6 10E9/L (ref 25–95)
RETICS/RBC NFR AUTO: 3 % (ref 0.5–2)
SARS-COV-2 RNA SPEC QL NAA+PROBE: NEGATIVE
SARS-COV-2 RNA SPEC QL NAA+PROBE: NORMAL
SODIUM SERPL-SCNC: 141 MMOL/L (ref 133–144)
SPECIMEN SOURCE: NORMAL
SPECIMEN SOURCE: NORMAL
WBC # BLD AUTO: 12.4 10E9/L (ref 4–11)

## 2020-10-28 PROCEDURE — 85045 AUTOMATED RETICULOCYTE COUNT: CPT | Performed by: INTERNAL MEDICINE

## 2020-10-28 PROCEDURE — 85384 FIBRINOGEN ACTIVITY: CPT | Performed by: INTERNAL MEDICINE

## 2020-10-28 PROCEDURE — 85025 COMPLETE CBC W/AUTO DIFF WBC: CPT | Performed by: INTERNAL MEDICINE

## 2020-10-28 PROCEDURE — U0003 INFECTIOUS AGENT DETECTION BY NUCLEIC ACID (DNA OR RNA); SEVERE ACUTE RESPIRATORY SYNDROME CORONAVIRUS 2 (SARS-COV-2) (CORONAVIRUS DISEASE [COVID-19]), AMPLIFIED PROBE TECHNIQUE, MAKING USE OF HIGH THROUGHPUT TECHNOLOGIES AS DESCRIBED BY CMS-2020-01-R: HCPCS | Performed by: INTERNAL MEDICINE

## 2020-10-28 PROCEDURE — 36415 COLL VENOUS BLD VENIPUNCTURE: CPT | Performed by: INTERNAL MEDICINE

## 2020-10-28 PROCEDURE — 120N000001 HC R&B MED SURG/OB

## 2020-10-28 PROCEDURE — 80053 COMPREHEN METABOLIC PANEL: CPT | Performed by: INTERNAL MEDICINE

## 2020-10-28 PROCEDURE — 250N000011 HC RX IP 250 OP 636: Performed by: INTERNAL MEDICINE

## 2020-10-28 PROCEDURE — 250N000013 HC RX MED GY IP 250 OP 250 PS 637: Performed by: HOSPITALIST

## 2020-10-28 PROCEDURE — 85379 FIBRIN DEGRADATION QUANT: CPT | Performed by: INTERNAL MEDICINE

## 2020-10-28 PROCEDURE — 85610 PROTHROMBIN TIME: CPT | Performed by: INTERNAL MEDICINE

## 2020-10-28 PROCEDURE — 83615 LACTATE (LD) (LDH) ENZYME: CPT | Performed by: INTERNAL MEDICINE

## 2020-10-28 PROCEDURE — 250N000013 HC RX MED GY IP 250 OP 250 PS 637: Performed by: INTERNAL MEDICINE

## 2020-10-28 PROCEDURE — 86140 C-REACTIVE PROTEIN: CPT | Performed by: INTERNAL MEDICINE

## 2020-10-28 PROCEDURE — 99233 SBSQ HOSP IP/OBS HIGH 50: CPT | Performed by: INTERNAL MEDICINE

## 2020-10-28 RX ADMIN — DEXAMETHASONE SODIUM PHOSPHATE 6 MG: 4 INJECTION, SOLUTION INTRAMUSCULAR; INTRAVENOUS at 19:10

## 2020-10-28 RX ADMIN — LISINOPRIL 2.5 MG: 2.5 TABLET ORAL at 08:13

## 2020-10-28 RX ADMIN — LEVOTHYROXINE SODIUM 125 MCG: 0.1 TABLET ORAL at 08:13

## 2020-10-28 RX ADMIN — LEVETIRACETAM 1500 MG: 500 TABLET, FILM COATED ORAL at 20:42

## 2020-10-28 RX ADMIN — DIVALPROEX SODIUM 250 MG: 250 TABLET, FILM COATED, EXTENDED RELEASE ORAL at 08:13

## 2020-10-28 RX ADMIN — LACOSAMIDE 200 MG: 200 TABLET, FILM COATED ORAL at 08:12

## 2020-10-28 RX ADMIN — ENOXAPARIN SODIUM 40 MG: 40 INJECTION SUBCUTANEOUS at 17:08

## 2020-10-28 RX ADMIN — AZITHROMYCIN MONOHYDRATE 250 MG: 250 TABLET ORAL at 14:14

## 2020-10-28 RX ADMIN — LEVETIRACETAM 1500 MG: 500 TABLET, FILM COATED ORAL at 08:12

## 2020-10-28 RX ADMIN — ENOXAPARIN SODIUM 40 MG: 40 INJECTION SUBCUTANEOUS at 06:46

## 2020-10-28 RX ADMIN — LACOSAMIDE 200 MG: 200 TABLET, FILM COATED ORAL at 20:43

## 2020-10-28 RX ADMIN — PANTOPRAZOLE SODIUM 40 MG: 40 TABLET, DELAYED RELEASE ORAL at 06:45

## 2020-10-28 RX ADMIN — LISINOPRIL 2.5 MG: 2.5 TABLET ORAL at 20:44

## 2020-10-28 RX ADMIN — DIVALPROEX SODIUM 250 MG: 250 TABLET, FILM COATED, EXTENDED RELEASE ORAL at 20:43

## 2020-10-28 ASSESSMENT — ACTIVITIES OF DAILY LIVING (ADL)
ADLS_ACUITY_SCORE: 13

## 2020-10-28 ASSESSMENT — MIFFLIN-ST. JEOR: SCORE: 1982.96

## 2020-10-28 NOTE — PLAN OF CARE
To Do:  End of Shift Summary  For vital signs and complete assessments, please see documentation flowsheets.     Pertinent assessments: A & O x4. Up independently in room. LS diminished. MILLER. Infrequent non-productive cough. Appetite is good.Voiding adequately. C/O diarrhea from the anti-biotics.     Major Shift Events: COVID-19 NEG. Precautions to remain in place per MD.     Treatment Plan: IV antibiotics and decadron.    Bedside Nurse: Milla Chisholm RN

## 2020-10-28 NOTE — PROVIDER NOTIFICATION
1436 Dr. Pérez pagemary- Pt. is NEG for COVID-19. Can special precautions be removed? Thanks.    Milla Chisholm RN on 10/28/2020 at 2:36 PM

## 2020-10-28 NOTE — PLAN OF CARE
Pt up ind. VSS LS clear. On 3L NC, unable to wean. Infreq cough. MILLER Covid negative, kept in isolation, plan to re swab tomorrow. Soy reg diet, appetite fair, denies nausea. On tele. Up amb in room.

## 2020-10-28 NOTE — PROGRESS NOTES
"BP (!) 149/78 (BP Location: Left arm)   Pulse 76   Temp 97.8  F (36.6  C) (Oral)   Resp 20   Ht 1.727 m (5' 8\")   Wt 134.4 kg (296 lb 6.4 oz)   LMP 10/17/2012   SpO2 97%   BMI 45.07 kg/m    Patient alert and oriented. Shortness of breath on exertion. On 3L of O2. Isolation maintained. Retested for Covid this morning. Tolerating regular diet. Up independently in the room. Denies pain or nausea at this time. Will continue to monitor and provide supportive cares.  "

## 2020-10-28 NOTE — PROGRESS NOTES
[facility]  Hospitalist Progress Note  Will Pérez MD 10/28/2020    Reason for Stay        Acute hypoxic respiratory failure    Bilateral infiltrates    Suspected COVID-19  pneumonia         Assessment and Plan:        Summary of Stay:     Marlyn Wilder is a 55 year old  female with a significant past medical history of brain tumor and meningioma, nephrolithiasis, hypertension, obesity, TIA and sleep apnea who presents with shortness of breath and cough of the last 2 weeks.  On arrival, patient was hypoxic with oxygen saturation 73% on room air and she was placed on 15 L of oxymask which improved her oxygenation rapidly.  She was alert and oriented x3     Vital signs on arrival showed blood pressure 131/88, temperature 97.8, respiratory rate of 40 and oxygen saturation 72% and heart rate of 115.     Basic metabolic panel is significant for mild hyponatremia with sodium 132 and complete blood count is evident for leukocytosis with WBC count of 11.3.  Chest x-ray showed evidence of subtle groundglass opacity noted in the mid to lower lateral right lung possibly reflecting viral pneumonitis.     Patient was given a bolus of normal saline, 1 g of Rocephin and 500 mg of IV Zithromax.  Hospitalist service was consulted for admission and inpatient management of this patient        Patient progress during stay:    Patient was admitted and was closely monitored on IV antibiotic and Decadron.  SARS-CoV-2 PCR obtained on October 26 came back negative but patient's involuntary markers are consistent with COVID-19 infection including significant elevated interleukin-6 level at 121.69.  Other markers also elevated including LDH CRP and D-dimer.  Fortunately, patient condition gradually improved and she remained afebrile but remained hypoxic.        Problem List with Assessment and Plan      1. Acute hypoxic respiratory failure: Due to bilateral pneumonia    Continue supplemental oxygen, wean  "oxygen as able.    She is improving  without any respiratory distress or shortness of breath      2. Bilateral pneumonia: Bilateral pulmonary chest x-ray      Concern for COVID-19 infection despite negative test    Inflammatory markers are up including LDH, CRP, D-dimer and interleukin-6    Continue Decadron, continue empiric antibiotic    Repeat SARS-CoV-2 PCR pending.  Continue Covid isolation respiratory panel results.  Discussed with bedside nurse    3. Hyponatremia: Resolved          Plan for today :    Continue Covid isolations    Continue Decadron and antibiotic    Follow SARS-CoV-2 PCR     Addendum   Second SARS CoV2 PCR is negative on 10/28  Will keep COVID isolation for now   Contacted [facility] triage team ,will review.    LDA     Access : Peripheral    Ovalle Catheter: not present        FEN :    Orders Placed This Encounter      Combination Diet Regular Diet Adult           Intake/Output Summary (Last 24 hours) at 10/27/2020 1849  Last data filed at 10/27/2020 0528  Gross per 24 hour   Intake 200 ml   Output 1000 ml   Net -800 ml          DVT Prophylaxis: Enoxaparin (Lovenox) SQ    Code Status:  Full Code    Estimated discharge  disposition and plan: Likely home in the next 2 days if you are able to wean off oxygen.  If her condition declines, we well consider consider transferring her to [facility]        Interval History (Subjective):        Patient was seen and examined by me at bedside.  She feels much better today without complaint of scalp and shortness of breath or chest pain.  She has no fever or chills.  She still requires couple liters of oxygen.  Good appetite.  No diarrhea or vomiting           Physical Exam:        Last Vital Signs:  /61 (BP Location: Left arm)   Pulse 77   Temp 98.2  F (36.8  C) (Oral)   Resp 16   Ht 1.727 m (5' 8\")   Wt 134.4 kg (296 lb 6.4 oz)   LMP 10/17/2012   SpO2 98%   BMI 45.07 kg/m      I/O last 3 completed shifts:  In: 1440 [P.O.:1440]  Out: -     Wt " Readings from Last 5 Encounters:   10/28/20 134.4 kg (296 lb 6.4 oz)   02/25/20 127.2 kg (280 lb 6.4 oz)   02/06/20 127 kg (280 lb)   06/10/19 129.7 kg (286 lb)   05/29/19 130.5 kg (287 lb 9.6 oz)      She is alert and oriented x3, no  respiratory distress  Neck supple, no JVD or thyromegaly  Lung sounds are diminished, no crackles or wheezing  Cardiovascular exam is unremarkable.  Heart regular, no murmur gallop  Abdomen is soft, nontender nondistended  Extremity exam showed no significant edema.    Neurologic exam is normal           Medications:        All current medications were reviewed with changes reflected in problem list.         Data:      All new lab and imaging data was reviewed.      Data reviewed today: I reviewed all new labs and imaging results over the last 24 hours. I personally reviewed no images or EKG's today      Recent Labs   Lab 10/28/20  0818 10/27/20  0620 10/26/20  1525   WBC 12.4* 8.9 10.1   HGB 11.0* 11.4* 11.2*   HCT 35.5 37.2 35.1   MCV 96 97 94    237 223     No results for input(s): CULT in the last 168 hours.  Recent Labs   Lab 10/28/20  0626 10/27/20  0620 10/26/20  1525    137 135   POTASSIUM 4.2 4.7 4.1   CHLORIDE 107 104 103   CO2 23 26 25   ANIONGAP 11 7 7   * 165* 108*   BUN 9 8 8   CR 0.60 0.61 0.61   GFRESTIMATED >90 >90 >90   GFRESTBLACK >90 >90 >90   BRENDEN 8.6 8.5 8.0*   PROTTOTAL 6.6* 6.6* 6.4*   ALBUMIN 2.5* 2.5* 2.4*   BILITOTAL 0.3 0.7 0.9   ALKPHOS 73 76 72   AST 16 18 21   ALT 32 31 31       Recent Labs   Lab 10/28/20  0626 10/27/20  0620 10/26/20  1525 10/26/20  1136   * 165* 108* 122*       Recent Labs   Lab 10/28/20  0626 10/27/20  0620 10/26/20  1525   INR 1.09 1.17* 1.13         Recent Labs   Lab 10/27/20  0620 10/26/20  1525   TROPI <0.015 0.018       Recent Results (from the past 48 hour(s))   XR Chest Port 1 View    Narrative    CHEST ONE VIEW PORTABLE  10/26/2020 11:56 AM     HISTORY:  Hypoxia, COVID.    COMPARISON: Chest x-ray  11/9/2012.      Impression    IMPRESSION: Portable chest. Subtle groundglass opacity noted in the  mid to lower lateral right lung possibly reflecting viral pneumonitis.  Retrocardiac opacity could indicate left lower lobe atelectasis or  consolidation. No pneumothorax or significant pleural fluid. Heart is  normal in size.    DAFNE VERAS MD       COVID Status:  COVID-19 PCR Results    COVID-19 PCR Results 8/31/20 10/26/20 10/26/20 10/28/20     1136 1136    COVID-19 Virus PCR to [facility] - Result  Test received-See reflex to IDDL test SARS CoV2 (COVID-19) Virus RT-PCR  Test received-See reflex to IDDL test SARS CoV2 (COVID-19) Virus RT-PCR   COVID-19 Virus PCR to [facility] - Source  Nasopharyngeal  Nasopharyngeal   COVID-19 Virus by PCR (External Result) Negative      SARS-CoV-2 Virus Specimen Source   Nasopharyngeal    SARS-CoV-2 PCR Result   NEGATIVE       Comments are available for some flowsheets but are not being displayed.         COVID-19 Antibody Results, Testing for Immunity    COVID-19 Antibody Results, Testing for Immunity   No data to display.              Disclaimer: This note consists of symbols derived from keyboarding, dictation and/or voice recognition software. As a result, there may be errors in the script that have gone undetected. Please consider this when interpreting information found in this chart.

## 2020-10-29 LAB
ANION GAP SERPL CALCULATED.3IONS-SCNC: 5 MMOL/L (ref 3–14)
BASOPHILS # BLD AUTO: 0 10E9/L (ref 0–0.2)
BASOPHILS NFR BLD AUTO: 0.2 %
BUN SERPL-MCNC: 12 MG/DL (ref 7–30)
CALCIUM SERPL-MCNC: 8.8 MG/DL (ref 8.5–10.1)
CHLORIDE SERPL-SCNC: 106 MMOL/L (ref 94–109)
CO2 SERPL-SCNC: 30 MMOL/L (ref 20–32)
CREAT SERPL-MCNC: 0.64 MG/DL (ref 0.52–1.04)
CRP SERPL-MCNC: 55.6 MG/L (ref 0–8)
D DIMER PPP FEU-MCNC: 1.2 UG/ML FEU (ref 0–0.5)
DIFFERENTIAL METHOD BLD: ABNORMAL
EOSINOPHIL # BLD AUTO: 0 10E9/L (ref 0–0.7)
EOSINOPHIL NFR BLD AUTO: 0 %
ERYTHROCYTE [DISTWIDTH] IN BLOOD BY AUTOMATED COUNT: 17.6 % (ref 10–15)
FIBRINOGEN PPP-MCNC: 711 MG/DL (ref 200–420)
GFR SERPL CREATININE-BSD FRML MDRD: >90 ML/MIN/{1.73_M2}
GLUCOSE SERPL-MCNC: 95 MG/DL (ref 70–99)
HCT VFR BLD AUTO: 33.2 % (ref 35–47)
HGB BLD-MCNC: 10.4 G/DL (ref 11.7–15.7)
IMM GRANULOCYTES # BLD: 0.3 10E9/L (ref 0–0.4)
IMM GRANULOCYTES NFR BLD: 3.5 %
INR PPP: 1.07 (ref 0.86–1.14)
LDH SERPL L TO P-CCNC: 351 U/L (ref 81–234)
LYMPHOCYTES # BLD AUTO: 2.2 10E9/L (ref 0.8–5.3)
LYMPHOCYTES NFR BLD AUTO: 22.7 %
MAGNESIUM SERPL-MCNC: 2.5 MG/DL (ref 1.6–2.3)
MCH RBC QN AUTO: 30.1 PG (ref 26.5–33)
MCHC RBC AUTO-ENTMCNC: 31.3 G/DL (ref 31.5–36.5)
MCV RBC AUTO: 96 FL (ref 78–100)
MONOCYTES # BLD AUTO: 0.6 10E9/L (ref 0–1.3)
MONOCYTES NFR BLD AUTO: 6.5 %
NEUTROPHILS # BLD AUTO: 6.5 10E9/L (ref 1.6–8.3)
NEUTROPHILS NFR BLD AUTO: 67.1 %
NRBC # BLD AUTO: 0 10*3/UL
NRBC BLD AUTO-RTO: 0 /100
PLATELET # BLD AUTO: 259 10E9/L (ref 150–450)
POTASSIUM SERPL-SCNC: 4.6 MMOL/L (ref 3.4–5.3)
RBC # BLD AUTO: 3.46 10E12/L (ref 3.8–5.2)
RETICS # AUTO: 123.9 10E9/L (ref 25–95)
RETICS/RBC NFR AUTO: 3.6 % (ref 0.5–2)
SARS-COV-2 RNA SPEC QL NAA+PROBE: NORMAL
SODIUM SERPL-SCNC: 141 MMOL/L (ref 133–144)
SPECIMEN SOURCE: NORMAL
TROPONIN I SERPL-MCNC: <0.015 UG/L (ref 0–0.04)
WBC # BLD AUTO: 9.6 10E9/L (ref 4–11)

## 2020-10-29 PROCEDURE — 36415 COLL VENOUS BLD VENIPUNCTURE: CPT | Performed by: INTERNAL MEDICINE

## 2020-10-29 PROCEDURE — 85610 PROTHROMBIN TIME: CPT | Performed by: INTERNAL MEDICINE

## 2020-10-29 PROCEDURE — 250N000011 HC RX IP 250 OP 636: Performed by: INTERNAL MEDICINE

## 2020-10-29 PROCEDURE — U0003 INFECTIOUS AGENT DETECTION BY NUCLEIC ACID (DNA OR RNA); SEVERE ACUTE RESPIRATORY SYNDROME CORONAVIRUS 2 (SARS-COV-2) (CORONAVIRUS DISEASE [COVID-19]), AMPLIFIED PROBE TECHNIQUE, MAKING USE OF HIGH THROUGHPUT TECHNOLOGIES AS DESCRIBED BY CMS-2020-01-R: HCPCS | Performed by: INTERNAL MEDICINE

## 2020-10-29 PROCEDURE — 85025 COMPLETE CBC W/AUTO DIFF WBC: CPT | Performed by: INTERNAL MEDICINE

## 2020-10-29 PROCEDURE — 83615 LACTATE (LD) (LDH) ENZYME: CPT | Performed by: INTERNAL MEDICINE

## 2020-10-29 PROCEDURE — 80048 BASIC METABOLIC PNL TOTAL CA: CPT | Performed by: INTERNAL MEDICINE

## 2020-10-29 PROCEDURE — 84484 ASSAY OF TROPONIN QUANT: CPT | Performed by: INTERNAL MEDICINE

## 2020-10-29 PROCEDURE — 83735 ASSAY OF MAGNESIUM: CPT | Performed by: INTERNAL MEDICINE

## 2020-10-29 PROCEDURE — 85384 FIBRINOGEN ACTIVITY: CPT | Performed by: INTERNAL MEDICINE

## 2020-10-29 PROCEDURE — 85379 FIBRIN DEGRADATION QUANT: CPT | Performed by: INTERNAL MEDICINE

## 2020-10-29 PROCEDURE — 250N000013 HC RX MED GY IP 250 OP 250 PS 637: Performed by: INTERNAL MEDICINE

## 2020-10-29 PROCEDURE — 250N000013 HC RX MED GY IP 250 OP 250 PS 637: Performed by: HOSPITALIST

## 2020-10-29 PROCEDURE — 85045 AUTOMATED RETICULOCYTE COUNT: CPT | Performed by: INTERNAL MEDICINE

## 2020-10-29 PROCEDURE — 86769 SARS-COV-2 COVID-19 ANTIBODY: CPT | Performed by: INTERNAL MEDICINE

## 2020-10-29 PROCEDURE — 86140 C-REACTIVE PROTEIN: CPT | Performed by: INTERNAL MEDICINE

## 2020-10-29 PROCEDURE — 120N000001 HC R&B MED SURG/OB

## 2020-10-29 PROCEDURE — 99233 SBSQ HOSP IP/OBS HIGH 50: CPT | Performed by: INTERNAL MEDICINE

## 2020-10-29 RX ADMIN — LEVETIRACETAM 1500 MG: 500 TABLET, FILM COATED ORAL at 20:23

## 2020-10-29 RX ADMIN — DIVALPROEX SODIUM 250 MG: 250 TABLET, FILM COATED, EXTENDED RELEASE ORAL at 08:07

## 2020-10-29 RX ADMIN — LACOSAMIDE 200 MG: 200 TABLET, FILM COATED ORAL at 08:07

## 2020-10-29 RX ADMIN — LACOSAMIDE 200 MG: 200 TABLET, FILM COATED ORAL at 20:23

## 2020-10-29 RX ADMIN — DEXAMETHASONE SODIUM PHOSPHATE 6 MG: 4 INJECTION, SOLUTION INTRAMUSCULAR; INTRAVENOUS at 14:16

## 2020-10-29 RX ADMIN — PANTOPRAZOLE SODIUM 40 MG: 40 TABLET, DELAYED RELEASE ORAL at 06:04

## 2020-10-29 RX ADMIN — CEFTRIAXONE 1 G: 1 INJECTION, POWDER, FOR SOLUTION INTRAMUSCULAR; INTRAVENOUS at 14:15

## 2020-10-29 RX ADMIN — LEVOTHYROXINE SODIUM 125 MCG: 0.1 TABLET ORAL at 08:09

## 2020-10-29 RX ADMIN — ENOXAPARIN SODIUM 40 MG: 40 INJECTION SUBCUTANEOUS at 06:04

## 2020-10-29 RX ADMIN — DIVALPROEX SODIUM 250 MG: 250 TABLET, FILM COATED, EXTENDED RELEASE ORAL at 20:23

## 2020-10-29 RX ADMIN — AZITHROMYCIN MONOHYDRATE 250 MG: 250 TABLET ORAL at 14:16

## 2020-10-29 RX ADMIN — LISINOPRIL 2.5 MG: 2.5 TABLET ORAL at 08:07

## 2020-10-29 RX ADMIN — ENOXAPARIN SODIUM 40 MG: 40 INJECTION SUBCUTANEOUS at 16:30

## 2020-10-29 RX ADMIN — LISINOPRIL 2.5 MG: 2.5 TABLET ORAL at 20:23

## 2020-10-29 RX ADMIN — LEVETIRACETAM 1500 MG: 500 TABLET, FILM COATED ORAL at 08:07

## 2020-10-29 ASSESSMENT — ACTIVITIES OF DAILY LIVING (ADL)
ADLS_ACUITY_SCORE: 13

## 2020-10-29 ASSESSMENT — MIFFLIN-ST. JEOR: SCORE: 1937.15

## 2020-10-29 NOTE — PLAN OF CARE
Pertinent assessments: VSS on 2L NC. Afebrile. Very MILLER. LS clear but diminished. Loose stools from antibiotics. Tele: SR. Tolerating a regular diet. Up independently. Covid neg x2. Remains on special precautions.  Major Shift Events: None  Treatment Plan: IV Rocephin, O2, Tele, and IV decadron.

## 2020-10-29 NOTE — PLAN OF CARE
To Do:  End of Shift Summary  For vital signs and complete assessments, please see documentation flowsheets.     Pertinent assessments: Alert/oriented x4. Lung sounds diminished. VSS on 2 LPM NC. Tele is SR. Abdomen is soft and non-tender, reports of diarrhea. Skin intact. Voiding without difficulty.     Major Shift Events: None    Treatment Plan: IV Rocephin, wean O2, Tele, and IV decadron. Discontinue precautions pending COVID triage team.

## 2020-10-29 NOTE — PROGRESS NOTES
[facility]  Hospitalist Progress Note  Will Pérez MD 10/29/2020    Reason for Stay        Acute hypoxic respiratory failure    Bilateral infiltrates    Suspected COVID-19  pneumonia         Assessment and Plan:        Summary of Stay:     Marlyn Wilder is a 55 year old  female with a significant past medical history of brain tumor and meningioma, nephrolithiasis, hypertension, obesity, TIA and sleep apnea who presents with shortness of breath and cough of the last 2 weeks.  On arrival, patient was hypoxic with oxygen saturation 73% on room air and she was placed on 15 L of oxymask which improved her oxygenation rapidly.  She was alert and oriented x3     Vital signs on arrival showed blood pressure 131/88, temperature 97.8, respiratory rate of 40 and oxygen saturation 72% and heart rate of 115.     Basic metabolic panel is significant for mild hyponatremia with sodium 132 and complete blood count is evident for leukocytosis with WBC count of 11.3.  Chest x-ray showed evidence of subtle groundglass opacity noted in the mid to lower lateral right lung possibly reflecting viral pneumonitis.     Patient was given a bolus of normal saline, 1 g of Rocephin and 500 mg of IV Zithromax.  Hospitalist service was consulted for admission and inpatient management of this patient        Patient progress during stay:    Patient was admitted and was closely monitored on IV antibiotic and Decadron.  SARS-CoV-2 PCR obtained on October 26 came back negative but patient's involuntary markers are consistent with COVID-19 infection including significant elevated interleukin-6 level at 121.69.  Other markers also elevated including LDH CRP and D-dimer.  Fortunately, patient condition gradually improved and she remained afebrile but remained hypoxic.        Problem List with Assessment and Plan      1. Acute hypoxic respiratory failure: Due to bilateral pneumonia    Continue supplemental oxygen, wean  oxygen as able.    She is improving  without any respiratory distress or shortness of breath      2. Bilateral pneumonia: Bilateral pulmonary chest x-ray      Concern for COVID-19 infection despite negative test    Inflammatory markers are up including LDH, CRP, D-dimer and interleukin-6    Continue Decadron, continue empiric antibiotic    Repeat SARS-CoV-2 PCR negative again on October 28, 2020.    Covid triage was team consulted and I spoke with Dr. Poncho Jordan about patient's presentation.  We agreed that patient probably has COVID-19 infection despite the negative test.    Plan is to get antibody test as well as repeat echo with test again.  Continue isolation    3. Hyponatremia: Resolved          Plan for today :    Continue Covid isolations    Continue Decadron and antibiotic    Wean off oxygen as able.  Probably discharge tomorrow if you are able to wean off oxygen.    I did discuss with patient and spouse at length and answered her questions.        LDA     Access : Peripheral    Ovalle Catheter: not present        FEN :    Orders Placed This Encounter      Combination Diet Regular Diet Adult           Intake/Output Summary (Last 24 hours) at 10/27/2020 1848  Last data filed at 10/27/2020 0528  Gross per 24 hour   Intake 200 ml   Output 1000 ml   Net -800 ml          DVT Prophylaxis: Enoxaparin (Lovenox) SQ    Code Status:  Full Code    Estimated discharge  disposition and plan: Likely home tomorrow if she continues to do well and able to wean off oxygen      Interval History (Subjective):        Patient was seen and examined by me at bedside.  She feels much better today without complaint of scalp and shortness of breath or chest pain.  She has some cough.  Patient is doing well without any complaint of chest pain or shortness of breath.  She is denying any pain or diarrhea.  She is requiring 1 or 2 L of oxygen to keep her oxygen saturation over 90.  I discussed with her that we are suspecting COVID-19  "infection despite the negative tests.  I spoke with patient's spouse over the phone and explained to him that we are suspecting COVID-19..        Physical Exam:        Last Vital Signs:  /53 (BP Location: Left arm)   Pulse 77   Temp 97.9  F (36.6  C) (Oral)   Resp 20   Ht 1.727 m (5' 8\")   Wt 129.9 kg (286 lb 4.8 oz)   LMP 10/17/2012   SpO2 95%   BMI 43.53 kg/m      No intake/output data recorded.    Wt Readings from Last 5 Encounters:   10/29/20 129.9 kg (286 lb 4.8 oz)   02/25/20 127.2 kg (280 lb 6.4 oz)   02/06/20 127 kg (280 lb)   06/10/19 129.7 kg (286 lb)   05/29/19 130.5 kg (287 lb 9.6 oz)      She is alert and oriented x3, no  respiratory distress  Neck supple, no JVD or thyromegaly  Lung sounds are diminished, no crackles or wheezing  Cardiovascular exam is unremarkable.  Heart regular, no murmur gallop  Abdomen is soft, nontender nondistended  Extremity exam showed no significant edema.    Neurologic exam is normal           Medications:        All current medications were reviewed with changes reflected in problem list.         Data:      All new lab and imaging data was reviewed.      Data reviewed today: I reviewed all new labs and imaging results over the last 24 hours. I personally reviewed no images or EKG's today      Recent Labs   Lab 10/29/20  0652 10/28/20  0818 10/27/20  0620   WBC 9.6 12.4* 8.9   HGB 10.4* 11.0* 11.4*   HCT 33.2* 35.5 37.2   MCV 96 96 97    267 237     No results for input(s): CULT in the last 168 hours.  Recent Labs   Lab 10/29/20  0652 10/28/20  0626 10/27/20  0620 10/26/20  1525    141 137 135   POTASSIUM 4.6 4.2 4.7 4.1   CHLORIDE 106 107 104 103   CO2 30 23 26 25   ANIONGAP 5 11 7 7   GLC 95 120* 165* 108*   BUN 12 9 8 8   CR 0.64 0.60 0.61 0.61   GFRESTIMATED >90 >90 >90 >90   GFRESTBLACK >90 >90 >90 >90   BRENDEN 8.8 8.6 8.5 8.0*   MAG 2.5*  --   --   --    PROTTOTAL  --  6.6* 6.6* 6.4*   ALBUMIN  --  2.5* 2.5* 2.4*   BILITOTAL  --  0.3 0.7 0.9 "   ALKPHOS  --  73 76 72   AST  --  16 18 21   ALT  --  32 31 31       Recent Labs   Lab 10/29/20  0652 10/28/20  0626 10/27/20  0620 10/26/20  1525 10/26/20  1136   GLC 95 120* 165* 108* 122*       Recent Labs   Lab 10/29/20  0652 10/28/20  0626 10/27/20  0620   INR 1.07 1.09 1.17*         Recent Labs   Lab 10/29/20  0652 10/27/20  0620 10/26/20  1525   TROPI <0.015 <0.015 0.018       Recent Results (from the past 48 hour(s))   XR Chest Port 1 View    Narrative    CHEST ONE VIEW PORTABLE  10/26/2020 11:56 AM     HISTORY:  Hypoxia, COVID.    COMPARISON: Chest x-ray 11/9/2012.      Impression    IMPRESSION: Portable chest. Subtle groundglass opacity noted in the  mid to lower lateral right lung possibly reflecting viral pneumonitis.  Retrocardiac opacity could indicate left lower lobe atelectasis or  consolidation. No pneumothorax or significant pleural fluid. Heart is  normal in size.    DAFNE VERAS MD       COVID Status:  COVID-19 PCR Results    COVID-19 PCR Results 8/31/20 10/26/20 10/26/20 10/28/20 10/28/20     1136 1136 0655 [id number]   COVID-19 Virus PCR to [facility] - Result  Test received-See reflex to IDDL test SARS CoV2 (COVID-19) Virus RT-PCR  Test received-See reflex to IDDL test SARS CoV2 (COVID-19) Virus RT-PCR    COVID-19 Virus PCR to [facility] - Source  Nasopharyngeal  Nasopharyngeal    COVID-19 Virus by PCR (External Result) Negative       SARS-CoV-2 Virus Specimen Source   Nasopharyngeal  Nasopharyngeal   SARS-CoV-2 PCR Result   NEGATIVE  NEGATIVE      Comments are available for some flowsheets but are not being displayed.         COVID-19 Antibody Results, Testing for Immunity    COVID-19 Antibody Results, Testing for Immunity   No data to display.              Disclaimer: This note consists of symbols derived from keyboarding, dictation and/or voice recognition software. As a result, there may be errors in the script that have gone undetected. Please consider this when interpreting information found in this  chart.

## 2020-10-29 NOTE — PLAN OF CARE
To Do:  End of Shift Summary  For vital signs and complete assessments, please see documentation flowsheets.     Pertinent assessments: Alert & oriented x4. Lung sounds diminished. Reports coughing more frequently today. VSS. Denies pain. Voiding without difficulty.     Major Shift Events: Swabbed for COVID-19. Weaned to 1/2 LPM oxygen. COVID antibody test pending. Possible discharge home tomorrow if able to wean oxygen.     Treatment Plan: IV Rocephin, wean O2, Tele, and IV decadron. Discontinue precautions pending COVID triage team.

## 2020-10-30 VITALS
HEIGHT: 68 IN | OXYGEN SATURATION: 92 % | SYSTOLIC BLOOD PRESSURE: 161 MMHG | WEIGHT: 293 LBS | BODY MASS INDEX: 44.41 KG/M2 | HEART RATE: 77 BPM | TEMPERATURE: 98 F | RESPIRATION RATE: 20 BRPM | DIASTOLIC BLOOD PRESSURE: 75 MMHG

## 2020-10-30 LAB
ANION GAP SERPL CALCULATED.3IONS-SCNC: 5 MMOL/L (ref 3–14)
ANISOCYTOSIS BLD QL SMEAR: SLIGHT
BASOPHILS # BLD AUTO: 0 10E9/L (ref 0–0.2)
BASOPHILS NFR BLD AUTO: 0 %
BUN SERPL-MCNC: 10 MG/DL (ref 7–30)
CALCIUM SERPL-MCNC: 8.6 MG/DL (ref 8.5–10.1)
CHLORIDE SERPL-SCNC: 106 MMOL/L (ref 94–109)
CO2 SERPL-SCNC: 30 MMOL/L (ref 20–32)
CREAT SERPL-MCNC: 0.72 MG/DL (ref 0.52–1.04)
CRP SERPL-MCNC: 28.7 MG/L (ref 0–8)
D DIMER PPP FEU-MCNC: 1.2 UG/ML FEU (ref 0–0.5)
DIFFERENTIAL METHOD BLD: ABNORMAL
EOSINOPHIL # BLD AUTO: 0.1 10E9/L (ref 0–0.7)
EOSINOPHIL NFR BLD AUTO: 1 %
ERYTHROCYTE [DISTWIDTH] IN BLOOD BY AUTOMATED COUNT: 17.5 % (ref 10–15)
FIBRINOGEN PPP-MCNC: 529 MG/DL (ref 200–420)
GFR SERPL CREATININE-BSD FRML MDRD: >90 ML/MIN/{1.73_M2}
GLUCOSE SERPL-MCNC: 82 MG/DL (ref 70–99)
HCT VFR BLD AUTO: 34.6 % (ref 35–47)
HGB BLD-MCNC: 11 G/DL (ref 11.7–15.7)
INR PPP: 1.03 (ref 0.86–1.14)
LABORATORY COMMENT REPORT: NORMAL
LDH SERPL L TO P-CCNC: 355 U/L (ref 81–234)
LYMPHOCYTES # BLD AUTO: 3.5 10E9/L (ref 0.8–5.3)
LYMPHOCYTES NFR BLD AUTO: 30 %
MACROCYTES BLD QL SMEAR: PRESENT
MCH RBC QN AUTO: 30.7 PG (ref 26.5–33)
MCHC RBC AUTO-ENTMCNC: 31.8 G/DL (ref 31.5–36.5)
MCV RBC AUTO: 97 FL (ref 78–100)
METAMYELOCYTES # BLD: 0.2 10E9/L
METAMYELOCYTES NFR BLD MANUAL: 2 %
MONOCYTES # BLD AUTO: 0.1 10E9/L (ref 0–1.3)
MONOCYTES NFR BLD AUTO: 1 %
NEUTROPHILS # BLD AUTO: 7.6 10E9/L (ref 1.6–8.3)
NEUTROPHILS NFR BLD AUTO: 66 %
PLATELET # BLD AUTO: 268 10E9/L (ref 150–450)
PLATELET # BLD EST: ABNORMAL 10*3/UL
POTASSIUM SERPL-SCNC: 4.3 MMOL/L (ref 3.4–5.3)
RBC # BLD AUTO: 3.58 10E12/L (ref 3.8–5.2)
RETICS # AUTO: 153.9 10E9/L (ref 25–95)
RETICS/RBC NFR AUTO: 4.3 % (ref 0.5–2)
SARS-COV-2 RNA SPEC QL NAA+PROBE: NEGATIVE
SODIUM SERPL-SCNC: 141 MMOL/L (ref 133–144)
SPECIMEN SOURCE: NORMAL
TOXIC GRANULES BLD QL SMEAR: PRESENT
WBC # BLD AUTO: 11.5 10E9/L (ref 4–11)

## 2020-10-30 PROCEDURE — 85045 AUTOMATED RETICULOCYTE COUNT: CPT | Performed by: INTERNAL MEDICINE

## 2020-10-30 PROCEDURE — 250N000013 HC RX MED GY IP 250 OP 250 PS 637: Performed by: INTERNAL MEDICINE

## 2020-10-30 PROCEDURE — 250N000013 HC RX MED GY IP 250 OP 250 PS 637: Performed by: HOSPITALIST

## 2020-10-30 PROCEDURE — 83615 LACTATE (LD) (LDH) ENZYME: CPT | Performed by: INTERNAL MEDICINE

## 2020-10-30 PROCEDURE — 36415 COLL VENOUS BLD VENIPUNCTURE: CPT | Performed by: INTERNAL MEDICINE

## 2020-10-30 PROCEDURE — 85025 COMPLETE CBC W/AUTO DIFF WBC: CPT | Performed by: INTERNAL MEDICINE

## 2020-10-30 PROCEDURE — 85610 PROTHROMBIN TIME: CPT | Performed by: INTERNAL MEDICINE

## 2020-10-30 PROCEDURE — 85384 FIBRINOGEN ACTIVITY: CPT | Performed by: INTERNAL MEDICINE

## 2020-10-30 PROCEDURE — 80048 BASIC METABOLIC PNL TOTAL CA: CPT | Performed by: INTERNAL MEDICINE

## 2020-10-30 PROCEDURE — 99239 HOSP IP/OBS DSCHRG MGMT >30: CPT | Performed by: INTERNAL MEDICINE

## 2020-10-30 PROCEDURE — 86140 C-REACTIVE PROTEIN: CPT | Performed by: INTERNAL MEDICINE

## 2020-10-30 PROCEDURE — 85379 FIBRIN DEGRADATION QUANT: CPT | Performed by: INTERNAL MEDICINE

## 2020-10-30 PROCEDURE — 250N000011 HC RX IP 250 OP 636: Performed by: INTERNAL MEDICINE

## 2020-10-30 RX ADMIN — LEVETIRACETAM 1500 MG: 500 TABLET, FILM COATED ORAL at 10:23

## 2020-10-30 RX ADMIN — LISINOPRIL 2.5 MG: 2.5 TABLET ORAL at 10:22

## 2020-10-30 RX ADMIN — DIVALPROEX SODIUM 250 MG: 250 TABLET, FILM COATED, EXTENDED RELEASE ORAL at 10:23

## 2020-10-30 RX ADMIN — PANTOPRAZOLE SODIUM 40 MG: 40 TABLET, DELAYED RELEASE ORAL at 06:21

## 2020-10-30 RX ADMIN — LACOSAMIDE 200 MG: 200 TABLET, FILM COATED ORAL at 10:22

## 2020-10-30 RX ADMIN — LEVOTHYROXINE SODIUM 125 MCG: 0.1 TABLET ORAL at 10:23

## 2020-10-30 RX ADMIN — ENOXAPARIN SODIUM 40 MG: 40 INJECTION SUBCUTANEOUS at 06:21

## 2020-10-30 ASSESSMENT — ACTIVITIES OF DAILY LIVING (ADL)
ADLS_ACUITY_SCORE: 13

## 2020-10-30 ASSESSMENT — MIFFLIN-ST. JEOR: SCORE: 1982.96

## 2020-10-30 NOTE — PLAN OF CARE
Pertinent assessments: VSS on room air. Afebrile. Up independently. Tolerating a regular diet. Covid swab #3 done on day shift. Blood drawn for covid 19 antibody test on day shift. Covid results and further plan of care is pending. A&Ox4. Pt wants to discharge to home on 10/30.  Major Shift Events: Weaned to room air.  Treatment Plan: IV Rocephin,Tele, Lovenox, PO Zithromax, and IV decadron.

## 2020-10-30 NOTE — DISCHARGE SUMMARY
[facility]  Hospitalist Discharge Summary      Date of Admission:  10/26/2020  Date of Discharge:  10/30/2020  Discharging Provider: Will Pérez MD      Discharge Diagnoses     Acute hypoxic respiratory failure    Bilateral infiltrates, likely secondary to COVID-19 pneumonia    Probable  COVID-19  pneumonia despite the negative test twice.  Both SARS-CoV-2 PCR and Covid antibody test is pending at time of discharge    Follow-ups Needed After Discharge   Follow-up Appointments     Follow-up and recommended labs and tests       Follow up with primary care provider, ADDY VAZQUEZ, within 7 days   for hospital follow- up.  The following labs/tests are recommended: Please   follow your repeat Covid test SARS-CoV-2 PCR and antibody test which is   pending at this time  with your primary care provider.         Follow-up and recommended labs and tests       Follow up with primary care provider, ADDY VAZQUEZ, within 7 days   for hospital follow- up.  No follow up labs or test are needed.             Unresulted Labs Ordered in the Past 30 Days of this Admission     Date and Time Order Name Status Description    10/29/2020 1415 SARS-CoV-2 COVID-19 Virus (Coronavirus) RT-PCR In process     10/29/2020 1208 COVID-19 Virus (Coronavirus) Antibody & Titer Reflex In process       These results will be followed up by PCP     Discharge Disposition   Discharged to home  Condition at discharge: Stable      Hospital Course     Marlyn Wilder is a 55 year old  female with a significant past medical history of brain tumor and meningioma, nephrolithiasis, hypertension, obesity, TIA and sleep apnea who presents with shortness of breath and cough of the last 2 weeks.  On arrival, patient was hypoxic with oxygen saturation 73% on room air and she was placed on 15 L of oxymask which improved her oxygenation rapidly.  She was alert and oriented x3     Vital signs on arrival showed blood pressure  131/88, temperature 97.8, respiratory rate of 40 and oxygen saturation 72% and heart rate of 115.     Basic metabolic panel is significant for mild hyponatremia with sodium 132 and complete blood count is evident for leukocytosis with WBC count of 11.3.  Chest x-ray showed evidence of subtle groundglass opacity noted in the mid to lower lateral right lung possibly reflecting viral pneumonitis.     Patient was given a bolus of normal saline, 1 g of Rocephin and 500 mg of IV Zithromax.  Hospitalist service was consulted for admission and inpatient management of this patient    Patient was admitted and was closely monitored on IV antibiotic and Decadron.  SARS-CoV-2 PCR obtained on October 26 came back negative but patient's inflammatory markers are consistent with COVID-19 infection including significant elevated interleukin-6 level at 121.69.  Other markers also elevated including LDH CRP and D-dimer.  Fortunately, patient condition gradually improved and she remained afebrile but remained hypoxic.  COVID-19 triage team was contacted and I spoke with Dr. Poncho Jordan who graciously reviewed patient's chart with me and agreed with my assessment of possible COVID-19 infection despite the negative test.  Dr. Jordan recommended antibody test and repeat PCR before discharge.    On the day of discharge, patient is alert and oriented x3.  She is comfortable without any symptoms of shortness of breath or cough.  She is off oxygen and saturating well.  She is ambulatory and understood the plan of care.  I also spoke with patient's spouse over the phone and relayed the fact that we are suspecting COVID-19 infection and he needs to be quarantined as well as seek medical advice if he develops symptoms.  As patient is ambulatory, no pharmacologic DVT prophylaxis was recommended on discharge.         Consultations This Hospital Stay   None    Code Status   Full Code    Time Spent on this Encounter   I, Will Pérez MD,  personally saw the patient today and spent greater than 30 minutes discharging this patient.       Will Pérez MD  [place] [address] [facility]  [address]  [facility] [address]  Phone: [phone]  Fax: [phone]  ______________________________________________________________________    Physical Exam   Vital Signs: Temp: 98  F (36.7  C) Temp src: Oral BP: (!) 161/75 Pulse: 77   Resp: 20 SpO2: 92 % O2 Device: None (Room air) Oxygen Delivery: 1.5 LPM  Weight: 296 lbs 6.4 oz       Primary Care Physician   ADDY VAZQUEZ    Discharge Orders      COVID-19 GetWell Loop Referral      Reason for your hospital stay    Acute hypoxic respiratory failure, suspected COVID-19 infection     Follow-up and recommended labs and tests     Follow up with primary care provider, ADDY VAZQUEZ, within 7 days for hospital follow- up.  The following labs/tests are recommended: Please follow your repeat Covid test SARS-CoV-2 PCR and antibody test which is pending at this time  with your primary care provider.     Activity    Your activity upon discharge: activity as tolerated     When to contact your care team    Call your primary doctor if you have any of the following: temperature greater than 100.4 or  increased shortness of breath.     Reason for your hospital stay    Acute hypoxic respiratory failure, suspected COVID-19 infection     Follow-up and recommended labs and tests     Follow up with primary care provider, ADDY VAZQUEZ, within 7 days for hospital follow- up.  No follow up labs or test are needed.     Discharge - Quarantine/Isolation Instruction    Date of symptom onset:     Date of first positive test:    If you tested positive COVID-19 and show symptoms (fever, cough, body aches or trouble breathing):        Stay home and away from others (self-isolate) until:        At least 10 days have passed since your symptoms started. AND...        You've had no fever-and no medicine  that reduces fever-for 3 full days (72 hours). AND...         Your other symptoms have resolved (gotten better).  If you tested positive for COVID-19 but don't show symptoms:       Stay home and away from others (self-isolate) until at least 10 days have passed since the date of your first positive COVID-19 test.     Contact provider    Contact your primary care provider if If increased trouble breathing, new arm/leg swelling, dizziness/passing out, falls, bleeding that doesn't stop, or uncontrolled pain.     Full Code     Diet    Follow this diet upon discharge: Orders Placed This Encounter      Combination Diet Regular Diet Adult       Significant Results and Procedures   Most Recent 3 CBC's:  Recent Labs   Lab Test 10/30/20  0639 10/29/20  0652 10/28/20  0818   WBC 11.5* 9.6 12.4*   HGB 11.0* 10.4* 11.0*   MCV 97 96 96    259 267     Most Recent 3 BMP's:  Recent Labs   Lab Test 10/30/20  0639 10/29/20  0652 10/28/20  0626    141 141   POTASSIUM 4.3 4.6 4.2   CHLORIDE 106 106 107   CO2 30 30 23   BUN 10 12 9   CR 0.72 0.64 0.60   ANIONGAP 5 5 11   BRENDEN 8.6 8.8 8.6   GLC 82 95 120*     Most Recent 2 LFT's:  Recent Labs   Lab Test 10/28/20  0626 10/27/20  0620   AST 16 18   ALT 32 31   ALKPHOS 73 76   BILITOTAL 0.3 0.7     Most Recent 3 INR's:  Recent Labs   Lab Test 10/30/20  0639 10/29/20  0652 10/28/20  0626   INR 1.03 1.07 1.09     Most Recent 3 Troponin's:  Recent Labs   Lab Test 10/29/20  0652 10/27/20  0620 10/26/20  1525   TROPI <0.015 <0.015 0.018     Most Recent D-dimer:  Recent Labs   Lab Test 10/30/20  0639   DD 1.2*   ,   Results for orders placed or performed during the hospital encounter of 10/26/20   XR Chest Port 1 View    Narrative    CHEST ONE VIEW PORTABLE  10/26/2020 11:56 AM     HISTORY:  Hypoxia, COVID.    COMPARISON: Chest x-ray 11/9/2012.      Impression    IMPRESSION: Portable chest. Subtle groundglass opacity noted in the  mid to lower lateral right lung possibly reflecting  viral pneumonitis.  Retrocardiac opacity could indicate left lower lobe atelectasis or  consolidation. No pneumothorax or significant pleural fluid. Heart is  normal in size.    DAFNE VERAS MD         COVID-19 PCR Results    COVID-19 PCR Results 8/31/20 10/26/20 10/26/20 10/28/20 10/28/20 10/29/20     1136 1136 0655 0655    COVID-19 Virus PCR to [facility] - Result  Test received-See reflex to IDDL test SARS CoV2 (COVID-19) Virus RT-PCR  Test received-See reflex to IDDL test SARS CoV2 (COVID-19) Virus RT-PCR  Test received-See reflex to IDDL test SARS CoV2 (COVID-19) Virus RT-PCR   COVID-19 Virus PCR to [facility] - Source  Nasopharyngeal  Nasopharyngeal  Nasopharyngeal   COVID-19 Virus by PCR (External Result) Negative        SARS-CoV-2 Virus Specimen Source   Nasopharyngeal  Nasopharyngeal    SARS-CoV-2 PCR Result   NEGATIVE  NEGATIVE       Comments are available for some flowsheets but are not being displayed.           Discharge Medications   Current Discharge Medication List      CONTINUE these medications which have NOT CHANGED    Details   divalproex sodium extended-release (DEPAKOTE ER) 250 MG 24 hr tablet Take 1 tablet (250 mg) by mouth 2 times daily  Qty: 60 tablet, Refills: 11    Associated Diagnoses: Partial symptomatic epilepsy with simple partial seizures, intractable, without status epilepticus (H)      folic acid (FOLVITE) 1 MG tablet Take 1 mg by mouth daily       levETIRAcetam (KEPPRA) 500 MG tablet TAKE 3 TABLETS(1500 MG) BY MOUTH IN THE MORNING AND IN THE EVENING  Qty: 180 tablet, Refills: 11    Associated Diagnoses: Partial symptomatic epilepsy with simple partial seizures, intractable, without status epilepticus (H)      levothyroxine (SYNTHROID) 125 MCG tablet Take 125 mcg by mouth daily       lisinopril (ZESTRIL) 2.5 MG tablet Take 2.5 mg by mouth 2 times daily       Multiple Vitamins-Minerals (MULTIVITAMIN & MINERAL PO) Take 1 tablet by mouth daily      ondansetron (ZOFRAN) 4 MG tablet Take 4 mg  by mouth daily as needed for nausea       pantoprazole (PROTONIX) 40 MG EC tablet Take 40 mg by mouth daily as needed for heartburn       VIMPAT 200 MG TABS tablet Take 1 tablet (200 mg) by mouth 2 times daily  Qty: 60 tablet, Refills: 5    Associated Diagnoses: Partial symptomatic epilepsy with simple partial seizures, intractable, without status epilepticus (H)      vitamin D3 (CHOLECALCIFEROL) 2000 units (50 mcg) tablet Take 1 tablet by mouth daily      methotrexate sodium 2.5 MG TABS Take 17.5 mg by mouth once a week (take 7 X 2.5 mg = 17.5 mg dose)           Allergies   Allergies   Allergen Reactions     Duloxetine Nausea

## 2020-10-30 NOTE — PLAN OF CARE
End of Shift Summary  For vital signs and complete assessments, please see documentation flowsheets.     Pertinent assessments: VSS on room air. Afebrile. Up independently. Tolerating a regular diet. Covid swab #3 done on yesterday. Blood drawn for covid 19 antibody test yesterday. Covid results and further plan of care is pending. A&Ox4. Pt wants to discharge to home on 10/30.  Major Shift Events: Oxygen WNL on room air.  Treatment Plan: IV Rocephin,Tele, Lovenox, PO Zithromax, and IV decadron.   Maricruz Trinh RN

## 2020-10-31 LAB
COVID-19 SPIKE RBD ABY TITER: NORMAL
COVID-19 SPIKE RBD ABY: NEGATIVE

## 2020-12-09 ENCOUNTER — MYC MEDICAL ADVICE (OUTPATIENT)
Dept: NEUROLOGY | Facility: CLINIC | Age: 55
End: 2020-12-09

## 2020-12-09 DIAGNOSIS — G40.119 PARTIAL SYMPTOMATIC EPILEPSY WITH SIMPLE PARTIAL SEIZURES, INTRACTABLE, WITHOUT STATUS EPILEPTICUS (H): Primary | ICD-10-CM

## 2020-12-10 DIAGNOSIS — N20.0 KIDNEY STONE: Primary | ICD-10-CM

## 2020-12-15 NOTE — TELEPHONE ENCOUNTER
"Message from :-  \"Please re-assure her that divalproex can weaken hair strength, leading to hair breakage and thinning, but does not appear to cause permanent hair loss.   We made a significant decrease in the divalproex dose in October.  We should get current levels of the 3 AEDs, and find out whether she has had any seizures recently, then make a decision on stopping divalproex.  Thanks.  \"      "

## 2020-12-21 DIAGNOSIS — N20.0 KIDNEY STONE: ICD-10-CM

## 2020-12-21 DIAGNOSIS — G40.119 PARTIAL SYMPTOMATIC EPILEPSY WITH SIMPLE PARTIAL SEIZURES, INTRACTABLE, WITHOUT STATUS EPILEPTICUS (H): ICD-10-CM

## 2020-12-21 LAB
ALBUMIN UR-MCNC: NEGATIVE MG/DL
APPEARANCE UR: CLEAR
BILIRUB UR QL STRIP: NEGATIVE
COLOR UR AUTO: YELLOW
GLUCOSE UR STRIP-MCNC: NEGATIVE MG/DL
HGB UR QL STRIP: ABNORMAL
KETONES UR STRIP-MCNC: NEGATIVE MG/DL
LEUKOCYTE ESTERASE UR QL STRIP: NEGATIVE
NITRATE UR QL: NEGATIVE
PH UR STRIP: 6.5 PH (ref 5–7)
SOURCE: ABNORMAL
SP GR UR STRIP: 1.02 (ref 1–1.03)
UROBILINOGEN UR STRIP-ACNC: 0.2 EU/DL (ref 0.2–1)

## 2020-12-21 PROCEDURE — 80177 DRUG SCRN QUAN LEVETIRACETAM: CPT | Mod: 90 | Performed by: PSYCHIATRY & NEUROLOGY

## 2020-12-21 PROCEDURE — 99000 SPECIMEN HANDLING OFFICE-LAB: CPT | Performed by: PSYCHIATRY & NEUROLOGY

## 2020-12-21 PROCEDURE — 81003 URINALYSIS AUTO W/O SCOPE: CPT | Performed by: UROLOGY

## 2020-12-21 PROCEDURE — 80235 DRUG ASSAY LACOSAMIDE: CPT | Mod: 90 | Performed by: PSYCHIATRY & NEUROLOGY

## 2020-12-21 PROCEDURE — 80165 DIPROPYLACETIC ACID FREE: CPT | Mod: 90 | Performed by: PSYCHIATRY & NEUROLOGY

## 2020-12-21 PROCEDURE — 80164 ASSAY DIPROPYLACETIC ACD TOT: CPT | Performed by: PSYCHIATRY & NEUROLOGY

## 2020-12-21 PROCEDURE — 36415 COLL VENOUS BLD VENIPUNCTURE: CPT | Performed by: PSYCHIATRY & NEUROLOGY

## 2020-12-22 LAB — VALPROATE SERPL-MCNC: 49 MG/L (ref 50–100)

## 2020-12-23 LAB
LEVETIRACETAM SERPL-MCNC: 30 UG/ML (ref 12–46)
VALPROATE FREE SERPL-MCNC: <5 UG/ML (ref 6–20)

## 2020-12-24 LAB — LACOSAMIDE SERPL-MCNC: 10.1 UG/ML (ref 5–10)

## 2021-01-05 ENCOUNTER — TELEPHONE (OUTPATIENT)
Dept: NEUROLOGY | Facility: CLINIC | Age: 56
End: 2021-01-05

## 2021-01-05 NOTE — TELEPHONE ENCOUNTER
[facility] Call Center    Phone Message    May a detailed message be left on voicemail: yes     Reason for Call: Symptoms or Concerns     If patient has red-flag symptoms, warm transfer to triage line    Current symptom or concern: Losing Hair, worsening so much, pt cannot shower or brush her hair and it is a fistful of hair, so much hair loss, now. Pt is scheduled for appt with Dr. Madrid on 1/12/21.  Pt wants to be seen much sooner, please. By then, pt is so frustrated with our clinic, pt reporting that she will be bald by 1/12/21.  IF this is a medication, pt needs help now, please.    Symptoms have been present for:  Pt does not know, pt was in hospital in Sept and Nov and pt was put on 3 anti-seizure meds and possibly one of those meds may be causing the hair loss.     Has patient previously been seen for this? No    By : Dr. Emmanuel Madrid    Date: n/a    Are there any new or worsening symptoms? Yes: please call pt asap. Thank you.      Action Taken: Message routed to:  [facility] ([facility]    Travel Screening: Not Applicable

## 2021-01-06 NOTE — TELEPHONE ENCOUNTER
Discussed with .  Marlyn has a number of possible medical reasons for hair loss  If Marlyn has been seizure free since the last visit with him,then we can stop the depakote.  If seizure activity has occurred, will need to talk with her in more detail for a discussion about options (a visit is arranged for 1/12/21)  If possible, Marlyn's  should also be present for additional information and patient support      Call placed to patient  Patient had her last seizure about two weeks ago  It was one of her typical seizures, lasted about two minutes, she feels she can see and hear but does not/cannot interact with her surroundings. She feels others will not notice when she is having a seizure.    We discussed that as she had a seizure, she is best advised to wait until the appointment to discuss her options because there is a potential for seizure exacerbation if a medication is stopped.  I also requested that her  try to be at the appointment with her per MD request  Marlyn notes her  will be there  We reviewed the date, time, and location of the appointment    No other questions at this time

## 2021-01-12 ENCOUNTER — OFFICE VISIT (OUTPATIENT)
Dept: NEUROLOGY | Facility: CLINIC | Age: 56
End: 2021-01-12
Payer: COMMERCIAL

## 2021-01-12 VITALS
SYSTOLIC BLOOD PRESSURE: 130 MMHG | RESPIRATION RATE: 20 BRPM | OXYGEN SATURATION: 97 % | HEIGHT: 68 IN | DIASTOLIC BLOOD PRESSURE: 84 MMHG | BODY MASS INDEX: 44.41 KG/M2 | HEART RATE: 72 BPM | WEIGHT: 293 LBS

## 2021-01-12 DIAGNOSIS — G40.119 PARTIAL SYMPTOMATIC EPILEPSY WITH SIMPLE PARTIAL SEIZURES, INTRACTABLE, WITHOUT STATUS EPILEPTICUS (H): ICD-10-CM

## 2021-01-12 DIAGNOSIS — G40.119 PARTIAL SYMPTOMATIC EPILEPSY WITH SIMPLE PARTIAL SEIZURES, INTRACTABLE, WITHOUT STATUS EPILEPTICUS (H): Primary | ICD-10-CM

## 2021-01-12 PROCEDURE — 99215 OFFICE O/P EST HI 40 MIN: CPT | Performed by: PSYCHIATRY & NEUROLOGY

## 2021-01-12 RX ORDER — LACOSAMIDE 200 MG/1
200 TABLET, FILM COATED ORAL 2 TIMES DAILY
Qty: 60 TABLET | Refills: 5 | Status: SHIPPED | OUTPATIENT
Start: 2021-01-12 | End: 2021-02-09

## 2021-01-12 RX ORDER — LEVETIRACETAM 500 MG/1
2000 TABLET ORAL 2 TIMES DAILY
Qty: 240 TABLET | Refills: 11 | Status: SHIPPED | OUTPATIENT
Start: 2021-01-12 | End: 2021-06-01

## 2021-01-12 ASSESSMENT — MIFFLIN-ST. JEOR: SCORE: 1981.15

## 2021-01-12 NOTE — LETTER
1/12/2021       RE: Marlyn Wilder  [address] [address] [id number]     Dear Colleague,    Thank you for referring your patient, Marlyn Wilder, to the [facility] [place] at [facility]. Please see a copy of my visit note below.    [facility]:  RETURN VISIT          Service Date: 01/12/2021    HISTORY:  Ms. 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  She came to the visit today with her .      Following the most recent virtual visit to this clinic on 10/06/2020, the patient reportedly has not had any seizures with unconsciousness, unresponsiveness, confusion, falling or convulsive movements, and has not had any isolated auras of her habitual types (with NNN).  She had one subjective event that was dissimilar from earlier phenomena, in the third week of December 2020, during which she felt impaired in interactions with others; she cannot further describe this, and her  did not note altered behavior at the time.      She has had hair breakage ( losing my hair ) that may in part be due to divalproex effects.  We have been reducing the dose of divalproex, as the other two AEDs were increased.  She denied any new adverse AED effects over the last 3 months.     She previously reported that on 08/31/2020, she began to have marked nausea, anorexia and malaise.  Her PCP directed her to the [facility] in [place], [place], from which she was admitted to that hospital.  Available medical records indicate that she was found to have increased left temporal lobe edema on MRI (but probably no increase in meningioma lesion mass), and she was placed on dexamethasone, with varying doses since then.  Ongoing VEEG was run, and noes indicate that left parasagittal subclinical or subtle clinic seizures were recorded on  09/01/2020, after which levetiracetam was increased to 2000 mg BID, and she was placed on lacosamide at 200 mg BID and on divalproex-ER at 750 mg BID.  Reportedly, the electrographic seizures ceased, and she was discharged home on 09/03/2020.  She later decreased divalproex-ER to 500 mg BID on 09/17/2020, due to excessive daytime lethargy.    She continues to have daytime lethargy.  She has been in bed about 9-10 hours per night, actually sleeping about 8 hours per night, and drowsy to various degrees all day.      Ictal semiology-history:                       The patient once again confirmed previously presented history in detail today. She again noted that she had a single febrile convulsion of early childhood, but otherwise had no seizures until the initial manifestation of her meningioma in 2011.  She has had only 1 type of seizure since 2011.       The patient has had exclusively simple partial seizures.  These first began in 2011, and have continued since then.  They have been quite stable in semiology and frequency.  She has at least 20 per year, but has only up to 2 on a single day and usually only one at a time.  The full form of the aura lasts almost 1 minute and features a froilan vu phenomenon associated with a sense that she has just viewed a complex visual scene briefly, although she cannot remember the nature of the scene, with an olfactory hallucination of burning electronics, and waves of intense nausea.  She does not have a postictal state with this.  She is always able to speak if she is with someone.  Her  has seen many of these, which she described the auras in detail to him and she is always able to respond to him quickly.  She never has any automatic behaviors as best he can determine.  Sometimes she has briefer events that feature only 1-2 of her 3 aura phenomena.       The patient has not had any staring spells with unresponsiveness or confusion, and has not had any grand mal seizures  following that of early childhood.  She only has involuntary jerks on falling asleep in the evening.       Auras appear to have been triggered or exacerbated by forgetting to take medications, which happens only rarely, by emotional stress, and by tiredness.       Epilepsy-seizure predispositions:   The patient's epilepsy risk factor is a left middle fossa meningioma with extension into the left parasellar space.  This was resected 3 times in , and pathology showed that there was a grade I meningothelial meningioma.  The portion that is in the parasellar space has surrounded the left optic nerve, and she does have visual deficits on the left.  She had radiation therapy following the last resection by Dr. Calero.  She has had follow-up scans since that time, which showed a stable lesion as recently as 2017.         There is no family history of seizures or epilepsy.       The patient had a single febrile convulsion of early childhood when she was approximately 1 years old.       She has no history of gestational or  injury, developmental delay, stroke, meningitis, encephalitis, and significant head injury.  She denied a history of physical or sexual abuse by an adult during her childhood or adulthood.       Laboratory evaluations:   The most recent brain MRI was performed at [facility] on 2017, and was reported to show a stable left medial middle fossa lesion extending into the parasellar space in the left orbital apex, with mild mass effect and with enhancement.       A routine EEG was performed in [place] in , and the report indicated left hemispheric slowing and spikes.   An outpatient EEG at [facility] on 06/15/2016 was abnormal due to left frontotemporal delta slowing and breach effect.      Epilepsy therapeutics:   The only anti-seizure medication that the patient has used has been levetiracetam.  This was started in .  It has been consistently associated with irritability.  The patient has  been able to control her irritability and has continued to function well as an  for the [employer].       PAST MEDICAL-SURGICAL HISTORY:    1. 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   2. History of left middle fossa meningothelial meningioma, grade 1, with extension to the left parasellar area, left orbital apex and cavernous sinus; status post resections (2012) and radiation therapy.   3. History of probable transient ischemic attack with aphasia (2013).   4. Obstructive sleep apnea, treated with CPAP.   5. Systemic hypertension, treated.   6. Hyperlipidemia.   7. History of recurring renal calculi.   8. History of arthralgia attributed to atypical rheumatoid arthritis or fibromyalgia.   9. Hypothyroidism.   10. Status post left carpal tunnel release.   11. History of TMJ syndrome.      FAMILY HISTORY:  There is no family history of seizures, epilepsy or other neurological conditions.       PERSONAL AND SOCIAL HISTORY:  The patient completed her education through a [employer] degree.  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  She was placed on total disability when her memory problems had progressed to the point that she no longer could perform necessary duties as an  for a [place] [employer].    She lives with her .  They have 3 adult children, no longer in the home.       REVIEW OF SYSTEMS:  The patient has noted nausea in recent months, which has responded to omeprazole and ondansetron.   She denied history of attention and memory disturbance, difficulties with comprehension and expression, difficulty in solving problems, weakness, tremors or other abnormal involuntary movements, numbness or tingling, incoordination, falling or difficulty in walking, urinary or fecal incontinence, headache and other pain, except as described above.  The patient denied any history of severe depression or suicidal ideation, severe anxiety, panic attacks,  hallucinations, delusions, psychosis, substance abuse, or psychiatric hospitalization.  She denied rashes and easy bruising.  The remainder of a 14-system symptom review was negative except as noted above.        MEDICATIONS:  Levetiracetam 2000 mg b.i.d., lacosamide 200 mg b.i.d., divalproex- mg b.i.d., and aspirin 81 mg daily, and other medications as per the electronic medical record.      PHYSICAL EXAMINATION:    The patient was an alert woman in no apparent distress.  Vital signs were as per the electronic medical record.  There was a skull defect consistent with reported surgery.  Neck was supple, without signs of meningeal irritation.      Scalp examination showed thinning of hair diffusely, with short shafts growing out of follicles diffusely (except of scars of prior craniotomy).        On neurological examination, the patient appeared mildly lethargic, and was fully oriented to person, place, time, and reason for visit.  Speech showed normal articulation, fluency, repetitions, naming, syntax and comprehension.  Cranial nerves III through XII were normal.  Muscle masses, tones and strengths were normal throughout.  There was no pronator drift.  Sequential fine finger movements were performed normally with each hand.  No spontaneous tremors, myoclonus, or other abnormal movements were observed.  Sensations of light touch, pinprick, vibration and proprioception were reportedly normal throughout.  The rapid alternating movements and finger-nose-finger and heel-shin maneuvers were performed normally bilaterally.  Romberg maneuver was negative.  Regular, heel, toe, tandem and reverse tandem walking were normal.  Deep tendon reflexes were normal and symmetric throughout.  Toes were downgoing bilaterally.       IMPRESSION:    The patient has had no habitual isolated auras and no complex partial or grand mal seizures on levetiracetam.  Later she had EEG diagnosis of recurring subclinical or subtle clinic  electrographic seizures at [facility] in [place], [place] in early September 2020.  She was placed on lacosamide and divalproex-ER at that time.  She later developed increased daytime lethargy and hair breakage ( losing my hair ) that likely is in part due to divalproex effects.  Our plan has been to complete the taper off divalproex when lacosamide and levetiracetam levels are adequate.  On 12/21/2020, the lacosamide level was 10.1 and the levetiracetam level was 30 mcg/ml.  I told her that we are ready to discontinue divalproex today.    We will re-check an outpatient prolonged EEG.  If no subclinical seizures are recorded, we will then discuss reducing the dose of lacosamide, to determine whether daytime lethargy may be reduced.    We spent some time discussing the risks of focal seizures, which may evolve into GTC seizures.  She and her  appeared motivated to avoid seizure recurrence.  She emphasized that she is fully compliant with prescribed doses of the AEDs.       We discussed [place] regulations on seizures and driving.  She has not had any seizures in adulthood of types that would impair her driving.       PLAN:    1.  Continue levetiracetam at 2000 mg b.i.d.   2.  Continue lacosamide at 200 mg b.i.d.   3.  Discontinue divalproex-ER.   4.  Out-patient 3hr VEEG.   5.  Return visit in approximately 3 months.   I spent 45 minutes in this patient care, more than 50% of which consisted of counseling and coordinating care.        Emmanuel Madrid M.D.   Professor of Neurology

## 2021-01-12 NOTE — NURSING NOTE
Chief Complaint   Patient presents with     Seizures     [facility] RETURN SEIZURE- LOSS OF HAIR        Chepe Fried, EMT

## 2021-01-13 NOTE — PROGRESS NOTES
[facility] Ep[facility]psy [facility]:  RETURN VISIT          Service Date: 01/12/2021    HISTORY:  Ms. 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  She came to the visit today with her .      Following the most recent virtual visit to this clinic on 10/06/2020, the patient reportedly has not had any seizures with unconsciousness, unresponsiveness, confusion, falling or convulsive movements, and has not had any isolated auras of her habitual types (with NNN).  She had one subjective event that was dissimilar from earlier phenomena, in the third week of December 2020, during which she felt impaired in interactions with others; she cannot further describe this, and her  did not note altered behavior at the time.      She has had hair breakage ( losing my hair ) that may in part be due to divalproex effects.  We have been reducing the dose of divalproex, as the other two AEDs were increased.  She denied any new adverse AED effects over the last 3 months.     She previously reported that on 08/31/2020, she began to have marked nausea, anorexia and malaise.  Her PCP directed her to the [facility] in [place], [place], from which she was admitted to that hospital.  Available medical records indicate that she was found to have increased left temporal lobe edema on MRI (but probably no increase in meningioma lesion mass), and she was placed on dexamethasone, with varying doses since then.  Ongoing VEEG was run, and noes indicate that left parasagittal subclinical or subtle clinic seizures were recorded on 09/01/2020, after which levetiracetam was increased to 2000 mg BID, and she was placed on lacosamide at 200 mg BID and on divalproex-ER at 750 mg BID.  Reportedly, the electrographic seizures ceased, and she was discharged home on 09/03/2020.  She later decreased divalproex-ER to 500 mg BID on 09/17/2020,  due to excessive daytime lethargy.    She continues to have daytime lethargy.  She has been in bed about 9-10 hours per night, actually sleeping about 8 hours per night, and drowsy to various degrees all day.      Ictal semiology-history:                       The patient once again confirmed previously presented history in detail today. She again noted that she had a single febrile convulsion of early childhood, but otherwise had no seizures until the initial manifestation of her meningioma in 2011.  She has had only 1 type of seizure since 2011.       The patient has had exclusively simple partial seizures.  These first began in 2011, and have continued since then.  They have been quite stable in semiology and frequency.  She has at least 20 per year, but has only up to 2 on a single day and usually only one at a time.  The full form of the aura lasts almost 1 minute and features a froilan vu phenomenon associated with a sense that she has just viewed a complex visual scene briefly, although she cannot remember the nature of the scene, with an olfactory hallucination of burning electronics, and waves of intense nausea.  She does not have a postictal state with this.  She is always able to speak if she is with someone.  Her  has seen many of these, which she described the auras in detail to him and she is always able to respond to him quickly.  She never has any automatic behaviors as best he can determine.  Sometimes she has briefer events that feature only 1-2 of her 3 aura phenomena.       The patient has not had any staring spells with unresponsiveness or confusion, and has not had any grand mal seizures following that of early childhood.  She only has involuntary jerks on falling asleep in the evening.       Auras appear to have been triggered or exacerbated by forgetting to take medications, which happens only rarely, by emotional stress, and by tiredness.       Epilepsy-seizure predispositions:   The  patient's epilepsy risk factor is a left middle fossa meningioma with extension into the left parasellar space.  This was resected 3 times in , and pathology showed that there was a grade I meningothelial meningioma.  The portion that is in the parasellar space has surrounded the left optic nerve, and she does have visual deficits on the left.  She had radiation therapy following the last resection by Dr. Calero.  She has had follow-up scans since that time, which showed a stable lesion as recently as 2017.         There is no family history of seizures or epilepsy.       The patient had a single febrile convulsion of early childhood when she was approximately 1 years old.       She has no history of gestational or  injury, developmental delay, stroke, meningitis, encephalitis, and significant head injury.  She denied a history of physical or sexual abuse by an adult during her childhood or adulthood.       Laboratory evaluations:   The most recent brain MRI was performed at [facility] on 2017, and was reported to show a stable left medial middle fossa lesion extending into the parasellar space in the left orbital apex, with mild mass effect and with enhancement.       A routine EEG was performed in [place] in , and the report indicated left hemispheric slowing and spikes.   An [facility] on 06[facility]15/2016 was abnormal due to left frontotemporal delta slowing and breach effect.      Epilepsy therapeutics:   The only anti-seizure medication that the patient has used has been levetiracetam.  This was started in .  It has been consistently associated with irritability.  The patient has been able to control her irritability and has continued to function well as an  for the [employer].       PAST MEDICAL-SURGICAL HISTORY:    1. 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   2. History of left middle fossa meningothelial meningioma, grade 1, with extension to the  left parasellar area, left orbital apex and cavernous sinus; status post resections (2012) and radiation therapy.   3. History of probable transient ischemic attack with aphasia (2013).   4. Obstructive sleep apnea, treated with CPAP.   5. Systemic hypertension, treated.   6. Hyperlipidemia.   7. History of recurring renal calculi.   8. History of arthralgia attributed to atypical rheumatoid arthritis or fibromyalgia.   9. Hypothyroidism.   10. Status post left carpal tunnel release.   11. History of TMJ syndrome.      FAMILY HISTORY:  There is no family history of seizures, epilepsy or other neurological conditions.       PERSONAL AND SOCIAL HISTORY:  The patient completed her education through a [employer] degree.  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  She was placed on total disability when her memory problems had progressed to the point that she no longer could perform necessary duties as an  for a [employer] court.    She lives with her .  They have 3 adult children, no longer in the home.       REVIEW OF SYSTEMS:  The patient has noted nausea in recent months, which has responded to omeprazole and ondansetron.   She denied history of attention and memory disturbance, difficulties with comprehension and expression, difficulty in solving problems, weakness, tremors or other abnormal involuntary movements, numbness or tingling, incoordination, falling or difficulty in walking, urinary or fecal incontinence, headache and other pain, except as described above.  The patient denied any history of severe depression or suicidal ideation, severe anxiety, panic attacks, hallucinations, delusions, psychosis, substance abuse, or psychiatric hospitalization.  She denied rashes and easy bruising.  The remainder of a 14-system symptom review was negative except as noted above.        MEDICATIONS:  Levetiracetam 2000 mg b.i.d., lacosamide 200 mg b.i.d., divalproex- mg b.i.d., and  aspirin 81 mg daily, and other medications as per the electronic medical record.      PHYSICAL EXAMINATION:    The patient was an alert woman in no apparent distress.  Vital signs were as per the electronic medical record.  There was a skull defect consistent with reported surgery.  Neck was supple, without signs of meningeal irritation.      Scalp examination showed thinning of hair diffusely, with short shafts growing out of follicles diffusely (except of scars of prior craniotomy).        On neurological examination, the patient appeared mildly lethargic, and was fully oriented to person, place, time, and reason for visit.  Speech showed normal articulation, fluency, repetitions, naming, syntax and comprehension.  Cranial nerves III through XII were normal.  Muscle masses, tones and strengths were normal throughout.  There was no pronator drift.  Sequential fine finger movements were performed normally with each hand.  No spontaneous tremors, myoclonus, or other abnormal movements were observed.  Sensations of light touch, pinprick, vibration and proprioception were reportedly normal throughout.  The rapid alternating movements and finger-nose-finger and heel-shin maneuvers were performed normally bilaterally.  Romberg maneuver was negative.  Regular, heel, toe, tandem and reverse tandem walking were normal.  Deep tendon reflexes were normal and symmetric throughout.  Toes were downgoing bilaterally.       IMPRESSION:    The patient has had no habitual isolated auras and no complex partial or grand mal seizures on levetiracetam.  Later she had EEG diagnosis of recurring subclinical or subtle clinic electrographic seizures at [facility] in [place], [place] in early September 2020.  She was placed on lacosamide and divalproex-ER at that time.  She later developed increased daytime lethargy and hair breakage ( losing my hair ) that likely is in part due to divalproex effects.  Our plan has been to complete the taper  off divalproex when lacosamide and levetiracetam levels are adequate.  On 12/21/2020, the lacosamide level was 10.1 and the levetiracetam level was 30 mcg/ml.  I told her that we are ready to discontinue divalproex today.    We will re-check an outpatient prolonged EEG.  If no subclinical seizures are recorded, we will then discuss reducing the dose of lacosamide, to determine whether daytime lethargy may be reduced.    We spent some time discussing the risks of focal seizures, which may evolve into GTC seizures.  She and her  appeared motivated to avoid seizure recurrence.  She emphasized that she is fully compliant with prescribed doses of the AEDs.       We discussed [place] regulations on seizures and driving.  She has not had any seizures in adulthood of types that would impair her driving.       PLAN:    1.  Continue levetiracetam at 2000 mg b.i.d.   2.  Continue lacosamide at 200 mg b.i.d.   3.  Discontinue divalproex-ER.   4.  Out-patient 3hr VEEG.   5.  Return visit in approximately 3 months.   I spent 45 minutes in this patient care, more than 50% of which consisted of counseling and coordinating care.         Emmanuel Madrid M.D.   Professor of Neurology

## 2021-01-15 ENCOUNTER — HEALTH MAINTENANCE LETTER (OUTPATIENT)
Age: 56
End: 2021-01-15

## 2021-01-15 RX ORDER — LEVETIRACETAM 500 MG/1
TABLET ORAL
Qty: 720 TABLET | OUTPATIENT
Start: 2021-01-15

## 2021-01-22 DIAGNOSIS — N20.0 KIDNEY STONE: Primary | ICD-10-CM

## 2021-01-25 ENCOUNTER — OFFICE VISIT (OUTPATIENT)
Dept: UROLOGY | Facility: CLINIC | Age: 56
End: 2021-01-25
Payer: COMMERCIAL

## 2021-01-25 VITALS
OXYGEN SATURATION: 98 % | HEART RATE: 68 BPM | BODY MASS INDEX: 42.44 KG/M2 | WEIGHT: 280 LBS | DIASTOLIC BLOOD PRESSURE: 80 MMHG | HEIGHT: 68 IN | SYSTOLIC BLOOD PRESSURE: 130 MMHG

## 2021-01-25 DIAGNOSIS — N20.0 KIDNEY STONE: ICD-10-CM

## 2021-01-25 LAB
ALBUMIN UR-MCNC: NEGATIVE MG/DL
APPEARANCE UR: CLEAR
BILIRUB UR QL STRIP: NEGATIVE
COLOR UR AUTO: YELLOW
GLUCOSE UR STRIP-MCNC: NEGATIVE MG/DL
HGB UR QL STRIP: ABNORMAL
KETONES UR STRIP-MCNC: NEGATIVE MG/DL
LEUKOCYTE ESTERASE UR QL STRIP: NEGATIVE
NITRATE UR QL: NEGATIVE
PH UR STRIP: 5 PH (ref 5–7)
SOURCE: ABNORMAL
SP GR UR STRIP: 1.02 (ref 1–1.03)
UROBILINOGEN UR STRIP-ACNC: 0.2 EU/DL (ref 0.2–1)

## 2021-01-25 PROCEDURE — 99213 OFFICE O/P EST LOW 20 MIN: CPT | Performed by: UROLOGY

## 2021-01-25 PROCEDURE — 81003 URINALYSIS AUTO W/O SCOPE: CPT | Performed by: UROLOGY

## 2021-01-25 ASSESSMENT — MIFFLIN-ST. JEOR: SCORE: 1908.57

## 2021-01-25 ASSESSMENT — PAIN SCALES - GENERAL: PAINLEVEL: MODERATE PAIN (4)

## 2021-01-25 NOTE — PROGRESS NOTES
History: It is a great pleasure to see this very pleasant 55-year-old lady in follow-up consultation today.  We recall she had a right percutaneous nephrolithotomy for 1.4 cm stone with another stone in February 2020.  Stone analysis was predominantly calcium oxalate.  In addition to this however she has some other significant medical issues including history of meningioma treated both surgically and radiation therapy.  She also has history of some pituitary insufficiency.  She is also had Covid in December 2020.  She has no significant urinary symptoms at the present time except some urgency.  ABDOMEN ONE VIEW  9/21/2020 11:07 AM     HISTORY: Kidney stone.     COMPARISON: February 25, 2020                                                                      IMPRESSION: Unremarkable bowel gas pattern. No definite renal calculi  over the kidneys or over the expected course of the mid to upper  ureter. Faint calcification possibly seen in the region of the left  ureterovesicular junction.      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, starts with smell, then de ja vu, nausea     Sleep apnea     uses CPAP     TIA (transient ischemic attack)     takes baby ASA       Social History     Socioeconomic History     Marital status:      Spouse name: None     Number of children: None     Years of education: None     Highest education level: None   Occupational History     None   Social Needs     Financial resource strain: None     Food insecurity     Worry: None     Inability: None     Transportation needs     Medical: None     Non-medical: None   Tobacco Use     Smoking status: Never Smoker     Smokeless tobacco: Never Used   Substance and Sexual Activity     Alcohol use:  No     Drug use: No     Sexual activity: Yes     Partners: Male     Birth control/protection: Surgical   Lifestyle     Physical activity     Days per week: None     Minutes per session: None     Stress: None   Relationships     Social connections     Talks on phone: None     Gets together: None     Attends Zoroastrian service: None     Active member of club or organization: None     Attends meetings of clubs or organizations: None     Relationship status: None     Intimate partner violence     Fear of current or ex partner: None     Emotionally abused: None     Physically abused: None     Forced sexual activity: None   Other Topics Concern     Parent/sibling w/ CABG, MI or angioplasty before 65F 55M? Not Asked   Social History Narrative     None       Past Surgical History:   Procedure Laterality Date     BRAIN SURGERY      debulking x 2, short term memory deficits     C  DELIVERY ONLY      ,     C LIGATE FALLOPIAN TUBE,POSTPARTUM       COMBINED CYSTOSCOPY, INSERT CATHETER URETER Right 2020    Procedure: CYSTOSCOPY, RIGHT URETERAL CATHETER PLACEMENT;  Surgeon: Adarsh Livingston MD;  Location:  OR      REMOVAL OF TONSILS,<11 Y/O      Tonsils <11y.o.     IR NEPHROSTOMY TUBE REMOVAL RIGHT  2020     IR RENAL STONE REMOVAL RIGHT  2020     OPTICAL TRACKING SYSTEM CRANIOTOMY, EXCISE TUMOR, COMBINED  2012    Procedure: COMBINED OPTICAL TRACKING SYSTEM CRANIOTOMY, EXCISE TUMOR;  Stealth Guided Left Redo Craniotomy, Resection Of Tumor Ct @615 am MRI @ 630;  Surgeon: Pan Calero MD;  Location: [facility] OR     PERCUTANEOUS NEPHROLITHOTOMY Right 2020    Procedure: RIGHT PERCUTANEOUS NEPHROLITHOTOMY;  Surgeon: Adarsh Livingston MD;  Location:  OR       Family History   Problem Relation Age of Onset     Breast Cancer Mother         72-alive     Hypertension Father         76-alive     C.A.D. Father      Family History Negative Sister         2 sisters  "healthy     Family History Negative Brother          Current Outpatient Medications:      folic acid (FOLVITE) 1 MG tablet, Take 1 mg by mouth daily , Disp: , Rfl:      levETIRAcetam (KEPPRA) 500 MG tablet, Take 4 tablets (2,000 mg) by mouth 2 times daily, Disp: 240 tablet, Rfl: 11     levothyroxine (SYNTHROID) 125 MCG tablet, Take 125 mcg by mouth daily , Disp: , Rfl:      lisinopril (ZESTRIL) 2.5 MG tablet, Take 2.5 mg by mouth 2 times daily , Disp: , Rfl:      methotrexate sodium 2.5 MG TABS, Take 17.5 mg by mouth once a week (take 7 X 2.5 mg = 17.5 mg dose), Disp: , Rfl:      Multiple Vitamins-Minerals (MULTIVITAMIN & MINERAL PO), Take 1 tablet by mouth daily, Disp: , Rfl:      ondansetron (ZOFRAN) 4 MG tablet, Take 4 mg by mouth daily as needed for nausea , Disp: , Rfl:      VIMPAT 200 MG TABS tablet, Take 1 tablet (200 mg) by mouth 2 times daily, Disp: 60 tablet, Rfl: 5     vitamin D3 (CHOLECALCIFEROL) 2000 units (50 mcg) tablet, Take 1 tablet by mouth daily, Disp: , Rfl:      pantoprazole (PROTONIX) 40 MG EC tablet, Take 40 mg by mouth daily as needed for heartburn , Disp: , Rfl:     10 point ROS of systems including Constitutional, Eyes, Respiratory, Cardiovascular, Gastroenterology, Genitourinary, Integumentary, Muscularskeletal, Psychiatric and Neurologic were all negative except for pertinent positives noted in my HPI.    Examination:   /80 (BP Location: Left arm, Patient Position: Sitting, Cuff Size: Adult Large)   Pulse 68   Ht 1.727 m (5' 8\")   Wt 127 kg (280 lb)   LMP 10/17/2012   SpO2 98%   BMI 42.57 kg/m    General Impression: Very pleasant patient in no acute distress, well-oriented in time place and person and quite conversational  Mental Status: Anxious  HEENT: Extraocular movements intact.  No clinical evidence of jaundice on examination of eyes.  Mucous membranes are unremarkable  Skin: Warm.  No other abnormalities  Respiratory System: Unlabored on room air.  Respiratory cycle " "normal  Lymph Nodes: Not examined  Back/Flank Tenderness: Not examined  Cardiovascular System: No significant peripheral pitting edema  Abdominal Examination: Not examined  Extremities: Not examined  Genitial: Not examined  Rectal Examination: Not examined  Neurologic System: No signs of hemiparesis    Impression: I have reviewed the KUB today.  There is no evidence of recurrence of stone formation.  She is drinking a lot of water.  Therefore I do not think at present we need to change our current plan of management.  She has a lot of significant other medical issues as outlined above.  I recommend she have a KUB in 1 year.  I went over the records in detail today.  I answered all her questions    Plan: 1 year for KUB and examination    \"This dictation was performed with voice recognition software and may contain errors,  omissions and inadvertent word substitution.\"        "

## 2021-01-25 NOTE — LETTER
1/25/2021       RE: Marlyn Wilder  [address] [facility] [address]     Dear Colleague,    Thank you for referring your patient, Marlyn Wilder, to the [facility] [place] [facility] Please see a copy of my visit note below.    History: It is a great pleasure to see this very pleasant 55-year-old lady in follow-up consultation today.  We recall she had a right percutaneous nephrolithotomy for 1.4 cm stone with another stone in February 2020.  Stone analysis was predominantly calcium oxalate.  In addition to this however she has some other significant medical issues including history of meningioma treated both surgically and radiation therapy.  She also has history of some pituitary insufficiency.  She is also had Covid in December 2020.  She has no significant urinary symptoms at the present time except some urgency.  ABDOMEN ONE VIEW  9/21/2020 11:07 AM     HISTORY: Kidney stone.     COMPARISON: February 25, 2020                                                                      IMPRESSION: Unremarkable bowel gas pattern. No definite renal calculi  over the kidneys or over the expected course of the mid to upper  ureter. Faint calcification possibly seen in the region of the left  ureterovesicular junction.      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, starts with smell, then de ja vu, nausea     Sleep apnea     uses CPAP     TIA (transient ischemic attack)     takes baby ASA       Social History     Socioeconomic History     Marital status:      Spouse name: None     Number of children: None     Years of education: None     Highest education level: None   Occupational History     None    Social Needs     Financial resource strain: None     Food insecurity     Worry: None     Inability: None     Transportation needs     Medical: None     Non-medical: None   Tobacco Use     Smoking status: Never Smoker     Smokeless tobacco: Never Used   Substance and Sexual Activity     Alcohol use: No     Drug use: No     Sexual activity: Yes     Partners: Male     Birth control/protection: Surgical   Lifestyle     Physical activity     Days per week: None     Minutes per session: None     Stress: None   Relationships     Social connections     Talks on phone: None     Gets together: None     Attends Adventism service: None     Active member of club or organization: None     Attends meetings of clubs or organizations: None     Relationship status: None     Intimate partner violence     Fear of current or ex partner: None     Emotionally abused: None     Physically abused: None     Forced sexual activity: None   Other Topics Concern     Parent/sibling w/ CABG, MI or angioplasty before 65F 55M? Not Asked   Social History Narrative     None       Past Surgical History:   Procedure Laterality Date     BRAIN SURGERY  2012    debulking x 2, short term memory deficits     C  DELIVERY ONLY      ,     C LIGATE FALLOPIAN TUBE,POSTPARTUM       COMBINED CYSTOSCOPY, INSERT CATHETER URETER Right 2020    Procedure: CYSTOSCOPY, RIGHT URETERAL CATHETER PLACEMENT;  Surgeon: Adarsh Livingston MD;  Location: [facility] OR     HC REMOVAL OF TONSILS,<12 Y/O      Tonsils <11y.o.     IR NEPHROSTOMY TUBE REMOVAL RIGHT  2020     IR RENAL STONE REMOVAL RIGHT  2020     OPTICAL TRACKING SYSTEM CRANIOTOMY, EXCISE TUMOR, COMBINED  2012    Procedure: COMBINED OPTICAL TRACKING SYSTEM CRANIOTOMY, EXCISE TUMOR;  Stealth Guided Left Redo Craniotomy, Resection Of Tumor Ct @615 am MRI @ 630;  Surgeon: Pan Calero MD;  Location: [facility] OR     PERCUTANEOUS NEPHROLITHOTOMY Right 2020    Procedure:  "RIGHT PERCUTANEOUS NEPHROLITHOTOMY;  Surgeon: Adarsh Livingston MD;  Location:  OR       Family History   Problem Relation Age of Onset     Breast Cancer Mother         72-alive     Hypertension Father         76-alive     C.A.D. Father      Family History Negative Sister         2 sisters healthy     Family History Negative Brother          Current Outpatient Medications:      folic acid (FOLVITE) 1 MG tablet, Take 1 mg by mouth daily , Disp: , Rfl:      levETIRAcetam (KEPPRA) 500 MG tablet, Take 4 tablets (2,000 mg) by mouth 2 times daily, Disp: 240 tablet, Rfl: 11     levothyroxine (SYNTHROID) 125 MCG tablet, Take 125 mcg by mouth daily , Disp: , Rfl:      lisinopril (ZESTRIL) 2.5 MG tablet, Take 2.5 mg by mouth 2 times daily , Disp: , Rfl:      methotrexate sodium 2.5 MG TABS, Take 17.5 mg by mouth once a week (take 7 X 2.5 mg = 17.5 mg dose), Disp: , Rfl:      Multiple Vitamins-Minerals (MULTIVITAMIN & MINERAL PO), Take 1 tablet by mouth daily, Disp: , Rfl:      ondansetron (ZOFRAN) 4 MG tablet, Take 4 mg by mouth daily as needed for nausea , Disp: , Rfl:      VIMPAT 200 MG TABS tablet, Take 1 tablet (200 mg) by mouth 2 times daily, Disp: 60 tablet, Rfl: 5     vitamin D3 (CHOLECALCIFEROL) 2000 units (50 mcg) tablet, Take 1 tablet by mouth daily, Disp: , Rfl:      pantoprazole (PROTONIX) 40 MG EC tablet, Take 40 mg by mouth daily as needed for heartburn , Disp: , Rfl:     10 point ROS of systems including Constitutional, Eyes, Respiratory, Cardiovascular, Gastroenterology, Genitourinary, Integumentary, Muscularskeletal, Psychiatric and Neurologic were all negative except for pertinent positives noted in my HPI.    Examination:   /80 (BP Location: Left arm, Patient Position: Sitting, Cuff Size: Adult Large)   Pulse 68   Ht 1.727 m (5' 8\")   Wt 127 kg (280 lb)   LMP 10/17/2012   SpO2 98%   BMI 42.57 kg/m    General Impression: Very pleasant patient in no acute distress, well-oriented in time " "place and person and quite conversational  Mental Status: Anxious  HEENT: Extraocular movements intact.  No clinical evidence of jaundice on examination of eyes.  Mucous membranes are unremarkable  Skin: Warm.  No other abnormalities  Respiratory System: Unlabored on room air.  Respiratory cycle normal  Lymph Nodes: Not examined  Back/Flank Tenderness: Not examined  Cardiovascular System: No significant peripheral pitting edema  Abdominal Examination: Not examined  Extremities: Not examined  Genitial: Not examined  Rectal Examination: Not examined  Neurologic System: No signs of hemiparesis    Impression: I have reviewed the KUB today.  There is no evidence of recurrence of stone formation.  She is drinking a lot of water.  Therefore I do not think at present we need to change our current plan of management.  She has a lot of significant other medical issues as outlined above.  I recommend she have a KUB in 1 year.  I went over the records in detail today.  I answered all her questions    Plan: 1 year for KUB and examination    \"This dictation was performed with voice recognition software and may contain errors,  omissions and inadvertent word substitution.\"    Adarsh Livingston MD    "

## 2021-02-09 DIAGNOSIS — G40.119 PARTIAL SYMPTOMATIC EPILEPSY WITH SIMPLE PARTIAL SEIZURES, INTRACTABLE, WITHOUT STATUS EPILEPTICUS (H): ICD-10-CM

## 2021-02-09 RX ORDER — LACOSAMIDE 200 MG/1
200 TABLET, FILM COATED ORAL 2 TIMES DAILY
Qty: 60 TABLET | Refills: 0 | Status: SHIPPED | OUTPATIENT
Start: 2021-02-09 | End: 2021-06-01

## 2021-02-09 NOTE — TELEPHONE ENCOUNTER
One month supply pended for [facility]. I do not see that her insurance has required PA before, so changing pharmacy should be ok.

## 2021-02-22 ENCOUNTER — ANCILLARY PROCEDURE (OUTPATIENT)
Dept: NEUROLOGY | Facility: CLINIC | Age: 56
End: 2021-02-22
Attending: PSYCHIATRY & NEUROLOGY
Payer: COMMERCIAL

## 2021-02-22 DIAGNOSIS — G40.119 PARTIAL SYMPTOMATIC EPILEPSY WITH SIMPLE PARTIAL SEIZURES, INTRACTABLE, WITHOUT STATUS EPILEPTICUS (H): ICD-10-CM

## 2021-03-01 ENCOUNTER — TELEPHONE (OUTPATIENT)
Dept: NEUROSURGERY | Facility: CLINIC | Age: 56
End: 2021-03-01

## 2021-04-07 ENCOUNTER — ANCILLARY PROCEDURE (OUTPATIENT)
Dept: MRI IMAGING | Facility: CLINIC | Age: 56
End: 2021-04-07
Attending: NEUROLOGICAL SURGERY
Payer: COMMERCIAL

## 2021-04-07 DIAGNOSIS — D32.0 BENIGN NEOPLASM OF CEREBRAL MENINGES (H): ICD-10-CM

## 2021-04-07 PROCEDURE — 70553 MRI BRAIN STEM W/O & W/DYE: CPT | Performed by: RADIOLOGY

## 2021-04-07 PROCEDURE — A9585 GADOBUTROL INJECTION: HCPCS | Performed by: RADIOLOGY

## 2021-04-07 RX ORDER — GADOBUTROL 604.72 MG/ML
15 INJECTION INTRAVENOUS ONCE
Status: COMPLETED | OUTPATIENT
Start: 2021-04-07 | End: 2021-04-07

## 2021-04-07 RX ADMIN — GADOBUTROL 12 ML: 604.72 INJECTION INTRAVENOUS at 08:16

## 2021-04-07 NOTE — DISCHARGE INSTRUCTIONS
MRI Contrast Discharge Instructions    The IV contrast you received today will pass out of your body in your  urine. This will happen in the next 24 hours. You will not feel this process.  Your urine will not change color.    Drink at least 4 extra glasses of water or juice today (unless your doctor  has restricted your fluids). This reduces the stress on your kidneys.  You may take your regular medicines.    If you are on dialysis: It is best to have dialysis today.    If you have a reaction: Most reactions happen right away. If you have  any new symptoms after leaving the hospital (such as hives or swelling),  call your hospital at the correct number below. Or call your family doctor.  If you have breathing distress or wheezing, call 911.    Special instructions: ***    I have read and understand the above information.    Signature:______________________________________ Date:___________    Staff:__________________________________________ Date:___________     Time:__________    [place] [facility] Departments:    ___[place]: [phone]  ___[facility]: [phone]  ___[place]: [phone] ___[facility]: [phone]  ___[facility]: [phone]  ___[facility]: [phone]  ___[phone]  ___[facility]: [phone]  ___[phone]

## 2021-05-18 ENCOUNTER — VIRTUAL VISIT (OUTPATIENT)
Dept: NEUROSURGERY | Facility: CLINIC | Age: 56
End: 2021-05-18
Payer: COMMERCIAL

## 2021-05-18 DIAGNOSIS — D32.0 BENIGN NEOPLASM OF CEREBRAL MENINGES (H): Primary | ICD-10-CM

## 2021-05-18 PROCEDURE — 99213 OFFICE O/P EST LOW 20 MIN: CPT | Mod: 95 | Performed by: NEUROLOGICAL SURGERY

## 2021-05-18 NOTE — LETTER
2021       RE: Marlyn Wilder  [address] [facility] [address]     Dear Colleague,    Thank you for referring your patient, Marlyn Wilder, to the [facility] NEUROSURGERY CLINIC [place] at [facility]. Please see a copy of my visit note below.    Marlyn is a 55 year old who is being evaluated via a billable telephone visit.      What phone number would you like to be contacted at? [phone]  How would you like to obtain your AVS? mychart  Phone call duration: 20 minutes    Fatigue constant. Hospitalized twice for possible increased seizures and for pneumonia. September - seizures, November - pneumonia. Nausea twice a day. On Keppra, Vimpat. Short term memory poor. Mother  recently.  arrangements being made. Diarrhea. Has some increased edema in left temporal lobe. Will bring her and her  in for an in-person visit to review images and discuss potential surgery for the residual meningioma. Will aim for next Monday morning or  when she sees Dr. Madrid.  JAKUB Calero MD        Again, thank you for allowing me to participate in the care of your patient.      Sincerely,    Pan Calero MD

## 2021-05-18 NOTE — PROGRESS NOTES
Marlyn is a 55 year old who is being evaluated via a billable telephone visit.      What phone number would you like to be contacted at? [phone]  How would you like to obtain your AVS? aleks  Phone call duration: 20 minutes    Fatigue constant. Hospitalized twice for possible increased seizures and for pneumonia. September - seizures, November - pneumonia. Nausea twice a day. On Keppra, Vimpat. Short term memory poor. Mother  recently.  arrangements being made. Diarrhea. Has some increased edema in left temporal lobe. Will bring her and her  in for an in-person visit to review images and discuss potential surgery for the residual meningioma. Will aim for next Monday morning or  when she sees Dr. Madrid.  JAKUB Calero MD

## 2021-05-19 ENCOUNTER — TELEPHONE (OUTPATIENT)
Dept: NEUROSURGERY | Facility: CLINIC | Age: 56
End: 2021-05-19

## 2021-05-19 NOTE — PATIENT INSTRUCTIONS
Patient will be called to schedule In Patient visit with Dr Calero either next Monday or June 1st when patient has an appointment with Dr Madrid to review imaging.    Call Razia MEZA for questions/concerns     Thank you for using [facility]

## 2021-05-19 NOTE — TELEPHONE ENCOUNTER
Outbound call to Wife, appointment set for Monday 5/24 @ 9am.    Works well for family and RT has approved.  Note sent to scheduling.

## 2021-05-19 NOTE — TELEPHONE ENCOUNTER
Call back to , left detailed message getting Dr Calero's time table and will get back to you with appointment information.    Thanks Razia  [phone]

## 2021-05-24 ENCOUNTER — OFFICE VISIT (OUTPATIENT)
Dept: NEUROSURGERY | Facility: CLINIC | Age: 56
End: 2021-05-24
Payer: COMMERCIAL

## 2021-05-24 VITALS
OXYGEN SATURATION: 95 % | SYSTOLIC BLOOD PRESSURE: 123 MMHG | HEART RATE: 72 BPM | WEIGHT: 282 LBS | BODY MASS INDEX: 42.88 KG/M2 | RESPIRATION RATE: 16 BRPM | DIASTOLIC BLOOD PRESSURE: 80 MMHG

## 2021-05-24 DIAGNOSIS — D32.9 MENINGIOMA (H): Primary | ICD-10-CM

## 2021-05-24 PROCEDURE — 99207 PR NO DOCUMENTATION ON VISIT: CPT | Performed by: NEUROLOGICAL SURGERY

## 2021-05-24 ASSESSMENT — PAIN SCALES - GENERAL: PAINLEVEL: NO PAIN (0)

## 2021-05-24 NOTE — LETTER
5/24/2021       RE: Marlyn Wilder  [address] [facility] [address]     Dear Colleague,    Thank you for referring your patient, Marlyn Wilder, to the [facility]UROSURGERY [facility]INIC [facility]. Please see a copy of my visit note below.    See dictated note.  JAKUB Calero MD      Again, thank you for allowing me to participate in the care of your patient.      Sincerely,    Pan Calero MD

## 2021-06-01 ENCOUNTER — OFFICE VISIT (OUTPATIENT)
Dept: NEUROLOGY | Facility: CLINIC | Age: 56
End: 2021-06-01
Payer: COMMERCIAL

## 2021-06-01 ENCOUNTER — APPOINTMENT (OUTPATIENT)
Dept: LAB | Facility: CLINIC | Age: 56
End: 2021-06-01
Payer: COMMERCIAL

## 2021-06-01 ENCOUNTER — TELEPHONE (OUTPATIENT)
Dept: NEUROSURGERY | Facility: CLINIC | Age: 56
End: 2021-06-01

## 2021-06-01 VITALS
SYSTOLIC BLOOD PRESSURE: 135 MMHG | BODY MASS INDEX: 42.73 KG/M2 | WEIGHT: 281 LBS | DIASTOLIC BLOOD PRESSURE: 82 MMHG | HEART RATE: 79 BPM | RESPIRATION RATE: 16 BRPM | OXYGEN SATURATION: 96 %

## 2021-06-01 DIAGNOSIS — G40.119 PARTIAL SYMPTOMATIC EPILEPSY WITH SIMPLE PARTIAL SEIZURES, INTRACTABLE, WITHOUT STATUS EPILEPTICUS (H): Primary | ICD-10-CM

## 2021-06-01 PROCEDURE — 36415 COLL VENOUS BLD VENIPUNCTURE: CPT | Performed by: PATHOLOGY

## 2021-06-01 PROCEDURE — 80235 DRUG ASSAY LACOSAMIDE: CPT | Mod: 90 | Performed by: PATHOLOGY

## 2021-06-01 PROCEDURE — 99000 SPECIMEN HANDLING OFFICE-LAB: CPT | Performed by: PATHOLOGY

## 2021-06-01 PROCEDURE — 99214 OFFICE O/P EST MOD 30 MIN: CPT | Mod: GC | Performed by: PSYCHIATRY & NEUROLOGY

## 2021-06-01 PROCEDURE — 80177 DRUG SCRN QUAN LEVETIRACETAM: CPT | Mod: 90 | Performed by: PATHOLOGY

## 2021-06-01 RX ORDER — LEVETIRACETAM 500 MG/1
2000 TABLET ORAL 2 TIMES DAILY
Qty: 240 TABLET | Refills: 11 | Status: SHIPPED | OUTPATIENT
Start: 2021-06-01 | End: 2021-11-09

## 2021-06-01 RX ORDER — LACOSAMIDE 200 MG/1
200 TABLET, FILM COATED ORAL 2 TIMES DAILY
Qty: 60 TABLET | Refills: 5 | Status: SHIPPED | OUTPATIENT
Start: 2021-06-01 | End: 2021-11-09 | Stop reason: DRUGHIGH

## 2021-06-01 RX ORDER — LEVOTHYROXINE SODIUM 112 UG/1
TABLET ORAL
COMMUNITY
Start: 2021-05-17

## 2021-06-01 ASSESSMENT — PAIN SCALES - GENERAL: PAINLEVEL: NO PAIN (0)

## 2021-06-01 NOTE — LETTER
6/1/2021       RE: Marlyn Wilder  [address] [facility] [address]     Dear Colleague,    Thank you for referring your patient, Marlyn Wilder, to the [facility] at [facility]. Please see a copy of my visit note below.      [facility]:  RETURN VISIT          Service Date: 06/01/2021    HISTORY:  Ms. 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  She came to the visit today with her .     She continues to have episodes of nausea, which she says have been present since August 2020. Feeling nauseous only, no associated symptoms except for fatigue.  The episodes are fairly short, possibly 1-2 minutes or less, with no vomiting, and no noted lapses in consciousness.  only notes that she asks people to stop talking. Occurring possibly once or twice per day. Stable in frequency.     She has a nausea medication that is PRN. Has not been using it recently. She reports no current medication side effects. She has had some word finding difficulty. Her hair has thickened since stopping Depakote.    She was initially irritable on Keppra, though this has improved significantly. Her  now only notes that she will be slightly irritable when tired. Her sleep is poor. She notes repeated yawning. No missed doses, only occasionally late on medications. She reports that she feels fatigued off and on most of the day after taking her morning medications. This improves in the afternoon evening.    She will be scheduling another surgery for her meningioma in August.    Ictal semiology-history:                       The patient once again confirmed previously presented history in detail today. She again noted that she had a single febrile convulsion of early childhood, but otherwise had no seizures until the initial  manifestation of her meningioma in 2011.  She has had only 1 type of seizure since 2011.       The patient has had exclusively simple partial seizures.  These first began in 2011, and have continued since then.  They have been quite stable in semiology and frequency.  She has at least 20 per year, but has only up to 2 on a single day and usually only one at a time.  The full form of the aura lasts almost 1 minute and features a froilan vu phenomenon associated with a sense that she has just viewed a complex visual scene briefly, although she cannot remember the nature of the scene, with an olfactory hallucination of burning electronics, and waves of intense nausea.  She does not have a postictal state with this.  She is always able to speak if she is with someone.  Her  has seen many of these, which she described the auras in detail to him and she is always able to respond to him quickly.  She never has any automatic behaviors as best he can determine.  Sometimes she has briefer events that feature only 1-2 of her 3 aura phenomena.       The patient has not had any staring spells with unresponsiveness or confusion, and has not had any grand mal seizures following that of early childhood.  She only has involuntary jerks on falling asleep in the evening.       Auras appear to have been triggered or exacerbated by forgetting to take medications, which happens only rarely, by emotional stress, and by tiredness.       Epilepsy-seizure predispositions:   The patient's epilepsy risk factor is a left middle fossa meningioma with extension into the left parasellar space.  This was resected 3 times in 2012, and pathology showed that there was a grade I meningothelial meningioma.  The portion that is in the parasellar space has surrounded the left optic nerve, and she does have visual deficits on the left.  She had radiation therapy following the last resection by Dr. Calero.  She has had follow-up scans since that time,  which showed a stable lesion as recently as 2017.         There is no family history of seizures or epilepsy.       The patient had a single febrile convulsion of early childhood when she was approximately 1 years old.       She has no history of gestational or  injury, developmental delay, stroke, meningitis, encephalitis, and significant head injury.  She denied a history of physical or sexual abuse by an adult during her childhood or adulthood.       Laboratory evaluations:   The most recent brain MRI was performed at [facility] on 2017, and was reported to show a stable left medial middle fossa lesion extending into the parasellar space in the left orbital apex, with mild mass effect and with enhancement.       A routine EEG was performed in [place] in , and the report indicated left hemispheric slowing and spikes.   An outpatient EEG at [facility] on 06/15/2016 was abnormal due to left frontotemporal delta slowing and breach effect.      Epilepsy therapeutics:   The only anti-seizure medication that the patient has used has been levetiracetam.  This was started in .  It has been consistently associated with irritability.  The patient has been able to control her irritability and has continued to function well as an  for the [employer].       PAST MEDICAL-SURGICAL HISTORY:    1. 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   2. History of left middle fossa meningothelial meningioma, grade 1, with extension to the left parasellar area, left orbital apex and cavernous sinus; status post resections () and radiation therapy.   3. History of probable transient ischemic attack with aphasia ().   4. Obstructive sleep apnea, treated with CPAP.   5. Systemic hypertension, treated.   6. Hyperlipidemia.   7. History of recurring renal calculi.   8. History of arthralgia attributed to atypical rheumatoid arthritis or fibromyalgia.   9. Hypothyroidism.   10. Status post left  carpal tunnel release.   11. History of TMJ syndrome.      FAMILY HISTORY:  There is no family history of seizures, epilepsy or other neurological conditions.       PERSONAL AND SOCIAL HISTORY:  The patient completed her education through a [employer] degree.  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  She was placed on total disability when her memory problems had progressed to the point that she no longer could perform necessary duties as an  for a [place] [employer].    She lives with her .  They have 3 adult children, no longer in the home.       REVIEW OF SYSTEMS:    As per HPI     MEDICATIONS:  Levetiracetam 2000 mg b.i.d., lacosamide 200 mg b.i.d., and other medications as per the electronic medical record.      PHYSICAL EXAMINATION:    The patient was an alert woman in no apparent distress.  Vital signs were as per the electronic medical record.  There was a skull defect consistent with reported surgery.  Neck was supple, without signs of meningeal irritation.      Scalp examination showed no remarkable hair thinning.      Visual fields full. EOMI. Antigravity throughout. No tremor. FNF and HS intact. Gait stable.     IMPRESSION:    Ms. 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 She continues to have episodes of marked nausea.  While these might represent focal seizures, the events are not consistent with her prior seizures, and explanations other than epileptic events are possible.     It is clear that she finds fatigue more troubling than the brief episodes. Because it is possible that her fatigue is partially due to anticonvulsants, we will not increase her medications. With Depakote stopped recently, we will obtain levels of levetiracetam and lacosamide to ensure that they are stable.     After her upcoming meningioma resection, we will re-evaluate the episodes.  Particularly  if her alertness and subjective energy levels increase after surgery, she may consider diagnosis with video-EEG and increasing AED therapy.      PLAN:    1.  Continue levetiracetam at 2000 mg b.i.d.   2.  Continue lacosamide at 200 mg b.i.d.   3.  Obtain anticonvulsant levels.  4.  Return visit in approximately 5 months.     Staffed with Dr. Julius Salgado MD, Epilepsy Fellow    Report Prepared By: Moises Salgado MD, Neurology-Epilepsy Fellow   I agree with the findings and plan of care as documented.  I personally examined the patient, and discussed our epilepsy diagnostic impressions and therapeutic recommendations with the patient.  The patient was agreeable to this plan.     I spent 33 minutes in this patient care, more than 50% of which consisted of counseling and coordinating care, with additional chart review and therapeutic planning performed on the day of the visit.    Emmanuel Madrid M.D., Professor of Neurology

## 2021-06-01 NOTE — TELEPHONE ENCOUNTER
Patient requested to have Angio and procedure by Dr Calero anytime after 8/13.    Will send notes to [facility]ZA for Angio and Dr Calero for scheduling.     Voices understanding.

## 2021-06-01 NOTE — PROGRESS NOTES
[facility] Ep[facility]psy [facility]:  RETURN VISIT          Service Date: 06/01/2021    HISTORY:  Ms. 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  She came to the visit today with her .     She continues to have episodes of nausea, which she says have been present since August 2020. Feeling nauseous only, no associated symptoms except for fatigue.  The episodes are fairly short, possibly 1-2 minutes or less, with no vomiting, and no noted lapses in consciousness.  only notes that she asks people to stop talking. Occurring possibly once or twice per day. Stable in frequency.     She has a nausea medication that is PRN. Has not been using it recently. She reports no current medication side effects. She has had some word finding difficulty. Her hair has thickened since stopping Depakote.    She was initially irritable on Keppra, though this has improved significantly. Her  now only notes that she will be slightly irritable when tired. Her sleep is poor. She notes repeated yawning. No missed doses, only occasionally late on medications. She reports that she feels fatigued off and on most of the day after taking her morning medications. This improves in the afternoon evening.    She will be scheduling another surgery for her meningioma in August.    Ictal semiology-history:                       The patient once again confirmed previously presented history in detail today. She again noted that she had a single febrile convulsion of early childhood, but otherwise had no seizures until the initial manifestation of her meningioma in 2011.  She has had only 1 type of seizure since 2011.       The patient has had exclusively simple partial seizures.  These first began in 2011, and have continued since then.  They have been quite stable in semiology and frequency.  She has at least 20 per year, but has  only up to 2 on a single day and usually only one at a time.  The full form of the aura lasts almost 1 minute and features a froilan vu phenomenon associated with a sense that she has just viewed a complex visual scene briefly, although she cannot remember the nature of the scene, with an olfactory hallucination of burning electronics, and waves of intense nausea.  She does not have a postictal state with this.  She is always able to speak if she is with someone.  Her  has seen many of these, which she described the auras in detail to him and she is always able to respond to him quickly.  She never has any automatic behaviors as best he can determine.  Sometimes she has briefer events that feature only 1-2 of her 3 aura phenomena.       The patient has not had any staring spells with unresponsiveness or confusion, and has not had any grand mal seizures following that of early childhood.  She only has involuntary jerks on falling asleep in the evening.       Auras appear to have been triggered or exacerbated by forgetting to take medications, which happens only rarely, by emotional stress, and by tiredness.       Epilepsy-seizure predispositions:   The patient's epilepsy risk factor is a left middle fossa meningioma with extension into the left parasellar space.  This was resected 3 times in , and pathology showed that there was a grade I meningothelial meningioma.  The portion that is in the parasellar space has surrounded the left optic nerve, and she does have visual deficits on the left.  She had radiation therapy following the last resection by Dr. Calero.  She has had follow-up scans since that time, which showed a stable lesion as recently as 2017.         There is no family history of seizures or epilepsy.       The patient had a single febrile convulsion of early childhood when she was approximately 1 years old.       She has no history of gestational or  injury, developmental delay,  stroke, meningitis, encephalitis, and significant head injury.  She denied a history of physical or sexual abuse by an adult during her childhood or adulthood.       Laboratory evaluations:   The most recent brain MRI was performed at [facility] on 05/18/2017, and was reported to show a stable left medial middle fossa lesion extending into the parasellar space in the left orbital apex, with mild mass effect and with enhancement.       A routine EEG was performed in [place] in 2012, and the report indicated left hemispheric slowing and spikes.   An outpatient EEG at [facility] on 06/15/2016 was abnormal due to left frontotemporal delta slowing and breach effect.      Epilepsy therapeutics:   The only anti-seizure medication that the patient has used has been levetiracetam.  This was started in 2012.  It has been consistently associated with irritability.  The patient has been able to control her irritability and has continued to function well as an  for the [employer].       PAST MEDICAL-SURGICAL HISTORY:    1. 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   2. History of left middle fossa meningothelial meningioma, grade 1, with extension to the left parasellar area, left orbital apex and cavernous sinus; status post resections (2012) and radiation therapy.   3. History of probable transient ischemic attack with aphasia (2013).   4. Obstructive sleep apnea, treated with CPAP.   5. Systemic hypertension, treated.   6. Hyperlipidemia.   7. History of recurring renal calculi.   8. History of arthralgia attributed to atypical rheumatoid arthritis or fibromyalgia.   9. Hypothyroidism.   10. Status post left carpal tunnel release.   11. History of TMJ syndrome.      FAMILY HISTORY:  There is no family history of seizures, epilepsy or other neurological conditions.       PERSONAL AND SOCIAL HISTORY:  The patient completed her education through a D[employer] degree.  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  She was placed on total disability when her memory problems had progressed to the point that she no longer could perform necessary duties as an  for a [place] [employer].    She lives with her .  They have 3 adult children, no longer in the home.       REVIEW OF SYSTEMS:    As per [facility]     MEDICATIONS:  Levetiracetam 2000 mg b.i.d., lacosamide 200 mg b.i.d., and other medications as per the electronic medical record.      PHYSICAL EXAMINATION:    The patient was an alert woman in no apparent distress.  Vital signs were as per the electronic medical record.  There was a skull defect consistent with reported surgery.  Neck was supple, without signs of meningeal irritation.      Scalp examination showed no remarkable hair thinning.      Visual fields full. EOMI. Antigravity throughout. No tremor. FNF and HS intact. Gait stable.     IMPRESSION:    Ms. 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 She continues to have episodes of marked nausea.  While these might represent focal seizures, the events are not consistent with her prior seizures, and explanations other than epileptic events are possible.     It is clear that she finds fatigue more troubling than the brief episodes. Because it is possible that her fatigue is partially due to anticonvulsants, we will not increase her medications. With Depakote stopped recently, we will obtain levels of levetiracetam and lacosamide to ensure that they are stable.     After her upcoming meningioma resection, we will re-evaluate the episodes.  Particularly if her alertness and subjective energy levels increase after surgery, she may consider diagnosis with video-EEG and increasing AED therapy.      PLAN:    1.  Continue levetiracetam at 2000 mg b.i.d.   2.  Continue lacosamide at 200 mg b.i.d.   3.  Obtain anticonvulsant levels.  4.  Return visit in  approximately 5 months.     Staffed with Dr. Julius Salgado MD, Epilepsy Fellow    Report Prepared By: Moises Salgado MD, Neurology-Epilepsy Fellow   I agree with the findings and plan of care as documented.  I personally examined the patient, and discussed our epilepsy diagnostic impressions and therapeutic recommendations with the patient.  The patient was agreeable to this plan.     I spent 33 minutes in this patient care, more than 50% of which consisted of counseling and coordinating care, with additional chart review and therapeutic planning performed on the day of the visit.    Emmanuel Madrid M.D., Professor of Neurology

## 2021-06-02 ENCOUNTER — TELEPHONE (OUTPATIENT)
Dept: NEUROLOGY | Facility: CLINIC | Age: 56
End: 2021-06-02

## 2021-06-02 NOTE — TELEPHONE ENCOUNTER
Per 5/24/21 visit with Dr. Calero patient needs angio prior to meningioma resection.    Spoke with patient. Informed:   Scheduling will reach out to arrange in the next few weeks.  Will be at hospital for 5-6 hours.  Will need  home.  Will need COVID test and blood work 2-4 days prior.  Visitor policy.   NPO instructions.   Will send instructions via [employer] closer to appointment.     States she will be on vacation from 8/7/21-8/13/21. Would like angio prior and resection after.     Patient verbalized understanding of all information. Questions answered. Patient has my contact information and was encouraged to call with questions/concerns. Vicky Hankins RN 6/2/2021 10:44 AM

## 2021-06-02 NOTE — PATIENT INSTRUCTIONS
We will contact you to arrange your angiogram and surgery.    If you have any questions please contact me at [phone], option 3.    Wellington Hankins RN, CNRN  Stroke & Endovascular Care Coordinator    Thank you for choosing [facility] for your health care needs.

## 2021-06-03 ENCOUNTER — TELEPHONE (OUTPATIENT)
Dept: NEUROSURGERY | Facility: CLINIC | Age: 56
End: 2021-06-03

## 2021-06-03 DIAGNOSIS — Z11.59 ENCOUNTER FOR SCREENING FOR OTHER VIRAL DISEASES: ICD-10-CM

## 2021-06-03 LAB — LEVETIRACETAM SERPL-MCNC: 36 UG/ML (ref 12–46)

## 2021-06-03 NOTE — TELEPHONE ENCOUNTER
Patient is scheduled for angiogram on 7/7/21 with Covid test on 7/5/21 in [place].    Patient is aware.

## 2021-06-04 LAB — LACOSAMIDE SERPL-MCNC: 11.5 UG/ML (ref 5–10)

## 2021-06-07 DIAGNOSIS — D32.0 BENIGN NEOPLASM OF CEREBRAL MENINGES (H): Primary | ICD-10-CM

## 2021-06-21 ENCOUNTER — PATIENT OUTREACH (OUTPATIENT)
Dept: NEUROLOGY | Facility: CLINIC | Age: 56
End: 2021-06-21

## 2021-06-21 NOTE — PATIENT INSTRUCTIONS
You are scheduled for a Diagnostic Cerebral Angiogram with Dr. Calero on 7/7/21 at 9:00 AM.     Please follow these instructions:    * Prior to your procedure you will need to obtain COVID-19 testing within 2-4 days of your scheduled procedure. You are scheduled for 7/5/21 at 9:30 AM at [facility], located at [address] [place] [id number]. Their phone number is [phone]. You will also need to obtain blood work (ordered by Dr. Danica Peres) at time of COVID testing. You are scheduled for 7/5/21 at 10:30 AM at [facility].    * You should arrive at the [facility] waiting room at the [facility] at 7:30 AM. The address is [address] [place] [address]. The phone number is [phone]. A map is enclosed.    * Do not eat after Midnight; you may drink clear liquids (includes water, Jell-O, clear broth, apple juice or any non-carbonated beverage that you can see through) until 7:00 AM.     * You may take your medications with a sip of water the morning of the procedure.     * You will be discharged the same day. You must have a  home and someone that can stay with you through the night.     PLEASE NOTE our COVID-19 visitors policy: For the protection of our patients and visitors, patients are allowed one consistent visitor, 18 years of age or older, per adult patient in the hospital. Visitors must wear a mask at all times while in the hospital.     All discharge instructions will be given to the  or volunteer. Documentation for the post-operative plan will be given to the patient and . Patients are required to have someone to stay with them for 24 hours after their procedure.    If you have questions regarding your procedure, please contact me at [phone], option 3.    If you need to cancel, reschedule or have procedure scheduling related questions, please call [facility] at [phone].    Thank  you,  Wellington Hankins RN, CNRN  Stroke & Endovascular Care Coordinator

## 2021-06-28 DIAGNOSIS — D32.0 BENIGN NEOPLASM OF CEREBRAL MENINGES (H): Primary | ICD-10-CM

## 2021-06-29 PROBLEM — D32.0 BENIGN NEOPLASM OF CEREBRAL MENINGES (H): Status: ACTIVE | Noted: 2021-06-29

## 2021-07-05 DIAGNOSIS — Z11.59 ENCOUNTER FOR SCREENING FOR OTHER VIRAL DISEASES: ICD-10-CM

## 2021-07-05 DIAGNOSIS — D32.0 BENIGN NEOPLASM OF CEREBRAL MENINGES (H): ICD-10-CM

## 2021-07-05 LAB
APTT PPP: 35 SEC (ref 22–37)
ERYTHROCYTE [DISTWIDTH] IN BLOOD BY AUTOMATED COUNT: 14.9 % (ref 10–15)
HCT VFR BLD AUTO: 40.5 % (ref 35–47)
HGB BLD-MCNC: 12.9 G/DL (ref 11.7–15.7)
INR PPP: 1.06 (ref 0.86–1.14)
LABORATORY COMMENT REPORT: NORMAL
MCH RBC QN AUTO: 29.3 PG (ref 26.5–33)
MCHC RBC AUTO-ENTMCNC: 31.9 G/DL (ref 31.5–36.5)
MCV RBC AUTO: 92 FL (ref 78–100)
PLATELET # BLD AUTO: 226 10E9/L (ref 150–450)
RBC # BLD AUTO: 4.4 10E12/L (ref 3.8–5.2)
SARS-COV-2 RNA RESP QL NAA+PROBE: NEGATIVE
SARS-COV-2 RNA RESP QL NAA+PROBE: NORMAL
SPECIMEN SOURCE: NORMAL
SPECIMEN SOURCE: NORMAL
WBC # BLD AUTO: 8 10E9/L (ref 4–11)

## 2021-07-05 PROCEDURE — 36415 COLL VENOUS BLD VENIPUNCTURE: CPT | Performed by: PSYCHIATRY & NEUROLOGY

## 2021-07-05 PROCEDURE — 85730 THROMBOPLASTIN TIME PARTIAL: CPT | Performed by: PSYCHIATRY & NEUROLOGY

## 2021-07-05 PROCEDURE — 85027 COMPLETE CBC AUTOMATED: CPT | Performed by: PSYCHIATRY & NEUROLOGY

## 2021-07-05 PROCEDURE — 80048 BASIC METABOLIC PNL TOTAL CA: CPT | Performed by: PSYCHIATRY & NEUROLOGY

## 2021-07-05 PROCEDURE — U0005 INFEC AGEN DETEC AMPLI PROBE: HCPCS | Performed by: NEUROLOGICAL SURGERY

## 2021-07-05 PROCEDURE — 85610 PROTHROMBIN TIME: CPT | Performed by: PSYCHIATRY & NEUROLOGY

## 2021-07-05 PROCEDURE — U0003 INFECTIOUS AGENT DETECTION BY NUCLEIC ACID (DNA OR RNA); SEVERE ACUTE RESPIRATORY SYNDROME CORONAVIRUS 2 (SARS-COV-2) (CORONAVIRUS DISEASE [COVID-19]), AMPLIFIED PROBE TECHNIQUE, MAKING USE OF HIGH THROUGHPUT TECHNOLOGIES AS DESCRIBED BY CMS-2020-01-R: HCPCS | Performed by: NEUROLOGICAL SURGERY

## 2021-07-06 LAB
ANION GAP SERPL CALCULATED.3IONS-SCNC: 7 MMOL/L (ref 3–14)
BUN SERPL-MCNC: 7 MG/DL (ref 7–30)
CALCIUM SERPL-MCNC: 9.8 MG/DL (ref 8.5–10.1)
CHLORIDE SERPL-SCNC: 108 MMOL/L (ref 94–109)
CO2 SERPL-SCNC: 25 MMOL/L (ref 20–32)
CREAT SERPL-MCNC: 0.78 MG/DL (ref 0.52–1.04)
GFR SERPL CREATININE-BSD FRML MDRD: 84 ML/MIN/{1.73_M2}
GLUCOSE SERPL-MCNC: 90 MG/DL (ref 70–99)
POTASSIUM SERPL-SCNC: 4.1 MMOL/L (ref 3.4–5.3)
SODIUM SERPL-SCNC: 140 MMOL/L (ref 133–144)

## 2021-07-07 ENCOUNTER — APPOINTMENT (OUTPATIENT)
Dept: INTERVENTIONAL RADIOLOGY/VASCULAR | Facility: CLINIC | Age: 56
End: 2021-07-07
Attending: NEUROLOGICAL SURGERY
Payer: COMMERCIAL

## 2021-07-07 ENCOUNTER — APPOINTMENT (OUTPATIENT)
Dept: MEDSURG UNIT | Facility: CLINIC | Age: 56
End: 2021-07-07
Attending: NEUROLOGICAL SURGERY
Payer: COMMERCIAL

## 2021-07-07 ENCOUNTER — HOSPITAL ENCOUNTER (OUTPATIENT)
Facility: CLINIC | Age: 56
Discharge: HOME | End: 2021-07-07
Attending: NEUROLOGICAL SURGERY | Admitting: NEUROLOGICAL SURGERY
Payer: COMMERCIAL

## 2021-07-07 VITALS
RESPIRATION RATE: 18 BRPM | BODY MASS INDEX: 42.6 KG/M2 | WEIGHT: 281.09 LBS | DIASTOLIC BLOOD PRESSURE: 64 MMHG | OXYGEN SATURATION: 98 % | TEMPERATURE: 98.1 F | HEIGHT: 68 IN | SYSTOLIC BLOOD PRESSURE: 148 MMHG | HEART RATE: 74 BPM

## 2021-07-07 DIAGNOSIS — D32.9 MENINGIOMA (H): ICD-10-CM

## 2021-07-07 PROCEDURE — C1887 CATHETER, GUIDING: HCPCS

## 2021-07-07 PROCEDURE — 36223 PLACE CATH CAROTID/INOM ART: CPT | Mod: XS | Performed by: NEUROLOGICAL SURGERY

## 2021-07-07 PROCEDURE — 999N000127 HC STATISTIC PERIPHERAL IV START W US GUIDANCE

## 2021-07-07 PROCEDURE — 250N000011 HC RX IP 250 OP 636: Performed by: PSYCHIATRY & NEUROLOGY

## 2021-07-07 PROCEDURE — 36223 PLACE CATH CAROTID/INOM ART: CPT | Mod: XS

## 2021-07-07 PROCEDURE — 36228 PLACE CATH INTRACRANIAL ART: CPT | Mod: GC | Performed by: NEUROLOGICAL SURGERY

## 2021-07-07 PROCEDURE — 99153 MOD SED SAME PHYS/QHP EA: CPT

## 2021-07-07 PROCEDURE — 36226 PLACE CATH VERTEBRAL ART: CPT

## 2021-07-07 PROCEDURE — C1769 GUIDE WIRE: HCPCS

## 2021-07-07 PROCEDURE — 99152 MOD SED SAME PHYS/QHP 5/>YRS: CPT | Mod: GC | Performed by: NEUROLOGICAL SURGERY

## 2021-07-07 PROCEDURE — 272N000566 HC SHEATH CR3

## 2021-07-07 PROCEDURE — 250N000009 HC RX 250: Performed by: PSYCHIATRY & NEUROLOGY

## 2021-07-07 PROCEDURE — 255N000002 HC RX 255 OP 636: Performed by: NEUROLOGICAL SURGERY

## 2021-07-07 PROCEDURE — 258N000003 HC RX IP 258 OP 636: Performed by: PSYCHIATRY & NEUROLOGY

## 2021-07-07 PROCEDURE — C1760 CLOSURE DEV, VASC: HCPCS

## 2021-07-07 PROCEDURE — 999N000133 HC STATISTIC PP CARE STAGE 2

## 2021-07-07 PROCEDURE — 272N000192 HC ACCESSORY CR2

## 2021-07-07 PROCEDURE — 36224 PLACE CATH CAROTD ART: CPT

## 2021-07-07 PROCEDURE — 36224 PLACE CATH CAROTD ART: CPT | Mod: LT | Performed by: NEUROLOGICAL SURGERY

## 2021-07-07 PROCEDURE — 99152 MOD SED SAME PHYS/QHP 5/>YRS: CPT

## 2021-07-07 PROCEDURE — 36226 PLACE CATH VERTEBRAL ART: CPT | Mod: LT | Performed by: NEUROLOGICAL SURGERY

## 2021-07-07 PROCEDURE — 272N000506 HC NEEDLE CR6

## 2021-07-07 PROCEDURE — 36227 PLACE CATH XTRNL CAROTID: CPT

## 2021-07-07 RX ORDER — ONDANSETRON 2 MG/ML
4 INJECTION INTRAMUSCULAR; INTRAVENOUS EVERY 6 HOURS PRN
Status: DISCONTINUED | OUTPATIENT
Start: 2021-07-07 | End: 2021-07-07 | Stop reason: HOSPADM

## 2021-07-07 RX ORDER — METOCLOPRAMIDE HYDROCHLORIDE 5 MG/ML
10 INJECTION INTRAMUSCULAR; INTRAVENOUS EVERY 6 HOURS PRN
Status: DISCONTINUED | OUTPATIENT
Start: 2021-07-07 | End: 2021-07-07 | Stop reason: HOSPADM

## 2021-07-07 RX ORDER — PROCHLORPERAZINE 25 MG
25 SUPPOSITORY, RECTAL RECTAL EVERY 12 HOURS PRN
Status: DISCONTINUED | OUTPATIENT
Start: 2021-07-07 | End: 2021-07-07 | Stop reason: HOSPADM

## 2021-07-07 RX ORDER — HEPARIN SODIUM 200 [USP'U]/100ML
1 INJECTION, SOLUTION INTRAVENOUS CONTINUOUS PRN
Status: DISCONTINUED | OUTPATIENT
Start: 2021-07-07 | End: 2021-07-07 | Stop reason: HOSPADM

## 2021-07-07 RX ORDER — LIDOCAINE 40 MG/G
CREAM TOPICAL
Status: DISCONTINUED | OUTPATIENT
Start: 2021-07-07 | End: 2021-07-07 | Stop reason: HOSPADM

## 2021-07-07 RX ORDER — PROCHLORPERAZINE MALEATE 10 MG
10 TABLET ORAL EVERY 6 HOURS PRN
Status: DISCONTINUED | OUTPATIENT
Start: 2021-07-07 | End: 2021-07-07 | Stop reason: HOSPADM

## 2021-07-07 RX ORDER — NICOTINE POLACRILEX 4 MG
15-30 LOZENGE BUCCAL
Status: DISCONTINUED | OUTPATIENT
Start: 2021-07-07 | End: 2021-07-07 | Stop reason: HOSPADM

## 2021-07-07 RX ORDER — CALCIUM CARBONATE 500(1250)
1 TABLET ORAL DAILY
COMMUNITY

## 2021-07-07 RX ORDER — NALOXONE HYDROCHLORIDE 0.4 MG/ML
0.4 INJECTION, SOLUTION INTRAMUSCULAR; INTRAVENOUS; SUBCUTANEOUS
Status: DISCONTINUED | OUTPATIENT
Start: 2021-07-07 | End: 2021-07-07 | Stop reason: HOSPADM

## 2021-07-07 RX ORDER — FENTANYL CITRATE 50 UG/ML
25-50 INJECTION, SOLUTION INTRAMUSCULAR; INTRAVENOUS EVERY 5 MIN PRN
Status: DISCONTINUED | OUTPATIENT
Start: 2021-07-07 | End: 2021-07-07 | Stop reason: HOSPADM

## 2021-07-07 RX ORDER — NALOXONE HYDROCHLORIDE 0.4 MG/ML
0.2 INJECTION, SOLUTION INTRAMUSCULAR; INTRAVENOUS; SUBCUTANEOUS
Status: DISCONTINUED | OUTPATIENT
Start: 2021-07-07 | End: 2021-07-07 | Stop reason: HOSPADM

## 2021-07-07 RX ORDER — SODIUM CHLORIDE 9 MG/ML
INJECTION, SOLUTION INTRAVENOUS CONTINUOUS
Status: DISCONTINUED | OUTPATIENT
Start: 2021-07-07 | End: 2021-07-07 | Stop reason: HOSPADM

## 2021-07-07 RX ORDER — METOCLOPRAMIDE 10 MG/1
10 TABLET ORAL EVERY 6 HOURS PRN
Status: DISCONTINUED | OUTPATIENT
Start: 2021-07-07 | End: 2021-07-07 | Stop reason: HOSPADM

## 2021-07-07 RX ORDER — IODIXANOL 320 MG/ML
150 INJECTION, SOLUTION INTRAVASCULAR ONCE
Status: COMPLETED | OUTPATIENT
Start: 2021-07-07 | End: 2021-07-07

## 2021-07-07 RX ORDER — FLUMAZENIL 0.1 MG/ML
0.2 INJECTION, SOLUTION INTRAVENOUS
Status: DISCONTINUED | OUTPATIENT
Start: 2021-07-07 | End: 2021-07-07 | Stop reason: HOSPADM

## 2021-07-07 RX ORDER — ONDANSETRON 4 MG/1
4 TABLET, ORALLY DISINTEGRATING ORAL EVERY 6 HOURS PRN
Status: DISCONTINUED | OUTPATIENT
Start: 2021-07-07 | End: 2021-07-07 | Stop reason: HOSPADM

## 2021-07-07 RX ORDER — DEXTROSE MONOHYDRATE 25 G/50ML
25-50 INJECTION, SOLUTION INTRAVENOUS
Status: DISCONTINUED | OUTPATIENT
Start: 2021-07-07 | End: 2021-07-07 | Stop reason: HOSPADM

## 2021-07-07 RX ADMIN — FENTANYL CITRATE 25 MCG: 50 INJECTION, SOLUTION INTRAMUSCULAR; INTRAVENOUS at 11:30

## 2021-07-07 RX ADMIN — SODIUM CHLORIDE: 9 INJECTION, SOLUTION INTRAVENOUS at 09:38

## 2021-07-07 RX ADMIN — LIDOCAINE HYDROCHLORIDE 30 ML: 10 INJECTION, SOLUTION EPIDURAL; INFILTRATION; INTRACAUDAL; PERINEURAL at 10:50

## 2021-07-07 RX ADMIN — FENTANYL CITRATE 25 MCG: 50 INJECTION, SOLUTION INTRAMUSCULAR; INTRAVENOUS at 10:46

## 2021-07-07 RX ADMIN — FENTANYL CITRATE 25 MCG: 50 INJECTION, SOLUTION INTRAMUSCULAR; INTRAVENOUS at 10:30

## 2021-07-07 RX ADMIN — MIDAZOLAM 0.5 MG: 1 INJECTION INTRAMUSCULAR; INTRAVENOUS at 11:29

## 2021-07-07 RX ADMIN — FENTANYL CITRATE 25 MCG: 50 INJECTION, SOLUTION INTRAMUSCULAR; INTRAVENOUS at 11:01

## 2021-07-07 RX ADMIN — MIDAZOLAM 0.5 MG: 1 INJECTION INTRAMUSCULAR; INTRAVENOUS at 10:46

## 2021-07-07 RX ADMIN — HEPARIN SODIUM 3 BAG: 200 INJECTION, SOLUTION INTRAVENOUS at 11:04

## 2021-07-07 RX ADMIN — MIDAZOLAM 0.5 MG: 1 INJECTION INTRAMUSCULAR; INTRAVENOUS at 10:30

## 2021-07-07 RX ADMIN — MIDAZOLAM 0.5 MG: 1 INJECTION INTRAMUSCULAR; INTRAVENOUS at 11:00

## 2021-07-07 RX ADMIN — IODIXANOL 110 ML: 320 INJECTION, SOLUTION INTRAVASCULAR at 11:37

## 2021-07-07 ASSESSMENT — VISUAL ACUITY
OU: GLASSES;BASELINE
OU: GLASSES
OU: GLASSES;BASELINE

## 2021-07-07 ASSESSMENT — MIFFLIN-ST. JEOR: SCORE: 1913.37

## 2021-07-07 NOTE — PROGRESS NOTES
Patient Name: Marlyn Wilder  Medical Record Number: [id number]  Today's Date: 7/7/2021    Procedure: Diagnostic Cerebral Angiogram  Proceduralist: Dr. Danica Peres, Dr. Noah Do, Dr. Ajay Nguyen & Dr. Pan Calero    Sedation start time: 1043  Sedation end time: 1135  Sedation medications administered: 2 mg Versed & 100 mcg Fentanyl   Total sedation time: 52 minutes    Report given to: 2A Rn  : N/A       Patient transferred to table, positioned and prepped per protocol.    Puncture to: right groing at  1051      Sheath removed at 1130 Manual pressure held for 10 minutes; Closure device deployed 130   Groin site appearance: CDI  Pedal pulse assessment:  Present and strong    Other:      Post Procedure Education:  The following information has been reviewed with patient   1. Maintain bedrest with right lower extremity straight until 1330.   2. Notify nurse immediately if having any bleeding from site, any changes in sensation, or changes in strength.   3. Notify nurse if you have to go the bathroom, the staff will assist you.   4. Nurses will be doing frequent assessments post procedure, which will include                   checking the pulses in your feet, groin/access site, vital signs, and neuro exam.    5. Please press your call light if you, or your family, have any questions or concerns        regarding your care.    Learner's response to angiogram education: patient needs reinforcement due to sedation.

## 2021-07-07 NOTE — PROGRESS NOTES
Patient tolerated recovery stage well. VSS, right groin site clean/dry/intact, no hematoma, and denies pain. Patient tolerated PO food and fluids. Teaching was done and discharge instructions were given. Patient ambulated, voided, and PIV was removed. Patient discharged from the hospital via wheel chair to home with .

## 2021-07-07 NOTE — DISCHARGE INSTRUCTIONS
[facility]         Interventional Radiology  Discharge Instructions Post Angiogram (NEURO)    AFTER YOU GO HOME  ? If you were given sedation, for the first 24 hours: we recommend that an adult stay with you, DO NOT drive or operate machinery at home or at work, DO NOT smoke, DO NOT make any important or legal decisions.  ? DO NOT drink alcoholic beverages the day of your procedure  ? DO relax and take it easy for 24 hours  ? DO drink plenty of fluids  ? DO resume your regular diet, unless otherwise instructed by your Primary Physician  ? DO NOT take a shower for at least 12 hours following your procedure. No tub bath, hot tubs, or swimming for 5 days. Do NOT scrub the procedure site vigorously for 5 days.      Care of groin site  It is normal to have a small bruise or lump at the site.    For the first 2 days, when you cough, sneeze or move your bowels, hold your hand over the puncture site and press gently.    Do NOT lift more than 10 pounds or do any strenuous exercise for at least 3 to 5 days.    Do not use lotion or powder near the puncture site for 3 days.  If you start bleeding from the site in your groin: lie down flat and press firmly on the site. Call your doctor as soon as you can.   Call 911 right away if you have: Heavy bleeding, bleeding that does not stop.  .        CALL THE PHYSICIAN IF:  ? You start bleeding from the procedure site.  ? You have numbness, coolness or change in color of the arm or leg of the puncture site  ? You experience changes in your vision, hearing, balance, coordination, speech, thinking or memory  ? You experience weakness in one or more extremity  ? You experience pain or redness at the puncture site  ? You develop nausea or vomiting  ? You develop hives or a rash or unexplained itching  ? You develop a temperature of 101 degrees F or greater      ADDITIONAL INSTRUCTIONS  ? Instruction booklet given: exoseal    [facility] INTERVENTIONAL RADIOLOGY  DEPARTMENT  Procedure Physician: Dr Peres/Dr Calero Date of procedure: July 7, 2021  Telephone Numbers: [phone] Monday-Friday 8:00 am to 4:30 pm  [phone] After 4:30 pm Monday-Friday, Weekends & Holidays.   Ask for the Neuro-Interventional doctor on call.  Someone is on call 24 hrs/day  [facility] toll free number: [phone] Monday-Friday 8:00 am to 4:30 pm  [facility] Emergency Dept: [phone]

## 2021-07-07 NOTE — PROGRESS NOTES
[facility]     Endovascular Surgical Neuroradiology Pre-Procedure Note      HPI:  Marlyn Wilder is a 55 year old woman with a left medial sphenoid wing meningioma status post multiple resections with a recent interval increase in the size of the extra-axial mass measuring 3.1 x  3.7 cm from 2.9 x 3.5 cm previously, with associated vasogenic edema in the left temporal lobe and mass-effect over the cavernous and supraclinoid left internal carotid arteries as well as the left A1 and left M1 arteries.  The patient is here to undergo a diagnostic cerebral angiogram to evaluate the left ICA vascular anatomy as it relates to the tumor, and its blood supply.    Medical History:  Past Medical History:   Diagnosis Date     Arthritis     atypical rheumatoid arthritis     Brain tumor (H)      Calculus of kidney      Gout      Hypertension      PONV (postoperative nausea and vomiting)      Seizures (H)     frontal lobe seizures, controlled on Keppra, starts with smell, then de ja vu, nausea     Sleep apnea     uses CPAP     TIA (transient ischemic attack)     takes baby ASA       Surgical History:  Past Surgical History:   Procedure Laterality Date     BRAIN SURGERY  2012    debulking x 2, short term memory deficits     C  DELIVERY ONLY      ,     C LIGATE FALLOPIAN TUBE,POSTPARTUM       COMBINED CYSTOSCOPY, INSERT CATHETER URETER Right 2020    Procedure: CYSTOSCOPY, RIGHT URETERAL CATHETER PLACEMENT;  Surgeon: Adarsh Livingston MD;  Location: [facility] OR     HC REMOVAL OF TONSILS,<12 Y/O      Tonsils <11y.o.     IR NEPHROSTOMY TUBE REMOVAL RIGHT  2020     IR RENAL STONE REMOVAL RIGHT  2020     OPTICAL TRACKING SYSTEM CRANIOTOMY, EXCISE TUMOR, COMBINED  2012    Procedure: COMBINED OPTICAL TRACKING SYSTEM CRANIOTOMY, EXCISE TUMOR;  Stealth Guided Left Redo Craniotomy, Resection Of Tumor Ct @615 am MRI @ 630;  Surgeon: Pan Calero  MD Fernando;  Location: [facility] OR     PERCUTANEOUS NEPHROLITHOTOMY Right 2/25/2020    Procedure: RIGHT PERCUTANEOUS NEPHROLITHOTOMY;  Surgeon: Adarsh Livingston MD;  Location:  OR       Family History:  Family History   Problem Relation Age of Onset     Breast Cancer Mother         72-alive     Hypertension Father         76-alive     C.A.D. Father      Family History Negative Sister         2 sisters healthy     Family History Negative Brother        Social History:  Social History     Socioeconomic History     Marital status:      Spouse name: Not on file     Number of children: Not on file     Years of education: Not on file     Highest education level: Not on file   Occupational History     Not on file   Social Needs     Financial resource strain: Not on file     Food insecurity     Worry: Not on file     Inability: Not on file     Transportation needs     Medical: Not on file     Non-medical: Not on file   Tobacco Use     Smoking status: Never Smoker     Smokeless tobacco: Never Used   Substance and Sexual Activity     Alcohol use: No     Drug use: No     Sexual activity: Yes     Partners: Male     Birth control/protection: Surgical   Lifestyle     Physical activity     Days per week: Not on file     Minutes per session: Not on file     Stress: Not on file   Relationships     Social connections     Talks on phone: Not on file     Gets together: Not on file     Attends Rastafarian service: Not on file     Active member of club or organization: Not on file     Attends meetings of clubs or organizations: Not on file     Relationship status: Not on file     Intimate partner violence     Fear of current or ex partner: Not on file     Emotionally abused: Not on file     Physically abused: Not on file     Forced sexual activity: Not on file   Other Topics Concern     Parent/sibling w/ CABG, MI or angioplasty before 65F 55M? Not Asked   Social History Narrative     Not on file       Allergies:  Allergies    Allergen Reactions     Duloxetine Nausea       Is there a contrast allergy?  No    Medications:  Current Facility-Administered Medications   Medication     glucose gel 15-30 g    Or     dextrose 50 % injection 25-50 mL    Or     glucagon injection 1 mg     lidocaine (LMX4) cream     lidocaine 1 % 0.1-1 mL     medication instruction     sodium chloride (PF) 0.9% PF flush 3 mL     sodium chloride (PF) 0.9% PF flush 3 mL     sodium chloride 0.9% infusion   .    ROS:  The 10 point Review of Systems is negative other than noted in the HPI or here.     PHYSICAL EXAMINATION  Vital Signs:  B/P: 147/77,  T: 98.1,  P: 71,  R: 16    General: sitting in bed in no acute distress. S1S2 heard. CTABL. Abdomen is soft and NT/ND. Mood is good, affect appropriate.    Mental status: Awake, alert and oriented to person, place, time and situation.     CN:  PERR. EOMI. Facial sensation is intact to touch in all 3 divisions bilaterally. Palate elevates symmetrically. Phonation is normal. Head turning and shoulder shrug are intact. Tongue is midline with normal movements.    Motor:   5/5 in all 4 limbs    Sensory:  Intact to touch bilaterally symmetrical.    Cerebellar:  No ataxia on FNT.     Gait:   Deferred    Pre-procedure National Institutes of Health Stroke Scale:   Not applicable    LABS  (most recent Cr, BUN, GFR, PLT, INR, PTT within the past 7 days):  Recent Labs   Lab 07/05/21  0924   CR 0.78   BUN 7   GFRESTIMATED 84   GFRESTBLACK >90      INR 1.06   PTT 35        Platelet Function P2Y12 (PRU):  Not applicable      ASSESSMENT:  Proceed with diagnostic cerebral angiogram    PLAN:  Diagnostic cerebral angiogram     PRE-PROCEDURE SEDATION ASSESSMENT     Pre-Procedure Sedation Assessment done at 0930.    Expected Level:  Moderate Sedation    Indication:  Sedation is required to allow for neurointerventional procedure.    Consent obtained from patient after discussing the risks, benefits and alternatives.     PO Intake:   Appropriately NPO for procedure    ASA Class:  Class 2 - MILD SYSTEMIC DISEASE, NO ACUTE PROBLEMS, NO FUNCTIONAL LIMITATIONS.    Mallampati:  Grade 2:  Soft palate, base of uvula, tonsillar pillars, and portion of posterior pharyngeal wall visible    History and physical reviewed and no updates needed. I have reviewed the lab findings, diagnostic data, medications, and the plan for sedation. I have determined this patient to be an appropriate candidate for the planned sedation and procedure and have reassessed the patient IMMEDIATELY PRIOR to sedation and procedure.    Patient was discussed with the Attending, Dr. Calero, who agrees with the plan.    JER VARGAS MD   Pager: [phone]      I have reviewed the history. I have seen and examined the patient myself and agree with the assessment and plan above.  JAKUB Calero MD

## 2021-07-07 NOTE — IP AVS SNAPSHOT
[facility] [address] [address]                                    After Visit Summary   7/7/2021    Marlyn Wilder    MRN: [id number]           After Visit Summary Signature Page    I have received my discharge instructions, and my questions have been answered. I have discussed any challenges I see with this plan with the nurse or doctor.    ..........................................................................................................................................  Patient/Patient Representative Signature      ..........................................................................................................................................  Patient Representative Print Name and Relationship to Patient    ..................................................               ................................................  Date                                   Time    ..........................................................................................................................................  Reviewed by Signature/Title    ...................................................              ..............................................  Date                                               Time          22EPIC Rev 08/18

## 2021-07-07 NOTE — IP AVS SNAPSHOT
After Visit Summary Template Not Found    This Print Group is only intended to be used in the After Visit Summary and can only be used in a report that uses a released After Visit Summary Template.                       MRN:[id number]                      After Visit Summary   7/7/2021    Marlyn Wilder    MRN: [id number]           Visit Information        Department      7/7/2021  7:19 AM [facility] [address] [place]          Review of your medicines      UNREVIEWED medicines. Ask your doctor about these medicines       Dose / Directions   calcium carbonate 500 MG tablet  Commonly known as: OS-BRENDEN      Dose: 1 tablet  Take 1 tablet by mouth daily  Refills: 0     folic acid 1 MG tablet  Commonly known as: FOLVITE      Dose: 1 mg  Take 1 mg by mouth daily  Refills: 0     levETIRAcetam 500 MG tablet  Commonly known as: KEPPRA  Used for: Partial symptomatic epilepsy with simple partial seizures, intractable, without status epilepticus (H)      Dose: 2,000 mg  Take 4 tablets (2,000 mg) by mouth 2 times daily  Quantity: 240 tablet  Refills: 11     levothyroxine 112 MCG tablet  Commonly known as: SYNTHROID/LEVOTHROID  Ask about: Which instructions should I use?      TAKE 1 TABLET BY MOUTH BEFORE BREAKFAST  Refills: 0     lisinopril 2.5 MG tablet  Commonly known as: ZESTRIL  Indication: High Blood Pressure Disorder      Dose: 2.5 mg  Take 2.5 mg by mouth 2 times daily  Refills: 0     methotrexate sodium 2.5 MG Tabs      Dose: 17.5 mg  Take 17.5 mg by mouth once a week (take 7 X 2.5 mg = 17.5 mg dose)  Refills: 0     MULTIVITAMIN & MINERAL PO      Dose: 1 tablet  Take 1 tablet by mouth daily  Refills: 0     ondansetron 4 MG tablet  Commonly known as: ZOFRAN      Dose: 4 mg  Take 4 mg by mouth daily as needed for nausea  Refills: 0     Vimpat 200 MG Tabs tablet  Used for: Partial symptomatic epilepsy with simple partial seizures, intractable, without status epilepticus (H)  Generic drug: lacosamide      Dose: 200  mg  Take 1 tablet (200 mg) by mouth 2 times daily  Quantity: 60 tablet  Refills: 5     vitamin D3 50 mcg (2000 units) tablet  Commonly known as: CHOLECALCIFEROL      Dose: 1 tablet  Take 1 tablet by mouth daily  Refills: 0              Protect others around you: Learn how to safely use, store and throw away your medicines at www.disposemymeds.org.       Follow-ups after your visit       Your next 10 appointments already scheduled    Nov 09, 2021  3:30 PM  (Arrive by 3:15 PM)  Return Seizure with Emmanuel Madrid MD  [facility] ([facility] ) [address] [place] [address]  3rd Floor  [facility] [address] [phone]         Care Instructions       Further instructions from your care team       [facility]         Interventional Radiology  Discharge Instructions Post Angiogram (NEURO)    AFTER YOU GO HOME  ? If you were given sedation, for the first 24 hours: we recommend that an adult stay with you, DO NOT drive or operate machinery at home or at work, DO NOT smoke, DO NOT make any important or legal decisions.  ? DO NOT drink alcoholic beverages the day of your procedure  ? DO relax and take it easy for 24 hours  ? DO drink plenty of fluids  ? DO resume your regular diet, unless otherwise instructed by your Primary Physician  ? DO NOT take a shower for at least 12 hours following your procedure. No tub bath, hot tubs, or swimming for 5 days. Do NOT scrub the procedure site vigorously for 5 days.      Care of groin site  It is normal to have a small bruise or lump at the site.    For the first 2 days, when you cough, sneeze or move your bowels, hold your hand over the puncture site and press gently.    Do NOT lift more than 10 pounds or do any strenuous exercise for at least 3 to 5 days.    Do not use lotion or powder near the puncture site for 3 days.  If you start bleeding from the site in your groin: lie down flat and press  "firmly on the site. Call your doctor as soon as you can.   Call 911 right away if you have: Heavy bleeding, bleeding that does not stop.  .        CALL THE PHYSICIAN IF:  ? You start bleeding from the procedure site.  ? You have numbness, coolness or change in color of the arm or leg of the puncture site  ? You experience changes in your vision, hearing, balance, coordination, speech, thinking or memory  ? You experience weakness in one or more extremity  ? You experience pain or redness at the puncture site  ? You develop nausea or vomiting  ? You develop hives or a rash or unexplained itching  ? You develop a temperature of 101 degrees F or greater      ADDITIONAL INSTRUCTIONS  ? Instruction booklet given: exoseal    [facility] INTERVENTIONAL RADIOLOGY DEPARTMENT  Procedure Physician: Dr Peres/Dr Calero Date of procedure: July 7, 2021  Telephone Numbers: [phone] Monday-Friday 8:00 am to 4:30 pm  [phone] After 4:30 pm Monday-Friday, Weekends & Holidays.   Ask for the Neuro-Interventional doctor on call.  Someone is on call 24 hrs/day  [facility] toll free number: [phone] Monday-Friday 8:00 am to 4:30 pm  [facility] Emergency Dept: [phone]        Additional Information About Your Visit       [employer] Information    [employer] gives you secure access to your electronic health record. If you see a primary care provider, you can also send messages to your care team and make appointments. If you have questions, please call your primary care clinic.  If you do not have a primary care provider, please call [phone] and they will assist you.       Care EveryWhere ID    This is your Care EveryWhere ID. This could be used by other organizations to access your [facility] medical records  [id number]       Your Vitals Were  Most recent update: 7/7/2021  1:04 PM    Blood Pressure   133/54          Pulse   80          Temperature   98.1  F (36.7  C) (Oral)          Respirations   16          Height   1.727 m (5' 7.99\")          "    Weight   127.5 kg (281 lb 1.4 oz)    Last Period   10/17/2012    Pulse Oximetry   96%    BMI (Body Mass Index)   42.75 kg/m           Primary Care Provider    Carlos Enrique Neal      Equal Access to Services    ESA SANCHEZ : Hadii aad ku hadmiquelo Soomaali, waaxda luqadaha, qaybta kaalmada adeegyada, michelle ronn adekee donis laloin leeanne. So [facility] [phone].    ATENCIÓN: Si habla español, tiene a carvajal disposición servicios gratuitos de asistencia lingüística. Llame al [phone].    We comply with applicable federal and state civil rights laws, including the [place] Human Rights Act. We do not discriminate on the basis of race, color, creed, Islam, national origin, marital status, age, disability, sex, sexual orientation, or gender identity.    If you would like an itemization of your charges they will now be available in [employer] 30 days after discharge. To access the itemized statements in [employer] go to billing/billing summary. From there select view account. There will be multiple tabs showing an overview of your account, detail, payments, and communications. From the communications tab you can see your monthly statements, your itemized statements, and any billing letters generated for your account. If you do not have a [employer] account and need help getting access please contact [employer] support at [phone].  If you would prefer to have your itemized statements mailed please contact our automated itemized bill request line at [phone] option  2.       Thank you!    Thank you for choosing [facility] for your care. Our goal is always to provide you with excellent care. Hearing back from our patients is one way we can continue to improve our services. Please take a few minutes to complete the written survey that you may receive in the mail after you visit with us. Thank you!            Medication List      ASK your doctor about these medications          Morning Afternoon Evening Bedtime As Needed     calcium carbonate 500 MG tablet  Also known as: OS-BRENDEN  INSTRUCTIONS: Take 1 tablet by mouth daily                     folic acid 1 MG tablet  Also known as: FOLVITE  INSTRUCTIONS: Take 1 mg by mouth daily                     levETIRAcetam 500 MG tablet  Also known as: KEPPRA  INSTRUCTIONS: Take 4 tablets (2,000 mg) by mouth 2 times daily                     levothyroxine 112 MCG tablet  Also known as: SYNTHROID/LEVOTHROID  INSTRUCTIONS: TAKE 1 TABLET BY MOUTH BEFORE BREAKFAST  Ask about: Which instructions should I use?                     lisinopril 2.5 MG tablet  Also known as: ZESTRIL  INSTRUCTIONS: Take 2.5 mg by mouth 2 times daily  Reason for med: High Blood Pressure Disorder                     methotrexate sodium 2.5 MG Tabs  INSTRUCTIONS: Take 17.5 mg by mouth once a week (take 7 X 2.5 mg = 17.5 mg dose)                     MULTIVITAMIN & MINERAL PO  INSTRUCTIONS: Take 1 tablet by mouth daily                     ondansetron 4 MG tablet  Also known as: ZOFRAN  INSTRUCTIONS: Take 4 mg by mouth daily as needed for nausea                     Vimpat 200 MG Tabs tablet  INSTRUCTIONS: Take 1 tablet (200 mg) by mouth 2 times daily  Generic drug: lacosamide                     vitamin D3 50 mcg (2000 units) tablet  Also known as: CHOLECALCIFEROL  INSTRUCTIONS: Take 1 tablet by mouth daily

## 2021-07-07 NOTE — PROGRESS NOTES
Pt prepped for mariah cerebral angiogram.PIV placed.Groins shaved and prepped and pulses marked and charted.IV contrast discharge instructions were reviewed and signed and a copy given to pt.Consent signed with  at bedside and questions answered.Neuro's intact except for some numbness and tingling in both hands.

## 2021-07-07 NOTE — PROCEDURES
[facility]     Endovascular Surgical Neuroradiology Post-Procedure Note    Pre-Procedure Diagnosis: Left paraclinoid meningioma with recent increase in the tumor size causing severe mass effect on left ICA, Left M1 MCA and A1 KARL segments with surrounding vasogenic edema in the left temporal edema.   Post-Procedure Diagnosis: same with angiographic evidence of collateralization of the left MCA territory from the left ECA branches.      Procedure(s):   Diagnostic cervicocerebral angiography    Findings:    [] Left MCA occlusion likely secondary to mass-effect from the left paraclinoid meningioma  [] Left MCA territory appears to be filling from leptomeningeal collaterals from the left KARL.  The left vertebral injection also appears to fill the posterior left MCA territory.  On injecting the external carotid artery on the left side, there appears to be an anastomosis of the left superficial temporal artery with the left middle meningeal artery that collateralized with the left distal MCA territory.     Plan: Continued outpatient follow-up for further surgical planning    Primary Surgeon:  Dr. Pan Calero  Secondary Surgeon:  Not applicable  Secondary Surgeon Review:  None  Fellow:  Dr Peres, Dr Do  Additional Assistants:  None    Prior to the start of the procedure and with procedural staff participation, I verbally confirmed: the patient s identity using two indicators, relevant allergies, that the procedure was appropriate and matched the consent or emergent situation, and that the correct equipment/implants were available. Immediately prior to starting the procedure I conducted the Time Out with the procedural staff and re-confirmed the patient s name, procedure, and site/side. (The Joint Commission universal protocol was followed.)  Yes    PRU value: Not applicable    Anesthesia:  Conscious Sedation  Medications:  2 mg Versed & 100 mcg Fentanyl   Puncture  site:  Right Femoral Artery    Fluoroscopy time (minutes): 19.5  Radiation dose (mGy):  939    Contrast amount (mL):  24     Estimated blood loss (mL):  10    Closure:  Device 6F Exoseal     Disposition:  Home after recovery.        Sedation Post-Procedure Summary    Sedatives: Fentanyl and Midazolam (Versed)    Vital signs and pulse oximetry were monitored and remained stable throughout the procedure, and sedation was maintained until the procedure was complete.  The patient was monitored by staff until sedation discharge criteria were met.    Patient tolerance:  Patient tolerated the procedure well with no immediate complications.    Time of sedation in minutes:  52  minutes from beginning to end of physician one to one monitoring.    JER VARGAS MD  Pager:  [phone]    See official report in PACS  JAKUB Calero MD

## 2021-07-12 DIAGNOSIS — D32.9 MENINGIOMA (H): Primary | ICD-10-CM

## 2021-07-15 ENCOUNTER — TELEPHONE (OUTPATIENT)
Dept: NEUROSURGERY | Facility: CLINIC | Age: 56
End: 2021-07-15

## 2021-07-15 DIAGNOSIS — Z11.59 ENCOUNTER FOR SCREENING FOR OTHER VIRAL DISEASES: Primary | ICD-10-CM

## 2021-07-15 DIAGNOSIS — D32.9 MENINGIOMA (H): Primary | ICD-10-CM

## 2021-07-15 NOTE — TELEPHONE ENCOUNTER
Spoke with patient about BTO with Dr. Calero scheduled for 7/21/21 at 9:00 with covid test 7/19/21 at the [facility].    Patient is aware of appts.

## 2021-07-16 ENCOUNTER — PATIENT OUTREACH (OUTPATIENT)
Dept: NEUROLOGY | Facility: CLINIC | Age: 56
End: 2021-07-16

## 2021-07-16 DIAGNOSIS — D32.9 MENINGIOMA (H): Primary | ICD-10-CM

## 2021-07-16 NOTE — PROGRESS NOTES
Informed patient of procedure instructions. Confirmed date and time. Patient verbalized understanding. All questions answered. Patient instructions sent via [employer]. Patient has my contact information and was encouraged to call with questions/concerns. Vicky Hankins RN 7/16/2021 3:42 PM

## 2021-07-16 NOTE — PATIENT INSTRUCTIONS
You are scheduled for a Diagnostic Cerebral Angiogram with Balloon Occlusion Test with Dr. Calero on 7/21/21 at 9:00 AM.     Please follow these instructions:    * Prior to your procedure you will need to obtain COVID-19 testing and blood work within 2-4 days of your scheduled procedure. You are scheduled for 7/17/21 at 9:15 AM at [facility], located at [address] [place] [address]. Their phone number is [phone].     * You should arrive at the [facility] at 7:30 AM. The address is [address] [place] [address]. The phone number is [phone].     * Do not eat after Midnight; you may drink clear liquids (includes water, Jell-O, clear broth, apple juice or any non-carbonated beverage that you can see through) until 7:00 AM.     * You may take your medications with a sip of water the morning of the procedure.     * You will be discharged the same day. You must have a  home and someone that can stay with you through the night.     PLEASE NOTE our COVID-19 visitors policy: For the protection of our patients and visitors, patients are allowed one consistent visitor, 18 years of age or older, per adult patient in the hospital. Visitors must wear a mask at all times while in the hospital.     All discharge instructions will be given to the  or volunteer. Documentation for the post-operative plan will be given to the patient and . Patients are required to have someone to stay with them for 24 hours after their procedure.    If you have questions regarding your procedure, please contact me at [phone], option [phone].    If you need to cancel, reschedule or have procedure scheduling related questions, please call [facility] at [phone].    Thank you,  Wellington Hankins RN, CNRN  Stroke & Endovascular Care Coordinator

## 2021-07-17 ENCOUNTER — LAB (OUTPATIENT)
Dept: LAB | Facility: CLINIC | Age: 56
End: 2021-07-17
Payer: COMMERCIAL

## 2021-07-17 DIAGNOSIS — Z11.59 ENCOUNTER FOR SCREENING FOR OTHER VIRAL DISEASES: ICD-10-CM

## 2021-07-17 DIAGNOSIS — D32.9 MENINGIOMA (H): ICD-10-CM

## 2021-07-17 LAB
ANION GAP SERPL CALCULATED.3IONS-SCNC: 5 MMOL/L (ref 3–14)
APTT PPP: 30 SECONDS (ref 22–38)
BUN SERPL-MCNC: 9 MG/DL (ref 7–30)
CALCIUM SERPL-MCNC: 9 MG/DL (ref 8.5–10.1)
CHLORIDE BLD-SCNC: 109 MMOL/L (ref 94–109)
CO2 SERPL-SCNC: 29 MMOL/L (ref 20–32)
CREAT SERPL-MCNC: 0.76 MG/DL (ref 0.52–1.04)
ERYTHROCYTE [DISTWIDTH] IN BLOOD BY AUTOMATED COUNT: 14.4 % (ref 10–15)
GFR SERPL CREATININE-BSD FRML MDRD: 88 ML/MIN/1.73M2
GLUCOSE BLD-MCNC: 112 MG/DL (ref 70–99)
HCT VFR BLD AUTO: 41.2 % (ref 35–47)
HGB BLD-MCNC: 12.7 G/DL (ref 11.7–15.7)
INR PPP: 1.09 (ref 0.85–1.15)
MCH RBC QN AUTO: 28.8 PG (ref 26.5–33)
MCHC RBC AUTO-ENTMCNC: 30.8 G/DL (ref 31.5–36.5)
MCV RBC AUTO: 93 FL (ref 78–100)
PLATELET # BLD AUTO: 231 10E3/UL (ref 150–450)
POTASSIUM BLD-SCNC: 3.9 MMOL/L (ref 3.4–5.3)
RBC # BLD AUTO: 4.41 10E6/UL (ref 3.8–5.2)
SARS-COV-2 RNA RESP QL NAA+PROBE: NEGATIVE
SODIUM SERPL-SCNC: 143 MMOL/L (ref 133–144)
WBC # BLD AUTO: 9.5 10E3/UL (ref 4–11)

## 2021-07-17 PROCEDURE — 85610 PROTHROMBIN TIME: CPT | Performed by: PATHOLOGY

## 2021-07-17 PROCEDURE — 85730 THROMBOPLASTIN TIME PARTIAL: CPT | Performed by: PATHOLOGY

## 2021-07-17 PROCEDURE — 36415 COLL VENOUS BLD VENIPUNCTURE: CPT | Performed by: PATHOLOGY

## 2021-07-17 PROCEDURE — 85027 COMPLETE CBC AUTOMATED: CPT | Performed by: PATHOLOGY

## 2021-07-17 PROCEDURE — U0003 INFECTIOUS AGENT DETECTION BY NUCLEIC ACID (DNA OR RNA); SEVERE ACUTE RESPIRATORY SYNDROME CORONAVIRUS 2 (SARS-COV-2) (CORONAVIRUS DISEASE [COVID-19]), AMPLIFIED PROBE TECHNIQUE, MAKING USE OF HIGH THROUGHPUT TECHNOLOGIES AS DESCRIBED BY CMS-2020-01-R: HCPCS | Mod: 90 | Performed by: PATHOLOGY

## 2021-07-17 PROCEDURE — U0005 INFEC AGEN DETEC AMPLI PROBE: HCPCS | Mod: 90 | Performed by: PATHOLOGY

## 2021-07-17 PROCEDURE — 80048 BASIC METABOLIC PNL TOTAL CA: CPT | Performed by: PATHOLOGY

## 2021-07-21 ENCOUNTER — APPOINTMENT (OUTPATIENT)
Dept: ULTRASOUND IMAGING | Facility: CLINIC | Age: 56
End: 2021-07-21
Attending: NEUROLOGICAL SURGERY
Payer: COMMERCIAL

## 2021-07-21 ENCOUNTER — HOSPITAL ENCOUNTER (OUTPATIENT)
Facility: CLINIC | Age: 56
Discharge: HOME | End: 2021-07-21
Attending: NEUROLOGICAL SURGERY | Admitting: NEUROLOGICAL SURGERY
Payer: COMMERCIAL

## 2021-07-21 ENCOUNTER — APPOINTMENT (OUTPATIENT)
Dept: MEDSURG UNIT | Facility: CLINIC | Age: 56
End: 2021-07-21
Attending: NEUROLOGICAL SURGERY
Payer: COMMERCIAL

## 2021-07-21 ENCOUNTER — APPOINTMENT (OUTPATIENT)
Dept: INTERVENTIONAL RADIOLOGY/VASCULAR | Facility: CLINIC | Age: 56
End: 2021-07-21
Attending: NEUROLOGICAL SURGERY
Payer: COMMERCIAL

## 2021-07-21 VITALS
RESPIRATION RATE: 16 BRPM | SYSTOLIC BLOOD PRESSURE: 150 MMHG | BODY MASS INDEX: 40.16 KG/M2 | DIASTOLIC BLOOD PRESSURE: 72 MMHG | TEMPERATURE: 97.8 F | OXYGEN SATURATION: 97 % | HEART RATE: 76 BPM | WEIGHT: 265 LBS | HEIGHT: 68 IN

## 2021-07-21 DIAGNOSIS — D32.9 MENINGIOMA (H): ICD-10-CM

## 2021-07-21 PROCEDURE — 258N000003 HC RX IP 258 OP 636: Performed by: PSYCHIATRY & NEUROLOGY

## 2021-07-21 PROCEDURE — 36299 UNLISTED PX VASCULAR NJX: CPT

## 2021-07-21 PROCEDURE — 272N000506 HC NEEDLE CR6

## 2021-07-21 PROCEDURE — 250N000013 HC RX MED GY IP 250 OP 250 PS 637: Performed by: PSYCHIATRY & NEUROLOGY

## 2021-07-21 PROCEDURE — 93886 INTRACRANIAL COMPLETE STUDY: CPT

## 2021-07-21 PROCEDURE — 250N000011 HC RX IP 250 OP 636: Performed by: NEUROLOGICAL SURGERY

## 2021-07-21 PROCEDURE — 272N000116 HC CATH CR1

## 2021-07-21 PROCEDURE — 272N000572 HC SHEATH CR9

## 2021-07-21 PROCEDURE — 76937 US GUIDE VASCULAR ACCESS: CPT

## 2021-07-21 PROCEDURE — 61623 EVASC TEMP BALO ARTL OCC H/N: CPT | Mod: GC | Performed by: NEUROLOGICAL SURGERY

## 2021-07-21 PROCEDURE — 99152 MOD SED SAME PHYS/QHP 5/>YRS: CPT

## 2021-07-21 PROCEDURE — 61623 EVASC TEMP BALO ARTL OCC H/N: CPT

## 2021-07-21 PROCEDURE — 93886 INTRACRANIAL COMPLETE STUDY: CPT | Mod: 26 | Performed by: RADIOLOGY

## 2021-07-21 PROCEDURE — 250N000011 HC RX IP 250 OP 636: Performed by: PSYCHIATRY & NEUROLOGY

## 2021-07-21 PROCEDURE — 99153 MOD SED SAME PHYS/QHP EA: CPT

## 2021-07-21 PROCEDURE — 255N000002 HC RX 255 OP 636: Performed by: NEUROLOGICAL SURGERY

## 2021-07-21 PROCEDURE — 272N000302 HC DEVICE INFLATION CR5

## 2021-07-21 PROCEDURE — C1769 GUIDE WIRE: HCPCS

## 2021-07-21 PROCEDURE — 272N000566 HC SHEATH CR3

## 2021-07-21 PROCEDURE — 999N000134 HC STATISTIC PP CARE STAGE 3

## 2021-07-21 PROCEDURE — 250N000009 HC RX 250: Performed by: PSYCHIATRY & NEUROLOGY

## 2021-07-21 PROCEDURE — 99152 MOD SED SAME PHYS/QHP 5/>YRS: CPT | Mod: GC | Performed by: NEUROLOGICAL SURGERY

## 2021-07-21 PROCEDURE — 272N000192 HC ACCESSORY CR2

## 2021-07-21 RX ORDER — FENTANYL CITRATE 50 UG/ML
25-50 INJECTION, SOLUTION INTRAMUSCULAR; INTRAVENOUS EVERY 5 MIN PRN
Status: DISCONTINUED | OUTPATIENT
Start: 2021-07-21 | End: 2021-07-21 | Stop reason: HOSPADM

## 2021-07-21 RX ORDER — ONDANSETRON 2 MG/ML
4 INJECTION INTRAMUSCULAR; INTRAVENOUS EVERY 6 HOURS PRN
Status: DISCONTINUED | OUTPATIENT
Start: 2021-07-21 | End: 2021-07-21 | Stop reason: HOSPADM

## 2021-07-21 RX ORDER — IBUPROFEN 200 MG
400 TABLET ORAL EVERY 6 HOURS PRN
Status: DISCONTINUED | OUTPATIENT
Start: 2021-07-21 | End: 2021-07-21 | Stop reason: HOSPADM

## 2021-07-21 RX ORDER — PROTAMINE SULFATE 10 MG/ML
10-50 INJECTION, SOLUTION INTRAVENOUS
Status: COMPLETED | OUTPATIENT
Start: 2021-07-21 | End: 2021-07-21

## 2021-07-21 RX ORDER — LIDOCAINE 40 MG/G
CREAM TOPICAL
Status: DISCONTINUED | OUTPATIENT
Start: 2021-07-21 | End: 2021-07-21 | Stop reason: HOSPADM

## 2021-07-21 RX ORDER — HEPARIN SODIUM 200 [USP'U]/100ML
1 INJECTION, SOLUTION INTRAVENOUS CONTINUOUS PRN
Status: DISCONTINUED | OUTPATIENT
Start: 2021-07-21 | End: 2021-07-21 | Stop reason: HOSPADM

## 2021-07-21 RX ORDER — PROCHLORPERAZINE 25 MG
25 SUPPOSITORY, RECTAL RECTAL EVERY 12 HOURS PRN
Status: DISCONTINUED | OUTPATIENT
Start: 2021-07-21 | End: 2021-07-21 | Stop reason: HOSPADM

## 2021-07-21 RX ORDER — NALOXONE HYDROCHLORIDE 0.4 MG/ML
0.2 INJECTION, SOLUTION INTRAMUSCULAR; INTRAVENOUS; SUBCUTANEOUS
Status: DISCONTINUED | OUTPATIENT
Start: 2021-07-21 | End: 2021-07-21 | Stop reason: HOSPADM

## 2021-07-21 RX ORDER — PROCHLORPERAZINE MALEATE 10 MG
10 TABLET ORAL EVERY 6 HOURS PRN
Status: DISCONTINUED | OUTPATIENT
Start: 2021-07-21 | End: 2021-07-21 | Stop reason: HOSPADM

## 2021-07-21 RX ORDER — NALOXONE HYDROCHLORIDE 0.4 MG/ML
0.4 INJECTION, SOLUTION INTRAMUSCULAR; INTRAVENOUS; SUBCUTANEOUS
Status: DISCONTINUED | OUTPATIENT
Start: 2021-07-21 | End: 2021-07-21 | Stop reason: HOSPADM

## 2021-07-21 RX ORDER — SODIUM CHLORIDE 9 MG/ML
INJECTION, SOLUTION INTRAVENOUS CONTINUOUS
Status: DISCONTINUED | OUTPATIENT
Start: 2021-07-21 | End: 2021-07-21 | Stop reason: HOSPADM

## 2021-07-21 RX ORDER — IODIXANOL 320 MG/ML
150 INJECTION, SOLUTION INTRAVASCULAR ONCE
Status: COMPLETED | OUTPATIENT
Start: 2021-07-21 | End: 2021-07-21

## 2021-07-21 RX ORDER — FLUMAZENIL 0.1 MG/ML
0.2 INJECTION, SOLUTION INTRAVENOUS
Status: DISCONTINUED | OUTPATIENT
Start: 2021-07-21 | End: 2021-07-21 | Stop reason: HOSPADM

## 2021-07-21 RX ORDER — HEPARIN SODIUM 1000 [USP'U]/ML
500-9000 INJECTION, SOLUTION INTRAVENOUS; SUBCUTANEOUS
Status: DISCONTINUED | OUTPATIENT
Start: 2021-07-21 | End: 2021-07-21 | Stop reason: HOSPADM

## 2021-07-21 RX ORDER — ONDANSETRON 4 MG/1
4 TABLET, ORALLY DISINTEGRATING ORAL EVERY 6 HOURS PRN
Status: DISCONTINUED | OUTPATIENT
Start: 2021-07-21 | End: 2021-07-21 | Stop reason: HOSPADM

## 2021-07-21 RX ORDER — ACETAMINOPHEN 325 MG/1
325 TABLET ORAL EVERY 4 HOURS PRN
Status: DISCONTINUED | OUTPATIENT
Start: 2021-07-21 | End: 2021-07-21 | Stop reason: HOSPADM

## 2021-07-21 RX ORDER — IBUPROFEN 200 MG
200 TABLET ORAL EVERY 6 HOURS PRN
Status: DISCONTINUED | OUTPATIENT
Start: 2021-07-21 | End: 2021-07-21 | Stop reason: HOSPADM

## 2021-07-21 RX ORDER — CYCLOBENZAPRINE HCL 5 MG
5 TABLET ORAL EVERY 8 HOURS PRN
Status: DISCONTINUED | OUTPATIENT
Start: 2021-07-21 | End: 2021-07-21 | Stop reason: HOSPADM

## 2021-07-21 RX ADMIN — SODIUM CHLORIDE: 9 INJECTION, SOLUTION INTRAVENOUS at 08:15

## 2021-07-21 RX ADMIN — FENTANYL CITRATE 25 MCG: 50 INJECTION, SOLUTION INTRAMUSCULAR; INTRAVENOUS at 13:25

## 2021-07-21 RX ADMIN — FENTANYL CITRATE 50 MCG: 50 INJECTION, SOLUTION INTRAMUSCULAR; INTRAVENOUS at 13:20

## 2021-07-21 RX ADMIN — MIDAZOLAM 0.5 MG: 1 INJECTION INTRAMUSCULAR; INTRAVENOUS at 12:14

## 2021-07-21 RX ADMIN — MIDAZOLAM 0.5 MG: 1 INJECTION INTRAMUSCULAR; INTRAVENOUS at 12:46

## 2021-07-21 RX ADMIN — HEPARIN SODIUM 1000 UNITS: 1000 INJECTION INTRAVENOUS; SUBCUTANEOUS at 13:20

## 2021-07-21 RX ADMIN — MIDAZOLAM 0.5 MG: 1 INJECTION INTRAMUSCULAR; INTRAVENOUS at 13:25

## 2021-07-21 RX ADMIN — LIDOCAINE HYDROCHLORIDE 6 ML: 10 INJECTION, SOLUTION EPIDURAL; INFILTRATION; INTRACAUDAL; PERINEURAL at 12:00

## 2021-07-21 RX ADMIN — FENTANYL CITRATE 25 MCG: 50 INJECTION, SOLUTION INTRAMUSCULAR; INTRAVENOUS at 12:46

## 2021-07-21 RX ADMIN — PROTAMINE SULFATE 15 MG: 10 INJECTION, SOLUTION INTRAVENOUS at 13:35

## 2021-07-21 RX ADMIN — FENTANYL CITRATE 25 MCG: 50 INJECTION, SOLUTION INTRAMUSCULAR; INTRAVENOUS at 12:00

## 2021-07-21 RX ADMIN — HEPARIN SODIUM 9000 UNITS: 1000 INJECTION INTRAVENOUS; SUBCUTANEOUS at 12:19

## 2021-07-21 RX ADMIN — FENTANYL CITRATE 25 MCG: 50 INJECTION, SOLUTION INTRAMUSCULAR; INTRAVENOUS at 12:14

## 2021-07-21 RX ADMIN — CYCLOBENZAPRINE HYDROCHLORIDE 5 MG: 5 TABLET, FILM COATED ORAL at 16:05

## 2021-07-21 RX ADMIN — MIDAZOLAM 0.5 MG: 1 INJECTION INTRAMUSCULAR; INTRAVENOUS at 11:54

## 2021-07-21 RX ADMIN — FENTANYL CITRATE 25 MCG: 50 INJECTION, SOLUTION INTRAMUSCULAR; INTRAVENOUS at 11:54

## 2021-07-21 RX ADMIN — ACETAMINOPHEN 325 MG: 325 TABLET, FILM COATED ORAL at 14:39

## 2021-07-21 RX ADMIN — IODIXANOL 70 ML: 320 INJECTION, SOLUTION INTRAVASCULAR at 13:37

## 2021-07-21 RX ADMIN — MIDAZOLAM 0.5 MG: 1 INJECTION INTRAMUSCULAR; INTRAVENOUS at 12:00

## 2021-07-21 RX ADMIN — MIDAZOLAM 1 MG: 1 INJECTION INTRAMUSCULAR; INTRAVENOUS at 13:20

## 2021-07-21 ASSESSMENT — VISUAL ACUITY
OU: BASELINE
OU: NORMAL ACUITY
OU: BASELINE;BLURRED VISION;GLASSES
OU: BASELINE
OU: BASELINE
OU: BASELINE;BLURRED VISION;GLASSES

## 2021-07-21 ASSESSMENT — MIFFLIN-ST. JEOR: SCORE: 1840.53

## 2021-07-21 NOTE — PROGRESS NOTES
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  The patient is here to undergo a balloon occlusion test and to check for adequate collaterals.     Medical History:  Past Medical History:   Diagnosis Date     Arthritis     atypical rheumatoid arthritis     Brain tumor (H)      Calculus of kidney      Gout      Hypertension      PONV (postoperative nausea and vomiting)      Seizures (H)     frontal lobe seizures, controlled on Keppra, starts with smell, then de ja vu, nausea     Sleep apnea     uses CPAP     TIA (transient ischemic attack)     takes baby ASA       Surgical History:  Past Surgical History:   Procedure Laterality Date     BRAIN SURGERY  2012    debulking x 2, short term memory deficits     C  DELIVERY ONLY      ,     C LIGATE FALLOPIAN TUBE,POSTPARTUM       COMBINED CYSTOSCOPY, INSERT CATHETER URETER Right 2020    Procedure: CYSTOSCOPY, RIGHT URETERAL CATHETER PLACEMENT;  Surgeon: Adarsh Livingston MD;  Location: [facility] OR     HC REMOVAL OF TONSILS,<11 Y/O      Tonsils <11y.o.     IR NEPHROSTOMY TUBE REMOVAL RIGHT  2020     IR RENAL STONE REMOVAL RIGHT  2020     OPTICAL TRACKING SYSTEM CRANIOTOMY, EXCISE TUMOR, COMBINED  2012    Procedure: COMBINED OPTICAL TRACKING SYSTEM CRANIOTOMY, EXCISE TUMOR;  Stealth Guided Left Redo Craniotomy, Resection Of Tumor Ct @615 am MRI @ 630;  Surgeon: Pan Calero MD;  Location: [facility] OR     PERCUTANEOUS NEPHROLITHOTOMY Right  2/25/2020    Procedure: RIGHT PERCUTANEOUS NEPHROLITHOTOMY;  Surgeon: Adarsh Livingston MD;  Location:  OR       Family History:  Family History   Problem Relation Age of Onset     Breast Cancer Mother         72-alive     Hypertension Father         76-alive     C.A.D. Father      Family History Negative Sister         2 sisters healthy     Family History Negative Brother        Social History:  Social History     Socioeconomic History     Marital status:      Spouse name: Not on file     Number of children: Not on file     Years of education: Not on file     Highest education level: Not on file   Occupational History     Not on file   Tobacco Use     Smoking status: Never Smoker     Smokeless tobacco: Never Used   Substance and Sexual Activity     Alcohol use: No     Drug use: No     Sexual activity: Yes     Partners: Male     Birth control/protection: Surgical   Other Topics Concern     Parent/sibling w/ CABG, MI or angioplasty before 65F 55M? Not Asked   Social History Narrative     Not on file     Social Determinants of Health     Financial Resource Strain:      Difficulty of Paying Living Expenses:    Food Insecurity:      Worried About Running Out of Food in the Last Year:      Ran Out of Food in the Last Year:    Transportation Needs:      Lack of Transportation (Medical):      Lack of Transportation (Non-Medical):    Physical Activity:      Days of Exercise per Week:      Minutes of Exercise per Session:    Stress:      Feeling of Stress :    Social Connections:      Frequency of Communication with Friends and Family:      Frequency of Social Gatherings with Friends and Family:      Attends [employer] Services:      Active Member of Clubs or Organizations:      Attends Club or Organization Meetings:      Marital Status:    Intimate Partner Violence:      Fear of Current or Ex-Partner:      Emotionally Abused:      Physically Abused:      Sexually Abused:        Allergies:  Allergies   Allergen  Reactions     Duloxetine Nausea       Is there a contrast allergy?  No    Medications:  Medications Prior to Admission   Medication Sig Dispense Refill Last Dose     calcium carbonate (OS-BRENDEN) 500 MG tablet Take 1 tablet by mouth daily   7/20/2021 at Unknown time     folic acid (FOLVITE) 1 MG tablet Take 1 mg by mouth daily    7/20/2021 at Unknown time     levETIRAcetam (KEPPRA) 500 MG tablet Take 4 tablets (2,000 mg) by mouth 2 times daily 240 tablet 11 7/20/2021 at Unknown time     levothyroxine (SYNTHROID/LEVOTHROID) 112 MCG tablet TAKE 1 TABLET BY MOUTH BEFORE BREAKFAST   7/21/2021 at 0300     lisinopril (ZESTRIL) 2.5 MG tablet Take 2.5 mg by mouth 2 times daily    7/20/2021 at Unknown time     methotrexate sodium 2.5 MG TABS Take 17.5 mg by mouth once a week (take 7 X 2.5 mg = 17.5 mg dose)   7/20/2021 at Unknown time     VIMPAT 200 MG TABS tablet Take 1 tablet (200 mg) by mouth 2 times daily 60 tablet 5 7/20/2021 at Unknown time     vitamin D3 (CHOLECALCIFEROL) 2000 units (50 mcg) tablet Take 1 tablet by mouth daily   7/20/2021 at Unknown time     Multiple Vitamins-Minerals (MULTIVITAMIN & MINERAL PO) Take 1 tablet by mouth daily   Unknown at Unknown time     ondansetron (ZOFRAN) 4 MG tablet Take 4 mg by mouth daily as needed for nausea    Unknown at Unknown time   .    ROS:  The 10 point Review of Systems is negative other than noted in the HPI or here.     PHYSICAL EXAMINATION  Vital Signs:  B/P: 150/71,  T: 97.8,  P: 77,  R: 18    Cardio:  RRR  Pulmonary:  no respiratory distress  Abdomen:  non-tender    Neurologic  Mental Status:  fully alert  Cranial Nerves:  PERRL  Motor:  no abnormal movements  Sensory:  intact/symmetric to light touch and pin prick throughout upper and lower extremities  Coordination:  FNF and HS intact without dysmetria    Pre-procedure National Institutes of Health Stroke Scale:   Not applicable    LABS  (most recent Cr, BUN, GFR, PLT, INR, PTT within the past 7 days):  Recent Labs    Lab 07/17/21  0921   CR 0.76   BUN 9   GFRESTIMATED 88      INR 1.09   PTT 30        Platelet Function P2Y12 (PRU):  Not applicable      ASSESSMENT:    PLAN:        PRE-PROCEDURE SEDATION ASSESSMENT     Pre-Procedure Sedation Assessment done at 0830.    Expected Level:  Moderate Sedation    Indication:  Sedation is required to allow for neurointerventional procedure.    Consent obtained from patient after discussing the risks, benefits and alternatives.     PO Intake:  Appropriately NPO for procedure    ASA Class:  Class 2 - MILD SYSTEMIC DISEASE, NO ACUTE PROBLEMS, NO FUNCTIONAL LIMITATIONS.    Mallampati:  Grade 2:  Soft palate, base of uvula, tonsillar pillars, and portion of posterior pharyngeal wall visible    History and physical reviewed and no updates needed. I have reviewed the lab findings, diagnostic data, medications, and the plan for sedation. I have determined this patient to be an appropriate candidate for the planned sedation and procedure and have reassessed the patient IMMEDIATELY PRIOR to sedation and procedure.    Patient was discussed with the Attending, , who agrees with the plan.    ELISEO BAPTISTE   Pager:[phone]      I have reviewed the history. I have seen and examined the patient myself and agree with the assessment and plan above.  JAKUB Calero MD

## 2021-07-21 NOTE — PROCEDURES
[facility]     Endovascular Surgical Neuroradiology Post-Procedure Note    Pre-Procedure Diagnosis:  Left sphenoid wing Meningioma with M1 occlusion   Post-Procedure Diagnosis:  Same    Procedure(s):   Balloon test occlusion of Left External Carotid Artery    Findings:  No Clinical Deficits noted after 5 mins of Balloon Occlusion of the left ECA. Successfully passed test today    Plan:  Discharge Home after 3hrs of Lying flat    Primary Surgeon:  Dr. Pan Calero  Secondary Surgeon:  Not applicable  Secondary Surgeon Review:  None  Fellow: Dr Peres/Wendy/Noah  Additional Assistants:      Prior to the start of the procedure and with procedural staff participation, I verbally confirmed: the patient s identity using two indicators, relevant allergies, that the procedure was appropriate and matched the consent or emergent situation, and that the correct equipment/implants were available. Immediately prior to starting the procedure I conducted the Time Out with the procedural staff and re-confirmed the patient s name, procedure, and site/side. (The Joint Commission universal protocol was followed.)  Yes    PRU value: Not applicable    Anesthesia:  Conscious Sedation  Medications:  Midazolam  Puncture site:  Right Femoral Artery    Fluoroscopy time (minutes): 59.6  Radiation dose (mGy):  1170    Contrast amount (mL):  70     Estimated blood loss (mL): 50    Closure:  Device - Exoseal    Disposition:  Home after recovery.        Sedation Post-Procedure Summary    Sedatives: Fentanyl    Vital signs and pulse oximetry were monitored and remained stable throughout the procedure, and sedation was maintained until the procedure was complete.  The patient was monitored by staff until sedation discharge criteria were met.    Patient tolerance:  Patient tolerated the procedure well with no immediate complications.    Time of sedation in minutes:  120 minutes from beginning to end  of physician one to one monitoring.    ELISEO BAPTISTE  Pager:  [phone]    See official report in PACS  JAKUB Calero MD

## 2021-07-21 NOTE — PROGRESS NOTES
Discharge: Discharge to home with family. Discharge instructions given and patient voiced understanding.   Patient status upon discharge: Neuro status intact. Rational and systematic thought process in place. Hemodynamically stable. Up walking in [place]. Ate well for lunch. No complaint of discomfort.    Skin assessment: Right groin site dry and flat. No hematoma. Good CMS and positive pulses noted.  Patient belongings: Gathered and pack per family and patient.

## 2021-07-21 NOTE — PROGRESS NOTES
Prep complete for Cerebral Angiogram. Patients  Camilo at bedside will transport patient home post procedure cell# [phone].

## 2021-07-21 NOTE — DISCHARGE INSTRUCTIONS
[facility]         Interventional Radiology  Discharge Instructions Post Angiogram (NEURO)    AFTER YOU GO HOME  ? If you were given sedation, for the first 24 hours: we recommend that an adult stay with you, DO NOT drive or operate machinery at home or at work, DO NOT smoke, DO NOT make any important or legal decisions.  ? DO NOT drink alcoholic beverages the day of your procedure  ? DO relax and take it easy for 24 hours  ? DO drink plenty of fluids  ? DO resume your regular diet, unless otherwise instructed by your Primary Physician  ? DO NOT take a shower for at least 12 hours following your procedure. No tub bath, hot tubs, or swimming for 5 days. Do NOT scrub the procedure site vigorously for 5 days.      Care of groin site  It is normal to have a small bruise or lump at the site.    For the first 2 days, when you cough, sneeze or move your bowels, hold your hand over the puncture site and press gently.    Do NOT lift more than 10 pounds or do any strenuous exercise for at least 3 to 5 days.    Do not use lotion or powder near the puncture site for 3 days.  If you start bleeding from the site in your groin: lie down flat and press firmly on the site. Call your doctor as soon as you can.   Call 911 right away if you have: Heavy bleeding, bleeding that does not stop.      Care of wrist or arm site  It is normal to have soreness at the puncture site and mild tingling in your hand for up to 3 days.    For 2 days, do not use your hand or arm to support your weight (such as rising from a chair) or bend your wrist (such as lifting a garage door).    For 2 days, do not do any strenuous exercise, do not lift more than 5 pounds or exercise your arm (tennis, golf or bowling).    No lotion or powder to the puncture site for 3 days  If you start bleeding from the site in your arm:    Sit down and press firmly on the site with your fingers for 10 minutes. Call your doctor as soon as you can.    If the  bleeding stops, sit still and keep your wrist straight for 2 hours.    Call 911 right away if you have: Heavy bleeding, bleeding that does not stop.        CALL THE PHYSICIAN IF:  ? You start bleeding from the procedure site.  ? You have numbness, coolness or change in color of the arm or leg of the puncture site  ? You experience changes in your vision, hearing, balance, coordination, speech, thinking or memory  ? You experience weakness in one or more extremity  ? You experience pain or redness at the puncture site  ? You develop nausea or vomiting  ? You develop hives or a rash or unexplained itching  ? You develop a temperature of 101 degrees F or greater      ADDITIONAL INSTRUCTIONS  ? Instruction booklet given: ***    [facility] INTERVENTIONAL RADIOLOGY DEPARTMENT  Procedure Physician:  Dr. Calero  Date of procedure: July 21, 2021  Telephone Numbers: [phone] Monday-Friday 8:00 am to 4:30 pm  [phone] After 4:30 pm Monday-Friday, Weekends & Holidays.   Ask for the Neuro-Interventional doctor on call.  Someone is on call 24 hrs/day  [facility] toll free number: [phone] Monday-Friday 8:00 am to 4:30 pm  [facility] Emergency Dept: [phone]

## 2021-07-21 NOTE — IR NOTE
Patient Name: Marlyn Wilder  Medical Record Number: [id number]  Today's Date: 7/21/2021    Procedure: Bilateral Carotid Cerebral Angiogram and Transcranial Doppler Ultrasound  Proceduralist: Pan Calero MD; Danica Peres MD; Ajay Nguyen MD; Noah Do MD (On-Call Pager #[phone])    Patient in room: 1123  Sedation start: 1154  Sedation end: 1325  Procedure Start: 1152  Procedure end: 1550  Sedation medications administered: 3.5 mg midazolam, 175 mcg fentanyl  Other medications: 10,000 unit(s) heparin    Report given to: Nemo MEZA on 2A at 1341  : n/a    Other Notes: Pt arrived to IR room 3 from 2A. Consent reviewed. Pt denies any questions or concerns regarding procedure. Pt positioned supine and monitored per protocol. Pt tolerated procedure without any noted complications. Pt transferred back to 2A.    Access obtained through R groin. Exoseal used for closure- deployed at 1330  Hemostasis ahcieved at 1350. 3 hours bed rest as per MD until 1650.  Right pedal pulse +1.

## 2021-07-21 NOTE — PROGRESS NOTES
Pt arrived from cerebral angiogram. Denies pain. Neuros intact at baseline. R groin site CDI. Exoseal intact. VSS on RA.  at the bedside. Bedrest for 3 hours, discharge time of 1700.

## 2021-08-04 ENCOUNTER — TELEPHONE (OUTPATIENT)
Dept: NEUROSURGERY | Facility: CLINIC | Age: 56
End: 2021-08-04

## 2021-08-04 DIAGNOSIS — D32.0 BENIGN NEOPLASM OF CEREBRAL MENINGES (H): Primary | ICD-10-CM

## 2021-08-04 DIAGNOSIS — Z11.59 ENCOUNTER FOR SCREENING FOR OTHER VIRAL DISEASES: ICD-10-CM

## 2021-08-04 NOTE — TELEPHONE ENCOUNTER
FUTURE VISIT INFORMATION      SURGERY INFORMATION:    Date: 21    Location: [facility] OR    Surgeon:  Pan Calero MD    Anesthesia Type:  General    Procedure: Redo left frontotemporal craniotomy and resection of tumor    Consult: ov     RECORDS REQUESTED FROM:       Primary Care Provider: Carlos Enrique Neal MD  - [facility]    Pertinent Medical History: JARED, hypertension    Most recent EKG+ Tracin/3/20- [facility]    Most recent ECHO: 11    Most recent Cardiac Stress Test: 11    Most recent Sleep Study:  3/25/14

## 2021-08-04 NOTE — TELEPHONE ENCOUNTER
Surgery Scheduled    DOS: 8/26/21  Provider: Dr. Calero  Location: [facility] OR  PAC: 8/16/21 via video  COVID test: 8/23/21  Post Op: 9/10/21 with Carmen Butts  Patient Called: Called and confirmed upcoming appointments with patient.    Nuvasive: #[id number]    Extra Imaging: Stealth MRI with contrast and fiducials and  Stealth CT without contrast and with fiducials scheduled for morning of surgery.    Additional Notes: N/A

## 2021-08-13 RX ORDER — ACETAMINOPHEN 325 MG/1
325-650 TABLET ORAL EVERY 6 HOURS PRN
COMMUNITY

## 2021-08-13 RX ORDER — ASPIRIN 81 MG/1
81 TABLET ORAL DAILY
Status: ON HOLD | COMMUNITY
End: 2021-08-29

## 2021-08-13 NOTE — PROGRESS NOTES
Preoperative Assessment Center Medication History Note    Medication history completed on August 13, 2021 by this writer. See Epic admission navigator for prior to admission medications. Operating room staff will still need to confirm medications and last dose information on day of surgery.     Medication history interview sources  Patient interview: Yes  Care Everywhere records: Yes  Surescripts pharmacy refill records: Yes  Other (if applicable):     Changes made to PTA medication list (reason)  Added: aspirin, tylenol,   Deleted: none  Changed: lisinopril dose/sig,     Additional medication history information (including reliability of information, actions taken by pharmacist):    -- No recent (within 30 days) course of antibiotics  -- No recent (within 30 days) course of systemic steroids  -- Patient declines being on any other prescription or over-the-counter medications    Prior to Admission medications    Medication Sig Last Dose Taking? Auth Provider   acetaminophen (TYLENOL) 325 MG tablet Take 325-650 mg by mouth every 6 hours as needed for mild pain Taking Yes Unknown, Entered By History   aspirin 81 MG EC tablet Take 81 mg by mouth daily Taking Yes Unknown, Entered By History   calcium carbonate (OS-BRENDEN) 500 MG tablet Take 1 tablet by mouth daily Taking Yes Reported, Patient   folic acid (FOLVITE) 1 MG tablet Take 1 mg by mouth daily  Taking Yes Reported, Patient   levETIRAcetam (KEPPRA) 500 MG tablet Take 4 tablets (2,000 mg) by mouth 2 times daily Taking Yes Emmanuel Madrid MD   levothyroxine (SYNTHROID/LEVOTHROID) 112 MCG tablet TAKE 1 TABLET BY MOUTH BEFORE BREAKFAST Taking Yes Reported, Patient   lisinopril (ZESTRIL) 2.5 MG tablet Take 2.5 mg by mouth daily  Taking Yes Reported, Patient   methotrexate sodium 2.5 MG TABS Take 17.5 mg by mouth once a week (take 7 X 2.5 mg = 17.5 mg dose)  On tuesdays Taking Yes Reported, Patient   Multiple Vitamins-Minerals (MULTIVITAMIN & MINERAL PO) Take 1 tablet  by mouth daily Taking Yes Reported, Patient   ondansetron (ZOFRAN) 4 MG tablet Take 4 mg by mouth daily as needed for nausea  Taking Yes Reported, Patient   VIMPAT 200 MG TABS tablet Take 1 tablet (200 mg) by mouth 2 times daily Taking Yes Emmanuel Madrid MD   vitamin D3 (CHOLECALCIFEROL) 2000 units (50 mcg) tablet Take 1 tablet by mouth daily Taking Yes Reported, Patient          Medication history completed by: Hal Alcantar [facility]

## 2021-08-16 ENCOUNTER — ANESTHESIA EVENT (OUTPATIENT)
Dept: SURGERY | Facility: CLINIC | Age: 56
End: 2021-08-16
Payer: COMMERCIAL

## 2021-08-16 ENCOUNTER — MEDICAL CORRESPONDENCE (OUTPATIENT)
Dept: HEALTH INFORMATION MANAGEMENT | Facility: CLINIC | Age: 56
End: 2021-08-16

## 2021-08-16 ENCOUNTER — VIRTUAL VISIT (OUTPATIENT)
Dept: SURGERY | Facility: CLINIC | Age: 56
End: 2021-08-16
Payer: COMMERCIAL

## 2021-08-16 ENCOUNTER — PRE VISIT (OUTPATIENT)
Dept: SURGERY | Facility: CLINIC | Age: 56
End: 2021-08-16

## 2021-08-16 DIAGNOSIS — I10 ESSENTIAL HYPERTENSION: ICD-10-CM

## 2021-08-16 DIAGNOSIS — Z01.818 PRE-OP EXAMINATION: ICD-10-CM

## 2021-08-16 DIAGNOSIS — Z86.73 HISTORY OF TIA (TRANSIENT ISCHEMIC ATTACK): ICD-10-CM

## 2021-08-16 DIAGNOSIS — D32.0 BENIGN NEOPLASM OF CEREBRAL MENINGES (H): Primary | ICD-10-CM

## 2021-08-16 PROCEDURE — 99203 OFFICE O/P NEW LOW 30 MIN: CPT | Mod: 95 | Performed by: PHYSICIAN ASSISTANT

## 2021-08-16 ASSESSMENT — PAIN SCALES - GENERAL: PAINLEVEL: NO PAIN (0)

## 2021-08-16 ASSESSMENT — ENCOUNTER SYMPTOMS: SEIZURES: 1

## 2021-08-16 ASSESSMENT — LIFESTYLE VARIABLES: TOBACCO_USE: 0

## 2021-08-16 NOTE — PROGRESS NOTES
Marlyn is a 56 year old who is being evaluated via a billable video visit.      How would you like to obtain your AVS? MyChart    HPI       Review of Systems         Objective    Vitals - Patient Reported  Pain Score: No Pain (0)        Physical Exam     DYLAN Torres LPN

## 2021-08-16 NOTE — H&P
Pre-Operative H & P     ADDENDUM 8/24/21:  EKG was reviewed in MUSE and shows Sinus rhythm. No further cardiac testing indicated. The patient is optimized for her procedure.       ADDENDUM: 8/22/21  Labs completed and BNP not elevated at all. Appears that EKG was completed but cannot see read yet. Will await read. Again unless grossly abnormal no further testing indicated.     CC:  Preoperative exam to assess for increased cardiopulmonary risk while undergoing surgery and anesthesia.    Date of Encounter: 8/16/2021  Primary Care Physician:  Carlos Enrique Neal     Reason for visit:   Encounter Diagnoses   Name Primary?     Benign neoplasm of cerebral meninges (H) Yes     Pre-op examination      History of TIA (transient ischemic attack)      Essential hypertension        HPI  Marlyn Wilder is a 56 year old female who presents for pre-operative H & P in preparation for Redo left frontotemporal craniotomy and resection of tumor with Dr. Calero on 8/26/21 at [facility].     The patient is 56-year-old woman with past medical history significant for hypertension, hyperlipidemia, postop nausea vomiting, obstructive sleep apnea, history of COVID-19 infection, TMJ, hypothyroidism, growth hormone deficiency, history of nephrolithiasis, rheumatoid arthritis, history of TIA, epilepsy and recurrent meningioma.  The patient underwent 3 prior tumor resections with the most recent done in 2012 by Dr. Calero.  She then had increase in her seizures in September 2020 and upon imaging was found to have increased edema in her left temporal lobe.  She was seen virtually by Dr. Calero and 5/18/2021 and then in person on 5/24/2021.  At that time they discussed her treatment options and she underwent a diagnostic cerebral angiogram on 7/7/21. She is now scheduled for the procedure as above    History is obtained from the patient and chart review      Hx of abnormal bleeding or anti-platelet  use: none    Menstrual history: Patient's last menstrual period was 10/17/2012.:     Prior to Admission Medications  Current Outpatient Medications   Medication Sig Dispense Refill     acetaminophen (TYLENOL) 325 MG tablet Take 325-650 mg by mouth every 6 hours as needed for mild pain       aspirin 81 MG EC tablet Take 81 mg by mouth daily       calcium carbonate (OS-BRENDEN) 500 MG tablet Take 1 tablet by mouth daily       folic acid (FOLVITE) 1 MG tablet Take 1 mg by mouth daily        levETIRAcetam (KEPPRA) 500 MG tablet Take 4 tablets (2,000 mg) by mouth 2 times daily 240 tablet 11     levothyroxine (SYNTHROID/LEVOTHROID) 112 MCG tablet TAKE 1 TABLET BY MOUTH BEFORE BREAKFAST       lisinopril (ZESTRIL) 2.5 MG tablet Take 2.5 mg by mouth daily        methotrexate sodium 2.5 MG TABS Take 17.5 mg by mouth once a week (take 7 X 2.5 mg = 17.5 mg dose)  On tuesdays       Multiple Vitamins-Minerals (MULTIVITAMIN & MINERAL PO) Take 1 tablet by mouth daily       ondansetron (ZOFRAN) 4 MG tablet Take 4 mg by mouth daily as needed for nausea        VIMPAT 200 MG TABS tablet Take 1 tablet (200 mg) by mouth 2 times daily 60 tablet 5     vitamin D3 (CHOLECALCIFEROL) 2000 units (50 mcg) tablet Take 1 tablet by mouth daily         Family History  Family History   Problem Relation Age of Onset     Breast Cancer Mother         72-alive     Hypertension Father         76-alive     C.A.D. Father      Family History Negative Sister         2 sisters healthy     Family History Negative Brother      Anesthesia Reaction No family hx of      Bleeding Disorder No family hx of      Clotting Disorder No family hx of        The complete review of systems is negative other than noted in the HPI or here.   Preprocedure Note     Last edited 08/16/21 1558 by Romi Price PA-C  Date of Service 08/16/21 1520  Creation Time 08/16/21 1520  Status: Addendum             Anesthesia Pre-Procedure Evaluation    Patient: Marlyn Wilder    MRN: [id number] : 1965        Preoperative Diagnosis: Benign neoplasm of cerebral meninges (H) [D32.0]   Procedure : Procedure(s):  Redo left frontotemporal craniotomy and resection of tumor     Past Medical History:   Diagnosis Date     Arthritis     atypical rheumatoid arthritis     Brain tumor (H)      Calculus of kidney      Gout      Hypertension      PONV (postoperative nausea and vomiting)      Seizures (H)     frontal lobe seizures, controlled on Keppra, starts with smell, then de ja vu, nausea     Sleep apnea     uses CPAP     TIA (transient ischemic attack)     takes baby ASA      Past Surgical History:   Procedure Laterality Date     BRAIN SURGERY      debulking x 2, short term memory deficits     C  DELIVERY ONLY      ,     C LIGATE FALLOPIAN TUBE,POSTPARTUM       CARPAL TUNNEL RELEASE RT/LT       COMBINED CYSTOSCOPY, INSERT CATHETER URETER Right 2020    Procedure: CYSTOSCOPY, RIGHT URETERAL CATHETER PLACEMENT;  Surgeon: Adarsh Livingston MD;  Location:  OR     HC REMOVAL OF TONSILS,<12 Y/O      Tonsils <11y.o.     IR NEPHROSTOMY TUBE REMOVAL RIGHT  2020     IR RENAL STONE REMOVAL RIGHT  2020     OPTICAL TRACKING SYSTEM CRANIOTOMY, EXCISE TUMOR, COMBINED  2012    Procedure: COMBINED OPTICAL TRACKING SYSTEM CRANIOTOMY, EXCISE TUMOR;  Stealth Guided Left Redo Craniotomy, Resection Of Tumor Ct @615 am MRI @ 630;  Surgeon: Pan Calero MD;  Location: [facility] OR     PERCUTANEOUS NEPHROLITHOTOMY Right 2020    Procedure: RIGHT PERCUTANEOUS NEPHROLITHOTOMY;  Surgeon: Adarsh Livingston MD;  Location: [facility] OR      Allergies   Allergen Reactions     Duloxetine Nausea      Social History     Tobacco Use     Smoking status: Never Smoker     Smokeless tobacco: Never Used   Substance Use Topics     Alcohol use: No      Wt Readings from Last 1 Encounters:   21 120.2 kg (265 lb)        Anesthesia Evaluation   Pt has had prior  "anesthetic. Type: General.    History of anesthetic complications  - PONV.      ROS/MED HX  ENT/Pulmonary: Comment: TMJ - uses mouth guard    (+) sleep apnea, uses CPAP,  (-) tobacco use   Neurologic: Comment: Meningioma - patient has short term memory loss secondary to this and her previous surgeries.     (+) seizures, features: simple partial , TIA, date: 2013, features: per patient she was speaking \"foreign language\" and has had 3 episodes in the past. .  (-) no CVA   Cardiovascular:     (+) Dyslipidemia hypertension-----Previous cardiac testing   Echo: Date: Results:    Stress Test: Date: Results:    ECG Reviewed: Date: 9/3/20 Results:  SR  Cath: Date: Results:   (-) stent   METS/Exercise Tolerance: 4 - Raking leaves, gardening    Hematologic:  - neg hematologic  ROS     Musculoskeletal: Comment: Rheumatoid arthritis - worse in hands and feet  (+) arthritis,     GI/Hepatic:  - neg GI/hepatic ROS  (-) GERD   Renal/Genitourinary:     (+) Nephrolithiasis  (h/o),     Endo: Comment: The patient has had work up for pituitary insufficiency and has hypothyroidism, growth hormone deficiency but denies adrenal insufficiency.     (+) thyroid problem, hypothyroidism, Obesity,     Psychiatric/Substance Use:  - neg psychiatric ROS     Infectious Disease: Comment: History of COVID-19 12/2020 asx      Malignancy:  - neg malignancy ROS     Other:  - neg other ROS             OUTSIDE LABS:  CBC:   Lab Results   Component Value Date    WBC 9.5 07/17/2021    WBC 8.0 07/05/2021    HGB 12.7 07/17/2021    HGB 12.9 07/05/2021    HCT 41.2 07/17/2021    HCT 40.5 07/05/2021     07/17/2021     07/05/2021     BMP:   Lab Results   Component Value Date     07/17/2021     07/05/2021    POTASSIUM 3.9 07/17/2021    POTASSIUM 4.1 07/05/2021    CHLORIDE 109 07/17/2021    CHLORIDE 108 07/05/2021    CO2 29 07/17/2021    CO2 25 07/05/2021    BUN 9 07/17/2021    BUN 7 07/05/2021    CR 0.76 07/17/2021    CR 0.78 07/05/2021    "  (H) 2021    GLC 90 2021     COAGS:   Lab Results   Component Value Date    PTT 30 2021    INR 1.09 2021    FIBR 529 (H) 10/30/2020     POC:   Lab Results   Component Value Date     (H) 2012    HCG Negative 07/10/2010     HEPATIC:   Lab Results   Component Value Date    ALBUMIN 2.5 (L) 10/28/2020    PROTTOTAL 6.6 (L) 10/28/2020    ALT 32 10/28/2020    AST 16 10/28/2020    ALKPHOS 73 10/28/2020    BILITOTAL 0.3 10/28/2020     OTHER:   Lab Results   Component Value Date    PH 7.44 2012    BRENDEN 9.0 2021    PHOS 3.7 2013    MAG 2.5 (H) 10/29/2020    TSH 3.35 2013    T4 0.87 2013    CRP 28.7 (H) 10/30/2020         Virtual visit -  No vitals were obtained       Physical Exam  Constitutional: Awake, alert, cooperative, no apparent distress, and appears stated age.  Eyes: Pupils equal  HENT: Normocephalic  Respiratory: Non labored breathing   Neurologic: Awake, alert, oriented to name, place and time.   Neuropsychiatric: Calm, cooperative. Normal affect.     LABS  Results for MARLYN WILDER (MRN [id number]) as of 2021 09:47   Ref. Range 2021 12:26   N-Terminal Pro Bnp Latest Ref Range: 0 - 125 pg/mL 15   ABO/Rh(D) Unknown AB POS   Antibody Screen Latest Ref Range: Negative  Negative   SPECIMEN EXPIRATION DATE Unknown [id number]       EK/3/20  Normal sinus rhythm    The patient's records and results personally reviewed by this provider.     Outside records reviewed from: care everywhere    ASSESSMENT and PLAN  Marlyn Wilder is a 56 year old female who is scheduled for Redo left frontotemporal craniotomy and resection of tumor on 21 by Dr. Calero in treatment of Benign neoplasm of cerebral meninges.  PAC referral for risk assessment and optimization for anesthesia with comorbid conditions of HTN, HLD, TMJ, COVID-19 infection, JARED, epilepsy, hypothyroidism, growth hormone deficiency, history of nephrolithiasis, RA, history of TIA.  ":    Pre-operative considerations:  1.  Cardiac:  Functional status- METS 4, the patient reports she is able to walk and go up stairs as well as stand and cook for periods of time without any issues. She denies any cardiac symptoms.  Intermediate risk surgery with 0.9% (RCRI #) risk of major adverse cardiac event. Given the patient's underlying HTN and risk factor of previous TIA will get updated EKG and BNP. Unless greatly abnormal no further testing indicated.   ~ HTN - hold lisinopril DOS  ~ HLD - hold ASA 81 mg for 7 days prior.     2.  Pulm:  Airway feasible.  JARED - CPAP - patient will bring DOS    3. Neuro: meningioma - procedure as above. Will get T/S. Patient reports short term memory loss from previous surgeries.   ~ epilepsy - followed by Dr. Madrid and last seen on 6/1/21. She has simple partial seizures. Continue Keppra and Vimpat.   ~ TIA - the patient reports she's had 3 of these in the past. She had symptoms of \"speaking a foreign language\".     4. Endo: Secondary to previous surgeries the patient has been seen by endocrinology in the past. She has growth hormone deficiency and hypothyroidism. No adrenal insufficiency.   ~ Obesity, BMI 42 - consideration for safe lifting techniques.     5. GI:  Risk of PONV score = 4.  If > 2, anti-emetic intervention recommended.    6. : History of nephrolithiasis - no current concerns.     7. Immunologic: RA - worse in hands and feet. The patient has plan for her methotrexate per Holli KELLER CNP but hold 1 week prior and 2 weeks after surgery.     8. Anesthesia: The patient's intubation took 2 attempts in 2012 but had easy intubation in 2020.   02/25/20; 0827; Mask Ventilation: Not attempted (RSI); Ease of Intubation: Easy; Airway Size: 7;  Cuffed;  Oral;  Blade Type: Jorge;  Blade Size: 3;  Place by: MI;  Insertion Attempts: 1;  Secured at (cm)to lip: 22 cm;  Breath Sounds: Equal, clear and bilateral;  End Tidal CO2: Present;  Dentition: Intact, " Unchanged;  Grade View of Cords: 1    VTE risk: 0.5%      Patient was discussed with Dr Rmairez.      The patient is optimized for their procedure. AVS with information on surgery time/arrival time, meds and NPO status given by nursing staff.        **Please refer to the physical examination documented by the anesthesiologist in the anesthesia record on the day of surgery**        Video-Visit Details    Type of service:  Video Visit    Patient verbally consented to video service today: YES      Video Start Time: 3:03  Video End Time (time video stopped): 3:21    Originating Location (pt. Location): Home    Distant Location (provider location):  [facility]     Mode of Communication:  Video Conference via Kontron      On the day of service:     Prep time: 4 minutes  Visit time: 18 minutes  Documentation time: 20 minutes  ------------------------------------------  Total time: 42 minutes      Romi Price PA-C  Preoperative Assessment Center  [facility]  Clinic and Surgery Center  Phone: [phone]  Fax: [phone]

## 2021-08-16 NOTE — PATIENT INSTRUCTIONS
Preparing for Your Surgery      Name:  Marlyn Wilder   MRN:  [id number]   :  1965   Today's Date:  2021       Arriving for surgery:  Surgery date:  21  Arrival time:  5:30 am    Restrictions due to COVID 19:       One visitor is allowed in the Pre Op area. When you go into surgery, one visitor is allowed to wait in the Surgery Waiting Room       (provided there is enough space to social distance).   After surgery- Two visitors are allowed at a time if you have a private room and one visitor is allowed for those in a semi-private room.   Every 4 days the visitor(s) can rotate. During the 4 day period, the visitor(s) must be consistent. No visitors under the age of 18 years old.   Visiting Hours: 8 am - 8:30 pm   No ill visitors.   All visitors must wear face mask.    [employer] parking is available for anyone with mobility limitations or disabilities.  (Mobile  24 hours/ 7 days a week; [facility]  7 am- 3:30 pm, Mon- Fri)    Please come to:     [facility] [address]  [address] [place] [address]    - ? parking is available in front of the hospital      -    Please proceed to [address] on the 3rd floor. [phone]?     - ?If you are in need of directions, wheelchair or escort please stop at the Information Desk in the lobby.  Inform the information person that you are here for surgery; a wheelchair and escort to [address] will be provided.?     What can I eat or drink?  -  You may eat and drink normally for up to 8 hours before your surgery. (Until 11:30 pm)  -  You may have clear liquids until 2 hours before surgery. (Until 5:30 am)    Examples of clear liquids:  Water  Clear broth  Juices (apple, white grape, white cranberry  and cider) without pulp  Noncarbonated, powder based beverages  (lemonade and Chad-Aid)  Sodas (Sprite, 7-Up, ginger ale and seltzer)  Coffee or tea (without milk or cream)  Gatorade    -  No Alcohol for at  least 24 hours before surgery     Which medicines can I take?    **Hold Aspirin for 7 days before surgery - take your last dose on 8/18/21.**     Hold Multivitamins for 7 days before surgery.  Hold Supplements for 7 days before surgery.  Hold Ibuprofen (Advil, Motrin) for 1 day before surgery--unless otherwise directed by surgeon.  Hold Naproxen (Aleve) for 4 days before surgery.    **Hold Methotrexate 1 week before surgery.**    -  DO NOT take these medications the day of surgery:  Calcium Carbonate (Os-Ernie)  Lisinopril (Zestril)    -  PLEASE TAKE these medications the day of surgery:  Acetaminophen (Tylenol)  Levetiracetam (Keppra)  Levothyroxine (Synthroid/Levothroid)  Vimpat  Ondansetron (Zofran)      How do I prepare myself?  - Please take 2 showers before surgery using Scrubcare or Hibiclens soap.    Use this soap only from the neck to your toes.     Leave the soap on your skin for one minute--then rinse thoroughly.      You may use your own shampoo and conditioner; no other hair products.   - Please remove all jewelry and body piercings.  - No lotions, deodorants or fragrance.  - No makeup or fingernail polish.   - Bring your ID and insurance card.    -If you have a Deep Brain Stimulator, Spinal Cord Stimulator or any neuro stimulator device---you must bring the remote control to the hospital     - All patients are required to have a Covid-19 test within 4 days of surgery/procedure.      -Patients will be contacted by the [facility] scheduling team within 1 week of surgery to make an appointment.      - Patients may call the Scheduling team at [phone] if they have not been scheduled within 4 days of  surgery.      ALL PATIENTS GOING HOME THE SAME DAY OF SURGERY ARE REQUIRED TO HAVE A RESPONSIBLE ADULT TO DRIVE AND BE IN ATTENDANCE WITH THEM FOR 24 HOURS FOLLOWING SURGERY.      Questions or Concerns:    - For any questions regarding the day of surgery or your hospital stay, please contact the Pre  Admission Nursing Office at [phone].       - If you have health changes between today and your surgery please call your surgeon.       For questions after surgery please call your surgeons office.

## 2021-08-16 NOTE — ANESTHESIA PREPROCEDURE EVALUATION
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]   Procedure : Procedure(s):  Redo left frontotemporal craniotomy and resection of tumor     Past Medical History:   Diagnosis Date     Arthritis     atypical rheumatoid arthritis     Brain tumor (H)      Calculus of kidney      Gout      Hypertension      PONV (postoperative nausea and vomiting)      Seizures (H)     frontal lobe seizures, controlled on Keppra, starts with smell, then de ja vu, nausea     Sleep apnea     uses CPAP     TIA (transient ischemic attack)     takes baby ASA      Past Surgical History:   Procedure Laterality Date     BRAIN SURGERY      debulking x 2, short term memory deficits     C  DELIVERY ONLY      ,     C LIGATE FALLOPIAN TUBE,POSTPARTUM       CARPAL TUNNEL RELEASE RT/LT       COMBINED CYSTOSCOPY, INSERT CATHETER URETER Right 2020    Procedure: CYSTOSCOPY, RIGHT URETERAL CATHETER PLACEMENT;  Surgeon: Adarsh Livingston MD;  Location:  OR     HC REMOVAL OF TONSILS,<11 Y/O      Tonsils <11y.o.     IR NEPHROSTOMY TUBE REMOVAL RIGHT  2020     IR RENAL STONE REMOVAL RIGHT  2020     OPTICAL TRACKING SYSTEM CRANIOTOMY, EXCISE TUMOR, COMBINED  2012    Procedure: COMBINED OPTICAL TRACKING SYSTEM CRANIOTOMY, EXCISE TUMOR;  Stealth Guided Left Redo Craniotomy, Resection Of Tumor Ct @615 am MRI @ 630;  Surgeon: Pan Calero MD;  Location: [facility] OR     PERCUTANEOUS NEPHROLITHOTOMY Right 2020    Procedure: RIGHT PERCUTANEOUS NEPHROLITHOTOMY;  Surgeon: Adarsh Livingston MD;  Location:  OR      Allergies   Allergen Reactions     Duloxetine Nausea      Social History     Tobacco Use     Smoking status: Never Smoker     Smokeless tobacco: Never Used   Substance Use Topics     Alcohol use: No      Wt Readings from Last 1 Encounters:   21 120.2  "kg (265 lb)        Anesthesia Evaluation   Pt has had prior anesthetic. Type: General.    History of anesthetic complications       ROS/MED HX  ENT/Pulmonary: Comment: TMJ - uses mouth guard    (+) sleep apnea, uses CPAP,  (-) tobacco use   Neurologic: Comment: Meningioma - patient has short term memory loss secondary to this and her previous surgeries.     (+) seizures, features: simple partial , TIA, date: 2013, features: per patient she was speaking \"foreign language\" and has had 3 episodes in the past. .  (-) no CVA   Cardiovascular:     (+) Dyslipidemia hypertension-----Previous cardiac testing   Echo: Date: Results:    Stress Test: Date: Results:    ECG Reviewed: Date: 9/3/20 Results:  SR  Cath: Date: Results:   (-) stent   METS/Exercise Tolerance: 4 - Raking leaves, gardening    Hematologic:  - neg hematologic  ROS     Musculoskeletal: Comment: Rheumatoid arthritis - worse in hands and feet  (+) arthritis,     GI/Hepatic:  - neg GI/hepatic ROS  (-) GERD   Renal/Genitourinary:     (+) Nephrolithiasis  (h/o),     Endo: Comment: The patient has had work up for pituitary insufficiency and has hypothyroidism, growth hormone deficiency but denies adrenal insufficiency.     (+) thyroid problem, hypothyroidism, Obesity,     Psychiatric/Substance Use:  - neg psychiatric ROS     Infectious Disease: Comment: History of COVID-19 12/2020 asx      Malignancy:  - neg malignancy ROS     Other:  - neg other ROS          Physical Exam    Airway        Mallampati: III   TM distance: > 3 FB   Neck ROM: full   Mouth opening: > 3 cm    Respiratory Devices and Support         Dental  no notable dental history         Cardiovascular   cardiovascular exam normal          Pulmonary   pulmonary exam normal                OUTSIDE LABS:  CBC:   Lab Results   Component Value Date    WBC 9.5 07/17/2021    WBC 8.0 07/05/2021    HGB 12.7 07/17/2021    HGB 12.9 07/05/2021    HCT 41.2 07/17/2021    HCT 40.5 07/05/2021     07/17/2021    "  07/05/2021     BMP:   Lab Results   Component Value Date     07/17/2021     07/05/2021    POTASSIUM 3.9 07/17/2021    POTASSIUM 4.1 07/05/2021    CHLORIDE 109 07/17/2021    CHLORIDE 108 07/05/2021    CO2 29 07/17/2021    CO2 25 07/05/2021    BUN 9 07/17/2021    BUN 7 07/05/2021    CR 0.76 07/17/2021    CR 0.78 07/05/2021     (H) 07/17/2021    GLC 90 07/05/2021     COAGS:   Lab Results   Component Value Date    PTT 30 07/17/2021    INR 1.09 07/17/2021    FIBR 529 (H) 10/30/2020     POC:   Lab Results   Component Value Date     (H) 11/11/2012    HCG Negative 07/10/2010     HEPATIC:   Lab Results   Component Value Date    ALBUMIN 2.5 (L) 10/28/2020    PROTTOTAL 6.6 (L) 10/28/2020    ALT 32 10/28/2020    AST 16 10/28/2020    ALKPHOS 73 10/28/2020    BILITOTAL 0.3 10/28/2020     OTHER:   Lab Results   Component Value Date    PH 7.44 11/09/2012    BRENDEN 9.0 07/17/2021    PHOS 3.7 03/09/2013    MAG 2.5 (H) 10/29/2020    TSH 3.35 08/27/2013    T4 0.87 08/27/2013    CRP 28.7 (H) 10/30/2020       Anesthesia Plan    ASA Status:  3      Anesthesia Type: General.     - Airway: ETT   Induction: Intravenous.   Maintenance: Balanced.   Techniques and Equipment:     - Airway: Shoulder Liza/Ramp, Video-Laryngoscope     - Lines/Monitors: 2nd IV, Arterial Line     - Blood: T&S     Consents    Anesthesia Plan(s) and associated risks, benefits, and realistic alternatives discussed. Questions answered and patient/representative(s) expressed understanding.     - Discussed with:  Patient      - Extended Intubation/Ventilatory Support Discussed: Yes.      - Patient is DNR/DNI Status: No    Use of blood products discussed: Yes.     - Discussed with: Patient.     Postoperative Care    Pain management: IV analgesics.     - Plan for long acting post-op opioid use   PONV prophylaxis: Ondansetron (or other 5HT-3), Dexamethasone or Solumedrol     Comments:    Given her known L MCA occlusion with collaterals, very  "close attention to avoidance of prolonged hypotension is needed.  Vasopressor infusions will be ready to go prior to induction,            PAC Discussion and Assessment    ASA Classification: 3  Case is suitable for: [place]  Anesthetic techniques and relevant risks discussed: GA                  PAC Resident/NP Anesthesia Assessment: Marlyn Wilder is a 56 year old female who is scheduled for Redo left frontotemporal craniotomy and resection of tumor on 8/26/21 by Dr. Calero in treatment of Benign neoplasm of cerebral meninges.  PAC referral for risk assessment and optimization for anesthesia with comorbid conditions of HTN, HLD, TMJ, COVID-19 infection, JARED, epilepsy, hypothyroidism, growth hormone deficiency, history of nephrolithiasis, RA, history of TIA. :    Pre-operative considerations:  1.  Cardiac:  Functional status- METS 4, the patient reports she is able to walk and go up stairs as well as stand and cook for periods of time without any issues. She denies any cardiac symptoms.  Intermediate risk surgery with 0.9% (RCRI #) risk of major adverse cardiac event. Given the patient's underlying HTN and risk factor of previous TIA will get updated EKG and BNP. Unless greatly abnormal no further testing indicated.   ~ HTN - hold lisinopril DOS  ~ HLD - hold ASA 81 mg for 7 days prior.     2.  Pulm:  Airway feasible.  JARED - CPAP - patient will bring DOS    3. Neuro: meningioma - procedure as above. Will get T/S. Patient reports short term memory loss from previous surgeries.   ~ epilepsy - followed by Dr. Madrid and last seen on 6/1/21. She has simple partial seizures. Continue Keppra and Vimpat.   ~ TIA - the patient reports she's had 3 of these in the past. She had symptoms of \"speaking a foreign language\".     4. Endo: Secondary to previous surgeries the patient has been seen by endocrinology in the past. She has growth hormone deficiency and hypothyroidism. No adrenal insufficiency.   ~ Obesity, BMI 42 - " consideration for safe lifting techniques.     5. GI:  Risk of PONV score = 4.  If > 2, anti-emetic intervention recommended.    6. : History of nephrolithiasis - no current concerns.     7. Immunologic: RA - worse in hands and feet. The patient has plan for her methotrexate per Holli KELLER CNP but hold 1 week prior and 2 weeks after surgery.     8. Anesthesia: The patient's intubation took 2 attempts in 2012 but had easy intubation in 2020.   02/25/20; 0827; Mask Ventilation: Not attempted (RSI); Ease of Intubation: Easy; Airway Size: 7;  Cuffed;  Oral;  Blade Type: Jorge;  Blade Size: 3;  Place by: MI;  Insertion Attempts: 1;  Secured at (cm)to lip: 22 cm;  Breath Sounds: Equal, clear and bilateral;  End Tidal CO2: Present;  Dentition: Intact, Unchanged;  Grade View of Cords: 1    VTE risk: 0.5%    **Please refer to the physical examination documented by the anesthesiologist in the anesthesia record on the day of surgery**      Patient is optimized and is acceptable candidate for the proposed procedure.  No further diagnostic evaluation is needed.     Patient discussed with Dr. Ramirez.     For further details of assessment, testing, and physical exam please see H and P completed on same date.    Romi Price PA-C      Mid-Level Provider/Resident: Romi Price PA-C  Date: 8/16/21        Reviewed and Signed by PAC Anesthesiologist  Anesthesiologist: James  Date: 8/16/21                     Jerrell Grady MD

## 2021-08-20 ENCOUNTER — LAB (OUTPATIENT)
Dept: LAB | Facility: CLINIC | Age: 56
End: 2021-08-20
Payer: COMMERCIAL

## 2021-08-20 DIAGNOSIS — Z01.818 PRE-OP EXAMINATION: ICD-10-CM

## 2021-08-20 DIAGNOSIS — D32.0 BENIGN NEOPLASM OF CEREBRAL MENINGES (H): ICD-10-CM

## 2021-08-20 DIAGNOSIS — Z86.73 HISTORY OF TIA (TRANSIENT ISCHEMIC ATTACK): ICD-10-CM

## 2021-08-20 DIAGNOSIS — I10 ESSENTIAL HYPERTENSION: ICD-10-CM

## 2021-08-20 LAB
ABO/RH(D): NORMAL
ANTIBODY SCREEN: NEGATIVE
NT-PROBNP SERPL-MCNC: 15 PG/ML (ref 0–125)
SPECIMEN EXPIRATION DATE: NORMAL

## 2021-08-20 PROCEDURE — 86900 BLOOD TYPING SEROLOGIC ABO: CPT | Mod: 90 | Performed by: PATHOLOGY

## 2021-08-20 PROCEDURE — 83880 ASSAY OF NATRIURETIC PEPTIDE: CPT | Performed by: PATHOLOGY

## 2021-08-20 PROCEDURE — 86850 RBC ANTIBODY SCREEN: CPT | Mod: 90 | Performed by: PATHOLOGY

## 2021-08-20 PROCEDURE — 36415 COLL VENOUS BLD VENIPUNCTURE: CPT | Performed by: PATHOLOGY

## 2021-08-20 PROCEDURE — 86901 BLOOD TYPING SEROLOGIC RH(D): CPT | Mod: 90 | Performed by: PATHOLOGY

## 2021-08-23 ENCOUNTER — LAB (OUTPATIENT)
Dept: LAB | Facility: CLINIC | Age: 56
End: 2021-08-23
Payer: COMMERCIAL

## 2021-08-23 DIAGNOSIS — Z11.59 ENCOUNTER FOR SCREENING FOR OTHER VIRAL DISEASES: ICD-10-CM

## 2021-08-23 PROCEDURE — U0003 INFECTIOUS AGENT DETECTION BY NUCLEIC ACID (DNA OR RNA); SEVERE ACUTE RESPIRATORY SYNDROME CORONAVIRUS 2 (SARS-COV-2) (CORONAVIRUS DISEASE [COVID-19]), AMPLIFIED PROBE TECHNIQUE, MAKING USE OF HIGH THROUGHPUT TECHNOLOGIES AS DESCRIBED BY CMS-2020-01-R: HCPCS

## 2021-08-23 PROCEDURE — U0005 INFEC AGEN DETEC AMPLI PROBE: HCPCS

## 2021-08-24 LAB — SARS-COV-2 RNA RESP QL NAA+PROBE: NEGATIVE

## 2021-08-25 RX ORDER — CEFAZOLIN SODIUM IN 0.9 % NACL 3 G/100 ML
3 INTRAVENOUS SOLUTION, PIGGYBACK (ML) INTRAVENOUS SEE ADMIN INSTRUCTIONS
Status: CANCELLED | OUTPATIENT
Start: 2021-08-25

## 2021-08-25 RX ORDER — CEFAZOLIN SODIUM IN 0.9 % NACL 3 G/100 ML
3 INTRAVENOUS SOLUTION, PIGGYBACK (ML) INTRAVENOUS
Status: CANCELLED | OUTPATIENT
Start: 2021-08-25

## 2021-08-26 ENCOUNTER — HOSPITAL ENCOUNTER (OUTPATIENT)
Dept: MRI IMAGING | Facility: CLINIC | Age: 56
End: 2021-08-26
Attending: NEUROLOGICAL SURGERY | Admitting: NEUROLOGICAL SURGERY
Payer: COMMERCIAL

## 2021-08-26 ENCOUNTER — ANESTHESIA (OUTPATIENT)
Dept: SURGERY | Facility: CLINIC | Age: 56
End: 2021-08-26
Payer: COMMERCIAL

## 2021-08-26 ENCOUNTER — HOSPITAL ENCOUNTER (INPATIENT)
Facility: CLINIC | Age: 56
LOS: 3 days | Discharge: HOME | End: 2021-08-29
Attending: NEUROLOGICAL SURGERY | Admitting: NEUROLOGICAL SURGERY
Payer: COMMERCIAL

## 2021-08-26 ENCOUNTER — HOSPITAL ENCOUNTER (OUTPATIENT)
Dept: CT IMAGING | Facility: CLINIC | Age: 56
End: 2021-08-26
Attending: NEUROLOGICAL SURGERY | Admitting: NEUROLOGICAL SURGERY
Payer: COMMERCIAL

## 2021-08-26 DIAGNOSIS — D49.6 BRAIN TUMOR (H): Primary | ICD-10-CM

## 2021-08-26 DIAGNOSIS — D32.0 BENIGN NEOPLASM OF CEREBRAL MENINGES (H): ICD-10-CM

## 2021-08-26 LAB
ABO/RH(D): NORMAL
ALBUMIN SERPL-MCNC: 3.5 G/DL (ref 3.4–5)
ALP SERPL-CCNC: 133 U/L (ref 40–150)
ALT SERPL W P-5'-P-CCNC: 26 U/L (ref 0–50)
ANION GAP SERPL CALCULATED.3IONS-SCNC: 4 MMOL/L (ref 3–14)
ANTIBODY SCREEN: NEGATIVE
APTT PPP: 28 SECONDS (ref 22–38)
AST SERPL W P-5'-P-CCNC: 17 U/L (ref 0–45)
BASE EXCESS BLDA CALC-SCNC: -1.3 MMOL/L (ref -9.6–2)
BASE EXCESS BLDA CALC-SCNC: -1.9 MMOL/L (ref -9.6–2)
BILIRUB SERPL-MCNC: 0.4 MG/DL (ref 0.2–1.3)
BLD PROD TYP BPU: NORMAL
BLD PROD TYP BPU: NORMAL
BLOOD COMPONENT TYPE: NORMAL
BLOOD COMPONENT TYPE: NORMAL
BUN SERPL-MCNC: 10 MG/DL (ref 7–30)
CA-I BLD-MCNC: 4.6 MG/DL (ref 4.4–5.2)
CA-I BLD-MCNC: 4.8 MG/DL (ref 4.4–5.2)
CALCIUM SERPL-MCNC: 9.1 MG/DL (ref 8.5–10.1)
CHLORIDE BLD-SCNC: 110 MMOL/L (ref 94–109)
CO2 SERPL-SCNC: 25 MMOL/L (ref 20–32)
CODING SYSTEM: NORMAL
CODING SYSTEM: NORMAL
CREAT SERPL-MCNC: 0.74 MG/DL (ref 0.52–1.04)
CROSSMATCH: NORMAL
CROSSMATCH: NORMAL
ERYTHROCYTE [DISTWIDTH] IN BLOOD BY AUTOMATED COUNT: 14.6 % (ref 10–15)
GFR SERPL CREATININE-BSD FRML MDRD: 90 ML/MIN/1.73M2
GLUCOSE BLD-MCNC: 151 MG/DL (ref 70–99)
GLUCOSE BLD-MCNC: 95 MG/DL (ref 70–99)
GLUCOSE BLD-MCNC: 96 MG/DL (ref 70–99)
GLUCOSE BLDC GLUCOMTR-MCNC: 180 MG/DL (ref 70–99)
GLUCOSE BLDC GLUCOMTR-MCNC: 89 MG/DL (ref 70–99)
HCO3 BLDA-SCNC: 23 MMOL/L (ref 21–28)
HCO3 BLDA-SCNC: 23 MMOL/L (ref 21–28)
HCT VFR BLD AUTO: 41.9 % (ref 35–47)
HGB BLD-MCNC: 11.3 G/DL (ref 11.7–15.7)
HGB BLD-MCNC: 11.8 G/DL (ref 11.7–15.7)
HGB BLD-MCNC: 13.1 G/DL (ref 11.7–15.7)
INR PPP: 1.04 (ref 0.85–1.15)
LACTATE BLD-SCNC: 1.1 MMOL/L
LACTATE BLD-SCNC: 1.9 MMOL/L
MCH RBC QN AUTO: 28.9 PG (ref 26.5–33)
MCHC RBC AUTO-ENTMCNC: 31.3 G/DL (ref 31.5–36.5)
MCV RBC AUTO: 93 FL (ref 78–100)
OXYHGB MFR BLDA: 96 % (ref 92–100)
OXYHGB MFR BLDA: 97 % (ref 92–100)
PCO2 BLDA: 36 MM HG (ref 35–45)
PCO2 BLDA: 38 MM HG (ref 35–45)
PH BLDA: 7.39 [PH] (ref 7.35–7.45)
PH BLDA: 7.42 [PH] (ref 7.35–7.45)
PLATELET # BLD AUTO: 255 10E3/UL (ref 150–450)
PO2 BLDA: 112 MM HG (ref 80–105)
PO2 BLDA: 91 MM HG (ref 80–105)
POTASSIUM BLD-SCNC: 3.8 MMOL/L (ref 3.5–5)
POTASSIUM BLD-SCNC: 4.1 MMOL/L (ref 3.4–5.3)
POTASSIUM BLD-SCNC: 4.9 MMOL/L (ref 3.5–5)
PROT SERPL-MCNC: 7.5 G/DL (ref 6.8–8.8)
RBC # BLD AUTO: 4.53 10E6/UL (ref 3.8–5.2)
SODIUM BLD-SCNC: 140 MMOL/L (ref 133–144)
SODIUM BLD-SCNC: 143 MMOL/L (ref 133–144)
SODIUM SERPL-SCNC: 139 MMOL/L (ref 133–144)
SPECIMEN EXPIRATION DATE: NORMAL
UNIT ABO/RH: NORMAL
UNIT ABO/RH: NORMAL
UNIT NUMBER: NORMAL
UNIT NUMBER: NORMAL
UNIT STATUS: NORMAL
UNIT STATUS: NORMAL
UNIT TYPE ISBT: 2800
UNIT TYPE ISBT: 8400
WBC # BLD AUTO: 8.3 10E3/UL (ref 4–11)

## 2021-08-26 PROCEDURE — 70450 CT HEAD/BRAIN W/O DYE: CPT | Mod: 26 | Performed by: STUDENT IN AN ORGANIZED HEALTH CARE EDUCATION/TRAINING PROGRAM

## 2021-08-26 PROCEDURE — 88360 TUMOR IMMUNOHISTOCHEM/MANUAL: CPT | Mod: TC | Performed by: NEUROLOGICAL SURGERY

## 2021-08-26 PROCEDURE — 88360 TUMOR IMMUNOHISTOCHEM/MANUAL: CPT | Mod: 26 | Performed by: SPECIALIST

## 2021-08-26 PROCEDURE — 250N000025 HC SEVOFLURANE, PER MIN: Performed by: NEUROLOGICAL SURGERY

## 2021-08-26 PROCEDURE — 85027 COMPLETE CBC AUTOMATED: CPT | Performed by: STUDENT IN AN ORGANIZED HEALTH CARE EDUCATION/TRAINING PROGRAM

## 2021-08-26 PROCEDURE — 250N000011 HC RX IP 250 OP 636: Performed by: ANESTHESIOLOGY

## 2021-08-26 PROCEDURE — 250N000011 HC RX IP 250 OP 636: Performed by: STUDENT IN AN ORGANIZED HEALTH CARE EDUCATION/TRAINING PROGRAM

## 2021-08-26 PROCEDURE — 999N000015 HC STATISTIC ARTERIAL MONITORING DAILY

## 2021-08-26 PROCEDURE — 255N000002 HC RX 255 OP 636: Performed by: NEUROLOGICAL SURGERY

## 2021-08-26 PROCEDURE — 258N000001 HC RX 258: Performed by: NEUROLOGICAL SURGERY

## 2021-08-26 PROCEDURE — 8E09XBG COMPUTER ASSISTED PROCEDURE OF HEAD AND NECK REGION, WITH COMPUTERIZED TOMOGRAPHY: ICD-10-PCS | Performed by: NEUROLOGICAL SURGERY

## 2021-08-26 PROCEDURE — 250N000009 HC RX 250: Performed by: ANESTHESIOLOGY

## 2021-08-26 PROCEDURE — 4A1104G MONITORING OF PERIPHERAL NERVOUS ELECTRICAL ACTIVITY, INTRAOPERATIVE, OPEN APPROACH: ICD-10-PCS | Performed by: NEUROLOGICAL SURGERY

## 2021-08-26 PROCEDURE — 82330 ASSAY OF CALCIUM: CPT

## 2021-08-26 PROCEDURE — C1763 CONN TISS, NON-HUMAN: HCPCS | Performed by: NEUROLOGICAL SURGERY

## 2021-08-26 PROCEDURE — 250N000013 HC RX MED GY IP 250 OP 250 PS 637: Performed by: ANESTHESIOLOGY

## 2021-08-26 PROCEDURE — 80053 COMPREHEN METABOLIC PANEL: CPT | Performed by: STUDENT IN AN ORGANIZED HEALTH CARE EDUCATION/TRAINING PROGRAM

## 2021-08-26 PROCEDURE — 70552 MRI BRAIN STEM W/DYE: CPT

## 2021-08-26 PROCEDURE — 360N000079 HC SURGERY LEVEL 6, PER MIN: Performed by: NEUROLOGICAL SURGERY

## 2021-08-26 PROCEDURE — 70552 MRI BRAIN STEM W/DYE: CPT | Mod: 26 | Performed by: STUDENT IN AN ORGANIZED HEALTH CARE EDUCATION/TRAINING PROGRAM

## 2021-08-26 PROCEDURE — C1713 ANCHOR/SCREW BN/BN,TIS/BN: HCPCS | Performed by: NEUROLOGICAL SURGERY

## 2021-08-26 PROCEDURE — 200N000002 HC R&B ICU UMMC

## 2021-08-26 PROCEDURE — A9585 GADOBUTROL INJECTION: HCPCS | Performed by: NEUROLOGICAL SURGERY

## 2021-08-26 PROCEDURE — 258N000003 HC RX IP 258 OP 636: Performed by: ANESTHESIOLOGY

## 2021-08-26 PROCEDURE — 272N000480 CT HEAD W/O CONTRAST

## 2021-08-26 PROCEDURE — 69990 MICROSURGERY ADD-ON: CPT | Mod: 59 | Performed by: NEUROLOGICAL SURGERY

## 2021-08-26 PROCEDURE — 250N000011 HC RX IP 250 OP 636: Performed by: NURSE ANESTHETIST, CERTIFIED REGISTERED

## 2021-08-26 PROCEDURE — 88307 TISSUE EXAM BY PATHOLOGIST: CPT | Mod: 26 | Performed by: SPECIALIST

## 2021-08-26 PROCEDURE — 999N000127 HC STATISTIC PERIPHERAL IV START W US GUIDANCE

## 2021-08-26 PROCEDURE — 250N000013 HC RX MED GY IP 250 OP 250 PS 637: Performed by: NURSE ANESTHETIST, CERTIFIED REGISTERED

## 2021-08-26 PROCEDURE — 370N000017 HC ANESTHESIA TECHNICAL FEE, PER MIN: Performed by: NEUROLOGICAL SURGERY

## 2021-08-26 PROCEDURE — 999N000141 HC STATISTIC PRE-PROCEDURE NURSING ASSESSMENT: Performed by: NEUROLOGICAL SURGERY

## 2021-08-26 PROCEDURE — 85730 THROMBOPLASTIN TIME PARTIAL: CPT | Performed by: STUDENT IN AN ORGANIZED HEALTH CARE EDUCATION/TRAINING PROGRAM

## 2021-08-26 PROCEDURE — 710N000011 HC RECOVERY PHASE 1, LEVEL 3, PER MIN: Performed by: NEUROLOGICAL SURGERY

## 2021-08-26 PROCEDURE — 36415 COLL VENOUS BLD VENIPUNCTURE: CPT | Performed by: STUDENT IN AN ORGANIZED HEALTH CARE EDUCATION/TRAINING PROGRAM

## 2021-08-26 PROCEDURE — 00B70ZZ EXCISION OF CEREBRAL HEMISPHERE, OPEN APPROACH: ICD-10-PCS | Performed by: NEUROLOGICAL SURGERY

## 2021-08-26 PROCEDURE — 999N000157 HC STATISTIC RCP TIME EA 10 MIN

## 2021-08-26 PROCEDURE — 86923 COMPATIBILITY TEST ELECTRIC: CPT | Performed by: NEUROLOGICAL SURGERY

## 2021-08-26 PROCEDURE — 61781 SCAN PROC CRANIAL INTRA: CPT | Performed by: NEUROLOGICAL SURGERY

## 2021-08-26 PROCEDURE — 250N000009 HC RX 250: Performed by: NEUROLOGICAL SURGERY

## 2021-08-26 PROCEDURE — 82810 BLOOD GASES O2 SAT ONLY: CPT

## 2021-08-26 PROCEDURE — 86900 BLOOD TYPING SEROLOGIC ABO: CPT | Performed by: STUDENT IN AN ORGANIZED HEALTH CARE EDUCATION/TRAINING PROGRAM

## 2021-08-26 PROCEDURE — 61512 CRNEC TREPH EXC MNGIOMA STTL: CPT | Mod: GC | Performed by: NEUROLOGICAL SURGERY

## 2021-08-26 PROCEDURE — 70450 CT HEAD/BRAIN W/O DYE: CPT

## 2021-08-26 PROCEDURE — 82803 BLOOD GASES ANY COMBINATION: CPT

## 2021-08-26 PROCEDURE — 250N000013 HC RX MED GY IP 250 OP 250 PS 637: Performed by: NURSE PRACTITIONER

## 2021-08-26 PROCEDURE — 272N000001 HC OR GENERAL SUPPLY STERILE: Performed by: NEUROLOGICAL SURGERY

## 2021-08-26 PROCEDURE — 85610 PROTHROMBIN TIME: CPT | Performed by: STUDENT IN AN ORGANIZED HEALTH CARE EDUCATION/TRAINING PROGRAM

## 2021-08-26 DEVICE — IMP SCR SYN MATRIX LOW PRO 1.5X04MM SELF DRILL 04.503.104.01: Type: IMPLANTABLE DEVICE | Site: CRANIAL | Status: FUNCTIONAL

## 2021-08-26 DEVICE — IMP PLATE SYN STRUT LOW PROFILE 6H TI 421.522: Type: IMPLANTABLE DEVICE | Site: CRANIAL | Status: FUNCTIONAL

## 2021-08-26 DEVICE — IMP PLATE SYN X LOW PROFILE 4H TI 421.510: Type: IMPLANTABLE DEVICE | Site: CRANIAL | Status: FUNCTIONAL

## 2021-08-26 DEVICE — GRAFT DURAGEN 3X3" ID330: Type: IMPLANTABLE DEVICE | Site: CRANIAL | Status: FUNCTIONAL

## 2021-08-26 RX ORDER — SODIUM CHLORIDE, SODIUM LACTATE, POTASSIUM CHLORIDE, CALCIUM CHLORIDE 600; 310; 30; 20 MG/100ML; MG/100ML; MG/100ML; MG/100ML
INJECTION, SOLUTION INTRAVENOUS CONTINUOUS
Status: DISCONTINUED | OUTPATIENT
Start: 2021-08-26 | End: 2021-08-26

## 2021-08-26 RX ORDER — NALOXONE HYDROCHLORIDE 0.4 MG/ML
0.4 INJECTION, SOLUTION INTRAMUSCULAR; INTRAVENOUS; SUBCUTANEOUS
Status: DISCONTINUED | OUTPATIENT
Start: 2021-08-26 | End: 2021-08-29 | Stop reason: HOSPADM

## 2021-08-26 RX ORDER — MULTIPLE VITAMINS W/ MINERALS TAB 9MG-400MCG
1 TAB ORAL DAILY
Status: DISCONTINUED | OUTPATIENT
Start: 2021-08-27 | End: 2021-08-29 | Stop reason: HOSPADM

## 2021-08-26 RX ORDER — HYDROMORPHONE HCL IN WATER/PF 6 MG/30 ML
0.2 PATIENT CONTROLLED ANALGESIA SYRINGE INTRAVENOUS
Status: DISPENSED | OUTPATIENT
Start: 2021-08-26 | End: 2021-08-27

## 2021-08-26 RX ORDER — LIDOCAINE 40 MG/G
CREAM TOPICAL
Status: DISCONTINUED | OUTPATIENT
Start: 2021-08-26 | End: 2021-08-29 | Stop reason: HOSPADM

## 2021-08-26 RX ORDER — CEFAZOLIN SODIUM IN 0.9 % NACL 3 G/100 ML
3 INTRAVENOUS SOLUTION, PIGGYBACK (ML) INTRAVENOUS SEE ADMIN INSTRUCTIONS
Status: DISCONTINUED | OUTPATIENT
Start: 2021-08-26 | End: 2021-08-26 | Stop reason: HOSPADM

## 2021-08-26 RX ORDER — HYDROMORPHONE HCL IN WATER/PF 6 MG/30 ML
0.4 PATIENT CONTROLLED ANALGESIA SYRINGE INTRAVENOUS EVERY 5 MIN PRN
Status: DISCONTINUED | OUTPATIENT
Start: 2021-08-26 | End: 2021-08-26

## 2021-08-26 RX ORDER — DEXAMETHASONE SODIUM PHOSPHATE 4 MG/ML
2 INJECTION, SOLUTION INTRA-ARTICULAR; INTRALESIONAL; INTRAMUSCULAR; INTRAVENOUS; SOFT TISSUE EVERY 8 HOURS
Status: DISCONTINUED | OUTPATIENT
Start: 2021-08-29 | End: 2021-08-29 | Stop reason: HOSPADM

## 2021-08-26 RX ORDER — CALCIUM CARBONATE 500(1250)
1 TABLET ORAL DAILY
Status: DISCONTINUED | OUTPATIENT
Start: 2021-08-27 | End: 2021-08-29 | Stop reason: HOSPADM

## 2021-08-26 RX ORDER — GADOBUTROL 604.72 MG/ML
10 INJECTION INTRAVENOUS ONCE
Status: COMPLETED | OUTPATIENT
Start: 2021-08-26 | End: 2021-08-26

## 2021-08-26 RX ORDER — HYDRALAZINE HYDROCHLORIDE 20 MG/ML
2.5-5 INJECTION INTRAMUSCULAR; INTRAVENOUS EVERY 10 MIN PRN
Status: DISCONTINUED | OUTPATIENT
Start: 2021-08-26 | End: 2021-08-26

## 2021-08-26 RX ORDER — DEXAMETHASONE SODIUM PHOSPHATE 4 MG/ML
3 INJECTION, SOLUTION INTRA-ARTICULAR; INTRALESIONAL; INTRAMUSCULAR; INTRAVENOUS; SOFT TISSUE EVERY 8 HOURS
Status: COMPLETED | OUTPATIENT
Start: 2021-08-28 | End: 2021-08-28

## 2021-08-26 RX ORDER — METOPROLOL TARTRATE 1 MG/ML
1-2 INJECTION, SOLUTION INTRAVENOUS EVERY 5 MIN PRN
Status: DISCONTINUED | OUTPATIENT
Start: 2021-08-26 | End: 2021-08-26

## 2021-08-26 RX ORDER — ONDANSETRON 4 MG/1
4 TABLET, ORALLY DISINTEGRATING ORAL EVERY 6 HOURS PRN
Status: DISCONTINUED | OUTPATIENT
Start: 2021-08-26 | End: 2021-08-29 | Stop reason: HOSPADM

## 2021-08-26 RX ORDER — CEFAZOLIN SODIUM IN 0.9 % NACL 3 G/100 ML
3 INTRAVENOUS SOLUTION, PIGGYBACK (ML) INTRAVENOUS
Status: COMPLETED | OUTPATIENT
Start: 2021-08-26 | End: 2021-08-26

## 2021-08-26 RX ORDER — LEVETIRACETAM 100 MG/ML
SOLUTION ORAL PRN
Status: DISCONTINUED | OUTPATIENT
Start: 2021-08-26 | End: 2021-08-26

## 2021-08-26 RX ORDER — LACOSAMIDE 100 MG/1
200 TABLET ORAL 2 TIMES DAILY
Status: DISCONTINUED | OUTPATIENT
Start: 2021-08-26 | End: 2021-08-29 | Stop reason: HOSPADM

## 2021-08-26 RX ORDER — ONDANSETRON 2 MG/ML
4 INJECTION INTRAMUSCULAR; INTRAVENOUS EVERY 30 MIN PRN
Status: DISCONTINUED | OUTPATIENT
Start: 2021-08-26 | End: 2021-08-26

## 2021-08-26 RX ORDER — LIDOCAINE HYDROCHLORIDE 20 MG/ML
INJECTION, SOLUTION INFILTRATION; PERINEURAL PRN
Status: DISCONTINUED | OUTPATIENT
Start: 2021-08-26 | End: 2021-08-26

## 2021-08-26 RX ORDER — SODIUM CHLORIDE 9 MG/ML
INJECTION, SOLUTION INTRAVENOUS CONTINUOUS
Status: ACTIVE | OUTPATIENT
Start: 2021-08-26 | End: 2021-08-27

## 2021-08-26 RX ORDER — DEXAMETHASONE SODIUM PHOSPHATE 4 MG/ML
4 INJECTION, SOLUTION INTRA-ARTICULAR; INTRALESIONAL; INTRAMUSCULAR; INTRAVENOUS; SOFT TISSUE EVERY 8 HOURS
Status: COMPLETED | OUTPATIENT
Start: 2021-08-26 | End: 2021-08-27

## 2021-08-26 RX ORDER — ONDANSETRON 4 MG/1
4 TABLET, ORALLY DISINTEGRATING ORAL EVERY 30 MIN PRN
Status: DISCONTINUED | OUTPATIENT
Start: 2021-08-26 | End: 2021-08-26

## 2021-08-26 RX ORDER — OXYCODONE HYDROCHLORIDE 5 MG/1
5 TABLET ORAL EVERY 4 HOURS PRN
Status: DISCONTINUED | OUTPATIENT
Start: 2021-08-26 | End: 2021-08-26

## 2021-08-26 RX ORDER — DEXTROSE MONOHYDRATE 25 G/50ML
25-50 INJECTION, SOLUTION INTRAVENOUS
Status: DISCONTINUED | OUTPATIENT
Start: 2021-08-26 | End: 2021-08-26

## 2021-08-26 RX ORDER — ACETAMINOPHEN 325 MG/1
975 TABLET ORAL ONCE
Status: COMPLETED | OUTPATIENT
Start: 2021-08-26 | End: 2021-08-26

## 2021-08-26 RX ORDER — ACETAMINOPHEN 325 MG/1
325-650 TABLET ORAL EVERY 6 HOURS PRN
Status: DISCONTINUED | OUTPATIENT
Start: 2021-08-26 | End: 2021-08-26

## 2021-08-26 RX ORDER — ONDANSETRON 2 MG/ML
INJECTION INTRAMUSCULAR; INTRAVENOUS PRN
Status: DISCONTINUED | OUTPATIENT
Start: 2021-08-26 | End: 2021-08-26

## 2021-08-26 RX ORDER — NALOXONE HYDROCHLORIDE 0.4 MG/ML
0.2 INJECTION, SOLUTION INTRAMUSCULAR; INTRAVENOUS; SUBCUTANEOUS
Status: DISCONTINUED | OUTPATIENT
Start: 2021-08-26 | End: 2021-08-29 | Stop reason: HOSPADM

## 2021-08-26 RX ORDER — FOLIC ACID 1 MG/1
1 TABLET ORAL DAILY
Status: DISCONTINUED | OUTPATIENT
Start: 2021-08-27 | End: 2021-08-29 | Stop reason: HOSPADM

## 2021-08-26 RX ORDER — PROPOFOL 10 MG/ML
INJECTION, EMULSION INTRAVENOUS CONTINUOUS PRN
Status: DISCONTINUED | OUTPATIENT
Start: 2021-08-26 | End: 2021-08-26

## 2021-08-26 RX ORDER — LEVOTHYROXINE SODIUM 112 UG/1
112 TABLET ORAL DAILY
Status: DISCONTINUED | OUTPATIENT
Start: 2021-08-27 | End: 2021-08-29 | Stop reason: HOSPADM

## 2021-08-26 RX ORDER — SODIUM CHLORIDE, SODIUM LACTATE, POTASSIUM CHLORIDE, CALCIUM CHLORIDE 600; 310; 30; 20 MG/100ML; MG/100ML; MG/100ML; MG/100ML
INJECTION, SOLUTION INTRAVENOUS CONTINUOUS
Status: DISCONTINUED | OUTPATIENT
Start: 2021-08-26 | End: 2021-08-26 | Stop reason: HOSPADM

## 2021-08-26 RX ORDER — PROCHLORPERAZINE MALEATE 10 MG
10 TABLET ORAL EVERY 6 HOURS PRN
Status: DISCONTINUED | OUTPATIENT
Start: 2021-08-26 | End: 2021-08-29 | Stop reason: HOSPADM

## 2021-08-26 RX ORDER — OXYCODONE HYDROCHLORIDE 5 MG/1
5 TABLET ORAL
Status: DISCONTINUED | OUTPATIENT
Start: 2021-08-26 | End: 2021-08-29 | Stop reason: HOSPADM

## 2021-08-26 RX ORDER — MANNITOL 20 G/100ML
INJECTION, SOLUTION INTRAVENOUS PRN
Status: DISCONTINUED | OUTPATIENT
Start: 2021-08-26 | End: 2021-08-26

## 2021-08-26 RX ORDER — ESMOLOL HYDROCHLORIDE 10 MG/ML
INJECTION INTRAVENOUS PRN
Status: DISCONTINUED | OUTPATIENT
Start: 2021-08-26 | End: 2021-08-26

## 2021-08-26 RX ORDER — EPHEDRINE SULFATE 50 MG/ML
INJECTION, SOLUTION INTRAMUSCULAR; INTRAVENOUS; SUBCUTANEOUS PRN
Status: DISCONTINUED | OUTPATIENT
Start: 2021-08-26 | End: 2021-08-26

## 2021-08-26 RX ORDER — BISACODYL 10 MG
10 SUPPOSITORY, RECTAL RECTAL DAILY PRN
Status: DISCONTINUED | OUTPATIENT
Start: 2021-08-26 | End: 2021-08-29 | Stop reason: HOSPADM

## 2021-08-26 RX ORDER — NICOTINE POLACRILEX 4 MG
15-30 LOZENGE BUCCAL
Status: DISCONTINUED | OUTPATIENT
Start: 2021-08-26 | End: 2021-08-26

## 2021-08-26 RX ORDER — VITAMIN B COMPLEX
50 TABLET ORAL DAILY
Status: DISCONTINUED | OUTPATIENT
Start: 2021-08-27 | End: 2021-08-29 | Stop reason: HOSPADM

## 2021-08-26 RX ORDER — ACETAMINOPHEN 325 MG/1
650 TABLET ORAL EVERY 4 HOURS PRN
Status: DISCONTINUED | OUTPATIENT
Start: 2021-08-29 | End: 2021-08-29 | Stop reason: HOSPADM

## 2021-08-26 RX ORDER — MEPERIDINE HYDROCHLORIDE 25 MG/ML
12.5 INJECTION INTRAMUSCULAR; INTRAVENOUS; SUBCUTANEOUS EVERY 5 MIN PRN
Status: DISCONTINUED | OUTPATIENT
Start: 2021-08-26 | End: 2021-08-26

## 2021-08-26 RX ORDER — HALOPERIDOL 5 MG/ML
1 INJECTION INTRAMUSCULAR
Status: DISCONTINUED | OUTPATIENT
Start: 2021-08-26 | End: 2021-08-26

## 2021-08-26 RX ORDER — SODIUM CHLORIDE, SODIUM LACTATE, POTASSIUM CHLORIDE, CALCIUM CHLORIDE 600; 310; 30; 20 MG/100ML; MG/100ML; MG/100ML; MG/100ML
INJECTION, SOLUTION INTRAVENOUS CONTINUOUS PRN
Status: DISCONTINUED | OUTPATIENT
Start: 2021-08-26 | End: 2021-08-26

## 2021-08-26 RX ORDER — SODIUM CHLORIDE, SODIUM LACTATE, POTASSIUM CHLORIDE, AND CALCIUM CHLORIDE .6; .31; .03; .02 G/100ML; G/100ML; G/100ML; G/100ML
IRRIGANT IRRIGATION PRN
Status: DISCONTINUED | OUTPATIENT
Start: 2021-08-26 | End: 2021-08-26 | Stop reason: HOSPADM

## 2021-08-26 RX ORDER — ACETAMINOPHEN 325 MG/1
975 TABLET ORAL EVERY 8 HOURS
Status: DISCONTINUED | OUTPATIENT
Start: 2021-08-26 | End: 2021-08-29 | Stop reason: HOSPADM

## 2021-08-26 RX ORDER — DEXAMETHASONE SODIUM PHOSPHATE 4 MG/ML
INJECTION, SOLUTION INTRA-ARTICULAR; INTRALESIONAL; INTRAMUSCULAR; INTRAVENOUS; SOFT TISSUE PRN
Status: DISCONTINUED | OUTPATIENT
Start: 2021-08-26 | End: 2021-08-26

## 2021-08-26 RX ORDER — LISINOPRIL 2.5 MG/1
2.5 TABLET ORAL DAILY
Status: DISCONTINUED | OUTPATIENT
Start: 2021-08-27 | End: 2021-08-29 | Stop reason: HOSPADM

## 2021-08-26 RX ORDER — PROPOFOL 10 MG/ML
INJECTION, EMULSION INTRAVENOUS PRN
Status: DISCONTINUED | OUTPATIENT
Start: 2021-08-26 | End: 2021-08-26

## 2021-08-26 RX ORDER — DEXAMETHASONE SODIUM PHOSPHATE 4 MG/ML
1 INJECTION, SOLUTION INTRA-ARTICULAR; INTRALESIONAL; INTRAMUSCULAR; INTRAVENOUS; SOFT TISSUE EVERY 8 HOURS
Status: DISCONTINUED | OUTPATIENT
Start: 2021-08-30 | End: 2021-08-29 | Stop reason: HOSPADM

## 2021-08-26 RX ORDER — LABETALOL HYDROCHLORIDE 5 MG/ML
10-40 INJECTION, SOLUTION INTRAVENOUS EVERY 10 MIN PRN
Status: DISCONTINUED | OUTPATIENT
Start: 2021-08-26 | End: 2021-08-29 | Stop reason: HOSPADM

## 2021-08-26 RX ORDER — HYDRALAZINE HYDROCHLORIDE 20 MG/ML
10-20 INJECTION INTRAMUSCULAR; INTRAVENOUS EVERY 30 MIN PRN
Status: DISCONTINUED | OUTPATIENT
Start: 2021-08-26 | End: 2021-08-29 | Stop reason: HOSPADM

## 2021-08-26 RX ORDER — POLYETHYLENE GLYCOL 3350 17 G/17G
17 POWDER, FOR SOLUTION ORAL DAILY
Status: DISCONTINUED | OUTPATIENT
Start: 2021-08-27 | End: 2021-08-29 | Stop reason: HOSPADM

## 2021-08-26 RX ORDER — FENTANYL CITRATE 50 UG/ML
50 INJECTION, SOLUTION INTRAMUSCULAR; INTRAVENOUS EVERY 5 MIN PRN
Status: DISCONTINUED | OUTPATIENT
Start: 2021-08-26 | End: 2021-08-26

## 2021-08-26 RX ORDER — FENTANYL CITRATE 50 UG/ML
INJECTION, SOLUTION INTRAMUSCULAR; INTRAVENOUS PRN
Status: DISCONTINUED | OUTPATIENT
Start: 2021-08-26 | End: 2021-08-26

## 2021-08-26 RX ORDER — LIDOCAINE 40 MG/G
CREAM TOPICAL
Status: DISCONTINUED | OUTPATIENT
Start: 2021-08-26 | End: 2021-08-26 | Stop reason: HOSPADM

## 2021-08-26 RX ORDER — AMOXICILLIN 250 MG
1 CAPSULE ORAL 2 TIMES DAILY
Status: DISCONTINUED | OUTPATIENT
Start: 2021-08-26 | End: 2021-08-29 | Stop reason: HOSPADM

## 2021-08-26 RX ORDER — ONDANSETRON 2 MG/ML
4 INJECTION INTRAMUSCULAR; INTRAVENOUS EVERY 6 HOURS PRN
Status: DISCONTINUED | OUTPATIENT
Start: 2021-08-26 | End: 2021-08-29 | Stop reason: HOSPADM

## 2021-08-26 RX ADMIN — FENTANYL CITRATE 50 MCG: 50 INJECTION, SOLUTION INTRAMUSCULAR; INTRAVENOUS at 21:34

## 2021-08-26 RX ADMIN — LEVETIRACETAM 2000 MG: 100 SOLUTION ORAL at 19:40

## 2021-08-26 RX ADMIN — GADOBUTROL 10 ML: 604.72 INJECTION INTRAVENOUS at 07:18

## 2021-08-26 RX ADMIN — Medication 100 MCG: at 08:24

## 2021-08-26 RX ADMIN — PROPOFOL 70 MG: 10 INJECTION, EMULSION INTRAVENOUS at 08:20

## 2021-08-26 RX ADMIN — Medication 3 G: at 09:41

## 2021-08-26 RX ADMIN — Medication 50 MCG: at 12:20

## 2021-08-26 RX ADMIN — ACETAMINOPHEN 975 MG: 325 TABLET, FILM COATED ORAL at 07:37

## 2021-08-26 RX ADMIN — LACOSAMIDE 200 MG: 200 TABLET, FILM COATED ORAL at 23:42

## 2021-08-26 RX ADMIN — ROCURONIUM BROMIDE 40 MG: 10 INJECTION INTRAVENOUS at 08:20

## 2021-08-26 RX ADMIN — FENTANYL CITRATE 250 MCG: 50 INJECTION, SOLUTION INTRAMUSCULAR; INTRAVENOUS at 08:20

## 2021-08-26 RX ADMIN — LEVETIRACETAM 2000 MG: 750 TABLET ORAL at 23:42

## 2021-08-26 RX ADMIN — ONDANSETRON 4 MG: 2 INJECTION INTRAMUSCULAR; INTRAVENOUS at 19:56

## 2021-08-26 RX ADMIN — LIDOCAINE HYDROCHLORIDE 100 MG: 20 INJECTION, SOLUTION INFILTRATION; PERINEURAL at 08:20

## 2021-08-26 RX ADMIN — PROPOFOL 20 MG: 10 INJECTION, EMULSION INTRAVENOUS at 17:34

## 2021-08-26 RX ADMIN — SODIUM CHLORIDE, POTASSIUM CHLORIDE, SODIUM LACTATE AND CALCIUM CHLORIDE: 600; 310; 30; 20 INJECTION, SOLUTION INTRAVENOUS at 14:59

## 2021-08-26 RX ADMIN — PHENYLEPHRINE HYDROCHLORIDE 0.5 MCG/KG/MIN: 10 INJECTION INTRAVENOUS at 08:30

## 2021-08-26 RX ADMIN — ESMOLOL HYDROCHLORIDE 20 MG: 10 INJECTION, SOLUTION INTRAVENOUS at 12:22

## 2021-08-26 RX ADMIN — DEXAMETHASONE SODIUM PHOSPHATE 10 MG: 4 INJECTION, SOLUTION INTRA-ARTICULAR; INTRALESIONAL; INTRAMUSCULAR; INTRAVENOUS; SOFT TISSUE at 09:32

## 2021-08-26 RX ADMIN — ESMOLOL HYDROCHLORIDE 20 MG: 10 INJECTION, SOLUTION INTRAVENOUS at 10:28

## 2021-08-26 RX ADMIN — PROPOFOL 50 MG: 10 INJECTION, EMULSION INTRAVENOUS at 10:18

## 2021-08-26 RX ADMIN — ONDANSETRON 4 MG: 2 INJECTION INTRAMUSCULAR; INTRAVENOUS at 21:04

## 2021-08-26 RX ADMIN — SODIUM CHLORIDE, POTASSIUM CHLORIDE, SODIUM LACTATE AND CALCIUM CHLORIDE: 600; 310; 30; 20 INJECTION, SOLUTION INTRAVENOUS at 15:00

## 2021-08-26 RX ADMIN — FENTANYL CITRATE 100 MCG/HR: 50 INJECTION, SOLUTION INTRAMUSCULAR; INTRAVENOUS at 08:30

## 2021-08-26 RX ADMIN — Medication 3 G: at 17:44

## 2021-08-26 RX ADMIN — ESMOLOL HYDROCHLORIDE 30 MG: 10 INJECTION, SOLUTION INTRAVENOUS at 09:00

## 2021-08-26 RX ADMIN — ESMOLOL HYDROCHLORIDE 30 MG: 10 INJECTION, SOLUTION INTRAVENOUS at 19:52

## 2021-08-26 RX ADMIN — HYDROMORPHONE HYDROCHLORIDE 0.4 MG: 1 INJECTION, SOLUTION INTRAMUSCULAR; INTRAVENOUS; SUBCUTANEOUS at 21:42

## 2021-08-26 RX ADMIN — SODIUM CHLORIDE, POTASSIUM CHLORIDE, SODIUM LACTATE AND CALCIUM CHLORIDE: 600; 310; 30; 20 INJECTION, SOLUTION INTRAVENOUS at 07:58

## 2021-08-26 RX ADMIN — SODIUM CHLORIDE, POTASSIUM CHLORIDE, SODIUM LACTATE AND CALCIUM CHLORIDE: 600; 310; 30; 20 INJECTION, SOLUTION INTRAVENOUS at 19:40

## 2021-08-26 RX ADMIN — Medication 200 MCG: at 09:28

## 2021-08-26 RX ADMIN — Medication 5 MG: at 15:10

## 2021-08-26 RX ADMIN — Medication 3 G: at 13:49

## 2021-08-26 RX ADMIN — Medication 50 MCG: at 10:45

## 2021-08-26 RX ADMIN — ACETAMINOPHEN 975 MG: 325 TABLET, FILM COATED ORAL at 23:42

## 2021-08-26 RX ADMIN — PHENYLEPHRINE HYDROCHLORIDE 50 MCG: 10 INJECTION INTRAVENOUS at 12:49

## 2021-08-26 RX ADMIN — ESMOLOL HYDROCHLORIDE 30 MG: 10 INJECTION, SOLUTION INTRAVENOUS at 19:43

## 2021-08-26 RX ADMIN — PROCHLORPERAZINE EDISYLATE 10 MG: 5 INJECTION INTRAMUSCULAR; INTRAVENOUS at 21:28

## 2021-08-26 RX ADMIN — MANNITOL 50 G: 20 INJECTION, SOLUTION INTRAVENOUS at 09:32

## 2021-08-26 RX ADMIN — Medication 5 MG: at 13:30

## 2021-08-26 RX ADMIN — FENTANYL CITRATE 50 MCG: 50 INJECTION, SOLUTION INTRAMUSCULAR; INTRAVENOUS at 21:11

## 2021-08-26 RX ADMIN — PROPOFOL 50 MCG/KG/MIN: 10 INJECTION, EMULSION INTRAVENOUS at 14:28

## 2021-08-26 RX ADMIN — Medication 50 MCG: at 12:17

## 2021-08-26 RX ADMIN — DEXAMETHASONE SODIUM PHOSPHATE 4 MG: 4 INJECTION, SOLUTION INTRA-ARTICULAR; INTRALESIONAL; INTRAMUSCULAR; INTRAVENOUS; SOFT TISSUE at 23:42

## 2021-08-26 RX ADMIN — DOCUSATE SODIUM 50 MG AND SENNOSIDES 8.6 MG 1 TABLET: 8.6; 5 TABLET, FILM COATED ORAL at 23:42

## 2021-08-26 RX ADMIN — PROPOFOL 50 MG: 10 INJECTION, EMULSION INTRAVENOUS at 10:03

## 2021-08-26 RX ADMIN — DEXAMETHASONE SODIUM PHOSPHATE 4 MG: 4 INJECTION, SOLUTION INTRA-ARTICULAR; INTRALESIONAL; INTRAMUSCULAR; INTRAVENOUS; SOFT TISSUE at 19:23

## 2021-08-26 ASSESSMENT — MIFFLIN-ST. JEOR
SCORE: 1868.37
SCORE: 1867.5

## 2021-08-26 NOTE — ANESTHESIA PROCEDURE NOTES
Airway       Patient location during procedure: OR       Procedure Start/Stop Times: 8/26/2021 8:26 AM  Staff -        Anesthesiologist:  Moises Rolon MD       Resident/Fellow: Maria C Marin MD       Performed By: residentIndications and Patient Condition       Indications for airway management: aileen-procedural       Induction type:intravenous       Mask difficulty assessment: 2 - vent by mask + OA or adjuvant +/- NMBA    Final Airway Details       Final airway type: endotracheal airway       Successful airway: ETT - single  Endotracheal Airway Details        ETT size (mm): 7.0       Cuffed: yes       Successful intubation technique: video laryngoscopy       VL Blade Size: MAC 3       Grade View of Cords: 1       Adjucts: stylet       Position: Right       Measured from: lips       Secured at (cm): 21       Bite block used: Soft    Post intubation assessment        Placement verified by: capnometry, equal breath sounds and chest rise        Number of attempts at approach: 1       Number of other approaches attempted: 0       Secured with: pink tape (Benzoin, tegaderm)       Ease of procedure: easy       Dentition: Intact and Unchanged

## 2021-08-26 NOTE — ANESTHESIA PROCEDURE NOTES
Arterial Line Procedure Note  Pre-Procedure   Staff -        Anesthesiologist:  Moises Rolon MD       Resident/Fellow: Maria C Marin MD       Performed By: resident       Location: OR       Pre-Anesthestic Checklist: patient identified, IV checked, risks and benefits discussed, informed consent, monitors and equipment checked, pre-op evaluation and at physician/surgeon's request  Procedure   Procedure: arterial line       Laterality: left       Insertion Site: radial.  Sterile Prep        Standard elements of sterile barrier followed       Skin prep: Chloraprep  Insertion/Injection        Technique: Seldinger Technique        Catheter Type/Size: 20 G, 12 cm  Narrative         Secured by: other       Tegaderm dressing used.       Complications: None apparent,        Arterial waveform: Yes        IBP within 10% of NIBP: Yes

## 2021-08-26 NOTE — PROGRESS NOTES
This 56 year old right handed woman presents for reoperation on a residual left medial sphenoid wing meningioma (previously resected multiple times and radiated). This is a formidable operation with considerable risks. She has a left M1 occlusion with adequate collateral supply to the left hemisphere. However, she has developed extracranial-intracranial collaterals which are at risk during the initial surgical approach. She understands the risks of death, stroke, seizures, brain injury, CSF leak, poor wound healing, need for reoperation, hemorrhage and she agrees to proceed.  JAKUB Calero MD

## 2021-08-27 ENCOUNTER — APPOINTMENT (OUTPATIENT)
Dept: OCCUPATIONAL THERAPY | Facility: CLINIC | Age: 56
End: 2021-08-27
Attending: NURSE PRACTITIONER
Payer: COMMERCIAL

## 2021-08-27 LAB
ANION GAP SERPL CALCULATED.3IONS-SCNC: 5 MMOL/L (ref 3–14)
BUN SERPL-MCNC: 6 MG/DL (ref 7–30)
CALCIUM SERPL-MCNC: 8.2 MG/DL (ref 8.5–10.1)
CHLORIDE BLD-SCNC: 115 MMOL/L (ref 94–109)
CO2 SERPL-SCNC: 22 MMOL/L (ref 20–32)
CREAT SERPL-MCNC: 0.53 MG/DL (ref 0.52–1.04)
ERYTHROCYTE [DISTWIDTH] IN BLOOD BY AUTOMATED COUNT: 14.7 % (ref 10–15)
ERYTHROCYTE [DISTWIDTH] IN BLOOD BY AUTOMATED COUNT: 14.8 % (ref 10–15)
GFR SERPL CREATININE-BSD FRML MDRD: >90 ML/MIN/1.73M2
GLUCOSE BLD-MCNC: 152 MG/DL (ref 70–99)
GLUCOSE BLDC GLUCOMTR-MCNC: 151 MG/DL (ref 70–99)
HCT VFR BLD AUTO: 33.2 % (ref 35–47)
HCT VFR BLD AUTO: 33.4 % (ref 35–47)
HGB BLD-MCNC: 10.3 G/DL (ref 11.7–15.7)
HGB BLD-MCNC: 10.4 G/DL (ref 11.7–15.7)
MAGNESIUM SERPL-MCNC: 2.1 MG/DL (ref 1.6–2.3)
MCH RBC QN AUTO: 28.9 PG (ref 26.5–33)
MCH RBC QN AUTO: 29.1 PG (ref 26.5–33)
MCHC RBC AUTO-ENTMCNC: 31 G/DL (ref 31.5–36.5)
MCHC RBC AUTO-ENTMCNC: 31.1 G/DL (ref 31.5–36.5)
MCV RBC AUTO: 93 FL (ref 78–100)
MCV RBC AUTO: 94 FL (ref 78–100)
PHOSPHATE SERPL-MCNC: 2.6 MG/DL (ref 2.5–4.5)
PLATELET # BLD AUTO: 207 10E3/UL (ref 150–450)
PLATELET # BLD AUTO: 238 10E3/UL (ref 150–450)
POTASSIUM BLD-SCNC: 4.1 MMOL/L (ref 3.4–5.3)
RBC # BLD AUTO: 3.54 10E6/UL (ref 3.8–5.2)
RBC # BLD AUTO: 3.6 10E6/UL (ref 3.8–5.2)
SODIUM SERPL-SCNC: 142 MMOL/L (ref 133–144)
WBC # BLD AUTO: 14.6 10E3/UL (ref 4–11)
WBC # BLD AUTO: 15.3 10E3/UL (ref 4–11)

## 2021-08-27 PROCEDURE — 85027 COMPLETE CBC AUTOMATED: CPT | Performed by: STUDENT IN AN ORGANIZED HEALTH CARE EDUCATION/TRAINING PROGRAM

## 2021-08-27 PROCEDURE — 36415 COLL VENOUS BLD VENIPUNCTURE: CPT | Performed by: STUDENT IN AN ORGANIZED HEALTH CARE EDUCATION/TRAINING PROGRAM

## 2021-08-27 PROCEDURE — 250N000013 HC RX MED GY IP 250 OP 250 PS 637: Performed by: NEUROLOGICAL SURGERY

## 2021-08-27 PROCEDURE — 200N000002 HC R&B ICU UMMC

## 2021-08-27 PROCEDURE — 97165 OT EVAL LOW COMPLEX 30 MIN: CPT | Mod: GO

## 2021-08-27 PROCEDURE — 80048 BASIC METABOLIC PNL TOTAL CA: CPT | Performed by: STUDENT IN AN ORGANIZED HEALTH CARE EDUCATION/TRAINING PROGRAM

## 2021-08-27 PROCEDURE — 250N000011 HC RX IP 250 OP 636: Performed by: NURSE PRACTITIONER

## 2021-08-27 PROCEDURE — 999N000147 HC STATISTIC PT IP EVAL DEFER

## 2021-08-27 PROCEDURE — 258N000003 HC RX IP 258 OP 636: Performed by: STUDENT IN AN ORGANIZED HEALTH CARE EDUCATION/TRAINING PROGRAM

## 2021-08-27 PROCEDURE — 250N000011 HC RX IP 250 OP 636: Performed by: STUDENT IN AN ORGANIZED HEALTH CARE EDUCATION/TRAINING PROGRAM

## 2021-08-27 PROCEDURE — 83735 ASSAY OF MAGNESIUM: CPT | Performed by: STUDENT IN AN ORGANIZED HEALTH CARE EDUCATION/TRAINING PROGRAM

## 2021-08-27 PROCEDURE — 97530 THERAPEUTIC ACTIVITIES: CPT | Mod: GO

## 2021-08-27 PROCEDURE — 250N000013 HC RX MED GY IP 250 OP 250 PS 637: Performed by: NURSE PRACTITIONER

## 2021-08-27 PROCEDURE — 97535 SELF CARE MNGMENT TRAINING: CPT | Mod: GO

## 2021-08-27 PROCEDURE — 84100 ASSAY OF PHOSPHORUS: CPT | Performed by: STUDENT IN AN ORGANIZED HEALTH CARE EDUCATION/TRAINING PROGRAM

## 2021-08-27 PROCEDURE — 258N000003 HC RX IP 258 OP 636: Performed by: NURSE PRACTITIONER

## 2021-08-27 RX ORDER — HEPARIN SODIUM 10000 [USP'U]/ML
7500 INJECTION, SOLUTION INTRAVENOUS; SUBCUTANEOUS EVERY 8 HOURS SCHEDULED
Status: DISCONTINUED | OUTPATIENT
Start: 2021-08-27 | End: 2021-08-29 | Stop reason: HOSPADM

## 2021-08-27 RX ADMIN — SODIUM CHLORIDE: 9 INJECTION, SOLUTION INTRAVENOUS at 05:56

## 2021-08-27 RX ADMIN — HYDROMORPHONE HYDROCHLORIDE 0.2 MG: 0.2 INJECTION, SOLUTION INTRAMUSCULAR; INTRAVENOUS; SUBCUTANEOUS at 09:36

## 2021-08-27 RX ADMIN — OXYCODONE HYDROCHLORIDE 5 MG: 5 TABLET ORAL at 04:14

## 2021-08-27 RX ADMIN — Medication 500 MG: at 08:02

## 2021-08-27 RX ADMIN — DEXAMETHASONE SODIUM PHOSPHATE 4 MG: 4 INJECTION, SOLUTION INTRA-ARTICULAR; INTRALESIONAL; INTRAMUSCULAR; INTRAVENOUS; SOFT TISSUE at 15:55

## 2021-08-27 RX ADMIN — OXYCODONE HYDROCHLORIDE 5 MG: 5 TABLET ORAL at 11:35

## 2021-08-27 RX ADMIN — OXYCODONE HYDROCHLORIDE 5 MG: 5 TABLET ORAL at 07:58

## 2021-08-27 RX ADMIN — SODIUM CHLORIDE 1000 ML: 9 INJECTION, SOLUTION INTRAVENOUS at 09:27

## 2021-08-27 RX ADMIN — LEVETIRACETAM 2000 MG: 750 TABLET ORAL at 20:04

## 2021-08-27 RX ADMIN — LEVOTHYROXINE SODIUM 112 MCG: 0.11 TABLET ORAL at 08:01

## 2021-08-27 RX ADMIN — OXYCODONE HYDROCHLORIDE 5 MG: 5 TABLET ORAL at 01:13

## 2021-08-27 RX ADMIN — HEPARIN SODIUM 7500 UNITS: 10000 INJECTION INTRAVENOUS; SUBCUTANEOUS at 15:54

## 2021-08-27 RX ADMIN — POLYETHYLENE GLYCOL 3350 17 G: 17 POWDER, FOR SOLUTION ORAL at 08:02

## 2021-08-27 RX ADMIN — POTASSIUM & SODIUM PHOSPHATES POWDER PACK 280-160-250 MG 1 PACKET: 280-160-250 PACK at 08:03

## 2021-08-27 RX ADMIN — DOCUSATE SODIUM 50 MG AND SENNOSIDES 8.6 MG 1 TABLET: 8.6; 5 TABLET, FILM COATED ORAL at 08:01

## 2021-08-27 RX ADMIN — LISINOPRIL 2.5 MG: 2.5 TABLET ORAL at 08:02

## 2021-08-27 RX ADMIN — OXYCODONE HYDROCHLORIDE 5 MG: 5 TABLET ORAL at 20:12

## 2021-08-27 RX ADMIN — LEVETIRACETAM 2000 MG: 750 TABLET ORAL at 07:58

## 2021-08-27 RX ADMIN — MULTIPLE VITAMINS W/ MINERALS TAB 1 TABLET: TAB at 07:58

## 2021-08-27 RX ADMIN — Medication 50 MCG: at 08:01

## 2021-08-27 RX ADMIN — ACETAMINOPHEN 975 MG: 325 TABLET, FILM COATED ORAL at 15:55

## 2021-08-27 RX ADMIN — LACOSAMIDE 200 MG: 200 TABLET, FILM COATED ORAL at 20:04

## 2021-08-27 RX ADMIN — POTASSIUM & SODIUM PHOSPHATES POWDER PACK 280-160-250 MG 1 PACKET: 280-160-250 PACK at 15:54

## 2021-08-27 RX ADMIN — FOLIC ACID 1 MG: 1 TABLET ORAL at 08:01

## 2021-08-27 RX ADMIN — OXYCODONE HYDROCHLORIDE 5 MG: 5 TABLET ORAL at 15:54

## 2021-08-27 RX ADMIN — DEXAMETHASONE SODIUM PHOSPHATE 4 MG: 4 INJECTION, SOLUTION INTRA-ARTICULAR; INTRALESIONAL; INTRAMUSCULAR; INTRAVENOUS; SOFT TISSUE at 08:02

## 2021-08-27 RX ADMIN — ACETAMINOPHEN 975 MG: 325 TABLET, FILM COATED ORAL at 08:01

## 2021-08-27 RX ADMIN — LACOSAMIDE 200 MG: 200 TABLET, FILM COATED ORAL at 08:01

## 2021-08-27 RX ADMIN — HEPARIN SODIUM 7500 UNITS: 10000 INJECTION INTRAVENOUS; SUBCUTANEOUS at 09:44

## 2021-08-27 RX ADMIN — SODIUM CHLORIDE 1000 ML: 9 INJECTION, SOLUTION INTRAVENOUS at 00:48

## 2021-08-27 ASSESSMENT — ACTIVITIES OF DAILY LIVING (ADL)
ADLS_ACUITY_SCORE: 18
PREVIOUS_RESPONSIBILITIES: MEAL PREP;HOUSEKEEPING;LAUNDRY;SHOPPING;MEDICATION MANAGEMENT;FINANCES;DRIVING
ADLS_ACUITY_SCORE: 18
ADLS_ACUITY_SCORE: 17
ADLS_ACUITY_SCORE: 18
IADL_COMMENTS: PT IND WITH IADLS AT BASELINE

## 2021-08-27 NOTE — PROGRESS NOTES
[facility], [place]   08/27/2021  Neurosurgery Progress Note:    Assessment:  Marlyn Wilder is a 56 year old woman with a hx of recurrent left medial sphenoid wing meningioma s/p resection on 2012, 2020, 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 Now s/p redo resection on 8/26.     Plan:    Neuro:  #Hx of TIA  #L sphenoid wing meningioma, s/p redo resection 8/26 (previously resected 2012, 2020, 5/18/2021)  #Hx simple partial seizures: froilan vu phenomenon, olfactory hallucinations, nausea  - Serial neuro exam  - tylenlol, oxycodone prn  - Decadron 4q8h, 4day taper  - PTA Keppra, Vimpat    CV:  #HTN  - -160  - labetalol/hydralazine prn  - Holding PTA ASA    Pulm:  #JARED on CPAP  - RA  - Incentive spirometry    GI:  - prn antiemetics  - bowel regimen: miralax, senna    Renal/FEN:  - Advance diet as tolerated  - Electrolyte replacement protocol  - strict I/O  - IVF until adequate PO intake    :  - Ovalle - remove on POD1    MSK:  - HOB > 30    HEME:  #Acute blood loss anemia  - Hgb > 8  - plts > 100k  - INR < 1.5    ENDO:  #Hypothyroidism  #GH deficiency   #RA  - Levothyroxine PTA  - PTA methotrexate    ID:  - monitor for fever    PPX:  - SCDs for DVT proph  - Start SQH today    Dispo:  - PT/OT        -----------------------------------  Niki Soria MD  Neurosurgery PGY2    Please contact neurosurgery resident on call with questions.    Dial * * [phone], enter [phone] when prompted.  -----------------------------------    Interval History: OR yesterday. NAEO. Denies any seizures since surgery    Objective:   Temp:  [98.1  F (36.7  C)-99.4  F (37.4  C)] 98.6  F (37  C)  Pulse:  [] 71  Resp:  [12-18] 18  BP: (132-154)/(59-79) 138/67  MAP:  [81 mmHg-141 mmHg] 141 mmHg  Arterial Line BP: (106-144)/() 144/138  SpO2:  [92 %-98 %] 95 %  I/O last 3 completed shifts:  In: 6567.49 [P.O.:210; I.V.:5357.49; IV Piggyback:1000]  Out: 5500 [Urine:5100;  Blood:400]    Gen: Appears comfortable, NAD  Wound: clean, dry, intact  Neurologic:  - Alert & Oriented to person, place, time, and situation  - Follows commands briskly  - Speech fluent, spontaneous. No aphasia or dysarthria.  - No gaze preference. No apparent hemineglect.  - PERRL, EOMI, visual acuity 20/20 bilaterally on gross examination  - Face symmetric with sensation intact to light touch  - Palate elevates symmetrically, uvula midline, tongue protrudes midline  - Trapezii muscles 5/5 bilaterally  - No pronator drift  - No clonus     Del Tr Bi WE WF Gr   R 5 5 5 5 5 5   L 5 5 5 5 5 5    HF KE KF DF PF EHL   R 5 5 5 5 5 5   L 5 5 5 5 5 5     Reflexes 2+ throughout    Sensation intact and symmetric to light touch throughout    LABS:  Recent Labs   Lab 08/27/21  0558 08/27/21  0313 08/26/21  2351 08/26/21  1234 08/26/21  0953 08/26/21  0728   NA  --  142  --  143 140 139   POTASSIUM  --  4.1  --  4.9 3.8 4.1   CHLORIDE  --  115*  --   --   --  110*   CO2  --  22  --   --   --  25   ANIONGAP  --  5  --   --   --  4   * 152* 180* 151* 95 96   BUN  --  6*  --   --   --  10   CR  --  0.53  --   --   --  0.74   BRENDEN  --  8.2*  --   --   --  9.1       Recent Labs   Lab 08/27/21  0313   WBC 15.3*   RBC 3.60*   HGB 10.4*   HCT 33.4*   MCV 93   MCH 28.9   MCHC 31.1*   RDW 14.7          IMAGING:  No results found for this or any previous visit (from the past 24 hour(s)).    I have reviewed the history. I have seen and examined the patient myself and agree with the assessment and plan above.  JAKUB Calero MD

## 2021-08-27 NOTE — ANESTHESIA POSTPROCEDURE EVALUATION
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    Anesthesia Type:  General    Note:  Disposition: Admission   Postop Pain Control: Uneventful            Sign Out: Well controlled pain   PONV: No   Neuro/Psych: Uneventful            Sign Out: Acceptable/Baseline neuro status   Airway/Respiratory: Uneventful            Sign Out: Acceptable/Baseline resp. status   CV/Hemodynamics: Uneventful            Sign Out: Acceptable CV status   Other NRE: NONE   DID A NON-ROUTINE EVENT OCCUR? No           Last vitals:  Vitals Value Taken Time   /65 08/26/21 2140   Temp 37.4  C (99.4  F) 08/26/21 2045   Pulse 87 08/26/21 2149   Resp 16 08/26/21 2130   SpO2 96 % 08/26/21 2149   Vitals shown include unvalidated device data.    Electronically Signed By: Maria Luisa Olson MD  August 26, 2021  9:51 PM

## 2021-08-27 NOTE — PLAN OF CARE
Major Shift Events:  Afebrile.  Weaned to room air.  Neuro intact, continued q1h neuro checks.  Pain controlled with PRN Oxycodone.  No N&V.  Incision ZAC and CDI.  Given 1L LR bolus to maintain SBP > 140.    Plan: Possible gonzalez removal and transfer to floor today    For vital signs and complete assessments, please see documentation flowsheets.      Danica Camargo RN

## 2021-08-27 NOTE — ANESTHESIA CARE TRANSFER NOTE
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    Anesthesia Type:   General     Note:    Oropharynx: oropharynx clear of all foreign objects and spontaneously breathing  Level of Consciousness: awake  Oxygen Supplementation: face mask  Level of Supplemental Oxygen (L/min / FiO2): 8  Independent Airway: airway patency satisfactory and stable  Dentition: dentition unchanged  Vital Signs Stable: post-procedure vital signs reviewed and stable  Report to RN Given: handoff report given  Patient transferred to: PACU    Handoff Report: Identifed the Patient, Identified the Reponsible Provider, Reviewed the pertinent medical history, Discussed the surgical course, Reviewed Intra-OP anesthesia mangement and issues during anesthesia, Set expectations for post-procedure period and Allowed opportunity for questions and acknowledgement of understanding      Vitals:  Vitals Value Taken Time   /70 08/26/21 2043   Temp     Pulse 95 08/26/21 2047   Resp     SpO2 98 % 08/26/21 2047   Vitals shown include unvalidated device data.    Electronically Signed By: ARIANNA Knapp CRNA  August 26, 2021  8:48 PM

## 2021-08-27 NOTE — BRIEF OP NOTE
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]  Post-operative diagnosis Same as pre-operative diagnosis    Procedure: Procedure(s):  Stealth Assisted Redo left frontotemporal craniotomy and resection of tumor  Surgeon: Surgeon(s) and Role:     * Pan Calero MD - Primary     * Poncho Arteaga MD - Assisting     * Efrem Kumar MD - Resident - Assisting  Anesthesia: General   Estimated blood loss: 400 mL  Drains: None  Specimens:   ID Type Source Tests Collected by Time Destination   1 : Left Skull Base Tumor      Tissue Brain SURGICAL PATHOLOGY EXAM Pan Calero MD 8/26/2021  5:34 PM    2 : cusa content Tissue Brain SURGICAL PATHOLOGY EXAM Pan Calero MD 8/26/2021  7:09 PM      Findings:   Dura severely adherent to celeste. Tumor debulked with temporal lobe portion and posterior portion of tumor debulked to the tentorial icisura. .  Complications: None.  Implants:   Implant Name Type Inv. Item Serial No.  Lot No. LRB No. Used Action   IMP PLATE SYN STR DOG BONE LOW PROFILE 2H .502  IMP PLATE SYN STR DOG BONE LOW PROFILE 2H .502    Left 1 Explanted   IMP MESH SYN MALLEABLE [id number] LOW  TI  IMP MESH SYN MALLEABLE [id number] LOW  TI    Left 1 Explanted   IMP BUR HOLE COVER 17MM LOW PROFILE .527  IMP BUR HOLE COVER 17MM LOW PROFILE .527    Left 2 Explanted   IMP SCR SYN MATRIX LOW PRO 1.5X04MM SELF DRILL [id number]  IMP SCR SYN MATRIX LOW PRO 1.5X04MM SELF DRILL [id number]    Left 21 Explanted   IMP PLATE SYN BOX LOW PROFILE 04H .511  IMP PLATE SYN BOX LOW PROFILE 04H .511    Left 1 Explanted   GRAFT DURAGEN 3X3\"  - [id number] Bone/Tissue/Biologic GRAFT DURAGEN 3X3\"   [employer]GR[employer] [id number] Left 1 Implanted   IMP SCR SYN MATRIX LOW PRO 1.5X04MM SELF DRILL " [id number] - S0 Metallic Hardware/Alcova IMP SCR SYN MATRIX LOW PRO 1.5X04MM SELF DRILL [id number] 0 SYNTHES-STRATEC 0 Left 17 Implanted   IMP PLATE SYN STRUT LOW PROFILE 6H .522 - S0 Metallic Hardware/Alcova IMP PLATE SYN STRUT LOW PROFILE 6H .522 0 SYNTHES-STRATEC 0 Left 1 Implanted   IMP PLATE SYN X LOW PROFILE 4H .510 - S0 Metallic Hardware/Alcova IMP PLATE SYN X LOW PROFILE 4H .510 0 SYNTHES-STRATEC 0 Left 1 Implanted       Plan:  - 4A  - Q1 hr neuro checks   - -160  - Continue PTA AEDs  - 7 day decadron taper  - SQH for DVT ppx POD#1

## 2021-08-27 NOTE — PROGRESS NOTES
Admitted/transferred from: PACU  Reason for admission/transfer: Next phase of care  2 RN skin assessment: completed by Danica CANTOR RN and Kerwin MANDUJANO RN  Result of skin assessment and interventions/actions: Craniotomy incision closed with staples, L radial ART line, R foot PIV, R forearm PIV, R upper arm PIV.  Prophylactic foam dressing to sacrum.  Height, weight, drug calc weight: Done  Patient belongings (see Flowsheet)  MDRO education added to care plan n/a    Danica Camargo RN   ?

## 2021-08-27 NOTE — PLAN OF CARE
PT: Per OT pt is moving well, does not require 2 inpatient therapies at this time.  OT to continue to follow to meet all therapy needs, PT orders completed.

## 2021-08-27 NOTE — PLAN OF CARE
Problem: Adult Inpatient Plan of Care  Goal: Plan of Care Review  Outcome: Improving     D: Pt with history of recurrent benign meningiomas admitted following L frontal-temporal craniotomy for tumor resection.     I/A:     Neuro: Q 1 hr neuro exams while in ICU. Exam remains unchanged. Alert and oriented x4. Some word finding difficulty and garbled speech but improved throughout the day. Ambulating with SBA, good strength, no focal deficits. Denies numbness/tingling. L eye swollen and bruised, describes blurry vision but able to see. Headaches post op, relieved with PRN oxycodone and dilaudid.   Cardiac: Sinus rhythm 60s. BP goal 140-160 per neurosurgery note. Called x3 for SBP< 14). Bolused for 1 L NS this AM with some improvement. Monitor this afternoon.   Respiratory: Room air SpO2 >95%. Lungs clear, no cough.   GI: Minimal appetite, PO encouraged. No BM  : Ovalle out and voiding without difficulty      P: Continue to monitor, notify MD of any acute events or changes.

## 2021-08-27 NOTE — PROGRESS NOTES
08/27/21 0847   Quick Adds   Type of Visit Initial Occupational Therapy Evaluation   Living Environment   People in home spouse   Current Living Arrangements house   Home Accessibility stairs to enter home   Number of Stairs, Main Entrance 2   Stair Railings, Main Entrance none   Transportation Anticipated family or friend will provide   Living Environment Comments Pt lives with spouse who will be home 24/7 for up to 4 weeks if needed. Tub shower present, all needs available on main floor.    Self-Care   Usual Activity Tolerance good   Current Activity Tolerance moderate   Regular Exercise No   Equipment Currently Used at Home none   Activity/Exercise/Self-Care Comment Pt IND with ADLs at baseline   Instrumental Activities of Daily Living (IADL)   Previous Responsibilities meal prep;housekeeping;laundry;shopping;medication management;finances;driving   IADL Comments Pt IND with IADLs at baseline   Disability/Function   Hearing Difficulty or Deaf no   Wear Glasses or Blind yes   Vision Management glasses   Concentrating, Remembering or Making Decisions Difficulty no   Difficulty Communicating no   Difficulty Eating/Swallowing no   Walking or Climbing Stairs Difficulty no   Dressing/Bathing Difficulty no   Toileting issues no   Doing Errands Independently Difficulty (such as shopping) no   Fall history within last six months no   Change in Functional Status Since Onset of Current Illness/Injury yes   General Information   Onset of Illness/Injury or Date of Surgery 08/26/21   Referring Physician Arlene Morton APRN CNP   Patient/Family Therapy Goal Statement (OT) to return to PLOF   Additional Occupational Profile Info/Pertinent History of Current Problem Marlyn Wilder is a 56 year old female with a hx of recurrent paraclinoid meningioma s/p resection on 2012, 2020, 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 Now s/p redo resection on 8/26.   Performance Patterns  (Routines, Roles, Habits) pt enjoys reading mystery novels   Existing Precautions/Restrictions   (craniotomy)   Left Upper Extremity (Weight-bearing Status) partial weight-bearing (PWB)  (10 lb lifting restriciton)   Right Upper Extremity (Weight-bearing Status) partial weight-bearing (PWB)  (10 lb lifting restriction)   Left Lower Extremity (Weight-bearing Status) full weight-bearing (FWB)   Right Lower Extremity (Weight-bearing Status) full weight-bearing (FWB)   Heart Disease Risk Factors Medical history   General Observations and Info Activity: up with assist   Cognitive Status Examination   Orientation Status orientation to person, place and time   Affect/Mental Status (Cognitive) WNL   Follows Commands WNL   Cognitive Status Comments Pt alert, oriented x 4, appropriate during conversation, no acute deficits identified.    Visual Perception   Visual Impairment/Limitations blurry vision  (L eye blurry vision)   Impact of Vision Impairment on Function (Vision) new blurry vision in L eye   Sensory   Sensory Quick Adds No deficits were identified   Pain Assessment   Patient Currently in Pain Yes, see Vital Sign flowsheet   Integumentary/Edema   Integumentary/Edema no deficits were identifed   Posture   Posture forward head position   Range of Motion Comprehensive   General Range of Motion no range of motion deficits identified   Strength Comprehensive (MMT)   Comment, General Manual Muscle Testing (MMT) Assessment NT due to precautions, grossly WNL   Clinical Impression   Criteria for Skilled Therapeutic Interventions Met (OT) yes;meets criteria   OT Diagnosis decreased IND with ADL/IADls   OT Problem List-Impairments impacting ADL activity tolerance impaired;post-surgical precautions;pain   Assessment of Occupational Performance 5 or more Performance Deficits   Identified Performance Deficits bathing, dressing, g/h, home management, driving   Planned Therapy Interventions (OT) ADL retraining;IADL retraining;home  program guidelines;progressive activity/exercise;risk factor education;visual perception   Clinical Decision Making Complexity (OT) low complexity   Therapy Frequency (OT) 5x/week   Predicted Duration of Therapy 1 week   Anticipated Equipment Needs Upon Discharge (OT)   (TBD)   Risk & Benefits of therapy have been explained evaluation/treatment results reviewed;care plan/treatment goals reviewed;risks/benefits reviewed;current/potential barriers reviewed;participants voiced agreement with care plan;patient;spouse/significant other   OT Discharge Planning    OT Discharge Recommendation (DC Rec) Home with assist   OT Rationale for DC Rec Pt mobilizing well, has great support at home for assist as needed    OT Brief overview of current status  SBA   Total Evaluation Time (Minutes)   Total Evaluation Time (Minutes) 5

## 2021-08-28 LAB
ANION GAP SERPL CALCULATED.3IONS-SCNC: 4 MMOL/L (ref 3–14)
BUN SERPL-MCNC: 8 MG/DL (ref 7–30)
CALCIUM SERPL-MCNC: 8.7 MG/DL (ref 8.5–10.1)
CHLORIDE BLD-SCNC: 111 MMOL/L (ref 94–109)
CO2 SERPL-SCNC: 27 MMOL/L (ref 20–32)
CREAT SERPL-MCNC: 0.56 MG/DL (ref 0.52–1.04)
ERYTHROCYTE [DISTWIDTH] IN BLOOD BY AUTOMATED COUNT: 14.7 % (ref 10–15)
GFR SERPL CREATININE-BSD FRML MDRD: >90 ML/MIN/1.73M2
GLUCOSE BLD-MCNC: 119 MG/DL (ref 70–99)
HCT VFR BLD AUTO: 30.3 % (ref 35–47)
HGB BLD-MCNC: 9.4 G/DL (ref 11.7–15.7)
MAGNESIUM SERPL-MCNC: 2.2 MG/DL (ref 1.6–2.3)
MCH RBC QN AUTO: 29 PG (ref 26.5–33)
MCHC RBC AUTO-ENTMCNC: 31 G/DL (ref 31.5–36.5)
MCV RBC AUTO: 94 FL (ref 78–100)
PHOSPHATE SERPL-MCNC: 2.4 MG/DL (ref 2.5–4.5)
PLATELET # BLD AUTO: 207 10E3/UL (ref 150–450)
POTASSIUM BLD-SCNC: 3.8 MMOL/L (ref 3.4–5.3)
RBC # BLD AUTO: 3.24 10E6/UL (ref 3.8–5.2)
SODIUM SERPL-SCNC: 142 MMOL/L (ref 133–144)
WBC # BLD AUTO: 13.3 10E3/UL (ref 4–11)

## 2021-08-28 PROCEDURE — 250N000013 HC RX MED GY IP 250 OP 250 PS 637: Performed by: NEUROLOGICAL SURGERY

## 2021-08-28 PROCEDURE — 83735 ASSAY OF MAGNESIUM: CPT | Performed by: NEUROLOGICAL SURGERY

## 2021-08-28 PROCEDURE — 80048 BASIC METABOLIC PNL TOTAL CA: CPT | Performed by: STUDENT IN AN ORGANIZED HEALTH CARE EDUCATION/TRAINING PROGRAM

## 2021-08-28 PROCEDURE — 250N000013 HC RX MED GY IP 250 OP 250 PS 637: Performed by: NURSE PRACTITIONER

## 2021-08-28 PROCEDURE — 84100 ASSAY OF PHOSPHORUS: CPT | Performed by: NEUROLOGICAL SURGERY

## 2021-08-28 PROCEDURE — 250N000011 HC RX IP 250 OP 636: Performed by: STUDENT IN AN ORGANIZED HEALTH CARE EDUCATION/TRAINING PROGRAM

## 2021-08-28 PROCEDURE — 36415 COLL VENOUS BLD VENIPUNCTURE: CPT | Performed by: STUDENT IN AN ORGANIZED HEALTH CARE EDUCATION/TRAINING PROGRAM

## 2021-08-28 PROCEDURE — 120N000002 HC R&B MED SURG/OB UMMC

## 2021-08-28 PROCEDURE — 85027 COMPLETE CBC AUTOMATED: CPT | Performed by: STUDENT IN AN ORGANIZED HEALTH CARE EDUCATION/TRAINING PROGRAM

## 2021-08-28 RX ORDER — POTASSIUM CHLORIDE 750 MG/1
20 TABLET, EXTENDED RELEASE ORAL ONCE
Status: COMPLETED | OUTPATIENT
Start: 2021-08-28 | End: 2021-08-28

## 2021-08-28 RX ADMIN — LEVETIRACETAM 2000 MG: 750 TABLET ORAL at 08:35

## 2021-08-28 RX ADMIN — DEXAMETHASONE SODIUM PHOSPHATE 3 MG: 4 INJECTION, SOLUTION INTRA-ARTICULAR; INTRALESIONAL; INTRAMUSCULAR; INTRAVENOUS; SOFT TISSUE at 00:11

## 2021-08-28 RX ADMIN — FOLIC ACID 1 MG: 1 TABLET ORAL at 08:36

## 2021-08-28 RX ADMIN — DEXAMETHASONE SODIUM PHOSPHATE 2 MG: 4 INJECTION, SOLUTION INTRA-ARTICULAR; INTRALESIONAL; INTRAMUSCULAR; INTRAVENOUS; SOFT TISSUE at 23:53

## 2021-08-28 RX ADMIN — ACETAMINOPHEN 975 MG: 325 TABLET, FILM COATED ORAL at 23:53

## 2021-08-28 RX ADMIN — LACOSAMIDE 200 MG: 200 TABLET, FILM COATED ORAL at 08:35

## 2021-08-28 RX ADMIN — POTASSIUM & SODIUM PHOSPHATES POWDER PACK 280-160-250 MG 1 PACKET: 280-160-250 PACK at 08:36

## 2021-08-28 RX ADMIN — MULTIPLE VITAMINS W/ MINERALS TAB 1 TABLET: TAB at 08:37

## 2021-08-28 RX ADMIN — HEPARIN SODIUM 7500 UNITS: 10000 INJECTION INTRAVENOUS; SUBCUTANEOUS at 13:26

## 2021-08-28 RX ADMIN — Medication 500 MG: at 08:35

## 2021-08-28 RX ADMIN — LEVETIRACETAM 2000 MG: 750 TABLET ORAL at 21:12

## 2021-08-28 RX ADMIN — LEVOTHYROXINE SODIUM 112 MCG: 0.11 TABLET ORAL at 08:36

## 2021-08-28 RX ADMIN — ACETAMINOPHEN 975 MG: 325 TABLET, FILM COATED ORAL at 00:10

## 2021-08-28 RX ADMIN — LISINOPRIL 2.5 MG: 2.5 TABLET ORAL at 08:36

## 2021-08-28 RX ADMIN — Medication 50 MCG: at 08:36

## 2021-08-28 RX ADMIN — POTASSIUM CHLORIDE 20 MEQ: 750 TABLET, EXTENDED RELEASE ORAL at 08:36

## 2021-08-28 RX ADMIN — LACOSAMIDE 200 MG: 200 TABLET, FILM COATED ORAL at 21:12

## 2021-08-28 RX ADMIN — HEPARIN SODIUM 7500 UNITS: 10000 INJECTION INTRAVENOUS; SUBCUTANEOUS at 21:12

## 2021-08-28 RX ADMIN — POTASSIUM & SODIUM PHOSPHATES POWDER PACK 280-160-250 MG 1 PACKET: 280-160-250 PACK at 16:08

## 2021-08-28 RX ADMIN — ACETAMINOPHEN 975 MG: 325 TABLET, FILM COATED ORAL at 16:08

## 2021-08-28 RX ADMIN — POTASSIUM & SODIUM PHOSPHATES POWDER PACK 280-160-250 MG 1 PACKET: 280-160-250 PACK at 12:29

## 2021-08-28 RX ADMIN — DEXAMETHASONE SODIUM PHOSPHATE 3 MG: 4 INJECTION, SOLUTION INTRA-ARTICULAR; INTRALESIONAL; INTRAMUSCULAR; INTRAVENOUS; SOFT TISSUE at 16:09

## 2021-08-28 RX ADMIN — ACETAMINOPHEN 975 MG: 325 TABLET, FILM COATED ORAL at 08:36

## 2021-08-28 RX ADMIN — HEPARIN SODIUM 7500 UNITS: 10000 INJECTION INTRAVENOUS; SUBCUTANEOUS at 00:44

## 2021-08-28 RX ADMIN — DEXAMETHASONE SODIUM PHOSPHATE 3 MG: 4 INJECTION, SOLUTION INTRA-ARTICULAR; INTRALESIONAL; INTRAMUSCULAR; INTRAVENOUS; SOFT TISSUE at 08:40

## 2021-08-28 RX ADMIN — OXYCODONE HYDROCHLORIDE 5 MG: 5 TABLET ORAL at 21:12

## 2021-08-28 ASSESSMENT — ACTIVITIES OF DAILY LIVING (ADL)
ADLS_ACUITY_SCORE: 13

## 2021-08-28 NOTE — PLAN OF CARE
Patient is A & O x4, L blurry vision improving as well as swelling around L eye, neuros intact, c/o intermittent left sided head pain, tolerable with tylenol and oxycodone, VSS, HR 60-70s, on RA, up with SBA, adequate UOP.  Continue with current plan of care and notify MD as needed.    Mariela Plummer RN

## 2021-08-28 NOTE — PROGRESS NOTES
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 Now s/p redo resection on 8/26.     Plan:    Neuro:  #Hx of TIA  #L paraclinoid meningioma, s/p redo resection 8/26 (previously resected 2012, 2020, 5/18/2021)  #Hx simple partial seizures: froilan vu phenomenon, olfactory hallucinations, nausea  - Serial neuro exam  - tylenlol, oxycodone prn  - Decadron 4q8h, 4day taper  - PTA Keppra, Vimpat    CV:  #HTN  - -160  - labetalol/hydralazine prn  - Holding PTA ASA - restart in 2 weeks    Pulm:  #JARED on CPAP  - RA  - Incentive spirometry    GI:  - prn antiemetics  - bowel regimen: miralax, senna    Renal/FEN:  - Regular diet  - Electrolyte replacement protocol  - strict I/O    :  - Ovalle - remove on POD1    MSK:  - HOB > 30    HEME:  #Acute blood loss anemia  - Hgb > 8  - plts > 100k  - INR < 1.5    ENDO:  #Hypothyroidism  #GH deficiency   #RA  - Levothyroxine PTA  - PTA methotrexate    ID:  - monitor for fever    PPX:  - SCDs and SQH for DVT proph    Dispo:  - PT/OT  - TTF vs. Discharge today      -----------------------------------  Niki Soria MD  Neurosurgery PGY2    Please contact neurosurgery resident on call with questions.    Dial * * [phone], enter [phone] when prompted.  -----------------------------------    Interval History: NAEO  Objective:   Temp:  [97.8  F (36.6  C)-98.5  F (36.9  C)] 98.5  F (36.9  C)  Pulse:  [56-90] 74  Resp:  [12-20] 16  BP: (104-153)/(48-79) 153/79  SpO2:  [90 %-97 %] 95 %  I/O last 3 completed shifts:  In: 2789 [P.O.:750; I.V.:1039; IV Piggyback:1000]  Out: 4975 [Urine:4975]    Gen: Appears comfortable, NAD  Wound: clean, dry, intact  Neurologic:  - Alert & Oriented to person, place, time, and situation  - Follows commands briskly  -  Speech fluent, spontaneous. No aphasia or dysarthria.  - No gaze preference. No apparent hemineglect.  - PERRL, EOMI, visual acuity 20/20 bilaterally on gross examination  - Face symmetric with sensation intact to light touch  - Palate elevates symmetrically, uvula midline, tongue protrudes midline  - Trapezii muscles 5/5 bilaterally  - No pronator drift  - No clonus     Del Tr Bi WE WF Gr   R 5 5 5 5 5 5   L 5 5 5 5 5 5    HF KE KF DF PF EHL   R 5 5 5 5 5 5   L 5 5 5 5 5 5     Reflexes 2+ throughout    Sensation intact and symmetric to light touch throughout    LABS:  Recent Labs   Lab 08/28/21  0507 08/27/21  0558 08/27/21  0313 08/26/21  1234 08/26/21  0953 08/26/21  0728     --  142 143  --  139   POTASSIUM 3.8  --  4.1 4.9  --  4.1   CHLORIDE 111*  --  115*  --   --  110*   CO2 27  --  22  --   --  25   ANIONGAP 4  --  5  --   --  4   * 151* 152* 151*   < > 96   BUN 8  --  6*  --   --  10   CR 0.56  --  0.53  --   --  0.74   BRENDEN 8.7  --  8.2*  --   --  9.1    < > = values in this interval not displayed.       Recent Labs   Lab 08/28/21  0507   WBC 13.3*   RBC 3.24*   HGB 9.4*   HCT 30.3*   MCV 94   MCH 29.0   MCHC 31.0*   RDW 14.7          IMAGING:  No results found for this or any previous visit (from the past 24 hour(s)).

## 2021-08-28 NOTE — PROGRESS NOTES
Major Shift Events:  Patient A&Ox4 neurologically intact. Complains of mild headache. Blurry vision present in L eye. Tylenol for pain. VSS. UOP remains high. No BM, but patient is passing gas. Tolerating regular diet. Ambulated in hallway.     Plan: transfer to  when bed is available. Monitor vision. Notify MD as appropriate  For vital signs and complete assessments, please see documentation flowsheets.

## 2021-08-28 NOTE — OP NOTE
"Procedure Date: 08/26/2021    PREOPERATIVE DIAGNOSES:  1.  Recurrent, complex left medial sphenoid wing WHO grade I meningioma.  2.  History of left M1 segment occlusion of the MCA   3. Epilepsy (simple partial seizures)    POSTOPERATIVE DIAGNOSES:  1.  Same 2. Same 3. Same    PROCEDURES PERFORMED:  1.  4th time redo left frontotemporal craniotomy for resection of recurrent medial sphenoid wing/paraclinoid meningioma.  2.  Intraoperative use of frameless stereotaxy   3.  Intraoperative use of the microscope    ATTENDING SURGEONS:  1.  Pan Calero MD.    ASSISTANT SURGEONS:  Efrem Kumar MD, Ph.D; 2.  Poncho Arteaga MD, Ph.D.     ANESTHESIA:  General endotracheal anesthesia.    IMPLANTS:   Implant Name Type Inv. Item Serial No.  Lot No. LRB No. Used Action   IMP PLATE SYN STR DOG BONE LOW PROFILE 2H .502   IMP PLATE SYN STR DOG BONE LOW PROFILE 2H .502       Left 1 Explanted   IMP MESH SYN MALLEABLE [id number] LOW  TI   IMP MESH SYN MALLEABLE [id number] LOW  TI       Left 1 Explanted   IMP BUR HOLE COVER 17MM LOW PROFILE .527   IMP BUR HOLE COVER 17MM LOW PROFILE .527       Left 2 Explanted   IMP SCR SYN MATRIX LOW PRO 1.5X04MM SELF DRILL [id number]   IMP SCR SYN MATRIX LOW PRO 1.5X04MM SELF DRILL [id number]       Left 21 Explanted   IMP PLATE SYN BOX LOW PROFILE 04H .511   IMP PLATE SYN BOX LOW PROFILE 04H .511       Left 1 Explanted   GRAFT DURAGEN 3X3\"  - [id number] Bone/Tissue/Biologic GRAFT DURAGEN 3X3\"    [employer] [id number] Left 1 Implanted   IMP SCR SYN MATRIX LOW PRO 1.5X04MM SELF DRILL [id number] - S0 Metallic Hardware/[place] IMP SCR SYN MATRIX LOW PRO 1.5X04MM SELF DRILL [id number] 0 SYNTHES-STRATEC 0 Left 17 Implanted   IMP PLATE SYN STRUT LOW PROFILE 6H .522 - S0 Metallic Hardware/[place] IMP PLATE SYN STRUT LOW PROFILE 6H .522 0 SYNTHES-STRATEC 0 Left 1 Implanted   IMP PLATE SYN X " LOW PROFILE 4H .510 - S0 Metallic Hardware/[place] IMP PLATE SYN X LOW PROFILE 4H .510 0 SYNTHES-STRATEC 0 Left 1 Implanted       EXPLANTS: All previously placed cranial plating hardware was removed.     ESTIMATED BLOOD LOSS:  400 mL.    SPECIMENS:  Left sphenoid wing tumor sent for permanent pathology.    FINDINGS:  Stable neuromonitoring throughout the procedure, including SSEP, EEG, and MEPs.    INDICATIONS FOR THE PROCEDURE:  Mrs. Wilder is a 56-year-old right-handed woman with a history of a recurrent left medial sphenoid wing meningioma, status post multiple resections and radiotherapy, and epilepsy (simple partial seizures).   Regarding the history of meningioma treatment, Ms. Wilder initially underwent a two-stage tumor resection of her left medial sphenoid wing meningioma 02/17/2012 at another hospital, which consisted of tumor debulking during stage I and further debulking of the tumor during stage II, 02/23/2012.  Persistent tumor remained.  She underwent further neurosurgical intervention with a repeat left frontotemporal craniotomy performed by us on 01/17/2013.  She subsequently underwent adjuvant radiotherapy and received a total dose of 5400 cGy in 30 fractions, which she completed 01/24/2013 without incident. Mrs. Wilder recently presented to [facility]surgery [facility] for followup for recurrent seizures and cognitive decline over the past year.  MRI imaging had demonstrated continued slow growth of her residual meningioma as well as increasing edema in the left temporal lobe. She has a chronic left M1 occlusion. Based on these findings, we had recommended balloon occlusion tests in preparation for possible repeat surgical resection.  Balloon occlusion tests were performed 07/21/2021, which demonstrated findings suggestive that significant collateral circulation supplied to her left MCA territory via external carotid artery via the internal maxillary and middle meningeal system as well as STA that  she remained neurologically stable during occlusion of the external iliac artery.  She returns today for further resection with our focus on the tumor causing mass effect on the temporal lobe.      DESCRIPTION OF PROCEDURE:  After informed consent was obtained, the patient was brought to the operating room, where she was transferred supine onto the operating room table.  General anesthesia and endotracheal intubation were performed by our anesthesia colleagues.  Care was taken during induction and subsequent period between induction and initiation of neuromonitoring to maintain systolic blood pressures at or above 140 mmHg.  The bed was turned 180 degrees.  The patient was placed in a Berry headholder, which was secured to the bed.  The patient was then registered to the Medtronic Stealth system, and excellent registration was achieved.  Neuromonitoring leads were places, and baseline SSEP, MEP, and EEG were obtained by the [facility] staff.  Hair was clipped along her previous left scalp incision, and the site was then prepared and draped in the standard fashion.    A timeout was performed to verify site and side of surgery as well as that the available imaging in the room corresponded to the correct patient.  A #15 blade was used to open the skin.  Care was taken to follow the previous incision.  Monopolar electrocautery was used to dissect down and expose the underlying cranial surface.  A myocutaneous flap was raised, and attention was paid to the inferior aspect of the incision to avoid injury to the superficial temporal artery.  The myocutaneous flap was raised and reflected anteriorly.  The flap was retracted using fishhooks.  SSEP, MEP, and EEG were stable during the initial opening.  The prior cranial plating system was encountered and was removed using the Synthes cranial plating set.      After removal of all of the previously placed hardware, a 5 mm cutting елена was used to create a елена hole near the  previous елена hole site, which had largely overgrown with bone.  The underlying dura was exposed.  The dura was elevated from the underlying bone using a [place] dental and then a #3 Penfield.  The dura was significantly adherent to the underlying bone.  A B1 with a footplate was then used to recreate the previous craniotomy, following the evident depression in the bone from the prior craniotomies.  After completing the craniotome work, a #3 Penfield was used to separate the dura off the underlying bone.  During this process, several durotomies were created and a portion of dura severely adherent to the middle portion of the bone flap was removed with the bone flap, which resulted in a small contusion to the left frontal lobe.  The wound was irrigated, and Surgicel placed over the left frontal contusion.  After hemostasis was achieved, it became clear that the dura was severely adherent to the underlying celeste, and any attempts to elevate the dura resulted in direct contusion of the cortical tissue.  Therefore, we halted the surgical plan from wide dural opening to focused dural opening overlying the anterior portion of the left temporal lobe.  Therefore, further bone was removed over the left temporal lobe down to the floor of the middle fossa.  This was achieved using a combination of a 3 mm cutting елена and a Leksell rongeur.  Once sufficient dura was exposed, including what appeared to be unscarred dura unaffected by the previous operations, the dura was opened using a #15 blade.  Microscissors were then used to extend the dural opening.  It became clear that despite this region being apparently unaffected by prior surgies, the dura was still significantly adherent to the celeste.  At this point, the microscope was brought into the field, and the dura was elevated off the celeste using a combination of microscissors and a microdissector.  The dura was reflected inferiorly, and dural tack-up sutures were placed to expose  the underlying cortex.  Once the anterior pole of the left temporal lobe was identified, we verified that the frameless stereotaxy system registration was accurate.  We then used the frameless stereotaxy to identify the trajectory to the lateral aspect of the meningioma, which extended into the left anterior temporal pole.  In order to expose this region of the tumor, a portion of the gliotic anterior temporal lobe was resected and the underlying tumor was then exposed.  The tumor was then systematically debulked using a CUSA. The tumor was relatively avascular and firm, likely due to prior radiation changes. Bipolar electrocautery and microscissors were also used to coagulate and then partially resect portions of the tumor.  This process continued until the tumor was debulked to expose the underlying lateral wall of the cavernous sinus.  Further debulking of the tumor was carried out medially until the lateral edge of the tumor was in line with the lateral wall of the cavernous sinus and tentorium.    We then turned our attention to the posterior aspect of the tumor.  The tumor capsule was dissected off the underlying brain surface using Rhoton micro dissectors. There was no distinct plane between the tumor and pial surface. The tumor was debulked using the ultrasonic aspirator.  This debulking was continued on until the capsule of the tumor could be collapsed and resected.  In this way, the posterolateral aspect of the tumor was resected until we identified the pial surface abutting the interpeduncular space.    At this point, we then systematically inspected the lateral aspect of the tumor, including the use of micro Doppler, which demonstrated close proximity to the suspected ICA terminus (though we did not visualize this vascular structure directly).  Given the adherence of the tumor to critical structures, we opted not to resect any further tumor at this point to minimize the risk of ICA injury or perforators.   The surface of the residual tumor was systematically coagulated using bipolar electrocautery.  The wound was then irrigated with copious amounts of irrigation.  Hemostasis achieved using bipolar and Surgicel and excellent hemostasis was observed.  A 3 x 3 piece of Duragen was then placed over the native dura and the bone flap replaced and secured into position using the Synthes cranial plating system.    The galea was then reapproximated using 2-0 Vicryl sutures in simple inverted interrupted fashion.  The skin was closed using staples.  The wound was cleaned with wet and dry sponges, followed by ChloraPrep, and bacitracin was applied.  At the end of the procedure, all counts, including needle, instrument, and sponge were correct x 2.  There were no immediate complications encountered during the procedure.The patient was then removed from pins, extubated, and transferred to the PACU for further recovery.    Dr. Sidhu, neurosurgery staff, was present for the key portions of the case and immediately available during the entire procedure.    Pan Sidhu MD    As Dictated by JUSTINA KALPAN MD, PhD      ATTESTATION: My signature attests that I was present for the key portions and immediately available for the entire operation described above. I agree with the above findings.  Please note that this operation was complex due to the scarring and distortion of anatomy from prior operations and radiation therapy. These features add an extra 20% time and effort.    PAN SIDHU MD      D: 2021   T: 2021   MT: Stacey    Name:     GRADY KRISHNAN  MRN:      [id number]        Account:        [id number]   :      1965           Procedure Date: 2021     Document: [id number]

## 2021-08-29 VITALS
TEMPERATURE: 96.9 F | OXYGEN SATURATION: 96 % | RESPIRATION RATE: 16 BRPM | HEART RATE: 63 BPM | HEIGHT: 68 IN | BODY MASS INDEX: 41.26 KG/M2 | WEIGHT: 272.27 LBS | SYSTOLIC BLOOD PRESSURE: 142 MMHG | DIASTOLIC BLOOD PRESSURE: 52 MMHG

## 2021-08-29 LAB
ANION GAP SERPL CALCULATED.3IONS-SCNC: 5 MMOL/L (ref 3–14)
BUN SERPL-MCNC: 11 MG/DL (ref 7–30)
CALCIUM SERPL-MCNC: 9.1 MG/DL (ref 8.5–10.1)
CHLORIDE BLD-SCNC: 110 MMOL/L (ref 94–109)
CO2 SERPL-SCNC: 27 MMOL/L (ref 20–32)
CREAT SERPL-MCNC: 0.54 MG/DL (ref 0.52–1.04)
ERYTHROCYTE [DISTWIDTH] IN BLOOD BY AUTOMATED COUNT: 14.6 % (ref 10–15)
GFR SERPL CREATININE-BSD FRML MDRD: >90 ML/MIN/1.73M2
GLUCOSE BLD-MCNC: 97 MG/DL (ref 70–99)
HCT VFR BLD AUTO: 29.5 % (ref 35–47)
HGB BLD-MCNC: 9.3 G/DL (ref 11.7–15.7)
MAGNESIUM SERPL-MCNC: 2.3 MG/DL (ref 1.6–2.3)
MCH RBC QN AUTO: 28.9 PG (ref 26.5–33)
MCHC RBC AUTO-ENTMCNC: 31.5 G/DL (ref 31.5–36.5)
MCV RBC AUTO: 92 FL (ref 78–100)
PLATELET # BLD AUTO: 207 10E3/UL (ref 150–450)
POTASSIUM BLD-SCNC: 3.8 MMOL/L (ref 3.4–5.3)
RBC # BLD AUTO: 3.22 10E6/UL (ref 3.8–5.2)
SODIUM SERPL-SCNC: 142 MMOL/L (ref 133–144)
WBC # BLD AUTO: 11.6 10E3/UL (ref 4–11)

## 2021-08-29 PROCEDURE — 85027 COMPLETE CBC AUTOMATED: CPT | Performed by: STUDENT IN AN ORGANIZED HEALTH CARE EDUCATION/TRAINING PROGRAM

## 2021-08-29 PROCEDURE — 250N000013 HC RX MED GY IP 250 OP 250 PS 637: Performed by: NURSE PRACTITIONER

## 2021-08-29 PROCEDURE — 80048 BASIC METABOLIC PNL TOTAL CA: CPT | Performed by: STUDENT IN AN ORGANIZED HEALTH CARE EDUCATION/TRAINING PROGRAM

## 2021-08-29 PROCEDURE — 83735 ASSAY OF MAGNESIUM: CPT | Performed by: NEUROLOGICAL SURGERY

## 2021-08-29 PROCEDURE — 250N000011 HC RX IP 250 OP 636: Performed by: STUDENT IN AN ORGANIZED HEALTH CARE EDUCATION/TRAINING PROGRAM

## 2021-08-29 PROCEDURE — 36415 COLL VENOUS BLD VENIPUNCTURE: CPT | Performed by: STUDENT IN AN ORGANIZED HEALTH CARE EDUCATION/TRAINING PROGRAM

## 2021-08-29 RX ORDER — DEXAMETHASONE 2 MG/1
2 TABLET ORAL EVERY 8 HOURS
Qty: 3 TABLET | Refills: 0 | Status: SHIPPED | OUTPATIENT
Start: 2021-08-29 | End: 2022-02-01

## 2021-08-29 RX ORDER — DEXAMETHASONE 1 MG
1 TABLET ORAL EVERY 8 HOURS
Qty: 3 TABLET | Refills: 0 | Status: SHIPPED | OUTPATIENT
Start: 2021-08-29 | End: 2021-08-30

## 2021-08-29 RX ORDER — POLYETHYLENE GLYCOL 3350 17 G/17G
17 POWDER, FOR SOLUTION ORAL DAILY
Qty: 116 G | Refills: 0 | Status: SHIPPED | OUTPATIENT
Start: 2021-08-29 | End: 2023-02-03

## 2021-08-29 RX ORDER — ASPIRIN 81 MG/1
81 TABLET ORAL DAILY
Qty: 30 TABLET | Refills: 3 | Status: SHIPPED | OUTPATIENT
Start: 2021-09-09

## 2021-08-29 RX ORDER — OXYCODONE HYDROCHLORIDE 5 MG/1
5 TABLET ORAL
Qty: 10 TABLET | Refills: 0 | Status: SHIPPED | OUTPATIENT
Start: 2021-08-29 | End: 2022-02-01

## 2021-08-29 RX ORDER — AMOXICILLIN 250 MG
1 CAPSULE ORAL 2 TIMES DAILY
Qty: 20 TABLET | Refills: 0 | Status: SHIPPED | OUTPATIENT
Start: 2021-08-29 | End: 2021-09-08

## 2021-08-29 RX ADMIN — LISINOPRIL 2.5 MG: 2.5 TABLET ORAL at 08:22

## 2021-08-29 RX ADMIN — ACETAMINOPHEN 975 MG: 325 TABLET, FILM COATED ORAL at 08:20

## 2021-08-29 RX ADMIN — DOCUSATE SODIUM 50 MG AND SENNOSIDES 8.6 MG 1 TABLET: 8.6; 5 TABLET, FILM COATED ORAL at 08:21

## 2021-08-29 RX ADMIN — FOLIC ACID 1 MG: 1 TABLET ORAL at 08:21

## 2021-08-29 RX ADMIN — HEPARIN SODIUM 7500 UNITS: 10000 INJECTION INTRAVENOUS; SUBCUTANEOUS at 05:32

## 2021-08-29 RX ADMIN — MULTIPLE VITAMINS W/ MINERALS TAB 1 TABLET: TAB at 08:21

## 2021-08-29 RX ADMIN — POLYETHYLENE GLYCOL 3350 17 G: 17 POWDER, FOR SOLUTION ORAL at 08:21

## 2021-08-29 RX ADMIN — Medication 50 MCG: at 08:21

## 2021-08-29 RX ADMIN — DEXAMETHASONE SODIUM PHOSPHATE 2 MG: 4 INJECTION, SOLUTION INTRA-ARTICULAR; INTRALESIONAL; INTRAMUSCULAR; INTRAVENOUS; SOFT TISSUE at 08:21

## 2021-08-29 RX ADMIN — Medication 500 MG: at 08:20

## 2021-08-29 RX ADMIN — LEVETIRACETAM 2000 MG: 750 TABLET ORAL at 08:20

## 2021-08-29 RX ADMIN — LEVOTHYROXINE SODIUM 112 MCG: 0.11 TABLET ORAL at 08:21

## 2021-08-29 RX ADMIN — LACOSAMIDE 200 MG: 200 TABLET, FILM COATED ORAL at 08:21

## 2021-08-29 ASSESSMENT — ACTIVITIES OF DAILY LIVING (ADL)
ADLS_ACUITY_SCORE: 13

## 2021-08-29 NOTE — PLAN OF CARE
Status: Pt transferred from  this shift. S/p Redo left frontotemporal craniotomy and resection of tumor on 8/26  Vitals: VSS on RA  Neuros: A&Ox4. 5/5 t/o. Numbness to R thumb. Per pt. Report blurry vision only when looking down wearing glasses.   IV: PIV SL'd  Labs/Electrolytes: Phos has been replaced, recheck in AM  Resp/trach: WNL on RA. CPAP on overnight, staples padded with gauze where strap meets incision.  Diet: Regular diet  Bowel status: No BM this shift, LBM PTA. Passing gas.  : Voiding.   Skin:Head incision ZAC with staples. Bruising to bilateral forearms. Blanchable redness to coccyx and chest-old tele patches. Left periorbital swelling. Bruising under left eye.   Pain: Minimal incisional pain managed adequately with scheduled tylenol   Activity: Up with SBA  Plan: Possible discharge today. Continue to monitor and follow POC.

## 2021-08-29 NOTE — PLAN OF CARE
Arrived from:  4A  Belongings/meds:  Glasses, backpack, cellphone, computer, underwear, shave kit  2 RN Skin Assessment Completed by:  Kait KC RN and Argenis SALCEDO RN  Non-intact findings documented (yes/no/NA): Yes, Incision to head, ZAC staples intact, bruising to bilateral forearms, Blanchable redness to coccyx, blanchable redness to chest-old tele patches. Left periorbital swelling. Bruising under left eye.      brought pt's CPAP in, at bedside.

## 2021-08-29 NOTE — PLAN OF CARE
Status: Pt transferred from  this shift. S/p Redo left frontotemporal craniotomy and resection of tumor on 8/26  Vitals: VSS on RA  Neuros: Alert and oriented x4. All extremities 5/5. Numbness to R thumb and blurry vision to both eyes- NSG paged and came to assess. No new orders.   IV: PIV SL'd  Labs/Electrolytes: Replaced in ICU, recheck in am.   Resp/trach: WNL on RA. CPAP on at HS, staples padded with gauze where strap meets incision.  Diet: Regular diet, fair appetite.  Bowel status: Bs+x4, + flatus.   : Voiding.   Skin: Incision to head, ZAC staples intact, bruising to bilateral forearms, Blanchable redness to coccyx, blanchable redness to chest-old tele patches. Left periorbital swelling. Bruising under left eye.   Pain: C/o head pain, prn oxycodone given.  Activity: Up with SBA  Social:  at bedside this shift.  Plan: Continue to monitor and follow POC. Possible discharge tomorrow.

## 2021-08-29 NOTE — PROGRESS NOTES
Transferred to: 6A  Belongings: Backpack   Ovalle removed? NA  Central line removed? NA  Chart and medications sent with patient Yes  Family notified: Yes,  Camilo with patient

## 2021-08-30 NOTE — DISCHARGE SUMMARY
[facility] Discharge Summary and Instructions    Marlyn Wilder MRN# [id number]   Age: 56 year old YOB: 1965     Date of Admission:  8/26/2021  Date of Discharge::  8/29/2021 11:30 AM  Admitting Physician:  Pan Calero MD  Discharge Physician:  Pan Calero MD          Admission Diagnoses:   Benign neoplasm of cerebral meninges (H) [D32.0]          Discharge Diagnosis:     Benign neoplasm of cerebral meninges (H) [D32.0]          Procedures:   8/28: PROCEDURES PERFORMED:  1.  4th time redo left frontotemporal craniotomy for resection of recurrent medial sphenoid wing/paraclinoid meningioma.  2.  Frameless stereotaxy using Medtronic Stealth optical tracking system.  3.  Intraoperative neuromonitoring (SSEP, EEG, and MEPs).  4.  Intraoperative use of the microscope  5.  Intraoperative use of an ultrasonic aspirator (CUSA) for tumor resection   4.  Use of intraoperative micro doppler ultrasound.           Brief History of Illness:   .Ms. Wilder is a 56-year-old right-handed female with a history of a recurrent left medial sphenoid wing meningioma, status post multiple resections and radiotherapy, and epilepsy (simple partial seizures).   Regarding the history of meningioma treatment, Ms. Wilder initially underwent a two-stage tumor resection of her left medial sphenoid wing meningioma 02/17/2012 by Dr. Kidd and Raúl Marie, which consisted of tumor debulking during stage I and further debulking of the tumor during stage II, again by Dr. Kidd and Dr. Marie 02/23/2012.  Persistent tumor remained.  She underwent further neurosurgical intervention with a repeat left frontotemporal craniotomy performed by Dr. Calero 01/17/2013.  She subsequently underwent adjuvant radiotherapy and received a total dose of 5400 cGy in 30 fractions, which she completed 01/24/2013 without incident. Ms. Wilder recently presented to Neurosurgery Clinic for followup  after several admissions to the hospital for recurrent seizures over the past year.  MRI imaging had demonstrated continued slow growth of her residual meningioma as well as increasing edema in the left temporal lobe.  Based on these findings, we had recommended balloon occlusion tests in preparation for possible repeat surgical resection.  Balloon occlusion tests were performed 07/21/2021, which demonstrated findings suggestive that significant collateral circulation supplied to her left MCA territory via external carotid artery via the internal maxillary and middle meningeal system as well as STA that she remained neurologically stable during occlusion of the external iliac artery.  Based on the findings of the tests, a thorough discussion with Ms. Wilder and her , she agreed to proceed with the procedure.              Hospital Course:   Patient underwent above-mentioned procedure on 8/28. The operation was uncomplicated and she was admitted to the surgical ICU for routine post operative cares. On post operative day 2 she was doing well and transferred to the floor. On post operative day 2, she was ambulating, voiding without a gonzalez, eating a regular diet, pain was well controlled and therefore she was discharged to home.      In addition to the assessment of diagnoses detailed above, this 56 year old female  patient admitted electively has the following conditions contributing to the complexity of their medical care:      Brain cancer and Brain compression which was evident on the CT/MRI.,  Anemia due to acute blood loss,  Hypertension  JARED on CPAP  Hypothyroidism  GH deficiency  Rheumatoid arthritis  Seizures    Exam at discharge:  Gen: Appears comfortable, NAD  Wound: clean, dry, intact  Neurologic:  - Alert & Oriented to person, place, time, and situation  - Follows commands briskly  - Speech fluent, spontaneous. No aphasia or dysarthria.  - No gaze preference. No apparent hemineglect.  - PERRL, EOMI,  visual acuity 20/20 bilaterally on gross examination  - Face symmetric with sensation intact to light touch  - Palate elevates symmetrically, uvula midline, tongue protrudes midline  - Trapezii muscles 5/5 bilaterally  - No pronator drift  - No clonus       Del Tr Bi WE WF Gr   R 5 5 5 5 5 5   L 5 5 5 5 5 5     HF KE KF DF PF EHL   R 5 5 5 5 5 5   L 5 5 5 5 5 5      Reflexes 2+ throughout     Sensation intact and symmetric to light touch throughout              Discharge Medications:     Discharge Medication List as of 8/29/2021 11:00 AM      START taking these medications    Details   !! dexamethasone (DECADRON) 1 MG tablet Take 1 tablet (1 mg) by mouth every 8 hours for 1 day, Disp-3 tablet, R-0, E-Prescribe      !! dexamethasone (DECADRON) 2 MG tablet Take 1 tablet (2 mg) by mouth every 8 hours for 1 day, Disp-3 tablet, R-0, E-Prescribe      oxyCODONE (ROXICODONE) 5 MG tablet Take 1 tablet (5 mg) by mouth every 3 hours as needed for breakthrough pain or pain, Disp-10 tablet, R-0, Local Print      polyethylene glycol (MIRALAX) 17 GM/Dose powder Take 17 g by mouth daily, Disp-116 g, R-0, E-Prescribe      senna-docusate (SENOKOT-S/PERICOLACE) 8.6-50 MG tablet Take 1 tablet by mouth 2 times daily for 10 days, Disp-20 tablet, R-0, E-Prescribe       !! - Potential duplicate medications found. Please discuss with provider.      CONTINUE these medications which have CHANGED    Details   aspirin 81 MG EC tablet Take 1 tablet (81 mg) by mouth daily, Disp-30 tablet, R-3, E-Prescribe         CONTINUE these medications which have NOT CHANGED    Details   acetaminophen (TYLENOL) 325 MG tablet Take 325-650 mg by mouth every 6 hours as needed for mild pain, Historical      calcium carbonate (OS-BRENDEN) 500 MG tablet Take 1 tablet by mouth daily, Historical      folic acid (FOLVITE) 1 MG tablet Take 1 mg by mouth daily , Historical      levETIRAcetam (KEPPRA) 500 MG tablet Take 4 tablets (2,000 mg) by mouth 2 times daily, Disp-240  tablet, R-11, E-Prescribe      levothyroxine (SYNTHROID/LEVOTHROID) 112 MCG tablet TAKE 1 TABLET BY MOUTH BEFORE BREAKFAST, Historical      lisinopril (ZESTRIL) 2.5 MG tablet Take 2.5 mg by mouth daily , Historical      methotrexate sodium 2.5 MG TABS Take 17.5 mg by mouth once a week (take 7 X 2.5 mg = 17.5 mg dose)  On tuesdays, Historical      Multiple Vitamins-Minerals (MULTIVITAMIN & MINERAL PO) Take 1 tablet by mouth daily, Historical      VIMPAT 200 MG TABS tablet Take 1 tablet (200 mg) by mouth 2 times daily, Disp-60 tablet, R-5, GABE, E-Prescribe      vitamin D3 (CHOLECALCIFEROL) 2000 units (50 mcg) tablet Take 1 tablet by mouth daily, Historical      ondansetron (ZOFRAN) 4 MG tablet Take 4 mg by mouth daily as needed for nausea , Historical                     Discharge Instructions and Follow-Up:     Discharge diet: Regular   Discharge activity: You may advance activity as tolerated. No strenuous exercise or heay lifting greater than 10 lbs for 4 weeks or until seen and cleared in clinic.   Discharge follow-up: Follow-up  In NSG clinic in 2 weeks   Wound care: Ok to shower,however no scrubbing of the wound and no soaking of the wound, meaning no bathtubs or swimming pools. Pat dry only. Leave wound open to air.  Sutures are not absorbable and need to be removed in 2 weeks.     Please call if you have:  1. increased pain, redness, drainage, swelling at your incision  2. fevers > 101.5 F degrees  3. with any questions or concerns.  You may reach the Neurosurgery clinic at [phone] during regular work hours. ER at [phone].    and ask for the Neurosurgery Resident on call at [phone], during off hours or weekends.         Discharge Disposition:     Discharged to home          Niki Soria MD  Neurosurgery Resident PGY2

## 2021-08-30 NOTE — PLAN OF CARE
Occupational Therapy Discharge Summary    Reason for therapy discharge:    Discharged to home.    Progress towards therapy goal(s). See goals on Care Plan in [facility] electronic health record for goal details.  Goals partially met.  Barriers to achieving goals:   discharge from facility.    Therapy recommendation(s):    No further therapy is recommended.Recommend assist for heavy ADL/IADLs and as needed

## 2021-09-01 LAB
PATH REPORT.COMMENTS IMP SPEC: NORMAL
PATH REPORT.FINAL DX SPEC: NORMAL
PATH REPORT.GROSS SPEC: NORMAL
PATH REPORT.MICROSCOPIC SPEC OTHER STN: NORMAL
PATH REPORT.RELEVANT HX SPEC: NORMAL
PHOTO IMAGE: NORMAL

## 2021-09-10 ENCOUNTER — OFFICE VISIT (OUTPATIENT)
Dept: NEUROSURGERY | Facility: CLINIC | Age: 56
End: 2021-09-10
Payer: COMMERCIAL

## 2021-09-10 VITALS
SYSTOLIC BLOOD PRESSURE: 146 MMHG | DIASTOLIC BLOOD PRESSURE: 76 MMHG | HEART RATE: 72 BPM | OXYGEN SATURATION: 98 % | RESPIRATION RATE: 16 BRPM

## 2021-09-10 DIAGNOSIS — D32.9 MENINGIOMA (H): Primary | ICD-10-CM

## 2021-09-10 PROCEDURE — 99024 POSTOP FOLLOW-UP VISIT: CPT | Performed by: PHYSICIAN ASSISTANT

## 2021-09-10 ASSESSMENT — PAIN SCALES - GENERAL: PAINLEVEL: NO PAIN (0)

## 2021-09-10 NOTE — LETTER
9/10/2021       RE: Marlyn Wilder  [address]  [place] [address]     Dear Colleague,    Thank you for referring your patient, Marlyn Wilder, to the [facility] NEUROSURGERY CLINIC [place] at [facility]. Please see a copy of my visit note below.      [facility]  Department of Neurosurgery    Name: Marlyn Wilder  MRN: [id number]  Age: 56 year old  : 1965  09/10/2021      Chief Complaint:   Recurrent medial sphenoid wing/paraclinoid meningioma s/p redo left frontotemporal craniotomy for resection 2021, 2 week postop    History of Present Illness:   Marlyn Wilder is a 56 year old female with a history of hypertension, hypothyroidism, hyperlipidemia, JARED, and history of seizures who is seen today for a 2 week postoperative visit. She underwent a redo left craniotomy for resection of her recurrent left medial sphenoid wing meningioma on 2021 with Dr. Calero. She did well and discharged home 2021. Her tumor has previously been treated via two stage resection in , repeat resection in , and radiation following that surgery. She reports doing quite well since discharge. She has very mild right eye blurriness which seems to be improving, was present postoperatively. She denies new fever, chills, headaches, double vision, seizures, vomiting, paresthesias, or weakness.   Her  is here as well today.    She is retired.     Review of Systems:   Pertinent items are noted in HPI or as in patient entered ROS below, remainder of complete ROS is negative.     Physical Exam:   BP (!) 146/76   Pulse 72   Resp 16   LMP 10/17/2012   SpO2 98%    General: No acute distress.    Eyes: Conjunctivae are normal.  Neuro: The patient is fully oriented. Speech is normal. Extraocular movements are intact without nystagmus. Facial sensation is intact in V1, V2, V3 distributions. Facial nerve function is normal, rated as a House  Brackmann 1.  Shoulders are full strength. There is no pronator drift. Full strength in all extremities. Sensation intact throughout. No dysmetria with finger-nose-finger testing. Gait is normal.  Psych: Normal mood and affect. Behavior is normal.    Incision: Her left craniotomy incision is clean, dry, intact and well healed with staples in place. These were removed today without complication.    Imaging:  No new imaging to review today     Assessment:  Recurrent medial sphenoid wing/paraclinoid meningioma s/p redo left frontotemporal craniotomy for resection 8/26/2021, 2 week postop    Plan:  Will plan for follow up in 3 months with Dr. Calero, with MRI prior to visit. They were encouraged to reach out in the meantime with any new questions or concerns.      Carmen Butts PA-C  Department of Neurosurgery          Again, thank you for allowing me to participate in the care of your patient.      Sincerely,    Carmen Butts PA-C

## 2021-09-10 NOTE — NURSING NOTE
Chief Complaint   Patient presents with     Surgical Followup     [facility] Return - 2 wk post-op, staple removal and wound check     Bruno Burgos

## 2021-09-10 NOTE — PATIENT INSTRUCTIONS
Follow up with Dr. Calero in 3 months, with MRI prior.  Please let us know if you have questions/concerns in the meantime.

## 2021-09-10 NOTE — PROGRESS NOTES
[facility]  Department of Neurosurgery    Name: Marlyn Wilder  MRN: [id number]  Age: 56 year old  : 1965  09/10/2021      Chief Complaint:   Recurrent medial sphenoid wing/paraclinoid meningioma s/p redo left frontotemporal craniotomy for resection 2021, 2 week postop    History of Present Illness:   Marlyn Wilder is a 56 year old female with a history of hypertension, hypothyroidism, hyperlipidemia, JARED, and history of seizures who is seen today for a 2 week postoperative visit. She underwent a redo left craniotomy for resection of her recurrent left medial sphenoid wing meningioma on 2021 with Dr. Calero. She did well and discharged home 2021. Her tumor has previously been treated via two stage resection in , repeat resection in , and radiation following that surgery. She reports doing quite well since discharge. She has very mild right eye blurriness which seems to be improving, was present postoperatively. She denies new fever, chills, headaches, double vision, seizures, vomiting, paresthesias, or weakness.   Her  is here as well today.    She is retired.     Review of Systems:   Pertinent items are noted in HPI or as in patient entered ROS below, remainder of complete ROS is negative.     Physical Exam:   BP (!) 146/76   Pulse 72   Resp 16   LMP 10/17/2012   SpO2 98%    General: No acute distress.    Eyes: Conjunctivae are normal.  Neuro: The patient is fully oriented. Speech is normal. Extraocular movements are intact without nystagmus. Facial sensation is intact in V1, V2, V3 distributions. Facial nerve function is normal, rated as a House Brackmann 1.  Shoulders are full strength. There is no pronator drift. Full strength in all extremities. Sensation intact throughout. No dysmetria with finger-nose-finger testing. Gait is normal.  Psych: Normal mood and affect. Behavior is normal.    Incision: Her left craniotomy incision is clean, dry, intact and well  healed with staples in place. These were removed today without complication.    Imaging:  No new imaging to review today     Assessment:  Recurrent medial sphenoid wing/paraclinoid meningioma s/p redo left frontotemporal craniotomy for resection 8/26/2021, 2 week postop    Plan:  Will plan for follow up in 3 months with Dr. Calero, with MRI prior to visit. They were encouraged to reach out in the meantime with any new questions or concerns.      Carmen Butts PA-C  Department of Neurosurgery

## 2021-10-13 ENCOUNTER — TELEPHONE (OUTPATIENT)
Dept: NEUROSURGERY | Facility: CLINIC | Age: 56
End: 2021-10-13

## 2021-10-13 NOTE — TELEPHONE ENCOUNTER
Writer faxed patient's completed and signed Attending Physician's Statement to [employer] [facility] @ [phone].  A copy was also scanned into the patient's EMR.  It should be noted that the paperwork came to this clinic via [place] mail and postmarked October 5th, 2021.     CURTIS Vidales  Neurosurgery clinic nurse navigator.

## 2021-10-24 ENCOUNTER — HEALTH MAINTENANCE LETTER (OUTPATIENT)
Age: 56
End: 2021-10-24

## 2021-10-25 ENCOUNTER — MYC MEDICAL ADVICE (OUTPATIENT)
Dept: NEUROSURGERY | Facility: CLINIC | Age: 56
End: 2021-10-25

## 2021-10-25 NOTE — TELEPHONE ENCOUNTER
Writer returned call to patient.     Physician Statement per Epic note completed, and received via postal mail.     Writer to seek out form [place] Disability for completion.     Temitope Fried LPN  Neurosurgery

## 2021-11-02 ENCOUNTER — TELEPHONE (OUTPATIENT)
Dept: NEUROSURGERY | Facility: CLINIC | Age: 56
End: 2021-11-02

## 2021-11-02 ENCOUNTER — CARE COORDINATION (OUTPATIENT)
Dept: NEUROSURGERY | Facility: CLINIC | Age: 56
End: 2021-11-02

## 2021-11-02 NOTE — TELEPHONE ENCOUNTER
Writer faxed Continuing disability report to [place] [employer] @ [phone]. A copy has been scanned into the patient's EMR and a [employer] message has been sent to the patient.     CURTIS Vidales  Neurosurgery nurse navigator

## 2021-11-09 ENCOUNTER — OFFICE VISIT (OUTPATIENT)
Dept: NEUROLOGY | Facility: CLINIC | Age: 56
End: 2021-11-09
Payer: COMMERCIAL

## 2021-11-09 VITALS
RESPIRATION RATE: 16 BRPM | HEART RATE: 75 BPM | OXYGEN SATURATION: 98 % | WEIGHT: 278 LBS | DIASTOLIC BLOOD PRESSURE: 87 MMHG | BODY MASS INDEX: 42.13 KG/M2 | SYSTOLIC BLOOD PRESSURE: 130 MMHG | HEIGHT: 68 IN

## 2021-11-09 DIAGNOSIS — G40.119 PARTIAL SYMPTOMATIC EPILEPSY WITH SIMPLE PARTIAL SEIZURES, INTRACTABLE, WITHOUT STATUS EPILEPTICUS (H): Primary | ICD-10-CM

## 2021-11-09 PROCEDURE — 99215 OFFICE O/P EST HI 40 MIN: CPT | Performed by: PSYCHIATRY & NEUROLOGY

## 2021-11-09 RX ORDER — LEVETIRACETAM 500 MG/1
2000 TABLET ORAL 2 TIMES DAILY
Qty: 240 TABLET | Refills: 11 | Status: SHIPPED | OUTPATIENT
Start: 2021-11-09 | End: 2022-03-15

## 2021-11-09 RX ORDER — LACOSAMIDE 150 MG/1
150 TABLET ORAL 2 TIMES DAILY
Qty: 60 TABLET | Refills: 5 | Status: SHIPPED | OUTPATIENT
Start: 2021-11-09 | End: 2022-03-15

## 2021-11-09 ASSESSMENT — ENCOUNTER SYMPTOMS
POLYDIPSIA: 0
WEIGHT GAIN: 0
INCREASED ENERGY: 0
EYE IRRITATION: 0
ALTERED TEMPERATURE REGULATION: 0
NIGHT SWEATS: 0
DECREASED APPETITE: 1
EYE REDNESS: 0
FEVER: 0
WEIGHT LOSS: 0
DOUBLE VISION: 0
HALLUCINATIONS: 0
EYE PAIN: 0
POLYPHAGIA: 0
EYE WATERING: 0
FATIGUE: 1
CHILLS: 0

## 2021-11-09 ASSESSMENT — MIFFLIN-ST. JEOR: SCORE: 1886.56

## 2021-11-09 ASSESSMENT — PAIN SCALES - GENERAL: PAINLEVEL: MODERATE PAIN (5)

## 2021-11-09 NOTE — LETTER
11/9/2021       RE: Marlyn Wilder  [address] [place] [facility] [address]     Dear Colleague,    Thank you for referring your patient, Marlyn Wilder, to the [facility] NEUROLOGY CLINIC [place] at [facility]. Please see a copy of my visit note below.      [facility] Ep[facility]psy [facility]:   RETURN VISIT           Service Date: 11/09/2021     HISTORY:  Ms. 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  She came to the visit today with her .     Following the most recent visit to this clinic on 06/01/2021, she had recurring isolated auras of her habitual types up to several times per month, until surgery on 08/26/2021.      Dr. Pan Calero performed a fourth de-bulking of the recurrent, left-sided, medial sphenoid wing-paraclinoid meningioma on 08/26/2021.  She did well post-operatively.  Isolated auras ceased, except for on that occurred when she missed AED doses in mid-September 2021.    She has not had any seizures causing impaired awareness.      She has had excessive daytime drowsiness on a daily basis over the last 3 months, despite using CPAP for JARED.  Except for drowsiness, she denied having adverse AED effects.       Ictal semiology-history:                       The patient once again confirmed previously presented history in detail today. She again noted that she had a single febrile convulsion of early childhood, but otherwise had no seizures until the initial manifestation of her meningioma in 2011.  She has had only 1 type of seizure since 2011.       The patient has had exclusively simple partial seizures.  These first began in 2011, and have continued since then.  They have been quite stable in semiology and frequency.  She has at least 20 per year, but has only up to 2 on a single day and usually only one at a time.  The full form  of the aura lasts almost 1 minute and features a froilan vu phenomenon associated with a sense that she has just viewed a complex visual scene briefly, although she cannot remember the nature of the scene, with an olfactory hallucination of burning electronics, and waves of intense nausea.  She does not have a postictal state with this.  She is always able to speak if she is with someone.  Her  has seen many of these, which she described the auras in detail to him and she is always able to respond to him quickly.  She never has any automatic behaviors as best he can determine.  Sometimes she has briefer events that feature only 1-2 of her 3 aura phenomena.       The patient has not had any staring spells with unresponsiveness or confusion, and has not had any grand mal seizures following that of early childhood.  She only has involuntary jerks on falling asleep in the evening.        Epilepsy-seizure predispositions:   The patient's epilepsy risk factor is a left middle fossa meningioma with extension into the left parasellar space.  This was resected 3 times in , and pathology showed that there was a grade I meningothelial meningioma.  The portion that is in the parasellar space has surrounded the left optic nerve, and she does have visual deficits on the left.  She had radiation therapy following the last resection by Dr. Calero.  She has had follow-up scans since that time, which showed a stable lesion as recently as 2017.         There is no family history of seizures or epilepsy.       The patient had a single febrile convulsion of early childhood when she was approximately 1 years old.       She has no history of gestational or  injury, developmental delay, stroke, meningitis, encephalitis, and significant head injury.  She denied a history of physical or sexual abuse by an adult during her childhood or adulthood.       Laboratory evaluations:   The most recent brain MRI was performed at  [facility] on 05/18/2017, and was reported to show a stable left medial middle fossa lesion extending into the parasellar space in the left orbital apex, with mild mass effect and with enhancement.       A routine EEG was performed in [place] in 2012, and the report indicated left hemispheric slowing and spikes.   An outpatient EEG at [facility] on 06/15/2016 was abnormal due to left frontotemporal delta slowing and breach effect.      Epilepsy therapeutics:   The only anti-seizure medication that the patient has used has been levetiracetam.  This was started in 2012.  It has been consistently associated with irritability.  The patient was able to control her irritability, to the extent that she was able to function well as an  for the [facility].   Divalproex and lacosamide were later used adjunctively with levetiracetam.  Divalproex was associated with hair breakage and was discontinued for this reason.        PAST MEDICAL-SURGICAL HISTORY:    1. 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   2. History of left middle fossa meningothelial meningioma, grade 1, with extension to the left parasellar area, left orbital apex and cavernous sinus; status post resections (4837-1862) and radiation therapy.   3. History of probable transient ischemic attack with aphasia (2013).   4. Obstructive sleep apnea, treated with CPAP.   5. Systemic hypertension, treated.   6. Hyperlipidemia.   7. History of recurring renal calculi.   8. History of arthralgia attributed to atypical rheumatoid arthritis or fibromyalgia.   9. Hypothyroidism.   10. Status post left carpal tunnel release.   11. History of TMJ syndrome.      PERSONAL AND SOCIAL HISTORY:  The patient completed her education through a [employer] degree.  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  She was placed on total disability when her memory problems had progressed to the point that she no longer could perform necessary duties as an   for a [employer].    She lives with her .  They have 3 adult children, no longer in the home.       REVIEW OF SYSTEMS:    As per HPI     MEDICATIONS:  Levetiracetam 2000 mg b.i.d., lacosamide 200 mg b.i.d., and other medications as per the electronic medical record.      PHYSICAL EXAMINATION:    The patient was an alert woman in no apparent distress.  Vital signs were as per the electronic medical record.  There was a skull defect consistent with reported surgery, with well-healed scars of craniotomies.  Neck was supple, without signs of meningeal irritation.       On neurological examination, the patient appeared mildly lethargic, and was fully oriented to person, place, time, and reason for visit.  Speech showed normal articulation, fluency, repetitions, naming, syntax and comprehension.  Cranial nerves III through XII were normal.  Muscle masses, tones and strengths were normal throughout.  There was no pronator drift.  Sequential fine finger movements were performed normally with each hand.  No spontaneous tremors, myoclonus, or other abnormal movements were observed.  Sensations of light touch, pinprick, vibration and proprioception were reportedly normal throughout.  The rapid alternating movements and finger-nose-finger and heel-shin maneuvers were performed normally bilaterally.  Romberg maneuver was negative.  Regular, heel, toe, tandem and reverse tandem walking were normal.  Deep tendon reflexes were normal and symmetric throughout.  Toes were downgoing bilaterally.      IMPRESSION:    Following the most recent visit to this clinic on 06/01/2021, she had recurring isolated auras of her habitual types up to several times per month, until surgery on 08/26/2021.  She underwent a fourth de-bulking of the recurrent, left-sided, medial sphenoid wing-paraclinoid meningioma on 08/26/2021.  Isolated auras then ceased, except for on that occurred when she missed AED doses in mid-September 2021.   She has not had any seizures causing impaired awareness.      She has had excessive daytime drowsiness on a daily basis over the last 3 months, despite using CPAP for JARED.  Except for drowsiness, she denied having adverse AED effects.       We agreed to reduce the lacosamide dose, as this may reduce one contributing factor for excessive drowsiness.  We reviewed the importance of not missing AED doses, and discussed strategies for maintaining full AED compliance.      We discussed [place] regulations on seizures and driving.  She has not had any seizures in adulthood of types that would impair her driving.     PLAN:    1.  Continue levetiracetam at 2000 mg b.i.d.   2.  Decrease lacosamide to 150 mg b.i.d.   3.  Return visit in approximately 4 months.    I personally spent 41 minutes in this patient care on the day of this visit, including time in direct contact with the patient of which more than 50% consisted of counseling and coordinating care, and time in other necessary patient care activities without direct patient contact including medical record and imaging review.    Emmanuel Madrid M.D., Professor of Neurology         Again, thank you for allowing me to participate in the care of your patient.      Sincerely,    Emmanuel Madrid MD

## 2021-11-10 NOTE — PROGRESS NOTES
[facility] Epilepsy [facility]:   RETURN VISIT           Service Date: 11/09/2021     HISTORY:  Ms. 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  She came to the visit today with her .     Following the most recent visit to this clinic on 06/01/2021, she had recurring isolated auras of her habitual types up to several times per month, until surgery on 08/26/2021.      Dr. Pan Calero performed a fourth de-bulking of the recurrent, left-sided, medial sphenoid wing-paraclinoid meningioma on 08/26/2021.  She did well post-operatively.  Isolated auras ceased, except for on that occurred when she missed AED doses in mid-September 2021.    She has not had any seizures causing impaired awareness.      She has had excessive daytime drowsiness on a daily basis over the last 3 months, despite using CPAP for JARED.  Except for drowsiness, she denied having adverse AED effects.       Ictal semiology-history:                       The patient once again confirmed previously presented history in detail today. She again noted that she had a single febrile convulsion of early childhood, but otherwise had no seizures until the initial manifestation of her meningioma in 2011.  She has had only 1 type of seizure since 2011.       The patient has had exclusively simple partial seizures.  These first began in 2011, and have continued since then.  They have been quite stable in semiology and frequency.  She has at least 20 per year, but has only up to 2 on a single day and usually only one at a time.  The full form of the aura lasts almost 1 minute and features a froilan vu phenomenon associated with a sense that she has just viewed a complex visual scene briefly, although she cannot remember the nature of the scene, with an olfactory hallucination of burning electronics, and waves of intense nausea.  She does not have a  postictal state with this.  She is always able to speak if she is with someone.  Her  has seen many of these, which she described the auras in detail to him and she is always able to respond to him quickly.  She never has any automatic behaviors as best he can determine.  Sometimes she has briefer events that feature only 1-2 of her 3 aura phenomena.       The patient has not had any staring spells with unresponsiveness or confusion, and has not had any grand mal seizures following that of early childhood.  She only has involuntary jerks on falling asleep in the evening.        Epilepsy-seizure predispositions:   The patient's epilepsy risk factor is a left middle fossa meningioma with extension into the left parasellar space.  This was resected 3 times in , and pathology showed that there was a grade I meningothelial meningioma.  The portion that is in the parasellar space has surrounded the left optic nerve, and she does have visual deficits on the left.  She had radiation therapy following the last resection by Dr. Calero.  She has had follow-up scans since that time, which showed a stable lesion as recently as 2017.         There is no family history of seizures or epilepsy.       The patient had a single febrile convulsion of early childhood when she was approximately 1 years old.       She has no history of gestational or  injury, developmental delay, stroke, meningitis, encephalitis, and significant head injury.  She denied a history of physical or sexual abuse by an adult during her childhood or adulthood.       Laboratory evaluations:   The most recent brain MRI was performed at [facility] on 2017, and was reported to show a stable left medial middle fossa lesion extending into the parasellar space in the left orbital apex, with mild mass effect and with enhancement.       A routine EEG was performed in [place] in , and the report indicated left hemispheric slowing and spikes.    An outpatient [facility] at [facility] on 06/15/2016 was abnormal due to left frontotemporal delta slowing and breach effect.      Epilepsy therapeutics:   The only anti-seizure medication that the patient has used has been levetiracetam.  This was started in 2012.  It has been consistently associated with irritability.  The patient was able to control her irritability, to the extent that she was able to function well as an  for the [employer].   Divalproex and lacosamide were later used adjunctively with levetiracetam.  Divalproex was associated with hair breakage and was discontinued for this reason.        PAST MEDICAL-SURGICAL HISTORY:    1. 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   2. History of left middle fossa meningothelial meningioma, grade 1, with extension to the left parasellar area, left orbital apex and cavernous sinus; status post resections (0550-4696) and radiation therapy.   3. History of probable transient ischemic attack with aphasia (2013).   4. Obstructive sleep apnea, treated with CPAP.   5. Systemic hypertension, treated.   6. Hyperlipidemia.   7. History of recurring renal calculi.   8. History of arthralgia attributed to atypical rheumatoid arthritis or fibromyalgia.   9. Hypothyroidism.   10. Status post left carpal tunnel release.   11. History of TMJ syndrome.      PERSONAL AND SOCIAL HISTORY:  The patient completed her education through a [employer] degree.  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  She was placed on total disability when her memory problems had progressed to the point that she no longer could perform necessary duties as an  for a [place] [employer].    She lives with her .  They have 3 adult children, no longer in the home.       REVIEW OF SYSTEMS:    As per [facility]     MEDICATIONS:  Levetiracetam 2000 mg b.i.d., lacosamide 200 mg b.i.d., and other medications as per the electronic medical record.      PHYSICAL  EXAMINATION:    The patient was an alert woman in no apparent distress.  Vital signs were as per the electronic medical record.  There was a skull defect consistent with reported surgery, with well-healed scars of craniotomies.  Neck was supple, without signs of meningeal irritation.       On neurological examination, the patient appeared mildly lethargic, and was fully oriented to person, place, time, and reason for visit.  Speech showed normal articulation, fluency, repetitions, naming, syntax and comprehension.  Cranial nerves III through XII were normal.  Muscle masses, tones and strengths were normal throughout.  There was no pronator drift.  Sequential fine finger movements were performed normally with each hand.  No spontaneous tremors, myoclonus, or other abnormal movements were observed.  Sensations of light touch, pinprick, vibration and proprioception were reportedly normal throughout.  The rapid alternating movements and finger-nose-finger and heel-shin maneuvers were performed normally bilaterally.  Romberg maneuver was negative.  Regular, heel, toe, tandem and reverse tandem walking were normal.  Deep tendon reflexes were normal and symmetric throughout.  Toes were downgoing bilaterally.      IMPRESSION:    Following the most recent visit to this clinic on 06/01/2021, she had recurring isolated auras of her habitual types up to several times per month, until surgery on 08/26/2021.  She underwent a fourth de-bulking of the recurrent, left-sided, medial sphenoid wing-paraclinoid meningioma on 08/26/2021.  Isolated auras then ceased, except for on that occurred when she missed AED doses in mid-September 2021.  She has not had any seizures causing impaired awareness.      She has had excessive daytime drowsiness on a daily basis over the last 3 months, despite using CPAP for JARED.  Except for drowsiness, she denied having adverse AED effects.       We agreed to reduce the lacosamide dose, as this may reduce  one contributing factor for excessive drowsiness.  We reviewed the importance of not missing AED doses, and discussed strategies for maintaining full AED compliance.      We discussed [place] regulations on seizures and driving.  She has not had any seizures in adulthood of types that would impair her driving.     PLAN:    1.  Continue levetiracetam at 2000 mg b.i.d.   2.  Decrease lacosamide to 150 mg b.i.d.   3.  Return visit in approximately 4 months.    I personally spent 41 minutes in this patient care on the day of this visit, including time in direct contact with the patient of which more than 50% consisted of counseling and coordinating care, and time in other necessary patient care activities without direct patient contact including medical record and imaging review.    Emmanuel Madrid M.D., Professor of Neurology

## 2021-12-07 ENCOUNTER — ANCILLARY PROCEDURE (OUTPATIENT)
Dept: MRI IMAGING | Facility: CLINIC | Age: 56
End: 2021-12-07
Attending: PHYSICIAN ASSISTANT
Payer: COMMERCIAL

## 2021-12-07 ENCOUNTER — OFFICE VISIT (OUTPATIENT)
Dept: NEUROSURGERY | Facility: CLINIC | Age: 56
End: 2021-12-07
Payer: COMMERCIAL

## 2021-12-07 VITALS
SYSTOLIC BLOOD PRESSURE: 144 MMHG | RESPIRATION RATE: 16 BRPM | DIASTOLIC BLOOD PRESSURE: 88 MMHG | HEART RATE: 71 BPM | WEIGHT: 275 LBS | OXYGEN SATURATION: 98 % | BODY MASS INDEX: 42.44 KG/M2

## 2021-12-07 DIAGNOSIS — D32.0 BENIGN NEOPLASM OF CEREBRAL MENINGES (H): Primary | ICD-10-CM

## 2021-12-07 DIAGNOSIS — D32.9 MENINGIOMA (H): ICD-10-CM

## 2021-12-07 PROCEDURE — 70553 MRI BRAIN STEM W/O & W/DYE: CPT | Performed by: RADIOLOGY

## 2021-12-07 PROCEDURE — 99213 OFFICE O/P EST LOW 20 MIN: CPT | Performed by: NEUROLOGICAL SURGERY

## 2021-12-07 PROCEDURE — A9585 GADOBUTROL INJECTION: HCPCS | Performed by: RADIOLOGY

## 2021-12-07 RX ORDER — GADOBUTROL 604.72 MG/ML
15 INJECTION INTRAVENOUS ONCE
Status: COMPLETED | OUTPATIENT
Start: 2021-12-07 | End: 2021-12-07

## 2021-12-07 RX ADMIN — GADOBUTROL 13 ML: 604.72 INJECTION INTRAVENOUS at 13:02

## 2021-12-07 ASSESSMENT — PAIN SCALES - GENERAL: PAINLEVEL: MODERATE PAIN (4)

## 2021-12-07 NOTE — DISCHARGE INSTRUCTIONS
MRI Contrast Discharge Instructions    The IV contrast you received today will pass out of your body in your  urine. This will happen in the next 24 hours. You will not feel this process.  Your urine will not change color.    Drink at least 4 extra glasses of water or juice today (unless your doctor  has restricted your fluids). This reduces the stress on your kidneys.  You may take your regular medicines.    If you are on dialysis: It is best to have dialysis today.    If you have a reaction: Most reactions happen right away. If you have  any new symptoms after leaving the hospital (such as hives or swelling),  call your hospital at the correct number below. Or call your family doctor.  If you have breathing distress or wheezing, call 911.    Special instructions: ***    I have read and understand the above information.    Signature:______________________________________ Date:___________    Staff:__________________________________________ Date:___________     Time:__________    [facility] Departments:    ___[place]: [phone]  ___[facility]: [phone]  ___[place]: [phone] ___[facility]: [phone]  ___[facility]: [phone]  ___[facility]: [phone]  ___[phone]  ___[facility]: [phone]  ___[phone]

## 2021-12-07 NOTE — LETTER
2021       RE: Marlyn Wilder  [address] [place] [facility] [address]     Dear Colleague,    Thank you for referring your patient, Marlyn Wilder, to the [facility] [place] at [facility]. Please see a copy of my visit note below.      Cerebrovascular and Cranial Clinic      Name: Marlyn Wilder  : 2021      Reason for visit: Recurrent medial sphenoid wing/paraclinoid meningioma s/p redo left frontotemporal craniotomy for resection 2021, follow up visit    We saw Mrs. Wilder back in the Cranial Neurosurgery Clinic today in follow up. We performed a 4th time redo left frontotemporal craniotomy for resection of recurrent left medial sphenoid wing meningioma 3.5 months ago (2021). She had an uncomplicated hospital course and was discharged home 3 days later (2021). At her 2 week post-operative visit with my partner, BLANCA Sal, she reported mild right eye blurriness that seemed to be improving. Today, the patient reports she occasionally has word finding difficulty and getting words out, worse towards the end of the day. Mrs. Wilder notes difficulty with short term memory, but she and her  both feel that this is stable since surgery. The patient feels like her glasses are not sitting on her eyes correctly. She reports that they droop on her left side. She endorses stable balance. Mrs. Wilder reports ongoing fatigue, and attributes this to the seizure medications that she is prescribed. Her  notes that her nausea spells have improved. Mrs. Wilder denies any froilan vu, but this occurred early in her tumor course several years ago. She reports doing well with 3 grandchildren. She received her COVID booster vaccination recently.      Review of Systems:   Pertinent items are noted in HPI, remainder of complete ROS is negative.      Physical Exam:   BP (!) 144/88   Pulse 71   Resp 16   Wt  124.7 kg (275 lb)   LMP 10/17/2012   SpO2 98%   BMI 42.44 kg/m     Neuro: The patient is fully oriented and alert. However, she appears very fatigued. Speech and language are essentially normal though she has some subtle word finding trouble. Extraocular movements are intact without nystagmus. Facial strength is normal. Her scalp incision is healing well. There is unchanged left temporalis atrophy.    MR Brain (12/07/2021)  The tumor has been debulked. The temporal component is smaller with less edema in the temporal lobe. There remains a large residual and extensive temporal lobe gliosis. The mass effect on the left optic nerve is unchanged.    Impression and plan:   1. We reviewed the patient's recent MRI and compared this to scans as far back as 2011. We discussed the improved edema on her imaging today. Our treatment options are limited should this tumor start growing again. Perhaps, radiosurgery would be an option if the tumor size is not too large.  2. In regard to her glasses, I recommended her to follow with her optometrist. If she would like to establish care for her vision here (she believes Dr. Castañeda is going to retire soon), we discussed that I can place a referral to my colleagues in neuro-ophthalmology, Dr. Yates or Dr. Suresh.   3. Follow up in 6 months with updated MRI  4. I encouraged her to contact us should any questions or concerns arise in advance of her next appointment    Please feel free to contact us if we may be of any assistance for Mrs. Wilder.    Pan Calero MD  Department of Neurosurgery  [facility]      Scribe Disclosure:  I, Jackie Sibley, am serving as a scribe to document services personally performed by Pan Calero MD at this visit, based upon the provider's statements to me. All documentation has been reviewed by the aforementioned provider prior to being entered into the official medical record.        Again, thank you for  allowing me to participate in the care of your patient.      Sincerely,    Pan Calero MD

## 2021-12-07 NOTE — PROGRESS NOTES
Cerebrovascular and Cranial Clinic      Name: Marlyn Wilder  : 2021      Reason for visit: Recurrent medial sphenoid wing/paraclinoid meningioma s/p redo left frontotemporal craniotomy for resection 2021, follow up visit    We saw Mrs. Wilder back in the Cranial Neurosurgery Clinic today in follow up. We performed a 4th time redo left frontotemporal craniotomy for resection of recurrent left medial sphenoid wing meningioma 3.5 months ago (2021). She had an uncomplicated hospital course and was discharged home 3 days later (2021). At her 2 week post-operative visit with my partner, BLANCA Sal, she reported mild right eye blurriness that seemed to be improving. Today, the patient reports she occasionally has word finding difficulty and getting words out, worse towards the end of the day. Mrs. Wilder notes difficulty with short term memory, but she and her  both feel that this is stable since surgery. The patient feels like her glasses are not sitting on her eyes correctly. She reports that they droop on her left side. She endorses stable balance. Mrs. Wilder reports ongoing fatigue, and attributes this to the seizure medications that she is prescribed. Her  notes that her nausea spells have improved. Mrs. Wilder denies any froilan vu, but this occurred early in her tumor course several years ago. She reports doing well with 3 grandchildren. She received her COVID booster vaccination recently.      Review of Systems:   Pertinent items are noted in HPI, remainder of complete ROS is negative.      Physical Exam:   BP (!) 144/88   Pulse 71   Resp 16   Wt 124.7 kg (275 lb)   LMP 10/17/2012   SpO2 98%   BMI 42.44 kg/m     Neuro: The patient is fully oriented and alert. However, she appears very fatigued. Speech and language are essentially normal though she has some subtle word finding trouble. Extraocular movements are intact without nystagmus. Facial strength is  normal. Her scalp incision is healing well. There is unchanged left temporalis atrophy.    MR Brain (12/07/2021)  The tumor has been debulked. The temporal component is smaller with less edema in the temporal lobe. There remains a large residual and extensive temporal lobe gliosis. The mass effect on the left optic nerve is unchanged.    Impression and plan:   1. We reviewed the patient's recent MRI and compared this to scans as far back as 2011. We discussed the improved edema on her imaging today. Our treatment options are limited should this tumor start growing again. Perhaps, radiosurgery would be an option if the tumor size is not too large.  2. In regard to her glasses, I recommended her to follow with her optometrist. If she would like to establish care for her vision here (she believes Dr. Castañeda is going to retire soon), we discussed that I can place a referral to my colleagues in neuro-ophthalmology, Dr. Yates or Dr. Suresh.   3. Follow up in 6 months with updated MRI  4. I encouraged her to contact us should any questions or concerns arise in advance of her next appointment    Please feel free to contact us if we may be of any assistance for Mrs. Wilder.    Pan Calero MD  Department of Neurosurgery  [facility]      Scribe Disclosure:  I, Jackie Sibley, am serving as a scribe to document services personally performed by Pan Calero MD at this visit, based upon the provider's statements to me. All documentation has been reviewed by the aforementioned provider prior to being entered into the official medical record.

## 2021-12-08 NOTE — PATIENT INSTRUCTIONS
Follow up with Dr Calero in 6 months with MRI Brain with and without contrast prior.    Call Razia RN for questions/concerns     Thank you for using [facility]

## 2021-12-20 ENCOUNTER — HOSPITAL ENCOUNTER (OUTPATIENT)
Dept: GENERAL RADIOLOGY | Facility: CLINIC | Age: 56
Discharge: HOME | End: 2021-12-20
Attending: UROLOGY | Admitting: UROLOGY
Payer: COMMERCIAL

## 2021-12-20 DIAGNOSIS — N20.0 KIDNEY STONE: ICD-10-CM

## 2021-12-20 PROCEDURE — 74019 RADEX ABDOMEN 2 VIEWS: CPT

## 2022-02-01 ENCOUNTER — OFFICE VISIT (OUTPATIENT)
Dept: UROLOGY | Facility: CLINIC | Age: 57
End: 2022-02-01
Payer: COMMERCIAL

## 2022-02-01 VITALS
WEIGHT: 275 LBS | HEART RATE: 71 BPM | BODY MASS INDEX: 41.68 KG/M2 | DIASTOLIC BLOOD PRESSURE: 76 MMHG | HEIGHT: 68 IN | OXYGEN SATURATION: 100 % | SYSTOLIC BLOOD PRESSURE: 132 MMHG

## 2022-02-01 DIAGNOSIS — N20.0 NEPHROLITHIASIS: Primary | ICD-10-CM

## 2022-02-01 LAB
ALBUMIN UR-MCNC: NEGATIVE MG/DL
APPEARANCE UR: CLEAR
BILIRUB UR QL STRIP: NEGATIVE
COLOR UR AUTO: YELLOW
GLUCOSE UR STRIP-MCNC: NEGATIVE MG/DL
HGB UR QL STRIP: NEGATIVE
KETONES UR STRIP-MCNC: NEGATIVE MG/DL
LEUKOCYTE ESTERASE UR QL STRIP: NEGATIVE
NITRATE UR QL: NEGATIVE
PH UR STRIP: 7 [PH] (ref 5–7)
SP GR UR STRIP: 1.02 (ref 1–1.03)
UROBILINOGEN UR STRIP-ACNC: 0.2 E.U./DL

## 2022-02-01 PROCEDURE — 99213 OFFICE O/P EST LOW 20 MIN: CPT | Performed by: PHYSICIAN ASSISTANT

## 2022-02-01 PROCEDURE — 81003 URINALYSIS AUTO W/O SCOPE: CPT | Mod: QW | Performed by: PHYSICIAN ASSISTANT

## 2022-02-01 ASSESSMENT — ENCOUNTER SYMPTOMS
NAUSEA: 0
CHILLS: 0
FEVER: 0
SHORTNESS OF BREATH: 0
DYSURIA: 0
FREQUENCY: 1
VOMITING: 0
HEMATURIA: 0
FLANK PAIN: 0

## 2022-02-01 ASSESSMENT — MIFFLIN-ST. JEOR: SCORE: 1872.95

## 2022-02-01 ASSESSMENT — PAIN SCALES - GENERAL: PAINLEVEL: NO PAIN (0)

## 2022-02-01 NOTE — PATIENT INSTRUCTIONS
Keep up with the water intake!    KUB and urinalysis with return visit in 1 year to monitor for stones, given history.    Contact us in the interim with questions, concerns, or changes in symptoms. [phone]    Standard recommendations on kidney stone prevention:  -These include maintaining fluid intake of 3 liters per day or more with a goal of making 2 or more liters of urine per day.  If alcoholic or caffeinated beverages are consumed, you need to drink water along with these beverages to maintain hydration.    -A few ounces of lemon juice concentrate a day diluted in water can help prevent stones (citrate is a stone inhibitor).    -Sodium influences calcium excretion in the urine. Limit intake of red meat, salt, and salty processed foods.    -Uric acid-high levels in the blood can lead to kidney stones and gout, especially if the urine pH is low.  Reduce her protein intake, especially red meats.  -Weight loss, if overweight, can reduce the recurrence of kidney stones.  -Diabetes-if diabetic, you are at a greater risk of having kidney stones.  -Maintain calcium intake in diet through continued consumption of dairy products etc.    -Limit foods that are high in oxalate such as spinach, sweet potatoes, dark chocolate, soy products, and some nuts such as peanuts.   -Medications that can increase risk of kidney stones: Ephedrine, Guaifenesin, Triamterene, Lasix, Diamox, Topamax, Zonegran, laxatives.

## 2022-02-01 NOTE — LETTER
2/1/2022       RE: Marlyn Wilder  [address] [facility] [id number]     Dear Colleague,    Thank you for referring your patient, Marlyn Wilder, to the [facility] [place] at [facility]. Please see a copy of my visit note below.    Subjective      CHIEF COMPLAINT/REASON FOR VISIT   Patient previously seen by Dr. Livingston  Follow-up regarding nephrolithiasis    HISTORY OF PRESENT ILLNESS   Ms. Wilder is a pleasant 56-year-old female, who presents today for follow-up regarding nephrolithiasis.  She underwent percutaneous nephrolithotomy for 1.4 cm stone with another stone in February 2020.  Stone analysis was mainly calcium oxalate monohydrate.    Patient has a longstanding history of nephrolithiasis, dating back to at least college.  She does note that she has not had any flank pain or passed a stone since we last saw her.  Most recent creatinine 0.68 with EGFR greater than 60.  She does have some urinary frequency, but she does drink a lot of water.  Denies other urinary symptomatology.    Medical history is significant for meningioma treated both surgically and with radiation therapy as well as seizures that were secondary to this.  History of pituitary insufficiency.    The following portions of the patient's history were reviewed and updated as appropriate: allergies, current medications, past family history, past medical history, past social history, past surgical history, and problem list.     REVIEW OF SYSTEMS   Review of Systems   Constitutional: Negative for chills and fever.   Respiratory: Negative for shortness of breath.    Cardiovascular: Negative for chest pain.   Gastrointestinal: Negative for nausea and vomiting.   Genitourinary: Positive for frequency. Negative for dysuria, flank pain, hematuria and urgency.      Per HPI.     Patient Active Problem List   Diagnosis     Calculus of kidney     CARDIOVASCULAR SCREENING; LDL GOAL  "LESS THAN 160     HTN (hypertension)     Meningioma (H)     Word finding difficulty     Hyperlipidemia LDL goal <130     Partial symptomatic epilepsy with simple partial seizures, intractable, without status epilepticus (H)     Musculoskeletal abnormal finding on examination     Arthritis, rheumatoid (H)     Arthralgia of temporomandibular joint     Brain tumor (H)     Carpal tunnel syndrome on both sides     Central hypothyroidism     Fibromyositis     Focal seizure (H)     Growth hormone deficiency (H)     Hyperreflexia     Myofascial pain     Numbness     JARED (obstructive sleep apnea)     Vision abnormalities     Vitamin D deficiency     Essential hypertension     Renal stones     Morbid obesity (H)     Pneumonia due to 2019 novel coronavirus     Acute respiratory failure with hypoxia (H)     Benign neoplasm of cerebral meninges (H)      Past Medical History:   Diagnosis Date     Arthritis     atypical rheumatoid arthritis     Brain tumor (H)      Calculus of kidney 2002     Gout      HLD (hyperlipidemia)      Hypertension      Hypothyroidism      Infection due to 2019 novel coronavirus      JARED (obstructive sleep apnea)      JARED (obstructive sleep apnea)      PONV (postoperative nausea and vomiting)      Seizures (H)     frontal lobe seizures, controlled on Keppra, starts with smell, then de ja vu, nausea     Sleep apnea     uses CPAP     TIA (transient ischemic attack)     takes baby ASA     TMJ (temporomandibular joint syndrome)         Objective      PHYSICAL EXAM   /76   Pulse 71   Ht 1.715 m (5' 7.5\")   Wt 124.7 kg (275 lb)   LMP 10/17/2012   SpO2 100%   BMI 42.44 kg/m     Physical Exam  Constitutional:       Appearance: Normal appearance.   HENT:      Head: Normocephalic.      Nose: Nose normal.   Eyes:      General: No scleral icterus.  Pulmonary:      Effort: Pulmonary effort is normal.   Abdominal:      General: There is no distension.   Neurological:      General: No focal deficit present. "      Mental Status: She is alert and oriented to person, place, and time.   Psychiatric:         Mood and Affect: Mood normal.         Behavior: Behavior normal.       LABORATORY     Recent Labs   Lab Test 02/01/22  1340   COLOR Yellow   APPEARANCE Clear   URINEGLC Negative   URINEBILI Negative   URINEKETONE Negative   SG 1.020   UBLD Negative   URINEPH 7.0   PROTEIN Negative   UROBILINOGEN 0.2   NITRITE Negative   LEUKEST Negative       IMAGING     I personally reviewed the images.     ABDOMEN ONE VIEW  12/20/2021 3:27 PM      HISTORY: Nephrolithiasis.     COMPARISON: 9/21/2020.                                                                      IMPRESSION: No convincing urinary calculi. Moderate amount of stool  throughout the colon. Bowel gas pattern is otherwise within normal  limits. No evidence for bowel obstruction.     MIREYA BUNDY MD      Assessment & Plan    1. Nephrolithiasis      I had the pleasure today of meeting with Ms. Wilder to discuss follow-up for nephrolithiasis.  Her KUB from December does not show any definitive kidney stone issues.  She does have some moderate amount of stool within the colon.  She does note that this fluctuates and she will often have larger bowel movements when she eats out.  Urinalysis is within normal limits.    Given her history of recurrent nephrolithiasis and last stone was rather large, would recommend continued monitoring.    -KUB and urinalysis with return visit in 1 year to monitor for stones, given history.    -Contact us in the interim with questions, concerns, or changes in symptoms.    General kidney stone prevention recommendations given.    Signed by:       Maricruz Cervatnes PA-C 2/1/2022 2:07 PM

## 2022-02-01 NOTE — PROGRESS NOTES
Subjective      CHIEF COMPLAINT/REASON FOR VISIT   Patient previously seen by Dr. Livingston  Follow-up regarding nephrolithiasis    HISTORY OF PRESENT ILLNESS   Ms. Wilder is a pleasant 56-year-old female, who presents today for follow-up regarding nephrolithiasis.  She underwent percutaneous nephrolithotomy for 1.4 cm stone with another stone in February 2020.  Stone analysis was mainly calcium oxalate monohydrate.    Patient has a longstanding history of nephrolithiasis, dating back to at least college.  She does note that she has not had any flank pain or passed a stone since we last saw her.  Most recent creatinine 0.68 with EGFR greater than 60.  She does have some urinary frequency, but she does drink a lot of water.  Denies other urinary symptomatology.    Medical history is significant for meningioma treated both surgically and with radiation therapy as well as seizures that were secondary to this.  History of pituitary insufficiency.    The following portions of the patient's history were reviewed and updated as appropriate: allergies, current medications, past family history, past medical history, past social history, past surgical history, and problem list.     REVIEW OF SYSTEMS   Review of Systems   Constitutional: Negative for chills and fever.   Respiratory: Negative for shortness of breath.    Cardiovascular: Negative for chest pain.   Gastrointestinal: Negative for nausea and vomiting.   Genitourinary: Positive for frequency. Negative for dysuria, flank pain, hematuria and urgency.      Per HPI.     Patient Active Problem List   Diagnosis     Calculus of kidney     CARDIOVASCULAR SCREENING; LDL GOAL LESS THAN 160     HTN (hypertension)     Meningioma (H)     Word finding difficulty     Hyperlipidemia LDL goal <130     Partial symptomatic epilepsy with simple partial seizures, intractable, without status epilepticus (H)     Musculoskeletal abnormal finding on examination     Arthritis, rheumatoid (H)      "Arthralgia of temporomandibular joint     Brain tumor (H)     Carpal tunnel syndrome on both sides     Central hypothyroidism     Fibromyositis     Focal seizure (H)     Growth hormone deficiency (H)     Hyperreflexia     Myofascial pain     Numbness     JARED (obstructive sleep apnea)     Vision abnormalities     Vitamin D deficiency     Essential hypertension     Renal stones     Morbid obesity (H)     Pneumonia due to 2019 novel coronavirus     Acute respiratory failure with hypoxia (H)     Benign neoplasm of cerebral meninges (H)      Past Medical History:   Diagnosis Date     Arthritis     atypical rheumatoid arthritis     Brain tumor (H)      Calculus of kidney 2002     Gout      HLD (hyperlipidemia)      Hypertension      Hypothyroidism      Infection due to 2019 novel coronavirus      JARED (obstructive sleep apnea)      JARED (obstructive sleep apnea)      PONV (postoperative nausea and vomiting)      Seizures (H)     frontal lobe seizures, controlled on Keppra, starts with smell, then de ja vu, nausea     Sleep apnea     uses CPAP     TIA (transient ischemic attack)     takes baby ASA     TMJ (temporomandibular joint syndrome)         Objective      PHYSICAL EXAM   /76   Pulse 71   Ht 1.715 m (5' 7.5\")   Wt 124.7 kg (275 lb)   LMP 10/17/2012   SpO2 100%   BMI 42.44 kg/m     Physical Exam  Constitutional:       Appearance: Normal appearance.   HENT:      Head: Normocephalic.      Nose: Nose normal.   Eyes:      General: No scleral icterus.  Pulmonary:      Effort: Pulmonary effort is normal.   Abdominal:      General: There is no distension.   Neurological:      General: No focal deficit present.      Mental Status: She is alert and oriented to person, place, and time.   Psychiatric:         Mood and Affect: Mood normal.         Behavior: Behavior normal.       LABORATORY     Recent Labs   Lab Test 02/01/22  1340   COLOR Yellow   APPEARANCE Clear   URINEGLC Negative   URINEBILI Negative   URINEKETONE " Negative   SG 1.020   UBLD Negative   URINEPH 7.0   PROTEIN Negative   UROBILINOGEN 0.2   NITRITE Negative   LEUKEST Negative       IMAGING     I personally reviewed the images.     ABDOMEN ONE VIEW  12/20/2021 3:27 PM      HISTORY: Nephrolithiasis.     COMPARISON: 9/21/2020.                                                                      IMPRESSION: No convincing urinary calculi. Moderate amount of stool  throughout the colon. Bowel gas pattern is otherwise within normal  limits. No evidence for bowel obstruction.     MIREYA BUNDY MD      Assessment & Plan    1. Nephrolithiasis      I had the pleasure today of meeting with Ms. Wilder to discuss follow-up for nephrolithiasis.  Her KUB from December does not show any definitive kidney stone issues.  She does have some moderate amount of stool within the colon.  She does note that this fluctuates and she will often have larger bowel movements when she eats out.  Urinalysis is within normal limits.    Given her history of recurrent nephrolithiasis and last stone was rather large, would recommend continued monitoring.    -KUB and urinalysis with return visit in 1 year to monitor for stones, given history.    -Contact us in the interim with questions, concerns, or changes in symptoms.    General kidney stone prevention recommendations given.    Signed by:       Maricruz Cervantes PA-C 2/1/2022 2:07 PM

## 2022-02-13 ENCOUNTER — HEALTH MAINTENANCE LETTER (OUTPATIENT)
Age: 57
End: 2022-02-13

## 2022-03-15 ENCOUNTER — OFFICE VISIT (OUTPATIENT)
Dept: NEUROLOGY | Facility: CLINIC | Age: 57
End: 2022-03-15
Payer: COMMERCIAL

## 2022-03-15 VITALS
BODY MASS INDEX: 41.83 KG/M2 | WEIGHT: 276 LBS | SYSTOLIC BLOOD PRESSURE: 145 MMHG | HEART RATE: 71 BPM | DIASTOLIC BLOOD PRESSURE: 75 MMHG | RESPIRATION RATE: 18 BRPM | OXYGEN SATURATION: 98 % | HEIGHT: 68 IN

## 2022-03-15 DIAGNOSIS — G40.119 PARTIAL SYMPTOMATIC EPILEPSY WITH SIMPLE PARTIAL SEIZURES, INTRACTABLE, WITHOUT STATUS EPILEPTICUS (H): ICD-10-CM

## 2022-03-15 PROCEDURE — 99214 OFFICE O/P EST MOD 30 MIN: CPT | Mod: GC | Performed by: PSYCHIATRY & NEUROLOGY

## 2022-03-15 RX ORDER — LEVETIRACETAM 500 MG/1
2000 TABLET ORAL 2 TIMES DAILY
Qty: 240 TABLET | Refills: 11 | Status: SHIPPED | OUTPATIENT
Start: 2022-03-15 | End: 2022-09-20

## 2022-03-15 RX ORDER — LACOSAMIDE 150 MG/1
150 TABLET ORAL 2 TIMES DAILY
Qty: 180 TABLET | Refills: 1 | Status: SHIPPED | OUTPATIENT
Start: 2022-03-15 | End: 2022-09-20

## 2022-03-15 ASSESSMENT — PAIN SCALES - GENERAL: PAINLEVEL: MODERATE PAIN (4)

## 2022-03-15 NOTE — PROGRESS NOTES
[facility]:   RETURN VISIT           Service Date:: March 15, 2022     HISTORY:    Ms. 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  She came to the visit today with her .     Following the most recent visit to this clinic on 11/09/2021, she had not had any impaired awareness episodes and reports compliance with medications.    She completed surgery on 08/26/2021 with Dr. Pan Calero who performed a fourth de-bulking of the recurrent, left-sided, medial sphenoid wing-paraclinoid meningioma. She did well post-operatively, with isolated auras stopping, and has been grateful that her symptoms have improved since, and her  reports she appears to have more energy and engagement since the surgery.    She has not had any seizures causing impaired awareness since around the time of the surgery, unable to state specifically her last seizure date.  Today she reports some left shoulder pain she has had since surgery and continued drowsiness all day, but no seizures and no auras since surgery in August. She reports compliance with her Levetiracetam 2000 mg b.i.d., lacosamide 150 mg b.i.d.    She describes that she has had excessive daytime drowsiness on a daily basis for 10 years (since Keppra, per the patient), despite using CPAP for JARED consistently.  Except for drowsiness, she denied having adverse AED effects.  She denies nausea, and her  reports improved activity without nausea sensation since the surgery. She is currently not working, but takes care of her grandson and is very involved with her [employer] group. She does reports some minor hair thinning, she suspects is due to the methotrexate. She mentions some alterations in vision that she has difficulty describing and mentioned to Dr. Calero as well, and plans to see an optometrist. She has  no visual loss, no  peripheral loss, no diplopia, she describes occasionally blurry vision up close and difficulty focusing, and finds herself closing the left eye to see better.     Ictal semiology-history:                       The patient once again confirmed previously presented history in detail today. She again noted that she had a single febrile convulsion of early childhood, but otherwise had no seizures until the initial manifestation of her meningioma in 2011.  She has had only 1 type of seizure since 2011.       The patient has had exclusively simple partial seizures.  These first began in 2011, and have continued since then.  They have been quite stable in semiology and frequency.  She has at least 20 per year, but has only up to 2 on a single day and usually only one at a time.  The full form of the aura lasts almost 1 minute and features a froilan vu phenomenon associated with a sense that she has just viewed a complex visual scene briefly, although she cannot remember the nature of the scene, with an olfactory hallucination of burning electronics, and waves of intense nausea.  She does not have a postictal state with this.  She is always able to speak if she is with someone.  Her  has seen many of these, which she described the auras in detail to him and she is always able to respond to him quickly.  She never has any automatic behaviors as best he can determine.  Sometimes she has briefer events that feature only 1-2 of her 3 aura phenomena.       The patient has not had any staring spells with unresponsiveness or confusion, and has not had any grand mal seizures following that of early childhood.  She only has involuntary jerks on falling asleep in the evening.        Epilepsy-seizure predispositions:   The patient's epilepsy risk factor is a left middle fossa meningioma with extension into the left parasellar space.  This was resected 3 times in 2012, and pathology showed that there was a grade I meningothelial  meningioma.  The portion that is in the parasellar space has surrounded the left optic nerve, and she does have visual deficits on the left.  She had radiation therapy following the last resection by Dr. Calero.  She has had follow-up scans since that time, which showed a stable lesion as recently as 2017.         There is no family history of seizures or epilepsy.       The patient had a single febrile convulsion of early childhood when she was approximately 1 years old.       She has no history of gestational or  injury, developmental delay, stroke, meningitis, encephalitis, and significant head injury.  She denied a history of physical or sexual abuse by an adult during her childhood or adulthood.       Laboratory evaluations:   The most recent brain MRI was performed at [facility] on 2021, and was reported to show a decreased size of left paraclinoid meningioma after debulking, and mucosal thickening of the left maxillary sinus, with potential sinusitis.    A routine EEG was performed in [place] in , and the report indicated left hemispheric slowing and spikes.   An outpatient EEG at [facility] on 06/15/2016 was abnormal due to left frontotemporal delta slowing and breach effect.      Epilepsy therapeutics:   The only anti-seizure medication that the patient has used has been levetiracetam.  This was started in .  It has been consistently associated with irritability.  The patient was able to control her irritability, to the extent that she was able to function well as an  for the [facility].   Divalproex and lacosamide were later used adjunctively with levetiracetam.  Divalproex was associated with hair breakage and was discontinued for this reason.        PAST MEDICAL-SURGICAL HISTORY:    1. 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   2. History of left middle fossa meningothelial meningioma, grade 1, with extension to the left parasellar area, left orbital apex and  cavernous sinus; status post resections ([id number]) and radiation therapy.   3. History of probable transient ischemic attack with aphasia (2013).   4. Obstructive sleep apnea, treated with CPAP.   5. Systemic hypertension, treated.   6. Hyperlipidemia.   7. History of recurring renal calculi.   8. History of arthralgia attributed to atypical rheumatoid arthritis or fibromyalgia.   9. Hypothyroidism.   10. Status post left carpal tunnel release.   11. History of TMJ syndrome.      PERSONAL AND SOCIAL HISTORY:  The patient completed her education through a [employer] degree.  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  She was placed on total disability when her memory problems had progressed to the point that she no longer could perform necessary duties as an  for a [employer].    She lives with her .  They have 3 adult children, no longer in the home.       REVIEW OF SYSTEMS:    As per \[facility] [place] [facility]\""     MEDICATIONS:  Levetiracetam 2000 mg b.i.d., lacosamide 150 mg b.i.d., and other medications as per the electronic medical record.      PHYSICAL EXAMINATION:    The patient was an alert woman in no apparent distress.    Vital signs were as per the electronic medical record.       On neurological examination, the patient appeared somewhat lethargic but is engaged, and was fully oriented to person, place, time, and reason for visit.  Speech showed normal articulation, fluency, repetitions, naming, syntax and comprehension. Her recall of 3 items in 5 minutes was 2/3 and with MC option 3/3  Cranial nerves III through XII were normal.  Muscle masses, tones and strengths were normal throughout with some tenderness to touch of left shoulder and minor weakness due to pain in shoulder abduction.  There was no pronator drift.  Sequential fine finger movements were performed normally with each hand.  No spontaneous tremors, myoclonus, or other abnormal movements were observed.  Sensations of light  touch normal throughout.  The finger-nose-finger and heel-shin maneuvers were performed normally bilaterally.  Romberg maneuver was negative.  Regular, heel, toe, tandem walking were normal.  Deep tendon reflexes were hyperreflexic symmetrically throughout with reddy's bilaterally without clonus.     IMPRESSION:    Following the most recent visit aftersurgery on 08/26/2021 with a fourth de-bulking of her recurrent, left-sided, medial sphenoid wing-paraclinoid meningioma she has not had any seizures or isolated auras, nor events causing impaired awareness.  She continues compliance on her medications. She reports some persistent daytime drowsiness on a daily basis over the last 10 years since starting on levetriacetam, continues compliance with CPAP for JARED, but her  excitedly endorses improved engagement and activity since the surgery.  Except for this drowsiness, she denied having adverse AED effects. Suspect her drowsiness is multifactorial related to medications and location of her meningioma, and at this time she should remain on her current AED regiment. No need for laboratory drug level assessment with stability and seizure freedom, will repeat drug levels at next visit. We reviewed the importance of not missing AED doses, and discussed strategies for maintaining full AED compliance, and encouraged exercise daily and sleep hygiene.      PLAN:    1.  Continue levetiracetam at 2000 mg b.i.d.   2.  Continue lacosamide at 150 mg b.i.d.   3.  Return visit in approximately 6 months with repeat drug levels.    The patient was discussed with Dr. Madrid, staff attending, who agrees with my assessment and plan.      Patrica Olvera DO, MBA ([place])  PGY-2 Neurology Resident     Report Prepared By: Patrica Olvera DO, MBA ([place]), Neurology Resident   I agree with the findings and plan of care as documented.  I personally examined the patient, and discussed our epilepsy diagnostic impressions and therapeutic  recommendations with the patient.  The patient was agreeable to this plan.    I spent 32 minutes in this patient care, more than 50% of which consisted of counseling and coordinating care.      Emmanuel Madrid M.D., Professor of Neurology

## 2022-03-15 NOTE — LETTER
3/15/2022       RE: Marlyn Wilder  [address] [facility] [id number]     Dear Colleague,    Thank you for referring your patient, Marlyn Wilder, to the [facility] [place] at [facility]. Please see a copy of my visit note below.      [facility] Ep[facility]psy [facility]:   RETURN VISIT           Service Date:: March 15, 2022     HISTORY:    Ms. 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  She came to the visit today with her .     Following the most recent visit to this clinic on 11/09/2021, she had not had any impaired awareness episodes and reports compliance with medications.    She completed surgery on 08/26/2021 with Dr. Pan Calero who performed a fourth de-bulking of the recurrent, left-sided, medial sphenoid wing-paraclinoid meningioma. She did well post-operatively, with isolated auras stopping, and has been grateful that her symptoms have improved since, and her  reports she appears to have more energy and engagement since the surgery.    She has not had any seizures causing impaired awareness since around the time of the surgery, unable to state specifically her last seizure date.  Today she reports some left shoulder pain she has had since surgery and continued drowsiness all day, but no seizures and no auras since surgery in August. She reports compliance with her Levetiracetam 2000 mg b.i.d., lacosamide 150 mg b.i.d.    She describes that she has had excessive daytime drowsiness on a daily basis for 10 years (since Keppra, per the patient), despite using CPAP for JARED consistently.  Except for drowsiness, she denied having adverse AED effects.  She denies nausea, and her  reports improved activity without nausea sensation since the surgery. She is currently not working, but takes care of her  grandson and is very involved with her [employer] group. She does reports some minor hair thinning, she suspects is due to the methotrexate. She mentions some alterations in vision that she has difficulty describing and mentioned to Dr. Calero as well, and plans to see an optometrist. She has  no visual loss, no peripheral loss, no diplopia, she describes occasionally blurry vision up close and difficulty focusing, and finds herself closing the left eye to see better.     Ictal semiology-history:                       The patient once again confirmed previously presented history in detail today. She again noted that she had a single febrile convulsion of early childhood, but otherwise had no seizures until the initial manifestation of her meningioma in 2011.  She has had only 1 type of seizure since 2011.       The patient has had exclusively simple partial seizures.  These first began in 2011, and have continued since then.  They have been quite stable in semiology and frequency.  She has at least 20 per year, but has only up to 2 on a single day and usually only one at a time.  The full form of the aura lasts almost 1 minute and features a froilan vu phenomenon associated with a sense that she has just viewed a complex visual scene briefly, although she cannot remember the nature of the scene, with an olfactory hallucination of burning electronics, and waves of intense nausea.  She does not have a postictal state with this.  She is always able to speak if she is with someone.  Her  has seen many of these, which she described the auras in detail to him and she is always able to respond to him quickly.  She never has any automatic behaviors as best he can determine.  Sometimes she has briefer events that feature only 1-2 of her 3 aura phenomena.       The patient has not had any staring spells with unresponsiveness or confusion, and has not had any grand mal seizures following that of early childhood.  She only has  involuntary jerks on falling asleep in the evening.        Epilepsy-seizure predispositions:   The patient's epilepsy risk factor is a left middle fossa meningioma with extension into the left parasellar space.  This was resected 3 times in , and pathology showed that there was a grade I meningothelial meningioma.  The portion that is in the parasellar space has surrounded the left optic nerve, and she does have visual deficits on the left.  She had radiation therapy following the last resection by Dr. Calero.  She has had follow-up scans since that time, which showed a stable lesion as recently as 2017.         There is no family history of seizures or epilepsy.       The patient had a single febrile convulsion of early childhood when she was approximately 1 years old.       She has no history of gestational or  injury, developmental delay, stroke, meningitis, encephalitis, and significant head injury.  She denied a history of physical or sexual abuse by an adult during her childhood or adulthood.       Laboratory evaluations:   The most recent brain MRI was performed at [facility] on 2021, and was reported to show a decreased size of left paraclinoid meningioma after debulking, and mucosal thickening of the left maxillary sinus, with potential sinusitis.    A routine EEG was performed in [place] in , and the report indicated left hemispheric slowing and spikes.   An outpatient EEG at [facility] on 06/15/2016 was abnormal due to left frontotemporal delta slowing and breach effect.      Epilepsy therapeutics:   The only anti-seizure medication that the patient has used has been levetiracetam.  This was started in .  It has been consistently associated with irritability.  The patient was able to control her irritability, to the extent that she was able to function well as an  for the [facility].   Divalproex and lacosamide were later used adjunctively with levetiracetam.  Divalproex  was associated with hair breakage and was discontinued for this reason.        PAST MEDICAL-SURGICAL HISTORY:    1. 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   2. History of left middle fossa meningothelial meningioma, grade 1, with extension to the left parasellar area, left orbital apex and cavernous sinus; status post resections ([id number]) and radiation therapy.   3. History of probable transient ischemic attack with aphasia (2013).   4. Obstructive sleep apnea, treated with CPAP.   5. Systemic hypertension, treated.   6. Hyperlipidemia.   7. History of recurring renal calculi.   8. History of arthralgia attributed to atypical rheumatoid arthritis or fibromyalgia.   9. Hypothyroidism.   10. Status post left carpal tunnel release.   11. History of TMJ syndrome.      PERSONAL AND SOCIAL HISTORY:  The patient completed her education through a [employer] degree.  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  She was placed on total disability when her memory problems had progressed to the point that she no longer could perform necessary duties as an  for a [place] [employer].    She lives with her .  They have 3 adult children, no longer in the home.       REVIEW OF SYSTEMS:    As per \[facility] [place] [facility]\""     MEDICATIONS:  Levetiracetam 2000 mg b.i.d., lacosamide 150 mg b.i.d., and other medications as per the electronic medical record.      PHYSICAL EXAMINATION:    The patient was an alert woman in no apparent distress.    Vital signs were as per the electronic medical record.       On neurological examination, the patient appeared somewhat lethargic but is engaged, and was fully oriented to person, place, time, and reason for visit.  Speech showed normal articulation, fluency, repetitions, naming, syntax and comprehension. Her recall of 3 items in 5 minutes was 2/3 and with MC option 3/3  Cranial nerves III through XII were normal.  Muscle masses, tones and strengths were normal  throughout with some tenderness to touch of left shoulder and minor weakness due to pain in shoulder abduction.  There was no pronator drift.  Sequential fine finger movements were performed normally with each hand.  No spontaneous tremors, myoclonus, or other abnormal movements were observed.  Sensations of light touch normal throughout.  The finger-nose-finger and heel-shin maneuvers were performed normally bilaterally.  Romberg maneuver was negative.  Regular, heel, toe, tandem walking were normal.  Deep tendon reflexes were hyperreflexic symmetrically throughout with reddy's bilaterally without clonus.     IMPRESSION:    Following the most recent visit aftersurgery on 08/26/2021 with a fourth de-bulking of her recurrent, left-sided, medial sphenoid wing-paraclinoid meningioma she has not had any seizures or isolated auras, nor events causing impaired awareness.  She continues compliance on her medications. She reports some persistent daytime drowsiness on a daily basis over the last 10 years since starting on levetriacetam, continues compliance with CPAP for JARED, but her  excitedly endorses improved engagement and activity since the surgery.  Except for this drowsiness, she denied having adverse AED effects. Suspect her drowsiness is multifactorial related to medications and location of her meningioma, and at this time she should remain on her current AED regiment. No need for laboratory drug level assessment with stability and seizure freedom, will repeat drug levels at next visit. We reviewed the importance of not missing AED doses, and discussed strategies for maintaining full AED compliance, and encouraged exercise daily and sleep hygiene.      PLAN:    1.  Continue levetiracetam at 2000 mg b.i.d.   2.  Continue lacosamide at 150 mg b.i.d.   3.  Return visit in approximately 6 months with repeat drug levels.    The patient was discussed with Dr. Madrid, staff attending, who agrees with my assessment  and plan.      Patrica Olvera DO, MBA ([place])  PGY-2 Neurology Resident     Report Prepared By: Patrica Olvera DO, MBA ([place]), Neurology Resident   I agree with the findings and plan of care as documented.  I personally examined the patient, and discussed our epilepsy diagnostic impressions and therapeutic recommendations with the patient.  The patient was agreeable to this plan.    I spent 32 minutes in this patient care, more than 50% of which consisted of counseling and coordinating care.        Emmanuel Madrid M.D., Professor of Neurology

## 2022-04-07 ENCOUNTER — TELEPHONE (OUTPATIENT)
Dept: NEUROSURGERY | Facility: CLINIC | Age: 57
End: 2022-04-07
Payer: COMMERCIAL

## 2022-04-10 ENCOUNTER — HEALTH MAINTENANCE LETTER (OUTPATIENT)
Age: 57
End: 2022-04-10

## 2022-05-23 ENCOUNTER — ANCILLARY PROCEDURE (OUTPATIENT)
Dept: MRI IMAGING | Facility: CLINIC | Age: 57
End: 2022-05-23
Attending: NEUROLOGICAL SURGERY
Payer: COMMERCIAL

## 2022-05-23 DIAGNOSIS — D32.0 BENIGN NEOPLASM OF CEREBRAL MENINGES (H): ICD-10-CM

## 2022-05-23 PROCEDURE — A9585 GADOBUTROL INJECTION: HCPCS | Performed by: RADIOLOGY

## 2022-05-23 PROCEDURE — 70553 MRI BRAIN STEM W/O & W/DYE: CPT | Performed by: RADIOLOGY

## 2022-05-23 RX ORDER — GADOBUTROL 604.72 MG/ML
15 INJECTION INTRAVENOUS ONCE
Status: COMPLETED | OUTPATIENT
Start: 2022-05-23 | End: 2022-05-23

## 2022-05-23 RX ADMIN — GADOBUTROL 12 ML: 604.72 INJECTION INTRAVENOUS at 11:24

## 2022-06-07 ENCOUNTER — VIRTUAL VISIT (OUTPATIENT)
Dept: NEUROSURGERY | Facility: CLINIC | Age: 57
End: 2022-06-07
Payer: COMMERCIAL

## 2022-06-07 DIAGNOSIS — D32.0 CEREBRAL MENINGIOMA (H): Primary | ICD-10-CM

## 2022-06-07 PROCEDURE — 99213 OFFICE O/P EST LOW 20 MIN: CPT | Mod: 95 | Performed by: NEUROLOGICAL SURGERY

## 2022-06-07 NOTE — NURSING NOTE
Chief Complaint   Patient presents with     Benign neoplasm of cerebral meninges     6 month follow up for Benign neoplasm of cerebral meninges

## 2022-06-07 NOTE — LETTER
6/7/2022       RE: Marlyn Wilder  [address] [facility] [address]     Dear Colleague,    Thank you for referring your patient, Marlyn Wilder, to the [facility]UROSURGERY [facility]. Please see a copy of my visit note below.    Marlyn is a 56 year old who is being evaluated via a billable telephone visit.      What phone number would you like to be contacted at? [phone]  How would you like to obtain your AVS? MyChart  Phone call duration: 15 minutes    No seizures at all. Can check with Dr. Madrid. Left eye has been worse since surgery. Dr. Castañeda is neuro-ophthalmologist.   Naps frequently. Fatigue continues. MRI in one year.       Again, thank you for allowing me to participate in the care of your patient.      Sincerely,    Pan Calero MD

## 2022-06-07 NOTE — LETTER
2022      RE: Marlyn Wilder  [address] [address]         No seizures at all. Can check with Dr. Madrid. Left eye has been worse since surgery. Dr. Castañeda is neuro-ophthalmologist.   Naps frequently. Fatigue continues. MRI in one year.     Service Date: 2022    Carlos Enrique Neal MD   [address], [address]  [place] [address]     RE:  Marlyn Wilder  MRN: [id number]  : 1965    Dear Dr. Neal:    We spoke to Ms. Wilder as part of a telephone followup in [facility] on 2022.  As you know, we are following her for residual left-sided medial sphenoid wing meningioma.  She underwent debulking of some growth of the temporal component of the tumor almost a year ago.  This is her fourth operation for this tumor.  She has also had radiation therapy in the past.  Our main goal was to debulk the temporal component and reduced the mass effect on the left temporal lobe.  Overall, she is recovered reasonably well from the operation, but she continues to have persistent problems.    Fatigue remains her biggest complaint.  She naps frequently and has to plan her day carefully, otherwise, she will a lack energy.  She also believes her left-sided vision has worsened since surgery.  She has an upcoming appointment with Dr. Townsend at [facility].  We will await his examination.      On a positive note, she has not had any obvious seizures since surgery.  She remains on Keppra and lacosamide.  She has been on Keppra for many years.   Her  accompanied her on this phone visit and believes her speech has been reasonably good.    Over the phone, she spoke in a monotonous voice.  Apart from that, her speech and language were normal.  Her comprehension and expression were normal.    REVIEW OF STUDIES:  We went over her MRI from 2022.  The enhancing tumor centered around the left anterior clinoid process is smaller than compared to  before surgery last year.  However, there is still a substantial residual tumor at almost 3.5 cm.  There is persistent gliosis in the left anterior temporal lobe, but the edema more posteriorly in the temporal lobe has resolved.  The mass effect on the anterior temporal lobe has been reduced.    IMPRESSION:  Ms. Wilder is overall relatively stable, considering the complexity of her overall condition.  We will discuss with Dr. Madrid if any further adjustments in her antiepileptic medications can be made since these may be contributing to her fatigue.  Of course, any seizures would be a setback for her and it may be more prudent to leave on her current doses since she is doing well otherwise.  We will repeat an MRI in 1 year.  We will await the report from Dr. Townsend in the coming weeks.    Sincerely,    Pan Calero MD        D: 2022   T: 2022   MT: DANTE    Name:     GRADY WILDER  MRN:      [id number]        Account:      [id number]   :      1965           Service Date: 2022       Document: [id number]

## 2022-06-07 NOTE — PROGRESS NOTES
Marlyn is a 56 year old who is being evaluated via a billable telephone visit.      What phone number would you like to be contacted at? [phone]  How would you like to obtain your AVS? [employer]  Phone call duration: 15 minutes    No seizures at all. Can check with Dr. Madrid. Left eye has been worse since surgery. Dr. Castañeda is neuro-ophthalmologist.   Naps frequently. Fatigue continues. MRI in one year.

## 2022-06-13 NOTE — PATIENT INSTRUCTIONS
Follow up with Dr. Castañeda in [facility] (we will obtain notes once available)    Repeat MRI brain with contrast in one year

## 2022-06-13 NOTE — PROGRESS NOTES
Service Date: 2022    Carlos Enrique Neal MD   [address], [address]  [place] [address]     RE:  Marlyn Wilder  MRN: [id number]  : 1965    Dear Dr. Neal:    We spoke to Ms. Wilder as part of a telephone followup in [facility] on 2022.  As you know, we are following her for residual left-sided medial sphenoid wing meningioma.  She underwent debulking of some growth of the temporal component of the tumor almost a year ago.  This is her fourth operation for this tumor.  She has also had radiation therapy in the past.  Our main goal was to debulk the temporal component and reduced the mass effect on the left temporal lobe.  Overall, she is recovered reasonably well from the operation, but she continues to have persistent problems.    Fatigue remains her biggest complaint.  She naps frequently and has to plan her day carefully, otherwise, she will a lack energy.  She also believes her left-sided vision has worsened since surgery.  She has an upcoming appointment with Dr. Townsend at [facility].  We will await his examination.      On a positive note, she has not had any obvious seizures since surgery.  She remains on Keppra and lacosamide.  She has been on Keppra for many years.   Her  accompanied her on this phone visit and believes her speech has been reasonably good.    Over the phone, she spoke in a monotonous voice.  Apart from that, her speech and language were normal.  Her comprehension and expression were normal.    REVIEW OF STUDIES:  We went over her MRI from 2022.  The enhancing tumor centered around the left anterior clinoid process is smaller than compared to before surgery last year.  However, there is still a substantial residual tumor at almost 3.5 cm.  There is persistent gliosis in the left anterior temporal lobe, but the edema more posteriorly in the temporal lobe has resolved.  The mass effect on the anterior  temporal lobe has been reduced.    IMPRESSION:  Ms. Wilder is overall relatively stable, considering the complexity of her overall condition.  We will discuss with Dr. Madrid if any further adjustments in her antiepileptic medications can be made since these may be contributing to her fatigue.  Of course, any seizures would be a setback for her and it may be more prudent to leave on her current doses since she is doing well otherwise.  We will repeat an MRI in 1 year.  We will await the report from Dr. Townsend in the coming weeks.    Sincerely,    Pan Calero MD        D: 2022   T: 2022   MT: DANTE    Name:     GRADY WILDER  MRN:      [id number]        Account:      [id number]   :      1965           Service Date: 2022       Document: [id number]

## 2022-06-15 NOTE — TELEPHONE ENCOUNTER
[facility]    Phone Message    May a detailed message be left on voicemail: yes     Reason for Call: Medication Refill Request    Has the patient contacted the pharmacy for the refill? Yes   Name of medication being requested: VIMPAT 200 MG TABS tablet [[id number]] (Order [id number])  Provider who prescribed the medication: Dr. Madrid   Pharmacy: [id number]  Date medication is needed: asap - patient has enough until Friday     Patient calling to get refill of medication - states that her normal pharmacy [facility] is out of medication and has to order it. Patient states that she is going out of town Friday and states that she would like to send to a different pharmacy so she can have medication before heading out of town on Friday     Please advise and call patient back at your earliest convenience       Action Taken: Other: KATHY JORDAN    Travel Screening: Not Applicable                                                                       This provider is located at [facility], [address] [place] [address].The Patient is seen remotely at home, [address] [place] [address] via [employer].  Patient is being seen via telehealth and confirm that they are in a secure environment for this session. The patient's condition being diagnosed/treated is appropriate for telemedicine. The provider identified himself/herself: herself as well as her credentials.   The patient and guardian gave consent to be seen remotely, and when consent is given they understand that the consent allows for patient identifiable information to be sent to a third party as needed.   They may refuse to be seen remotely at any time. The electronic data is encrypted and password protected, and the patient has been advised of the potential risks to privacy not withstanding such measures.    You have chosen to receive care through a telehealth visit.  Do you consent to use a video/audio connection for your medical care today? Yes. Patient verified Name, , and address.       Chief Complaint  Follow-up for depression and anxiety    Subjective          Rylei Powell presents to [facility] for medication management.    History of Present Illness  Patient presents today reporting that she has been doing good in taking her medication.  Patient denies any major depressive symptoms as she states overall she has been doing better but states some things have made her side in her personal life but they are not significant.  She notes however her anxiety has been increased lately overall as she feels anxious and worried.  She states at random times she will get anxious and other times she will just wake up feeling anxious.  She reports depression 1-2 and anxiety of 5-6 on a scale of 0-10 with 10 being the worst.  Patient states that when she does get anxious she will clean or listen to a podcast or read a book and feels that  she is managing it at this time.  Denies any irritability or agitation.  Denies any side effects to the medications.  She reports that with 1 tablet of the hydroxyzine and her amitriptyline she is sleeping well.  Patient states that her appetite is good.  Denies any self-harm.  Denies any SI/HI/AH/VH.        Objective   Vital Signs:   There were no vitals taken for this visit.  Due to the remote nature of this encounter (virtual encounter), vitals were unable to be obtained.  Height stated at 60 inches.  Weight stated at 130 pounds.        PHQ-9 Score:   PHQ-9 Total Score: (P) 5     Patient screened positive for depression based on a PHQ-9 score of 18 on 5/24/2022. Follow-up recommendations include: see notes and medication list.    PHQ-9 Depression Screening  Little interest or pleasure in doing things? (P) 1   Feeling down, depressed, or hopeless? (P) 0   Trouble falling or staying asleep, or sleeping too much? (P) 1   Feeling tired or having little energy? (P) 1   Poor appetite or overeating? (P) 1   Feeling bad about yourself - or that you are a failure or have let yourself or your family down? (P) 0   Trouble concentrating on things, such as reading the newspaper or watching television? (P) 1   Moving or speaking so slowly that other people could have noticed? Or the opposite - being so fidgety or restless that you have been moving around a lot more than usual? (P) 0   Thoughts that you would be better off dead, or of hurting yourself in some way? (P) 0   PHQ-9 Total Score (P) 5   If you checked off any problems, how difficult have these problems made it for you to do your work, take care of things at home, or get along with other people? (P) Somewhat difficult     PHQ-9 Total Score: (P) 5      Feeling nervous, anxious or on edge: (P) Several days  Not being able to stop or control worrying: (P) Several days  Worrying too much about different things: (P) Not at all  Trouble Relaxing: (P) Not at all  Being so  restless that it is hard to sit still: (P) Several days  Feeling afraid as if something awful might happen: (P) Not at all  Becoming easily annoyed or irritable: (P) Not at all  FE 7 Total Score: (P) 3  If you checked any problems, how difficult have these problems made it for you to do your work, take care of things at home, or get along with other people: (P) Somewhat difficult      PROMIS scale screening tool that patient filled out virtually reviewed by this APRN at today's encounter.      Mental Status Exam:   Hygiene:   good  Cooperation:  Cooperative  Eye Contact:  Good  Psychomotor Behavior:  Appropriate  Affect:  Appropriate  Mood: normal  Speech:  Normal  Thought Process:  Goal directed  Thought Content:  Normal  Suicidal:  None  Homicidal:  None  Hallucinations:  None  Delusion:  None  Memory:  Intact  Orientation:  Person, Place, Time and Situation  Reliability:  good  Insight:  Good  Judgement:  Good and Fair  Impulse Control:  Good and Fair  Physical/Medical Issues:  Yes Migraines     Current Medications:   Current Outpatient Medications   Medication Sig Dispense Refill   • amitriptyline (ELAVIL) 25 MG tablet Take 0.5-1 tablets by mouth Every Night. 30 tablet 5   • eletriptan (Relpax) 20 MG tablet Take 1 tablet by mouth 1 (One) Time As Needed for Migraine for up to 1 dose. May repeat dose x1 after 2 hr 9 tablet 3   • hydrOXYzine (ATARAX) 25 MG tablet Take 1-2 tablets by mouth At Night As Needed for Anxiety (sleep). 60 tablet 1   • ondansetron ODT (ZOFRAN-ODT) 4 MG disintegrating tablet Place 1 tablet on the tongue Every 8 (Eight) Hours As Needed for Nausea or Vomiting (migraine). 15 tablet 0   • ubrogepant (ubrogepant) 50 MG tablet Take 1 tablet by mouth Every 2 (Two) Hours As Needed (migraine). Max dose 200mg per day 2 tablet 0   • venlafaxine XR (EFFEXOR-XR) 75 MG 24 hr capsule Take 1 capsule by mouth Daily. For mood 30 capsule 2     No current facility-administered medications for this visit.        Physical Exam  Nursing note reviewed.   Constitutional:       Appearance: Normal appearance.   Neurological:      Mental Status: She is alert.   Psychiatric:         Attention and Perception: Attention and perception normal.         Mood and Affect: Affect normal. Mood is anxious.         Speech: Speech normal.         Behavior: Behavior normal. Behavior is cooperative.         Thought Content: Thought content normal.         Cognition and Memory: Cognition and memory normal.         Judgment: Judgment normal.        Result Review :     The following data was reviewed by: NATHAN Bhatia on 06/15/2022:                                    Data reviewed: PCP notes        Assessment and Plan    Problem List Items Addressed This Visit    None     Visit Diagnoses     Generalized anxiety disorder        Major depressive disorder, recurrent episode, moderate (HCC)        Sleep difficulties                TREATMENT PLAN/GOALS: Continue supportive psychotherapy efforts and medications as indicated. Treatment and medication options discussed during today's visit. Patient ackowledged and verbally consented to continue with current treatment plan and was educated on the importance of compliance with treatment and follow-up appointments.    MEDICATION ISSUES:  We discussed risks, benefits, and side effects of the above medications and the patient was agreeable with the plan. Patient was educated on the importance of compliance with treatment and follow-up appointments.  Patient is agreeable to call the office with any worsening of symptoms or onset of side effects. Patient is agreeable to call 911 or go to the nearest ER should he/she begin having SI/HI.      -Continue venlafaxine 75 mg XR daily for depression and anxiety.  -Continue hydroxyzine 25 mg at night as needed for sleep.  -Reminded patient of therapy appointment for August.  Encouraged patient to start writing or doing voice recordings when she feels  anxious in the morning or things that make her anxious so she can get them out of her head and see if that helps her cope better.  Praised patient for using podcast as well as reading and cleaning as positive coping skills.  -Patient did not want to increase or add medication at this time for her anxiety as she stated she felt she was managing well.  Encourage patient if anything worsen to contact the clinic sooner as we could do a trial of BuSpar 5 mg twice daily or increase her Effexor patient verbalized understanding.      Counseled patient regarding multimodal approach with healthy nutrition, healthy sleep, regular physical activity, social activities, counseling, and medications.      Coping skills reviewed and encouraged positive framing of thoughts     Assisted patient in processing above session content; acknowledged and normalized patient’s thoughts, feelings, and concerns.  Applied  positive coping skills and behavior management in session.  Allowed patient to freely discuss issues without interruption or judgment. Provided safe, confidential environment to facilitate the development of positive therapeutic relationship and encourage open, honest communication. Assisted patient in identifying risk factors which would indicate the need for higher level of care including thoughts to harm self or others and/or self-harming behavior and encouraged patient to contact this office, call 911, or present to the nearest emergency room should any of these events occur. Discussed crisis intervention services and means to access.     MEDS ORDERED DURING VISIT:  No orders of the defined types were placed in this encounter.          Follow Up   Return in about 4 weeks (around 7/13/2022), or if symptoms worsen or fail to improve, for Recheck.    Patient was given instructions and counseling regarding her condition or for health maintenance advice. Please see specific information pulled into the AVS if appropriate.      Some of the data in this electronic note has been brought forward from a previous encounter, any necessary changes have been made, it has been reviewed by this APRN, and it is accurate.      This document has been electronically signed by NATHAN Bhatia  Zee 15, 2022 11:52 EDT    Part of this note may be an electronic transcription/translation of spoken language to printed text using the Dragon Dictation System.

## 2022-07-31 ENCOUNTER — HEALTH MAINTENANCE LETTER (OUTPATIENT)
Age: 57
End: 2022-07-31

## 2022-08-09 ENCOUNTER — LAB REQUISITION (OUTPATIENT)
Dept: LAB | Facility: CLINIC | Age: 57
End: 2022-08-09

## 2022-08-09 DIAGNOSIS — Z01.419 ENCOUNTER FOR GYNECOLOGICAL EXAMINATION (GENERAL) (ROUTINE) WITHOUT ABNORMAL FINDINGS: ICD-10-CM

## 2022-08-09 PROCEDURE — G0145 SCR C/V CYTO,THINLAYER,RESCR: HCPCS | Performed by: OBSTETRICS & GYNECOLOGY

## 2022-08-09 PROCEDURE — 87624 HPV HI-RISK TYP POOLED RSLT: CPT | Performed by: OBSTETRICS & GYNECOLOGY

## 2022-08-11 LAB
BKR LAB AP GYN ADEQUACY: NORMAL
BKR LAB AP GYN INTERPRETATION: NORMAL
BKR LAB AP HPV REFLEX: NORMAL
BKR LAB AP LMP: NORMAL
BKR LAB AP PREVIOUS ABNL DX: NORMAL
BKR LAB AP PREVIOUS ABNORMAL: NORMAL
PATH REPORT.COMMENTS IMP SPEC: NORMAL
PATH REPORT.COMMENTS IMP SPEC: NORMAL
PATH REPORT.RELEVANT HX SPEC: NORMAL

## 2022-08-16 LAB
HUMAN PAPILLOMA VIRUS 16 DNA: NEGATIVE
HUMAN PAPILLOMA VIRUS 18 DNA: NEGATIVE
HUMAN PAPILLOMA VIRUS FINAL DIAGNOSIS: NORMAL
HUMAN PAPILLOMA VIRUS OTHER HR: NEGATIVE

## 2022-09-20 ENCOUNTER — LAB (OUTPATIENT)
Dept: LAB | Facility: CLINIC | Age: 57
End: 2022-09-20
Payer: COMMERCIAL

## 2022-09-20 ENCOUNTER — OFFICE VISIT (OUTPATIENT)
Dept: NEUROLOGY | Facility: CLINIC | Age: 57
End: 2022-09-20

## 2022-09-20 VITALS
DIASTOLIC BLOOD PRESSURE: 81 MMHG | HEART RATE: 68 BPM | WEIGHT: 282 LBS | OXYGEN SATURATION: 99 % | SYSTOLIC BLOOD PRESSURE: 140 MMHG | BODY MASS INDEX: 43.52 KG/M2

## 2022-09-20 DIAGNOSIS — G40.119 PARTIAL SYMPTOMATIC EPILEPSY WITH SIMPLE PARTIAL SEIZURES, INTRACTABLE, WITHOUT STATUS EPILEPTICUS (H): ICD-10-CM

## 2022-09-20 LAB — LEVETIRACETAM SERPL-MCNC: 52.7 ΜG/ML (ref 10–40)

## 2022-09-20 PROCEDURE — 80235 DRUG ASSAY LACOSAMIDE: CPT

## 2022-09-20 PROCEDURE — 80177 DRUG SCRN QUAN LEVETIRACETAM: CPT

## 2022-09-20 PROCEDURE — 36415 COLL VENOUS BLD VENIPUNCTURE: CPT

## 2022-09-20 PROCEDURE — 99215 OFFICE O/P EST HI 40 MIN: CPT | Performed by: PSYCHIATRY & NEUROLOGY

## 2022-09-20 RX ORDER — LACOSAMIDE 150 MG/1
150 TABLET ORAL 2 TIMES DAILY
Qty: 180 TABLET | Refills: 1 | Status: SHIPPED | OUTPATIENT
Start: 2022-09-20 | End: 2023-01-17

## 2022-09-20 RX ORDER — LEVETIRACETAM 500 MG/1
2000 TABLET ORAL 2 TIMES DAILY
Qty: 240 TABLET | Refills: 11 | Status: SHIPPED | OUTPATIENT
Start: 2022-09-20 | End: 2023-01-17

## 2022-09-20 ASSESSMENT — PAIN SCALES - GENERAL: PAINLEVEL: NO PAIN (0)

## 2022-09-20 NOTE — LETTER
Date:September 21, 2022      Provider requested that no letter be sent. Do not send.       [facility]

## 2022-09-20 NOTE — LETTER
9/20/2022       RE: Marlyn Wilder  [address] [address]     Dear Colleague,    Thank you for referring your patient, Marlyn Wilder, to the [facility]. Please see a copy of my visit note below.      [facility]  [facility]          Service Date: 09/20/2022     I spent 41 minutes in this patient care, with 26 minutes in direct patient contact, and 15 minutes in chart review and document preparation.         Emmanuel Madrid M.D.   Professor of Neurology        Again, thank you for allowing me to participate in the care of your patient.      Sincerely,    Emmanuel Madrid MD

## 2022-09-21 NOTE — PROGRESS NOTES
[facility]:  RETURN VISIT          Service Date: 09/20/2022    HISTORY:  Ms. 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  She came to the visit today with her .      Following the most recent visit to this clinic on 03/15/2022, she has had no seizures of any type, including no auras.      Previously, she was experiencing recurring isolated auras of her habitual types up to several times per month, until surgery on 08/26/2021.       Dr. Pan Calero performed a fourth de-bulking of the recurrent, left-sided, medial sphenoid wing-paraclinoid meningioma on 08/26/2021.  She did well post-operatively.  Isolated auras ceased, except for on that occurred when she missed AED doses in mid-September 2021.     She has had chronic daytime drowsiness on a daily basis over the last 3 months, despite using CPAP for JARED.  Except for drowsiness, she denied having adverse AED effects.       Ictal semiology-history:                       The patient once again confirmed previously presented history in detail today. She again noted that she had a single febrile convulsion of early childhood, but otherwise had no seizures until the initial manifestation of her meningioma in 2011.  She has had only 1 type of seizure since 2011.       The patient has had exclusively simple partial seizures.  These first began in 2011, and have continued since then.  They have been quite stable in semiology and frequency.  She has at least 20 per year, but has only up to 2 on a single day and usually only one at a time.  The full form of the aura lasts almost 1 minute and features a froilan vu phenomenon associated with a sense that she has just viewed a complex visual scene briefly, although she cannot remember the nature of the scene, with an olfactory hallucination of burning electronics, and waves of intense nausea.  She does not  have a postictal state with this.  She is always able to speak if she is with someone.  Her  has seen many of these, which she described the auras in detail to him and she is always able to respond to him quickly.  She never has any automatic behaviors as best he can determine.  Sometimes she has briefer events that feature only 1-2 of her 3 aura phenomena.       The patient has not had any staring spells with unresponsiveness or confusion, and has not had any grand mal seizures following that of early childhood.  She only has involuntary jerks on falling asleep in the evening.        Epilepsy-seizure predispositions:   The patient's epilepsy risk factor is a left middle fossa meningioma with extension into the left parasellar space.  This was resected 3 times in , and pathology showed that there was a grade I meningothelial meningioma.  The portion that is in the parasellar space has surrounded the left optic nerve, and she does have visual deficits on the left.  She had radiation therapy following the last resection by Dr. Calero.  She has had follow-up scans since that time, which showed a stable lesion as recently as 2017.         There is no family history of seizures or epilepsy.       The patient had a single febrile convulsion of early childhood when she was approximately 1 years old.       She has no history of gestational or  injury, developmental delay, stroke, meningitis, encephalitis, and significant head injury.  She denied a history of physical or sexual abuse by an adult during her childhood or adulthood.       Laboratory evaluations:   The most recent brain MRI was performed at [facility] on 2017, and was reported to show a stable left medial middle fossa lesion extending into the parasellar space in the left orbital apex, with mild mass effect and with enhancement.       A routine EEG was performed in [place] in , and the report indicated left hemispheric slowing and  spikes.   An outpatient EEG at [facility] on 06/15/2016 was abnormal due to left frontotemporal delta slowing and breach effect.      Epilepsy therapeutics:   The only anti-seizure medication that the patient has used has been levetiracetam.  This was started in 2012.  It has been consistently associated with irritability.  The patient was able to control her irritability, to the extent that she was able to function well as an  for the [employer].   Divalproex and lacosamide were later used adjunctively with levetiracetam.  Divalproex was associated with hair breakage and was discontinued for this reason.        PAST MEDICAL-SURGICAL HISTORY:    1. 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   2. History of left middle fossa meningothelial meningioma, grade 1, with extension to the left parasellar area, left orbital apex and cavernous sinus; status post resections (4209-2812) and radiation therapy.   3. History of probable transient ischemic attack with aphasia (2013).   4. Obstructive sleep apnea, treated with CPAP.   5. Systemic hypertension, treated.   6. Hyperlipidemia.   7. History of recurring renal calculi.   8. History of arthralgia attributed to atypical rheumatoid arthritis or fibromyalgia.   9. Hypothyroidism.   10. Status post left carpal tunnel release.   11. History of TMJ syndrome.      PERSONAL AND SOCIAL HISTORY:  The patient completed her education through a [employer] degree.  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  She was placed on total disability when her memory problems had progressed to the point that she no longer could perform necessary duties as an  for a [place] [employer].    She lives with her .  They have 3 adult children, no longer in the home.       REVIEW OF SYSTEMS:    As per HPI     MEDICATIONS:  Levetiracetam 2000 mg b.i.d., lacosamide 150 mg b.i.d., and other medications as per the electronic medical record.       PHYSICAL EXAMINATION:    The patient was an alert woman in no apparent distress.  Vital signs were as per the electronic medical record.  There was a skull defect consistent with reported surgery, with well-healed scars of craniotomies.  Neck was supple, without signs of meningeal irritation.       On neurological examination, the patient appeared mildly lethargic, and was fully oriented to person, place, time, and reason for visit.  Speech showed normal articulation, fluency, repetitions, naming, syntax and comprehension.  Cranial nerves III through XII were normal.  Muscle masses, tones and strengths were normal throughout.  There was no pronator drift.  Sequential fine finger movements were performed normally with each hand.  No spontaneous tremors, myoclonus, or other abnormal movements were observed.  Sensations of light touch, pinprick, vibration and proprioception were reportedly normal throughout.  The rapid alternating movements and finger-nose-finger and heel-shin maneuvers were performed normally bilaterally.  Romberg maneuver was negative.  Regular, heel, toe, tandem and reverse tandem walking were normal.  Deep tendon reflexes were normal and symmetric throughout.  Toes were downgoing bilaterally.       IMPRESSION:    Isolated auras have ceased.  She had a fourth de-bulking of the recurrent, left-sided, medial sphenoid wing-paraclinoid meningioma on 08/26/2021.  Isolated auras then stopped, except for on that occurred when she missed AED doses in mid-September 2021.  She has not had any seizures causing impaired awareness.       She has had excessive daytime drowsiness on a daily basis, despite using CPAP for JARED.  Except for drowsiness, she denied having adverse AED effects.       We agreed to re-cehck an EEG and AED levels, and if no epiepform abnormalties are occurreing and the lacosamide levels is adeaute, then to reduce and probably taper off levetiracetam,       We discussed [place]  regulations on seizures and driving.  She has not had any seizures in adulthood of types that would impair her driving.      PLAN:    1.  Continue levetiracetam and lacosamide at the current doses.   2.  Check levetiracetam and lacosamide levels.   3.  Out-paeint 3hr VEEG.  4.  Return visit in approximately 4 months.     I spent 41 minutes in this patient care, with 26 minutes in direct patient contact, and 15 minutes in chart review and document preparation.         Emmanuel Madrid M.D.   Professor of Neurology

## 2022-09-22 LAB — LACOSAMIDE SERPL-MCNC: 9.5 UG/ML

## 2022-10-16 ENCOUNTER — HEALTH MAINTENANCE LETTER (OUTPATIENT)
Age: 57
End: 2022-10-16

## 2022-10-19 ENCOUNTER — ANCILLARY PROCEDURE (OUTPATIENT)
Dept: NEUROLOGY | Facility: CLINIC | Age: 57
End: 2022-10-19
Attending: PSYCHIATRY & NEUROLOGY
Payer: COMMERCIAL

## 2022-10-19 DIAGNOSIS — G40.119 PARTIAL SYMPTOMATIC EPILEPSY WITH SIMPLE PARTIAL SEIZURES, INTRACTABLE, WITHOUT STATUS EPILEPTICUS (H): ICD-10-CM

## 2023-01-17 ENCOUNTER — OFFICE VISIT (OUTPATIENT)
Dept: NEUROLOGY | Facility: CLINIC | Age: 58
End: 2023-01-17
Payer: COMMERCIAL

## 2023-01-17 VITALS
OXYGEN SATURATION: 100 % | WEIGHT: 282.5 LBS | DIASTOLIC BLOOD PRESSURE: 73 MMHG | SYSTOLIC BLOOD PRESSURE: 125 MMHG | BODY MASS INDEX: 43.59 KG/M2 | HEART RATE: 73 BPM

## 2023-01-17 DIAGNOSIS — G40.119 PARTIAL SYMPTOMATIC EPILEPSY WITH SIMPLE PARTIAL SEIZURES, INTRACTABLE, WITHOUT STATUS EPILEPTICUS (H): ICD-10-CM

## 2023-01-17 PROCEDURE — 99215 OFFICE O/P EST HI 40 MIN: CPT | Performed by: PSYCHIATRY & NEUROLOGY

## 2023-01-17 RX ORDER — LACOSAMIDE 150 MG/1
150 TABLET ORAL 2 TIMES DAILY
Qty: 180 TABLET | Refills: 1 | Status: SHIPPED | OUTPATIENT
Start: 2023-01-17 | End: 2023-06-05

## 2023-01-17 RX ORDER — LEVETIRACETAM 500 MG/1
1500 TABLET ORAL 2 TIMES DAILY
Qty: 180 TABLET | Refills: 11 | Status: SHIPPED | OUTPATIENT
Start: 2023-01-17 | End: 2023-01-20

## 2023-01-17 ASSESSMENT — PAIN SCALES - GENERAL: PAINLEVEL: NO PAIN (0)

## 2023-01-17 NOTE — PROGRESS NOTES
[facility]ilepsy [facility]:  RETURN VISIT           Service Date: 01/17/2023     HISTORY:  Ms. 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  She came to the visit today with her .      Following the most recent visit to this clinic on 09/20/2022, she has had no seizures of any type, including no auras.       Previously, she was experiencing recurring isolated auras of her habitual types up to several times per month, until surgery on 08/26/2021.       Dr. Pan Calero performed a fourth de-bulking of the recurrent, left-sided, medial sphenoid wing-paraclinoid meningioma on 08/26/2021.  She did well post-operatively.  Isolated auras ceased, except for on that occurred when she missed AED doses in mid-September 2021.     She has had chronic daytime drowsiness on a daily basis over the last 3 months, despite using CPAP for JARED.  Except for drowsiness, she denied having adverse AED effects.       Ictal semiology-history:                       The patient once again confirmed previously presented history in detail today. She again noted that she had a single febrile convulsion of early childhood, but otherwise had no seizures until the initial manifestation of her meningioma in 2011.  She has had only 1 type of seizure since 2011.       The patient has had exclusively simple partial seizures.  These first began in 2011, and have continued since then.  They have been quite stable in semiology and frequency.  She has at least 20 per year, but has only up to 2 on a single day and usually only one at a time.  The full form of the aura lasts almost 1 minute and features a froilan vu phenomenon associated with a sense that she has just viewed a complex visual scene briefly, although she cannot remember the nature of the scene, with an olfactory hallucination of burning electronics, and waves of intense  nausea.  She does not have a postictal state with this.  She is always able to speak if she is with someone.  Her  has seen many of these, which she described the auras in detail to him and she is always able to respond to him quickly.  She never has any automatic behaviors as best he can determine.  Sometimes she has briefer events that feature only 1-2 of her 3 aura phenomena.       The patient has not had any staring spells with unresponsiveness or confusion, and has not had any grand mal seizures following that of early childhood.  She only has involuntary jerks on falling asleep in the evening.        Epilepsy-seizure predispositions:   The patient's epilepsy risk factor is a left middle fossa meningioma with extension into the left parasellar space.  This was resected 3 times in , and pathology showed that there was a grade I meningothelial meningioma.  The portion that is in the parasellar space has surrounded the left optic nerve, and she does have visual deficits on the left.  She had radiation therapy following the last resection by Dr. Calero.  She has had follow-up scans since that time, which showed a stable lesion as recently as 2017.         There is no family history of seizures or epilepsy.       The patient had a single febrile convulsion of early childhood when she was approximately 1 years old.       She has no history of gestational or  injury, developmental delay, stroke, meningitis, encephalitis, and significant head injury.  She denied a history of physical or sexual abuse by an adult during her childhood or adulthood.       Laboratory evaluations:   The most recent brain MRI was performed at [facility] on 2017, and was reported to show a stable left medial middle fossa lesion extending into the parasellar space in the left orbital apex, with mild mass effect and with enhancement.       A routine EEG was performed in [place] in , and the report indicated left  hemispheric slowing and spikes.   An outpatient EEG at [facility] on 06/15/2016 was abnormal due to left frontotemporal delta slowing and breach effect.      Epilepsy therapeutics:   The only anti-seizure medication that the patient has used has been levetiracetam.  This was started in 2012.  It has been consistently associated with irritability.  The patient was able to control her irritability, to the extent that she was able to function well as an  for the [employer].   Divalproex and lacosamide were later used adjunctively with levetiracetam.  Divalproex was associated with hair breakage and was discontinued for this reason.        PAST MEDICAL-SURGICAL HISTORY:    1. 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   2. History of left middle fossa meningothelial meningioma, grade 1, with extension to the left parasellar area, left orbital apex and cavernous sinus; status post resections ([id number]) and radiation therapy.   3. History of probable transient ischemic attack with aphasia (2013).   4. Obstructive sleep apnea, treated with CPAP.   5. Systemic hypertension, treated.   6. Hyperlipidemia.   7. History of recurring renal calculi.   8. History of arthralgia attributed to atypical rheumatoid arthritis or fibromyalgia.   9. Hypothyroidism.   10. Status post left carpal tunnel release.   11. History of TMJ syndrome.      PERSONAL AND SOCIAL HISTORY:  The patient completed her education through a [employer] degree.  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  She was placed on total disability when her memory problems had progressed to the point that she no longer could perform necessary duties as an  for a [place] [employer].    She lives with her .  They have 3 adult children, no longer in the home.       REVIEW OF SYSTEMS:    As per HPI     MEDICATIONS:  Levetiracetam 2000 mg b.i.d., lacosamide 150 mg b.i.d., and other medications as per the electronic  medical record.      PHYSICAL EXAMINATION:    The patient was an alert woman in no apparent distress.  Vital signs were as per the electronic medical record.  There was a skull defect consistent with reported surgery, with well-healed scars of craniotomies.  Neck was supple, without signs of meningeal irritation.       On neurological examination, the patient appeared mildly lethargic, and was fully oriented to person, place, time, and reason for visit.  Speech showed normal articulation, fluency, repetitions, naming, syntax and comprehension.  Cranial nerves III through XII were normal.  Muscle masses, tones and strengths were normal throughout.  There was no pronator drift.  Sequential fine finger movements were performed normally with each hand.  No spontaneous tremors, myoclonus, or other abnormal movements were observed.  Sensations of light touch, pinprick, vibration and proprioception were reportedly normal throughout.  The rapid alternating movements and finger-nose-finger and heel-shin maneuvers were performed normally bilaterally.  Romberg maneuver was negative.  Regular, heel, toe, tandem and reverse tandem walking were normal.  Deep tendon reflexes were normal and symmetric throughout.  Toes were downgoing bilaterally.       IMPRESSION:    Isolated auras have ceased.  She has not had any seizures causing impaired awareness.      We reviewed the most recnet EEG, wthich did nto show epieltpiform abnormalties.      The levetiracetam level was 52.7 and the lacosamide level was 9.5 mcg/ml, on 09.20.2022.      We agreed to make a dose reduction in levetiracetam.  I told her that this might reduce daytime lethargy to a small degree, but this would be unlikely to fully resolve the lethargy, which likely is multifactorial.      She had a fourth de-bulking of the recurrent, left-sided, medial sphenoid wing-paraclinoid meningioma on 08/26/2021.  Isolated auras then stopped, except for on that occurred when she  missed AED doses in mid-September 2021.      She has had excessive daytime drowsiness on a daily basis, despite using CPAP for JARED.  Except for drowsiness, she denied having adverse AED effects.        We discussed [place] regulations on seizures and driving.  She has not had any seizures in adulthood of types that would impair her driving.      PLAN:    1.  Decrease levetiracetam to 1500 mg b.i.d.   2.  Continue lacosamide at the current dose.   3.  Return visit in approximately 5 months.     I spent 46 minutes in this patient care, with 32 minutes in direct patient contact, and 14 minutes in chart review and document preparation.         Emmanuel Madrid M.D.   Professor of Neurology

## 2023-01-17 NOTE — LETTER
1/17/2023       RE: Marlyn Wilder  [id number] [address] [facility] [id number]     Dear Colleague,    Thank you for referring your patient, Marlyn Wilder, to the [facility] [place] at [facility]. Please see a copy of my visit note below.       [facility] Epilepsy Clinic:  RETURN VISIT           Service Date: 01/17/2023     HISTORY:  Ms. 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  She came to the visit today with her .      Following the most recent visit to this clinic on 09/20/2022, she has had no seizures of any type, including no auras.       Previously, she was experiencing recurring isolated auras of her habitual types up to several times per month, until surgery on 08/26/2021.       Dr. Pan Calero performed a fourth de-bulking of the recurrent, left-sided, medial sphenoid wing-paraclinoid meningioma on 08/26/2021.  She did well post-operatively.  Isolated auras ceased, except for on that occurred when she missed AED doses in mid-September 2021.     She has had chronic daytime drowsiness on a daily basis over the last 3 months, despite using CPAP for JARED.  Except for drowsiness, she denied having adverse AED effects.       Ictal semiology-history:                       The patient once again confirmed previously presented history in detail today. She again noted that she had a single febrile convulsion of early childhood, but otherwise had no seizures until the initial manifestation of her meningioma in 2011.  She has had only 1 type of seizure since 2011.       The patient has had exclusively simple partial seizures.  These first began in 2011, and have continued since then.  They have been quite stable in semiology and frequency.  She has at least 20 per year, but has only up to 2 on a single day and usually only  one at a time.  The full form of the aura lasts almost 1 minute and features a froilan vu phenomenon associated with a sense that she has just viewed a complex visual scene briefly, although she cannot remember the nature of the scene, with an olfactory hallucination of burning electronics, and waves of intense nausea.  She does not have a postictal state with this.  She is always able to speak if she is with someone.  Her  has seen many of these, which she described the auras in detail to him and she is always able to respond to him quickly.  She never has any automatic behaviors as best he can determine.  Sometimes she has briefer events that feature only 1-2 of her 3 aura phenomena.       The patient has not had any staring spells with unresponsiveness or confusion, and has not had any grand mal seizures following that of early childhood.  She only has involuntary jerks on falling asleep in the evening.        Epilepsy-seizure predispositions:   The patient's epilepsy risk factor is a left middle fossa meningioma with extension into the left parasellar space.  This was resected 3 times in , and pathology showed that there was a grade I meningothelial meningioma.  The portion that is in the parasellar space has surrounded the left optic nerve, and she does have visual deficits on the left.  She had radiation therapy following the last resection by Dr. Calero.  She has had follow-up scans since that time, which showed a stable lesion as recently as 2017.         There is no family history of seizures or epilepsy.       The patient had a single febrile convulsion of early childhood when she was approximately 1 years old.       She has no history of gestational or  injury, developmental delay, stroke, meningitis, encephalitis, and significant head injury.  She denied a history of physical or sexual abuse by an adult during her childhood or adulthood.       Laboratory evaluations:   The most  recent brain MRI was performed at [facility] on 05/18/2017, and was reported to show a stable left medial middle fossa lesion extending into the parasellar space in the left orbital apex, with mild mass effect and with enhancement.       A routine EEG was performed in [place] in 2012, and the report indicated left hemispheric slowing and spikes.   An outpatient EEG at [facility] on 06/15/2016 was abnormal due to left frontotemporal delta slowing and breach effect.      Epilepsy therapeutics:   The only anti-seizure medication that the patient has used has been levetiracetam.  This was started in 2012.  It has been consistently associated with irritability.  The patient was able to control her irritability, to the extent that she was able to function well as an  for the [employer].   Divalproex and lacosamide were later used adjunctively with levetiracetam.  Divalproex was associated with hair breakage and was discontinued for this reason.        PAST MEDICAL-SURGICAL HISTORY:    1. 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   2. History of left middle fossa meningothelial meningioma, grade 1, with extension to the left parasellar area, left orbital apex and cavernous sinus; status post resections ([id number]) and radiation therapy.   3. History of probable transient ischemic attack with aphasia (2013).   4. Obstructive sleep apnea, treated with CPAP.   5. Systemic hypertension, treated.   6. Hyperlipidemia.   7. History of recurring renal calculi.   8. History of arthralgia attributed to atypical rheumatoid arthritis or fibromyalgia.   9. Hypothyroidism.   10. Status post left carpal tunnel release.   11. History of TMJ syndrome.      PERSONAL AND SOCIAL HISTORY:  The patient completed her education through a [employer] degree.  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  She was placed on total disability when her memory problems had progressed to the point that she no longer could  perform necessary duties as an  for a [place] [employer].    She lives with her .  They have 3 adult children, no longer in the home.       REVIEW OF SYSTEMS:    As per HPI     MEDICATIONS:  Levetiracetam 2000 mg b.i.d., lacosamide 150 mg b.i.d., and other medications as per the electronic medical record.      PHYSICAL EXAMINATION:    The patient was an alert woman in no apparent distress.  Vital signs were as per the electronic medical record.  There was a skull defect consistent with reported surgery, with well-healed scars of craniotomies.  Neck was supple, without signs of meningeal irritation.       On neurological examination, the patient appeared mildly lethargic, and was fully oriented to person, place, time, and reason for visit.  Speech showed normal articulation, fluency, repetitions, naming, syntax and comprehension.  Cranial nerves III through XII were normal.  Muscle masses, tones and strengths were normal throughout.  There was no pronator drift.  Sequential fine finger movements were performed normally with each hand.  No spontaneous tremors, myoclonus, or other abnormal movements were observed.  Sensations of light touch, pinprick, vibration and proprioception were reportedly normal throughout.  The rapid alternating movements and finger-nose-finger and heel-shin maneuvers were performed normally bilaterally.  Romberg maneuver was negative.  Regular, heel, toe, tandem and reverse tandem walking were normal.  Deep tendon reflexes were normal and symmetric throughout.  Toes were downgoing bilaterally.       IMPRESSION:    Isolated auras have ceased.  She has not had any seizures causing impaired awareness.      We reviewed the most recnet EEG, wthich did nto show epieltpiform abnormalties.      The levetiracetam level was 52.7 and the lacosamide level was 9.5 mcg/ml, on 09.20.2022.      We agreed to make a dose reduction in levetiracetam.  I told her that this might reduce daytime  lethargy to a small degree, but this would be unlikely to fully resolve the lethargy, which likely is multifactorial.      She had a fourth de-bulking of the recurrent, left-sided, medial sphenoid wing-paraclinoid meningioma on 08/26/2021.  Isolated auras then stopped, except for on that occurred when she missed AED doses in mid-September 2021.      She has had excessive daytime drowsiness on a daily basis, despite using CPAP for JARED.  Except for drowsiness, she denied having adverse AED effects.        We discussed [place] regulations on seizures and driving.  She has not had any seizures in adulthood of types that would impair her driving.      PLAN:    1.  Decrease levetiracetam to 1500 mg b.i.d.   2.  Continue lacosamide at the current dose.   3.  Return visit in approximately 5 months.     I spent 46 minutes in this patient care, with 32 minutes in direct patient contact, and 14 minutes in chart review and document preparation.         Emmanuel Madrid M.D.   Professor of Neurology

## 2023-01-19 ENCOUNTER — HOSPITAL ENCOUNTER (OUTPATIENT)
Dept: GENERAL RADIOLOGY | Facility: CLINIC | Age: 58
Discharge: HOME | End: 2023-01-19
Attending: PHYSICIAN ASSISTANT | Admitting: PHYSICIAN ASSISTANT
Payer: COMMERCIAL

## 2023-01-19 DIAGNOSIS — N20.0 NEPHROLITHIASIS: ICD-10-CM

## 2023-01-19 PROCEDURE — 74018 RADEX ABDOMEN 1 VIEW: CPT

## 2023-01-20 DIAGNOSIS — G40.119 PARTIAL SYMPTOMATIC EPILEPSY WITH SIMPLE PARTIAL SEIZURES, INTRACTABLE, WITHOUT STATUS EPILEPTICUS (H): ICD-10-CM

## 2023-01-20 RX ORDER — LEVETIRACETAM 500 MG/1
1500 TABLET ORAL 2 TIMES DAILY
Qty: 540 TABLET | Refills: 3 | Status: SHIPPED | OUTPATIENT
Start: 2023-01-20 | End: 2023-06-05

## 2023-01-24 ENCOUNTER — DOCUMENTATION ONLY (OUTPATIENT)
Dept: NEUROSURGERY | Facility: CLINIC | Age: 58
End: 2023-01-24
Payer: COMMERCIAL

## 2023-01-24 NOTE — PROGRESS NOTES
PAPERWORK DOCUMENTATION    How Paperwork is received:  Other, unknown but I have he form.     Type of Paperwork: Physician Statement    Who is the paperwork for:  Patient    Received Date January 24, 2023    Who Received the paperwork:  Razia    Form to be Completed by:  Danica    Anticipated Completion Date: January 31st, 2023- February 2nd, 2023    Date Completed Form Faxed to Requested Entity: Writer faxed Attending Physician's Statement on Tuesday January 31st to [employer] @[phone]. A copy was also scanned into the patient's EMR.     CURTIS Vidales  Neurosurgery nurse navigator.

## 2023-02-03 ENCOUNTER — OFFICE VISIT (OUTPATIENT)
Dept: UROLOGY | Facility: CLINIC | Age: 58
End: 2023-02-03
Payer: COMMERCIAL

## 2023-02-03 VITALS — BODY MASS INDEX: 44.26 KG/M2 | WEIGHT: 282 LBS | HEIGHT: 67 IN

## 2023-02-03 DIAGNOSIS — N20.0 NEPHROLITHIASIS: Primary | ICD-10-CM

## 2023-02-03 LAB
ALBUMIN UR-MCNC: NEGATIVE MG/DL
APPEARANCE UR: ABNORMAL
BILIRUB UR QL STRIP: NEGATIVE
COLOR UR AUTO: YELLOW
GLUCOSE UR STRIP-MCNC: NEGATIVE MG/DL
HGB UR QL STRIP: NEGATIVE
KETONES UR STRIP-MCNC: NEGATIVE MG/DL
LEUKOCYTE ESTERASE UR QL STRIP: NEGATIVE
NITRATE UR QL: NEGATIVE
PH UR STRIP: 7 [PH] (ref 5–7)
SP GR UR STRIP: 1.02 (ref 1–1.03)
UROBILINOGEN UR STRIP-ACNC: 2 E.U./DL

## 2023-02-03 PROCEDURE — 81003 URINALYSIS AUTO W/O SCOPE: CPT | Mod: QW | Performed by: PHYSICIAN ASSISTANT

## 2023-02-03 PROCEDURE — 99213 OFFICE O/P EST LOW 20 MIN: CPT | Performed by: PHYSICIAN ASSISTANT

## 2023-02-03 ASSESSMENT — ENCOUNTER SYMPTOMS
NAUSEA: 0
VOMITING: 0
HEMATURIA: 0
SHORTNESS OF BREATH: 0
FEVER: 0
CHILLS: 0
FLANK PAIN: 0
DYSURIA: 0

## 2023-02-03 ASSESSMENT — PAIN SCALES - GENERAL: PAINLEVEL: NO PAIN (0)

## 2023-02-03 NOTE — NURSING NOTE
Chief Complaint   Patient presents with     Nephrolithiasis     Pt here for 1 year follow up     Annita Dubose, CMA

## 2023-02-03 NOTE — PROGRESS NOTES
"Subjective    Chief Complaint   Patient presents with   • Follow-up     F/U 1 wk, PT STATES ISSUE IS NOT BETTER, BACK PAIN AS WELL      History of Present Illness    Karissa Roach is a 26 y.o. female who presents for follow-up of left labial abscess with ulceration noted.  Herpes culture last week has returned positive.  Patient having a lot of pain.  She has never had herpes before.    Obstetric History:  OB History      Para Term  AB Living    2         1    SAB TAB Ectopic Molar Multiple Live Births                        Menstrual History:     No LMP recorded. Patient is not currently having periods (Reason: Other).       Past Medical History:   Diagnosis Date   • Anemia      Family History   Problem Relation Age of Onset   • Malig Hyperthermia Neg Hx        The following portions of the patient's history were reviewed and updated as appropriate: allergies, current medications, past social history and problem list.    Review of Systems  As per HPI        Objective   Physical Exam  Left labial abscess resolved.  Patient now has 4 ulcerations noted herpes which are extremely tender including below the vagina on the thigh on each side.  Her inguinal nodes are not enlarged but are tender.  /64   Ht 154.9 cm (61\")   Wt 51.3 kg (113 lb)   BMI 21.35 kg/m²     Assessment/Plan   Karissa was seen today for follow-up.    Diagnoses and all orders for this visit:    Herpes simplex infection of genitourinary system    Other orders  -     valACYclovir (VALTREX) 1000 MG tablet; Take 1 tablet by mouth 2 (Two) Times a Day for 10 days.  -     ibuprofen (ADVIL,MOTRIN) 800 MG tablet; Take 1 tablet by mouth Every 8 (Eight) Hours As Needed for Mild Pain  or Moderate Pain .        IMP/PLAN   20 minute visit today spent with 15 minutes face-to-face counseling concerning the treatment and causation of primary genital herpes infection.  Previous left labial abscess obviously secondary to herpetic lesion.  Patient " Subjective      CHIEF COMPLAINT/REASON FOR VISIT   Follow up on kidney stones    HISTORY OF PRESENT ILLNESS   Ms. Wilder is a very pleasant 57-year-old female, who presents today for follow-up regarding nephrolithiasis.  I last saw her in 02/21/22.  She underwent PCNL for a 1.4 cm stone with an additional stone in February 2020.  Stone analysis was primarily calcium oxalate monohydrate.    Patient has had stone issues dating back to college.  She notes that this last was approximately the fourth 1 that she has had.  She also endorses that her sister just underwent a procedure for a stone yesterday.    Medical history is significant for meningioma treated both surgically and with radiation therapy as well as seizures secondary to this.    Patient denies passing any stones since last time I saw her.  She denies any hematuria, dysuria, or flank pain.  KUB was obtained for visit.  Urinalysis today does not look convincing for infection does not show evidence of hematuria.    Patient continues to stay well-hydrated and notes that she always has her water bottle with her.    The following portions of the patient's history were reviewed and updated as appropriate: allergies, current medications, past family history, past medical history, past social history, past surgical history, and problem list.     REVIEW OF SYSTEMS   Review of Systems   Constitutional: Negative for chills and fever.   Respiratory: Negative for shortness of breath.    Cardiovascular: Negative for chest pain.   Gastrointestinal: Negative for nausea and vomiting.   Genitourinary: Negative for dysuria, flank pain and hematuria.      Per HPI.     Patient Active Problem List   Diagnosis     Calculus of kidney     CARDIOVASCULAR SCREENING; LDL GOAL LESS THAN 160     HTN (hypertension)     Meningioma (H)     Word finding difficulty     Hyperlipidemia LDL goal <130     Partial symptomatic epilepsy with simple partial seizures, intractable, without status  "epilepticus (H)     Musculoskeletal abnormal finding on examination     Arthritis, rheumatoid (H)     Arthralgia of temporomandibular joint     Brain tumor (H)     Carpal tunnel syndrome on both sides     Central hypothyroidism     Fibromyositis     Focal seizure (H)     Growth hormone deficiency (H)     Hyperreflexia     Myofascial pain     Numbness     JARED (obstructive sleep apnea)     Vision abnormalities     Vitamin D deficiency     Essential hypertension     Renal stones     Morbid obesity (H)     Pneumonia due to 2019 novel coronavirus     Acute respiratory failure with hypoxia (H)     Benign neoplasm of cerebral meninges (H)      Past Medical History:   Diagnosis Date     Arthritis     atypical rheumatoid arthritis     Brain tumor (H)      Calculus of kidney 2002     Gout      HLD (hyperlipidemia)      Hypertension      Hypothyroidism      Infection due to 2019 novel coronavirus      JARED (obstructive sleep apnea)      JARED (obstructive sleep apnea)      PONV (postoperative nausea and vomiting)      Seizures (H)     frontal lobe seizures, controlled on Keppra, starts with smell, then de ja vu, nausea     Sleep apnea     uses CPAP     TIA (transient ischemic attack)     takes baby ASA     TMJ (temporomandibular joint syndrome)         Objective      PHYSICAL EXAM   Ht 1.702 m (5' 7\")   Wt 127.9 kg (282 lb)   LMP 10/17/2012   BMI 44.17 kg/m     Physical Exam  Constitutional:       Appearance: Normal appearance.   HENT:      Head: Normocephalic.      Nose: Nose normal.   Eyes:      General: No scleral icterus.  Pulmonary:      Effort: Pulmonary effort is normal.   Musculoskeletal:      Cervical back: Normal range of motion.   Skin:     Findings: No rash.   Neurological:      General: No focal deficit present.      Mental Status: She is alert and oriented to person, place, and time.   Psychiatric:         Mood and Affect: Mood normal.         Behavior: Behavior normal.       LABORATORY     Recent Labs   Lab Test " will continue and finish her Keflex and start Valtrex today.  She will return to office in about 10 days for recheck.  She has been totally counseled about the fact that this is an extremely contagious condition and she needs to take safeguards for people around her.                02/03/23  1318   COLOR Yellow   APPEARANCE Slightly Cloudy*   URINEGLC Negative   URINEBILI Negative   URINEKETONE Negative   SG 1.020   UBLD Negative   URINEPH 7.0   PROTEIN Negative   UROBILINOGEN 2.0*   NITRITE Negative   LEUKEST Negative     IMAGING     I personally reviewed the images.     XR KUB    Result Date: 1/19/2023  XR KUB 1/19/2023 2:46 PM HISTORY: stones???; Nephrolithiasis COMPARISON: 12/20/2021     IMPRESSION:Nonobstructive bowel gas pattern. Moderate amount of formed stool in the colon. No free air. No suspicious calcifications. ANDRES SOTO MD   SYSTEM ID:  [id number]     Assessment & Plan    1. Nephrolithiasis      I had the pleasure today of meeting with Ms. Wilder to discuss her nephrolithiasis.  We discussed her KUB which did not show any definitive stone.  We did discuss that there may be stones that are not visible on KUB.  She does have increased stool in the colon.  She continues to note that this will fluctuate.  Urinalysis does not show concern for infection no evidence of blood.    Given history of recurrent nephrolithiasis and previous large stones, would continue with monitoring.  Patient has not had CT imaging in several years.  We discussed that due to this, would recommend CT next year to evaluate if there are stones that we are missing with KUB.    -Follow up in 1 year with CT ab/pelvis without and return visit for stone monitoring.  This is to see if there are any stones that are not showing up on KUB.  If no stones or minimal, would recommend going back to KUB.    -General kidney stone prevention guidelines given.    -Contact us in the interim with questions, concerns, or changes in symptomatology.    Signed by:       Maricruz Cervantes PA-C 2/3/2023 1:29 PM

## 2023-02-03 NOTE — PATIENT INSTRUCTIONS
Follow up in 1 year with CT ab/pelvis without and return visit for stone monitoring.  This is to see if there are any stones that are not showing up on KUB.  If no stones or minimal, would recommend going back to KUB.    Standard recommendations on kidney stone prevention:  -These include maintaining fluid intake of 3 liters per day or more with a goal of making 2.5 or more liters of urine per day.  If alcoholic or caffeinated beverages are consumed, you need to drink water along with these beverages to maintain hydration.    -A few ounces of lemon juice concentrate a day diluted in water can help prevent stones (citrate is a stone inhibitor).  Can also try Crystal Light.  -Sodium influences calcium excretion in the urine.  Try to limit sodium to 2207-3632 mg/day.   Limit intake of red meat, salt, and salty processed foods.  Limit animal protein to 0.8-1.3 g/kg/day.    -Uric acid-high levels in the blood can lead to kidney stones and gout, especially if the urine pH is low.  Reduce her protein intake, especially red meats.  -Weight loss, if overweight, can reduce the recurrence of kidney stones.  -Diabetes-if diabetic, you are at a greater risk of having kidney stones.  -Maintain calcium intake through DIETARY source with continued consumption of dairy products etc.  Normal intake of calcium is 800-1200 mg/day.  -Limit foods that are high in oxalate such as spinach, sweet potatoes, dark chocolate, soy products, and some nuts such as peanuts.     Contact us in the interim with questions, concerns, or changes in symptomatology.  [phone]

## 2023-02-03 NOTE — LETTER
2/3/2023       RE: Marlyn Wilder  [address] [address] [id number]     Dear Colleague,    Thank you for referring your patient, Marlyn Wilder, to the [facility] [place] [facility]. Please see a copy of my visit note below.    Subjective       CHIEF COMPLAINT/REASON FOR VISIT   Follow up on kidney stones    HISTORY OF PRESENT ILLNESS   Ms. Wilder is a very pleasant 57-year-old female, who presents today for follow-up regarding nephrolithiasis.  I last saw her in 02/21/22.  She underwent PCNL for a 1.4 cm stone with an additional stone in February 2020.  Stone analysis was primarily calcium oxalate monohydrate.    Patient has had stone issues dating back to college.  She notes that this last was approximately the fourth 1 that she has had.  She also endorses that her sister just underwent a procedure for a stone yesterday.    Medical history is significant for meningioma treated both surgically and with radiation therapy as well as seizures secondary to this.    Patient denies passing any stones since last time I saw her.  She denies any hematuria, dysuria, or flank pain.  KUB was obtained for visit.  Urinalysis today does not look convincing for infection does not show evidence of hematuria.    Patient continues to stay well-hydrated and notes that she always has her water bottle with her.    The following portions of the patient's history were reviewed and updated as appropriate: allergies, current medications, past family history, past medical history, past social history, past surgical history, and problem list.     REVIEW OF SYSTEMS   Review of Systems   Constitutional: Negative for chills and fever.   Respiratory: Negative for shortness of breath.    Cardiovascular: Negative for chest pain.   Gastrointestinal: Negative for nausea and vomiting.   Genitourinary: Negative for dysuria, flank pain and hematuria.      Per HPI.     Patient  "Active Problem List   Diagnosis     Calculus of kidney     CARDIOVASCULAR SCREENING; LDL GOAL LESS THAN 160     HTN (hypertension)     Meningioma (H)     Word finding difficulty     Hyperlipidemia LDL goal <130     Partial symptomatic epilepsy with simple partial seizures, intractable, without status epilepticus (H)     Musculoskeletal abnormal finding on examination     Arthritis, rheumatoid (H)     Arthralgia of temporomandibular joint     Brain tumor (H)     Carpal tunnel syndrome on both sides     Central hypothyroidism     Fibromyositis     Focal seizure (H)     Growth hormone deficiency (H)     Hyperreflexia     Myofascial pain     Numbness     JARED (obstructive sleep apnea)     Vision abnormalities     Vitamin D deficiency     Essential hypertension     Renal stones     Morbid obesity (H)     Pneumonia due to 2019 novel coronavirus     Acute respiratory failure with hypoxia (H)     Benign neoplasm of cerebral meninges (H)      Past Medical History:   Diagnosis Date     Arthritis     atypical rheumatoid arthritis     Brain tumor (H)      Calculus of kidney 2002     Gout      HLD (hyperlipidemia)      Hypertension      Hypothyroidism      Infection due to 2019 novel coronavirus      JARED (obstructive sleep apnea)      JARED (obstructive sleep apnea)      PONV (postoperative nausea and vomiting)      Seizures (H)     frontal lobe seizures, controlled on Keppra, starts with smell, then de ja vu, nausea     Sleep apnea     uses CPAP     TIA (transient ischemic attack)     takes baby ASA     TMJ (temporomandibular joint syndrome)         Objective       PHYSICAL EXAM   Ht 1.702 m (5' 7\")   Wt 127.9 kg (282 lb)   LMP 10/17/2012   BMI 44.17 kg/m     Physical Exam  Constitutional:       Appearance: Normal appearance.   HENT:      Head: Normocephalic.      Nose: Nose normal.   Eyes:      General: No scleral icterus.  Pulmonary:      Effort: Pulmonary effort is normal.   Musculoskeletal:      Cervical back: Normal range " of motion.   Skin:     Findings: No rash.   Neurological:      General: No focal deficit present.      Mental Status: She is alert and oriented to person, place, and time.   Psychiatric:         Mood and Affect: Mood normal.         Behavior: Behavior normal.       LABORATORY     Recent Labs   Lab Test 02/03/23  1318   COLOR Yellow   APPEARANCE Slightly Cloudy*   URINEGLC Negative   URINEBILI Negative   URINEKETONE Negative   SG 1.020   UBLD Negative   URINEPH 7.0   PROTEIN Negative   UROBILINOGEN 2.0*   NITRITE Negative   LEUKEST Negative     IMAGING     I personally reviewed the images.     XR KUB    Result Date: 1/19/2023  XR KUB 1/19/2023 2:46 PM HISTORY: stones???; Nephrolithiasis COMPARISON: 12/20/2021     IMPRESSION:Nonobstructive bowel gas pattern. Moderate amount of formed stool in the colon. No free air. No suspicious calcifications. ANDRES SOTO MD   SYSTEM ID:  [id number]     Assessment & Plan    1. Nephrolithiasis      I had the pleasure today of meeting with Ms. Wilder to discuss her nephrolithiasis.  We discussed her KUB which did not show any definitive stone.  We did discuss that there may be stones that are not visible on KUB.  She does have increased stool in the colon.  She continues to note that this will fluctuate.  Urinalysis does not show concern for infection no evidence of blood.    Given history of recurrent nephrolithiasis and previous large stones, would continue with monitoring.  Patient has not had CT imaging in several years.  We discussed that due to this, would recommend CT next year to evaluate if there are stones that we are missing with KUB.    -Follow up in 1 year with CT ab/pelvis without and return visit for stone monitoring.  This is to see if there are any stones that are not showing up on KUB.  If no stones or minimal, would recommend going back to KUB.    -General kidney stone prevention guidelines given.    -Contact us in the interim with questions, concerns, or  changes in symptomatology.    Signed by:       Maricruz Cervantes PA-C 2/3/2023 1:29 PM

## 2023-05-30 ENCOUNTER — ANCILLARY PROCEDURE (OUTPATIENT)
Dept: MRI IMAGING | Facility: CLINIC | Age: 58
End: 2023-05-30
Attending: NEUROLOGICAL SURGERY
Payer: COMMERCIAL

## 2023-05-30 DIAGNOSIS — D32.0 CEREBRAL MENINGIOMA (H): ICD-10-CM

## 2023-05-30 PROCEDURE — 70553 MRI BRAIN STEM W/O & W/DYE: CPT | Performed by: RADIOLOGY

## 2023-05-30 PROCEDURE — A9585 GADOBUTROL INJECTION: HCPCS | Performed by: RADIOLOGY

## 2023-05-30 RX ORDER — GADOBUTROL 604.72 MG/ML
15 INJECTION INTRAVENOUS ONCE
Status: COMPLETED | OUTPATIENT
Start: 2023-05-30 | End: 2023-05-30

## 2023-05-30 RX ADMIN — GADOBUTROL 13 ML: 604.72 INJECTION INTRAVENOUS at 11:33

## 2023-06-01 ENCOUNTER — HEALTH MAINTENANCE LETTER (OUTPATIENT)
Age: 58
End: 2023-06-01

## 2023-06-05 ENCOUNTER — OFFICE VISIT (OUTPATIENT)
Dept: NEUROLOGY | Facility: CLINIC | Age: 58
End: 2023-06-05
Payer: COMMERCIAL

## 2023-06-05 VITALS — OXYGEN SATURATION: 100 % | HEART RATE: 64 BPM | DIASTOLIC BLOOD PRESSURE: 82 MMHG | SYSTOLIC BLOOD PRESSURE: 140 MMHG

## 2023-06-05 DIAGNOSIS — G40.119 PARTIAL SYMPTOMATIC EPILEPSY WITH SIMPLE PARTIAL SEIZURES, INTRACTABLE, WITHOUT STATUS EPILEPTICUS (H): ICD-10-CM

## 2023-06-05 PROCEDURE — 99215 OFFICE O/P EST HI 40 MIN: CPT | Performed by: PSYCHIATRY & NEUROLOGY

## 2023-06-05 RX ORDER — LACOSAMIDE 150 MG/1
150 TABLET ORAL 2 TIMES DAILY
Qty: 180 TABLET | Refills: 1 | Status: SHIPPED | OUTPATIENT
Start: 2023-06-05 | End: 2023-11-06

## 2023-06-05 NOTE — LETTER
6/5/2023       RE: Marlyn Wilder  [address] [address]     Dear Colleague,    Thank you for referring your patient, Marlyn Wilder, to the [facility]UROLOG[facility] [place] at [facility]. Please see a copy of my visit note below.       [facility]psy [facility]:  RETURN VISIT           Service Date: 06/05/2023     HISTORY:  Ms. 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  She came to the visit today with her .      Following the most recent visit to this clinic on  01/17/2023, she has had no seizures of any type, including no auras.       Previously, she was experiencing recurring isolated auras of her habitual types up to several times per month, until surgery on 08/26/2021.       Dr. Pan Calero performed a fourth de-bulking of the recurrent, left-sided, medial sphenoid wing-paraclinoid meningioma on 08/26/2021.  She did well post-operatively.  Isolated auras ceased, except for on that occurred when she missed AED doses in mid-September 2021.     She has had chronic daytime drowsiness on a daily basis over the last 3 months, despite using CPAP for JARED.  Except for drowsiness, she denied having adverse AED effects.       Ictal semiology-history:                       The patient once again confirmed previously presented history in detail today. She again noted that she had a single febrile convulsion of early childhood, but otherwise had no seizures until the initial manifestation of her meningioma in 2011.  She has had only 1 type of seizure since 2011.       The patient has had exclusively simple partial seizures.  These first began in 2011, and have continued since then.  They have been quite stable in semiology and frequency.  She has at least 20 per year, but has only up to 2 on a single day and usually only  one at a time.  The full form of the aura lasts almost 1 minute and features a froilan vu phenomenon associated with a sense that she has just viewed a complex visual scene briefly, although she cannot remember the nature of the scene, with an olfactory hallucination of burning electronics, and waves of intense nausea.  She does not have a postictal state with this.  She is always able to speak if she is with someone.  Her  has seen many of these, which she described the auras in detail to him and she is always able to respond to him quickly.  She never has any automatic behaviors as best he can determine.  Sometimes she has briefer events that feature only 1-2 of her 3 aura phenomena.       The patient has not had any staring spells with unresponsiveness or confusion, and has not had any grand mal seizures following that of early childhood.  She only has involuntary jerks on falling asleep in the evening.        Epilepsy-seizure predispositions:   The patient's epilepsy risk factor is a left middle fossa meningioma with extension into the left parasellar space.  This was resected 3 times in , and pathology showed that there was a grade I meningothelial meningioma.  The portion that is in the parasellar space has surrounded the left optic nerve, and she does have visual deficits on the left.  She had radiation therapy following the last resection by Dr. Calero.  She has had follow-up scans since that time, which showed a stable lesion as recently as 2017.         There is no family history of seizures or epilepsy.       The patient had a single febrile convulsion of early childhood when she was approximately 1 years old.       She has no history of gestational or  injury, developmental delay, stroke, meningitis, encephalitis, and significant head injury.  She denied a history of physical or sexual abuse by an adult during her childhood or adulthood.       Laboratory evaluations:   The most  recent brain MRI was performed at [facility] on 05/18/2017, and was reported to show a stable left medial middle fossa lesion extending into the parasellar space in the left orbital apex, with mild mass effect and with enhancement.       A routine EEG was performed in [place] in 2012, and the report indicated left hemispheric slowing and spikes.   An outpatient EEG at [facility] on 06/15/2016 was abnormal due to left frontotemporal delta slowing and breach effect.      Epilepsy therapeutics:   The only anti-seizure medication that the patient has used has been levetiracetam.  This was started in 2012.  It has been consistently associated with irritability.  The patient was able to control her irritability, to the extent that she was able to function well as an  for the [employer].   Divalproex and lacosamide were later used adjunctively with levetiracetam.  Divalproex was associated with hair breakage and was discontinued for this reason.        PAST MEDICAL-SURGICAL HISTORY:    1. 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   2. History of left middle fossa meningothelial meningioma, grade 1, with extension to the left parasellar area, left orbital apex and cavernous sinus; status post resections ([id number]) and radiation therapy.   3. History of probable transient ischemic attack with aphasia (2013).   4. Obstructive sleep apnea, treated with CPAP.   5. Systemic hypertension, treated.   6. Hyperlipidemia.   7. History of recurring renal calculi.   8. History of arthralgia attributed to atypical rheumatoid arthritis or fibromyalgia.   9. Hypothyroidism.   10. Status post left carpal tunnel release.   11. History of TMJ syndrome.      PERSONAL AND SOCIAL HISTORY:  The patient completed her education through a [employer] degree.  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  She was placed on total disability when her memory problems had progressed to the point that she no longer could  perform necessary duties as an  for a [place] [employer].    She lives with her .  They have 3 adult children, no longer in the home.       REVIEW OF SYSTEMS:    As per HPI     MEDICATIONS:  Levetiracetam 1500 mg b.i.d., lacosamide 150 mg b.i.d., and other medications as per the electronic medical record.      PHYSICAL EXAMINATION:    The patient was an alert woman in no apparent distress.  Vital signs were as per the electronic medical record.  There was a skull defect consistent with reported surgery, with well-healed scars of craniotomies.  Neck was supple, without signs of meningeal irritation.       On neurological examination, the patient appeared mildly lethargic, and was fully oriented to person, place, time, and reason for visit.  Speech showed normal articulation, fluency, repetitions, naming, syntax and comprehension.  Cranial nerves III through XII were normal.  Muscle masses, tones and strengths were normal throughout.  There was no pronator drift.  Sequential fine finger movements were performed normally with each hand.  No spontaneous tremors, myoclonus, or other abnormal movements were observed.  Sensations of light touch, pinprick, vibration and proprioception were reportedly normal throughout.  The rapid alternating movements and finger-nose-finger and heel-shin maneuvers were performed normally bilaterally.  Romberg maneuver was negative.  Regular, heel, toe, tandem and reverse tandem walking were normal.  Deep tendon reflexes were normal and symmetric throughout.  Toes were downgoing bilaterally.       IMPRESSION:    Isolated auras have ceased.  She has not had any seizures causing impaired awareness.    The lacosamide level was 9.5 mcg/ml, on 09.20.2022.  Except for drowsiness, she denied having adverse AED effects.       [facility].       We agreed to discontinue levetiracetam.  I told her that this might  reduce daytime lethargy to a somedegree, but this would be unlikely to fully resolve the lethargy, which likely is multifactorial.       She had a fourth de-bulking of the recurrent, left-sided, medial sphenoid wing-paraclinoid meningioma on 08/26/2021.  Isolated auras then stopped, except for on that occurred when she missed AED doses in mid-September 2021.       She has had excessive daytime drowsiness on a daily basis, despite using CPAP for JARED.        We discussed [place] regulations on seizures and driving.  She has not had any seizures in adulthood of types that would impair her driving.      PLAN:    1.  Discontinue  levetiracetam.     2.  Continue lacosamide at the current dose.   3.  Return visit in approximately 5 months.     I spent 46 minutes in this patient care, with 31 minutes in direct patient contact, and 15 minutes in chart review and document preparation.         Emmanuel Madrid M.D.   Professor of Neurology

## 2023-06-06 ENCOUNTER — OFFICE VISIT (OUTPATIENT)
Dept: NEUROSURGERY | Facility: CLINIC | Age: 58
End: 2023-06-06
Payer: COMMERCIAL

## 2023-06-06 VITALS
HEART RATE: 66 BPM | SYSTOLIC BLOOD PRESSURE: 128 MMHG | BODY MASS INDEX: 44.4 KG/M2 | OXYGEN SATURATION: 98 % | DIASTOLIC BLOOD PRESSURE: 61 MMHG | WEIGHT: 283.5 LBS

## 2023-06-06 DIAGNOSIS — D32.0 CEREBRAL MENINGIOMA (H): Primary | ICD-10-CM

## 2023-06-06 PROCEDURE — 99213 OFFICE O/P EST LOW 20 MIN: CPT | Performed by: NEUROLOGICAL SURGERY

## 2023-06-06 ASSESSMENT — PAIN SCALES - GENERAL: PAINLEVEL: MILD PAIN (3)

## 2023-06-06 NOTE — LETTER
2023       RE: Marlyn Wilder  [address]  [place] [address]     Dear Colleague,    Thank you for referring your patient, Marlyn Wilder, to the [facility] NEUROSURGERY CLINIC [place] at [facility]. Please see a copy of my visit note below.    See dictated note.Visit 20 minutes.  JAKUB Calero MD    Service Date: 2023    Carlos Enrique Neal MD  [facility]  [address], [address]  [place] [address]    RE:     Marlyn Wilder  MRN:  [id number]  :   1965    Dear Dr. Neal:    We saw Mrs. Wilder back in [facility] today.  She was accompanied by her .  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  She is about 2 years out from her last surgery in which we debulked some of the temporal component that was causing local mass effect.  For the most part, the past year has been uneventful for her.  She has not had any obvious seizure activity and Dr. Madrid is weaning off the Keppra.  She remains on Vimpat.  Her main complaint, as it has been for several years, is fatigue.  Both she and her  believe that her cognition is stable.  She continues to read and her short-term memory has been relatively stable.  She has some trouble with names, but otherwise her language seems to be highly functional.  She is not having any headaches.  She describes generalized diminished vision bilaterally without any change in peripheral vision.  She last saw the [facility] about 2 months ago.    PHYSICAL EXAMINATION:  Her general appearance is the best that we have seen in several years.  She seemed to be in good spirits and appeared comfortable.  The left temporalis atrophy is stable.  She has mild anisocoria, but the left pupil still reacts.  She does not have an afferent pupillary defect.  Extraocular movements are normal.  There is no ptosis.  Facial strength is normal.  Her  speech, language and phonation were also normal.  She is moving all extremities with good strength and coordination.  Blood pressure 128/61, heart rate 66.  She is morbidly obese with a weight of 128.6 kilograms and BMI of 44.4.    REVIEW OF STUDIES:  Her MRI from a week ago is stable.  The residual tumor measures a little over 3 cm in maximum dimension.  The chronic gliotic changes in the left anterior medial temporal lobe are unchanged.  There is no new edema.  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  Overall, her imaging findings are stable.    IMPRESSION AND PLAN:  Given her clinical and radiographic stability, we will repeat an MRI in 1 year.  She will have a follow-up eye exam with Dr. Malik in the near future.  Please do not hesitate to contact us with questions.    Sincerely,          Pan Calero MD        D: 2023   T: 2023   MT: al    Name:     GRADY KRISHNANDelvis  MRN:      [id number]        Account:      [id number]   :      1965           Service Date: 2023       Document: [id number]

## 2023-06-06 NOTE — PROGRESS NOTES
[facility] Epilepsy Clinic:  RETURN VISIT           Service Date: 06/05/2023     HISTORY:  Ms. 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  She came to the visit today with her .      Following the most recent visit to this clinic on  01/17/2023, she has had no seizures of any type, including no auras.       Previously, she was experiencing recurring isolated auras of her habitual types up to several times per month, until surgery on 08/26/2021.       Dr. Pan Calero performed a fourth de-bulking of the recurrent, left-sided, medial sphenoid wing-paraclinoid meningioma on 08/26/2021.  She did well post-operatively.  Isolated auras ceased, except for on that occurred when she missed AED doses in mid-September 2021.     She has had chronic daytime drowsiness on a daily basis over the last 3 months, despite using CPAP for JARED.  Except for drowsiness, she denied having adverse AED effects.       Ictal semiology-history:                       The patient once again confirmed previously presented history in detail today. She again noted that she had a single febrile convulsion of early childhood, but otherwise had no seizures until the initial manifestation of her meningioma in 2011.  She has had only 1 type of seizure since 2011.       The patient has had exclusively simple partial seizures.  These first began in 2011, and have continued since then.  They have been quite stable in semiology and frequency.  She has at least 20 per year, but has only up to 2 on a single day and usually only one at a time.  The full form of the aura lasts almost 1 minute and features a froilan vu phenomenon associated with a sense that she has just viewed a complex visual scene briefly, although she cannot remember the nature of the scene, with an olfactory hallucination of burning electronics, and waves of intense  nausea.  She does not have a postictal state with this.  She is always able to speak if she is with someone.  Her  has seen many of these, which she described the auras in detail to him and she is always able to respond to him quickly.  She never has any automatic behaviors as best he can determine.  Sometimes she has briefer events that feature only 1-2 of her 3 aura phenomena.       The patient has not had any staring spells with unresponsiveness or confusion, and has not had any grand mal seizures following that of early childhood.  She only has involuntary jerks on falling asleep in the evening.        Epilepsy-seizure predispositions:   The patient's epilepsy risk factor is a left middle fossa meningioma with extension into the left parasellar space.  This was resected 3 times in , and pathology showed that there was a grade I meningothelial meningioma.  The portion that is in the parasellar space has surrounded the left optic nerve, and she does have visual deficits on the left.  She had radiation therapy following the last resection by Dr. Calero.  She has had follow-up scans since that time, which showed a stable lesion as recently as 2017.         There is no family history of seizures or epilepsy.       The patient had a single febrile convulsion of early childhood when she was approximately 1 years old.       She has no history of gestational or  injury, developmental delay, stroke, meningitis, encephalitis, and significant head injury.  She denied a history of physical or sexual abuse by an adult during her childhood or adulthood.       Laboratory evaluations:   The most recent brain MRI was performed at [facility] on 2017, and was reported to show a stable left medial middle fossa lesion extending into the parasellar space in the left orbital apex, with mild mass effect and with enhancement.       A routine EEG was performed in [place] in , and the report indicated left  hemispheric slowing and spikes.   An outpatient EEG at [facility] on 06/15/2016 was abnormal due to left frontotemporal delta slowing and breach effect.      Epilepsy therapeutics:   The only anti-seizure medication that the patient has used has been levetiracetam.  This was started in 2012.  It has been consistently associated with irritability.  The patient was able to control her irritability, to the extent that she was able to function well as an  for the [employer].   Divalproex and lacosamide were later used adjunctively with levetiracetam.  Divalproex was associated with hair breakage and was discontinued for this reason.        PAST MEDICAL-SURGICAL HISTORY:    1. 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   2. History of left middle fossa meningothelial meningioma, grade 1, with extension to the left parasellar area, left orbital apex and cavernous sinus; status post resections ([id number]) and radiation therapy.   3. History of probable transient ischemic attack with aphasia (2013).   4. Obstructive sleep apnea, treated with CPAP.   5. Systemic hypertension, treated.   6. Hyperlipidemia.   7. History of recurring renal calculi.   8. History of arthralgia attributed to atypical rheumatoid arthritis or fibromyalgia.   9. Hypothyroidism.   10. Status post left carpal tunnel release.   11. History of TMJ syndrome.      PERSONAL AND SOCIAL HISTORY:  The patient completed her education through a [employer] degree.  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  She was placed on total disability when her memory problems had progressed to the point that she no longer could perform necessary duties as an  for a [place] [employer].    She lives with her .  They have 3 adult children, no longer in the home.       REVIEW OF SYSTEMS:    As per HPI     MEDICATIONS:  Levetiracetam 1500 mg b.i.d., lacosamide 150 mg b.i.d., and other medications as per the electronic  medical record.      PHYSICAL EXAMINATION:    The patient was an alert woman in no apparent distress.  Vital signs were as per the electronic medical record.  There was a skull defect consistent with reported surgery, with well-healed scars of craniotomies.  Neck was supple, without signs of meningeal irritation.       On neurological examination, the patient appeared mildly lethargic, and was fully oriented to person, place, time, and reason for visit.  Speech showed normal articulation, fluency, repetitions, naming, syntax and comprehension.  Cranial nerves III through XII were normal.  Muscle masses, tones and strengths were normal throughout.  There was no pronator drift.  Sequential fine finger movements were performed normally with each hand.  No spontaneous tremors, myoclonus, or other abnormal movements were observed.  Sensations of light touch, pinprick, vibration and proprioception were reportedly normal throughout.  The rapid alternating movements and finger-nose-finger and heel-shin maneuvers were performed normally bilaterally.  Romberg maneuver was negative.  Regular, heel, toe, tandem and reverse tandem walking were normal.  Deep tendon reflexes were normal and symmetric throughout.  Toes were downgoing bilaterally.       IMPRESSION:    Isolated auras have ceased.  She has not had any seizures causing impaired awareness.    The lacosamide level was 9.5 mcg/ml, on 09.20.2022.  Except for drowsiness, she denied having adverse AED effects.       [facility].       We agreed to discontinue levetiracetam.  I told her that this might reduce daytime lethargy to a somedegree, but this would be unlikely to fully resolve the lethargy, which likely is multifactorial.       She had a fourth de-bulking of the recurrent, left-sided, medial sphenoid wing-paraclinoid meningioma on 08/26/2021.  Isolated auras then stopped, except for on that occurred  when she missed AED doses in mid-September 2021.       She has had excessive daytime drowsiness on a daily basis, despite using CPAP for JARED.        We discussed [place] regulations on seizures and driving.  She has not had any seizures in adulthood of types that would impair her driving.      PLAN:    1.  Discontinue  levetiracetam.     2.  Continue lacosamide at the current dose.   3.  Return visit in approximately 5 months.     I spent 46 minutes in this patient care, with 31 minutes in direct patient contact, and 15 minutes in chart review and document preparation.         Emmanuel Madrid M.D.   Professor of Neurology

## 2023-06-06 NOTE — PROGRESS NOTES
Service Date: 2023    Carlos Enrique Neal MD  [facility]  [address], [address]  [place] [address]    RE:     Marlyn Wilder  MRN:  [id number]  :   1965    Dear Dr. Neal:    We saw Mrs. Wilder back in [facility] today.  She was accompanied by her .  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  She is about 2 years out from her last surgery in which we debulked some of the temporal component that was causing local mass effect.  For the most part, the past year has been uneventful for her.  She has not had any obvious seizure activity and Dr. Madrid is weaning off the Keppra.  She remains on Vimpat.  Her main complaint, as it has been for several years, is fatigue.  Both she and her  believe that her cognition is stable.  She continues to read and her short-term memory has been relatively stable.  She has some trouble with names, but otherwise her language seems to be highly functional.  She is not having any headaches.  She describes generalized diminished vision bilaterally without any change in peripheral vision.  She last saw the [facility] about 2 months ago.    PHYSICAL EXAMINATION:  Her general appearance is the best that we have seen in several years.  She seemed to be in good spirits and appeared comfortable.  The left temporalis atrophy is stable.  She has mild anisocoria, but the left pupil still reacts.  She does not have an afferent pupillary defect.  Extraocular movements are normal.  There is no ptosis.  Facial strength is normal.  Her speech, language and phonation were also normal.  She is moving all extremities with good strength and coordination.  Blood pressure 128/61, heart rate 66.  She is morbidly obese with a weight of 128.6 kilograms and BMI of 44.4.    REVIEW OF STUDIES:  Her MRI from a week ago is stable.  The residual tumor measures a little over 3 cm in maximum dimension.  The chronic  gliotic changes in the left anterior medial temporal lobe are unchanged.  There is no new edema.  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  Overall, her imaging findings are stable.    IMPRESSION AND PLAN:  Given her clinical and radiographic stability, we will repeat an MRI in 1 year.  She will have a follow-up eye exam with Dr. Malik in the near future.  Please do not hesitate to contact us with questions.    Sincerely,          Pan Calero MD        D: 2023   T: 2023   MT: al    Name:     GRADY KRISHNAN  MRN:      [id number]        Account:      [id number]   :      1965           Service Date: 2023       Document: [id number]

## 2023-06-06 NOTE — PATIENT INSTRUCTIONS
Follow up in 1 year with Dr Calero with MRI Brain with and without contrast    Call Razia RN  Neurosurgery Care Coordinator with questions/concerns     Thank you for using [facility]

## 2023-06-06 NOTE — LETTER
2023      RE: Marlyn Wilder  [address] [facility] [id number]       See dictated note.Visit 20 minutes.  JAKUB Calero MD    Service Date: 2023    Carlos Enrique Neal MD  [facility]  [address], [address]  [place] [address]    RE:     Marlyn Wilder  MRN:  [id number]  :   1965    Dear Dr. Neal:    We saw Mrs. Wilder back in [facility] today.  She was accompanied by her .  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  She is about 2 years out from her last surgery in which we debulked some of the temporal component that was causing local mass effect.  For the most part, the past year has been uneventful for her.  She has not had any obvious seizure activity and Dr. Madrid is weaning off the Keppra.  She remains on Vimpat.  Her main complaint, as it has been for several years, is fatigue.  Both she and her  believe that her cognition is stable.  She continues to read and her short-term memory has been relatively stable.  She has some trouble with names, but otherwise her language seems to be highly functional.  She is not having any headaches.  She describes generalized diminished vision bilaterally without any change in peripheral vision.  She last saw the [facility] about 2 months ago.    PHYSICAL EXAMINATION:  Her general appearance is the best that we have seen in several years.  She seemed to be in good spirits and appeared comfortable.  The left temporalis atrophy is stable.  She has mild anisocoria, but the left pupil still reacts.  She does not have an afferent pupillary defect.  Extraocular movements are normal.  There is no ptosis.  Facial strength is normal.  Her speech, language and phonation were also normal.  She is moving all extremities with good strength and coordination.  Blood pressure 128/61, heart rate 66.  She is morbidly obese with a weight of 128.6 kilograms and BMI of  44.4.    REVIEW OF STUDIES:  Her MRI from a week ago is stable.  The residual tumor measures a little over 3 cm in maximum dimension.  The chronic gliotic changes in the left anterior medial temporal lobe are unchanged.  There is no new edema.  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  Overall, her imaging findings are stable.    IMPRESSION AND PLAN:  Given her clinical and radiographic stability, we will repeat an MRI in 1 year.  She will have a follow-up eye exam with Dr. Malik in the near future.  Please do not hesitate to contact us with questions.    Sincerely,          Pan Calero MD        D: 2023   T: 2023   MT: al    Name:     GRADY KRISHNAN  MRN:      [id number]        Account:      [id number]   :      1965           Service Date: 2023       Document: [id number]      Pan Calero MD

## 2023-11-06 ENCOUNTER — OFFICE VISIT (OUTPATIENT)
Dept: NEUROLOGY | Facility: CLINIC | Age: 58
End: 2023-11-06
Payer: COMMERCIAL

## 2023-11-06 ENCOUNTER — LAB (OUTPATIENT)
Dept: LAB | Facility: CLINIC | Age: 58
End: 2023-11-06
Payer: COMMERCIAL

## 2023-11-06 VITALS
DIASTOLIC BLOOD PRESSURE: 80 MMHG | HEART RATE: 76 BPM | RESPIRATION RATE: 18 BRPM | BODY MASS INDEX: 44.42 KG/M2 | SYSTOLIC BLOOD PRESSURE: 138 MMHG | OXYGEN SATURATION: 96 % | HEIGHT: 67 IN | WEIGHT: 283 LBS

## 2023-11-06 DIAGNOSIS — G40.119 PARTIAL SYMPTOMATIC EPILEPSY WITH SIMPLE PARTIAL SEIZURES, INTRACTABLE, WITHOUT STATUS EPILEPTICUS (H): ICD-10-CM

## 2023-11-06 PROCEDURE — 36415 COLL VENOUS BLD VENIPUNCTURE: CPT | Performed by: PATHOLOGY

## 2023-11-06 PROCEDURE — 80235 DRUG ASSAY LACOSAMIDE: CPT | Mod: 90 | Performed by: PATHOLOGY

## 2023-11-06 PROCEDURE — 99000 SPECIMEN HANDLING OFFICE-LAB: CPT | Performed by: PATHOLOGY

## 2023-11-06 PROCEDURE — 99214 OFFICE O/P EST MOD 30 MIN: CPT | Mod: GC | Performed by: PSYCHIATRY & NEUROLOGY

## 2023-11-06 RX ORDER — LACOSAMIDE 150 MG/1
150 TABLET ORAL 2 TIMES DAILY
Qty: 180 TABLET | Refills: 1 | Status: SHIPPED | OUTPATIENT
Start: 2023-11-06 | End: 2024-07-05

## 2023-11-06 ASSESSMENT — PAIN SCALES - GENERAL: PAINLEVEL: NO PAIN (0)

## 2023-11-06 NOTE — LETTER
"11/6/2023       RE: Marlyn Wilder  [address] [address] [address]     Dear Colleague,    Thank you for referring your patient, Marlyn Wilder, to the [facility] [place] at [facility]. Please see a copy of my visit note below.      [facility]:  RETURN VISIT           Service Date: 11/06/2023     HISTORY:  Ms. 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  She came to the visit today with her .      Following the most recent visit to this clinic on 06/05/2023, she has had no seizures of any type, including no auras.      Patient does mention that since stopping the Keppra, she still continues to feel sleepy, per her  notes that he has noticed that she has more energy in her which is an improvement.  Patient continues to have these \"waves\" of not feeling good, and feeling more tired.  These have been for minutes before resolving on its own, and happens a few times a week.  These waves overall have decreased in frequency compared to before.  She denies any auras.    She notes that she has missed her medications a couple of times over the last 5 months, but both times she compensated by taking an extra dose the next time she was due to take 1.    No side effects from the Vimpat.  No palpitations, shortness of breath.     Previously, she was experiencing recurring isolated auras of her habitual types up to several times per month, until surgery on 08/26/2021.       She has had chronic daytime drowsiness on a daily basis over the last 3 months, despite using CPAP for JARED.  Except for drowsiness, she denied having adverse AED effects.      Last Vimpat level on 9/20/2022 was 9.5.     Ictal semiology-history:                       The patient once again confirmed previously presented history in detail today. " She again noted that she had a single febrile convulsion of early childhood, but otherwise had no seizures until the initial manifestation of her meningioma in 2011.  She has had only 1 type of seizure since 2011.       The patient has had exclusively simple partial seizures.  These first began in 2011, and have continued since then.  They have been quite stable in semiology and frequency.  She has at least 20 per year, but has only up to 2 on a single day and usually only one at a time.  The full form of the aura lasts almost 1 minute and features a froilan vu phenomenon associated with a sense that she has just viewed a complex visual scene briefly, although she cannot remember the nature of the scene, with an olfactory hallucination of burning electronics, and waves of intense nausea.  She does not have a postictal state with this.  She is always able to speak if she is with someone.  Her  has seen many of these, which she described the auras in detail to him and she is always able to respond to him quickly.  She never has any automatic behaviors as best he can determine.  Sometimes she has briefer events that feature only 1-2 of her 3 aura phenomena.       The patient has not had any staring spells with unresponsiveness or confusion, and has not had any grand mal seizures following that of early childhood.  She only has involuntary jerks on falling asleep in the evening.        Epilepsy-seizure predispositions:   The patient's epilepsy risk factor is a left middle fossa meningioma with extension into the left parasellar space.  This was resected 3 times in 2012, and pathology showed that there was a grade I meningothelial meningioma.  The portion that is in the parasellar space has surrounded the left optic nerve, and she does have visual deficits on the left.  She had radiation therapy following the last resection by Dr. Calero.  She has had follow-up scans since that time, which showed a stable lesion as  recently as 2017.       Dr. Pan Calero performed a fourth de-bulking of the recurrent, left-sided, medial sphenoid wing-paraclinoid meningioma on 2021.  She did well post-operatively.  Isolated auras ceased, except for on that occurred when she missed AED doses in mid-2021.      There is no family history of seizures or epilepsy.       The patient had a single febrile convulsion of early childhood when she was approximately 1 years old.       She has no history of gestational or  injury, developmental delay, stroke, meningitis, encephalitis, and significant head injury.  She denied a history of physical or sexual abuse by an adult during her childhood or adulthood.       Laboratory evaluations:   The most recent brain MRI was performed at [facility] on 2017, and was reported to show a stable left medial middle fossa lesion extending into the parasellar space in the left orbital apex, with mild mass effect and with enhancement.       A routine EEG was performed in [place] in , and the report indicated left hemispheric slowing and spikes.   An outpatient EEG at [facility] on 06/15/2016 was abnormal due to left frontotemporal delta slowing and breach effect.    Last EEG in 10/2022- left frontotemporal neuronal dysfunction and are consistent with the reported left middle fossa surgical procedure. No seizures.    Last Vimpat level on 2022 was 9.5.      Epilepsy therapeutics:   The only anti-seizure medication that the patient has used has been levetiracetam.  This was started in .  It has been consistently associated with irritability.  The patient was able to control her irritability, to the extent that she was able to function well as an  for the [employer].   Divalproex and lacosamide were later used adjunctively with levetiracetam.  Divalproex was associated with hair breakage and was discontinued for this reason.    Levetiracetam was discontinued in view of  excessive drowsiness during the day.      PAST MEDICAL-SURGICAL HISTORY:    1. 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   2. History of left middle fossa meningothelial meningioma, grade 1, with extension to the left parasellar area, left orbital apex and cavernous sinus; status post resections (0256-5447) and radiation therapy.   3. History of probable transient ischemic attack with aphasia (2013).   4. Obstructive sleep apnea, treated with CPAP.   5. Systemic hypertension, treated.   6. Hyperlipidemia.   7. History of recurring renal calculi.   8. History of arthralgia attributed to atypical rheumatoid arthritis or fibromyalgia.   9. Hypothyroidism.   10. Status post left carpal tunnel release.   11. History of TMJ syndrome.      PERSONAL AND SOCIAL HISTORY:  The patient completed her education through a [employer] degree.  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  She was placed on total disability when her memory problems had progressed to the point that she no longer could perform necessary duties as an  for a [place] [employer].    She lives with her .  They have 3 adult children, no longer in the home.       REVIEW OF SYSTEMS:    As per [facility]     MEDICATIONS:  Lacosamide 150 mg b.i.d., and other medications as per the electronic medical record.      PHYSICAL EXAMINATION:    The patient was an alert woman in no apparent distress.  Vital signs were as per the electronic medical record.  There was a skull defect consistent with reported surgery, with well-healed scars of craniotomies.  Neck was supple, without signs of meningeal irritation.       On neurological examination, the patient appeared mildly lethargic, and was fully oriented to person, place, time, and reason for visit.  Speech showed normal articulation, fluency, repetitions, naming, syntax and comprehension.  Cranial nerves III through XII were normal.  Muscle masses, tones and strengths were normal  throughout.  There was no pronator drift.  Sequential fine finger movements were performed normally with each hand.  No spontaneous tremors, myoclonus, or other abnormal movements were observed.  Sensations of light touch, pinprick, vibration and proprioception were reportedly normal throughout.  The rapid alternating movements and finger-nose-finger and heel-shin maneuvers were performed normally bilaterally.  Romberg maneuver was negative.  Regular, heel, toe, tandem and reverse tandem walking were normal.  Deep tendon reflexes were normal and symmetric throughout.  Toes were downgoing bilaterally.       IMPRESSION:    Patient reports that she has not had any auras or seizures since discontinuing the Keppra.  Her energy levels have improved, although she still continues to have waves of feeling tired.  These do not bother her as she is now retired and it only lasts minutes.  She is stable on the Vimpat with no side effects.    We will plan to repeat a Vimpat level and follow-up with her again in 1 year.  From a meningioma standpoint, she is due to have a follow-up MRI next year in June with her follow-up visit with neurosurgery then.    We discussed [place] regulations on seizures and driving.  She has not had any seizures in adulthood of types that would impair her driving.      PLAN:    1.  Repeat lacosamide level.  2.  Continue lacosamide at the current dose.   3.  Return visit in approximately 10 months.      Patient was seen and discussed with attending, Dr. Madrid, who agrees with the assessment and plan.     SANGITA Sandhu  PGY2 Resident, Neurology     Report Prepared By: SANGITA Sandhu, Neurology Resident   I agree with the findings and plan of care as documented.  I personally examined the patient, and discussed our epilepsy diagnostic impressions and therapeutic recommendations with the patient.  The patient was agreeable to this plan.    I spent 29 minutes in this patient care, with 14  minutes in direct patient contact, and 15 minutes in chart review.         Again, thank you for allowing me to participate in the care of your patient.      Sincerely,    Emmanuel Madrid MD

## 2023-11-06 NOTE — PROGRESS NOTES
"  [facility]:  RETURN VISIT           Service Date: 11/06/2023     HISTORY:  Ms. 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  She came to the visit today with her .      Following the most recent visit to this clinic on 06/05/2023, she has had no seizures of any type, including no auras.      Patient does mention that since stopping the Keppra, she still continues to feel sleepy, per her  notes that he has noticed that she has more energy in her which is an improvement.  Patient continues to have these \"waves\" of not feeling good, and feeling more tired.  These have been for minutes before resolving on its own, and happens a few times a week.  These waves overall have decreased in frequency compared to before.  She denies any auras.    She notes that she has missed her medications a couple of times over the last 5 months, but both times she compensated by taking an extra dose the next time she was due to take 1.    No side effects from the Vimpat.  No palpitations, shortness of breath.     Previously, she was experiencing recurring isolated auras of her habitual types up to several times per month, until surgery on 08/26/2021.       She has had chronic daytime drowsiness on a daily basis over the last 3 months, despite using CPAP for JARED.  Except for drowsiness, she denied having adverse AED effects.      Last Vimpat level on 9/20/2022 was 9.5.     Ictal semiology-history:                       The patient once again confirmed previously presented history in detail today. She again noted that she had a single febrile convulsion of early childhood, but otherwise had no seizures until the initial manifestation of her meningioma in 2011.  She has had only 1 type of seizure since 2011.       The patient has had exclusively simple partial seizures.  These first began in 2011, and have " continued since then.  They have been quite stable in semiology and frequency.  She has at least 20 per year, but has only up to 2 on a single day and usually only one at a time.  The full form of the aura lasts almost 1 minute and features a froilan vu phenomenon associated with a sense that she has just viewed a complex visual scene briefly, although she cannot remember the nature of the scene, with an olfactory hallucination of burning electronics, and waves of intense nausea.  She does not have a postictal state with this.  She is always able to speak if she is with someone.  Her  has seen many of these, which she described the auras in detail to him and she is always able to respond to him quickly.  She never has any automatic behaviors as best he can determine.  Sometimes she has briefer events that feature only 1-2 of her 3 aura phenomena.       The patient has not had any staring spells with unresponsiveness or confusion, and has not had any grand mal seizures following that of early childhood.  She only has involuntary jerks on falling asleep in the evening.        Epilepsy-seizure predispositions:   The patient's epilepsy risk factor is a left middle fossa meningioma with extension into the left parasellar space.  This was resected 3 times in 2012, and pathology showed that there was a grade I meningothelial meningioma.  The portion that is in the parasellar space has surrounded the left optic nerve, and she does have visual deficits on the left.  She had radiation therapy following the last resection by Dr. Calero.  She has had follow-up scans since that time, which showed a stable lesion as recently as 05/2017.       Dr. Pan Calero performed a fourth de-bulking of the recurrent, left-sided, medial sphenoid wing-paraclinoid meningioma on 08/26/2021.  She did well post-operatively.  Isolated auras ceased, except for on that occurred when she missed AED doses in mid-September 2021.      There  is no family history of seizures or epilepsy.       The patient had a single febrile convulsion of early childhood when she was approximately 1 years old.       She has no history of gestational or  injury, developmental delay, stroke, meningitis, encephalitis, and significant head injury.  She denied a history of physical or sexual abuse by an adult during her childhood or adulthood.       Laboratory evaluations:   The most recent brain MRI was performed at [facility] on 2017, and was reported to show a stable left medial middle fossa lesion extending into the parasellar space in the left orbital apex, with mild mass effect and with enhancement.       A routine EEG was performed in [place] in , and the report indicated left hemispheric slowing and spikes.   An [facility] on 06/15/2016 was abnormal due to left frontotemporal delta slowing and breach effect.    Last EEG in 10/2022- left frontotemporal neuronal dysfunction and are consistent with the reported left middle fossa surgical procedure. No seizures.    Last Vimpat level on 2022 was 9.5.      Epilepsy therapeutics:   The only anti-seizure medication that the patient has used has been levetiracetam.  This was started in .  It has been consistently associated with irritability.  The patient was able to control her irritability, to the extent that she was able to function well as an  for the [employer].   Divalproex and lacosamide were later used adjunctively with levetiracetam.  Divalproex was associated with hair breakage and was discontinued for this reason.    Levetiracetam was discontinued in view of excessive drowsiness during the day.      PAST MEDICAL-SURGICAL HISTORY:    1. 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   2. History of left middle fossa meningothelial meningioma, grade 1, with extension to the left parasellar area, left orbital apex and cavernous sinus; status post resections  ([id number]) and radiation therapy.   3. History of probable transient ischemic attack with aphasia (2013).   4. Obstructive sleep apnea, treated with CPAP.   5. Systemic hypertension, treated.   6. Hyperlipidemia.   7. History of recurring renal calculi.   8. History of arthralgia attributed to atypical rheumatoid arthritis or fibromyalgia.   9. Hypothyroidism.   10. Status post left carpal tunnel release.   11. History of TMJ syndrome.      PERSONAL AND SOCIAL HISTORY:  The patient completed her education through a [employer] degree.  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  She was placed on total disability when her memory problems had progressed to the point that she no longer could perform necessary duties as an  for a [employer].    She lives with her .  They have 3 adult children, no longer in the home.       REVIEW OF SYSTEMS:    As per [facility]     MEDICATIONS:  Lacosamide 150 mg b.i.d., and other medications as per the electronic medical record.      PHYSICAL EXAMINATION:    The patient was an alert woman in no apparent distress.  Vital signs were as per the electronic medical record.  There was a skull defect consistent with reported surgery, with well-healed scars of craniotomies.  Neck was supple, without signs of meningeal irritation.       On neurological examination, the patient appeared mildly lethargic, and was fully oriented to person, place, time, and reason for visit.  Speech showed normal articulation, fluency, repetitions, naming, syntax and comprehension.  Cranial nerves III through XII were normal.  Muscle masses, tones and strengths were normal throughout.  There was no pronator drift.  Sequential fine finger movements were performed normally with each hand.  No spontaneous tremors, myoclonus, or other abnormal movements were observed.  Sensations of light touch, pinprick, vibration and proprioception were reportedly normal throughout.  The rapid  alternating movements and finger-nose-finger and heel-shin maneuvers were performed normally bilaterally.  Romberg maneuver was negative.  Regular, heel, toe, tandem and reverse tandem walking were normal.  Deep tendon reflexes were normal and symmetric throughout.  Toes were downgoing bilaterally.       IMPRESSION:    Patient reports that she has not had any auras or seizures since discontinuing the Keppra.  Her energy levels have improved, although she still continues to have waves of feeling tired.  These do not bother her as she is now retired and it only lasts minutes.  She is stable on the Vimpat with no side effects.    We will plan to repeat a Vimpat level and follow-up with her again in 1 year.  From a meningioma standpoint, she is due to have a follow-up MRI next year in June with her follow-up visit with neurosurgery then.    We discussed [place] regulations on seizures and driving.  She has not had any seizures in adulthood of types that would impair her driving.      PLAN:    1.  Repeat lacosamide level.  2.  Continue lacosamide at the current dose.   3.  Return visit in approximately 10 months.      Patient was seen and discussed with attending, Dr. Madrid, who agrees with the assessment and plan.     SANGITA Sandhu  PGY2 Resident, Neurology     Report Prepared By: SANGITA Sandhu, Neurology Resident   I agree with the findings and plan of care as documented.  I personally examined the patient, and discussed our epilepsy diagnostic impressions and therapeutic recommendations with the patient.  The patient was agreeable to this plan.    I spent 29 minutes in this patient care, with 14 minutes in direct patient contact, and 15 minutes in chart review.   Emmanuel Madrid M.D., Professor of Neurology

## 2023-11-09 LAB — LACOSAMIDE SERPL-MCNC: 7.3 UG/ML

## 2024-01-09 ENCOUNTER — IMMUNIZATION (OUTPATIENT)
Dept: FAMILY MEDICINE | Facility: CLINIC | Age: 59
End: 2024-01-09
Payer: COMMERCIAL

## 2024-01-09 DIAGNOSIS — Z23 NEED FOR COVID-19 VACCINE: ICD-10-CM

## 2024-01-09 DIAGNOSIS — Z23 NEEDS FLU SHOT: Primary | ICD-10-CM

## 2024-01-09 PROCEDURE — 90686 IIV4 VACC NO PRSV 0.5 ML IM: CPT

## 2024-01-09 PROCEDURE — 99207 PR NO CHARGE NURSE ONLY: CPT

## 2024-01-09 PROCEDURE — 90480 ADMN SARSCOV2 VAC 1/ONLY CMP: CPT

## 2024-01-09 PROCEDURE — 90471 IMMUNIZATION ADMIN: CPT

## 2024-01-09 PROCEDURE — 91320 SARSCV2 VAC 30MCG TRS-SUC IM: CPT

## 2024-01-09 NOTE — PROGRESS NOTES
Prior to immunization administration, verified patients identity using patient s name and date of birth. Please see Immunization Activity for additional information.     Screening Questionnaire for Adult Immunization    Are you sick today?   No   Do you have allergies to medications, food, a vaccine component or latex?   No   Have you ever had a serious reaction after receiving a vaccination?   No   Do you have a long-term health problem with heart, lung, kidney, or metabolic disease (e.g., diabetes), asthma, a blood disorder, no spleen, complement component deficiency, a cochlear implant, or a spinal fluid leak?  Are you on long-term aspirin therapy?   No   Do you have cancer, leukemia, HIV/AIDS, or any other immune system problem?   No   Do you have a parent, brother, or sister with an immune system problem?   No   In the past 3 months, have you taken medications that affect  your immune system, such as prednisone, other steroids, or anticancer drugs; drugs for the treatment of rheumatoid arthritis, Crohn s disease, or psoriasis; or have you had radiation treatments?   No   Have you had a seizure, or a brain or other nervous system problem?   No   During the past year, have you received a transfusion of blood or blood    products, or been given immune (gamma) globulin or antiviral drug?   No   For women: Are you pregnant or is there a chance you could become       pregnant during the next month?   No   Have you received any vaccinations in the past 4 weeks?   No     Immunization questionnaire answers were all negative.    I have reviewed the following standing orders:   This patient is due and qualifies for the Covid-19 vaccine.     Click here for COVID-19 Standing Order    I have reviewed the vaccines inclusion and exclusion criteria; No concerns regarding eligibility.     This patient is due and qualifies for the Influenza vaccine.    Click here for Influenza Vaccine Standing Order    I have reviewed the  vaccines inclusion and exclusion criteria; No concerns regarding eligibility.     Patient instructed to remain in clinic for 15 minutes afterwards, and to report any adverse reactions.     Screening performed by Angelica Acuna CMA on 1/9/2024 at 2:51 PM.

## 2024-01-13 ENCOUNTER — HEALTH MAINTENANCE LETTER (OUTPATIENT)
Age: 59
End: 2024-01-13

## 2024-02-02 ENCOUNTER — HOSPITAL ENCOUNTER (OUTPATIENT)
Dept: CT IMAGING | Facility: CLINIC | Age: 59
Discharge: HOME | End: 2024-02-02
Attending: PHYSICIAN ASSISTANT | Admitting: PHYSICIAN ASSISTANT
Payer: COMMERCIAL

## 2024-02-02 DIAGNOSIS — N20.0 NEPHROLITHIASIS: ICD-10-CM

## 2024-02-02 PROCEDURE — 74176 CT ABD & PELVIS W/O CONTRAST: CPT

## 2024-02-12 ENCOUNTER — VIRTUAL VISIT (OUTPATIENT)
Dept: UROLOGY | Facility: CLINIC | Age: 59
End: 2024-02-12
Payer: COMMERCIAL

## 2024-02-12 DIAGNOSIS — N20.0 NEPHROLITHIASIS: Primary | ICD-10-CM

## 2024-02-12 PROCEDURE — 99213 OFFICE O/P EST LOW 20 MIN: CPT | Mod: 95 | Performed by: PHYSICIAN ASSISTANT

## 2024-02-12 ASSESSMENT — ENCOUNTER SYMPTOMS
VOMITING: 0
DYSURIA: 0
SHORTNESS OF BREATH: 0
FATIGUE: 1
FLANK PAIN: 0
NAUSEA: 0
CHILLS: 0
FEVER: 0
HEMATURIA: 0

## 2024-02-12 NOTE — LETTER
2/12/2024       RE: Marlyn Wilder  [address] [facility] [address]     Dear Colleague,    Thank you for referring your patient, Marlyn Wilder, to the [facility] [place] [facility]. Please see a copy of my visit note below.    Virtual Visit Details    Type of service:  Video Visit   Video Start Time:  1531  Video End Time: 1538    Originating Location (pt. Location): Home    Distant Location (provider location):  On-site  Platform used for Video Visit: Mirza    CHIEF COMPLAINT/REASON FOR VISIT   Follow up on nephrolithiasis     HISTORY OF PRESENT ILLNESS   Ms. Wilder is very pleasant 58 year old year old female, who presents today for follow-up on nephrolithiasis.  I last saw her on 02/03/2023.  She underwent PCNL for a 1.4 cm stone within the right kidney in February 2022.  Stone analysis was primarily calcium oxalate monohydrate.  CT imaging after that procedure showed a residual approximately 4 to 5 mm stone.    Since that time, she has not had passage of any additional stones, including over the last year.  She has had issues with stones over the last year.  Family history of nephrolithiasis in her sister.    Her previous KUBs did not show any stones.  Recommendation given that we knew she did have a stone was for CT imaging, which she completed prior to this visit.    Patient continues to try to stay well-hydrated and always has her water bottle with her.    Patient continues to have a lot of follow-up visits for her history of meningioma.  She does note significant fatigue.  She denies any hematuria or flank pain.    The following portions of the patient's history were reviewed and updated as appropriate: allergies, current medications, past family history, past medical history, past social history, past surgical history, and problem list.     REVIEW OF SYSTEMS   Review of Systems   Constitutional:  Positive for fatigue. Negative  for chills and fever.   Respiratory:  Negative for shortness of breath.    Cardiovascular:  Negative for chest pain.   Gastrointestinal:  Negative for nausea and vomiting.   Genitourinary:  Negative for dysuria, flank pain and hematuria.      Per HPI.     Patient Active Problem List   Diagnosis    Calculus of kidney    CARDIOVASCULAR SCREENING; LDL GOAL LESS THAN 160    HTN (hypertension)    Meningioma (H)    Word finding difficulty    Hyperlipidemia LDL goal <130    Partial symptomatic epilepsy with simple partial seizures, intractable, without status epilepticus (H)    Musculoskeletal abnormal finding on examination    Arthritis, rheumatoid (H)    Arthralgia of temporomandibular joint    Brain tumor (H)    Carpal tunnel syndrome on both sides    Central hypothyroidism    Fibromyositis    Focal seizure (H)    Growth hormone deficiency (H24)    Hyperreflexia    Myofascial pain    Numbness    JARED (obstructive sleep apnea)    Vision abnormalities    Vitamin D deficiency    Essential hypertension    Renal stones    Morbid obesity (H)    Pneumonia due to 2019 novel coronavirus    Acute respiratory failure with hypoxia (H)    Benign neoplasm of cerebral meninges (H)      Past Medical History:   Diagnosis Date    Arthritis     atypical rheumatoid arthritis    Brain tumor (H)     Calculus of kidney 2002    Gout     HLD (hyperlipidemia)     Hypertension     Hypothyroidism     Infection due to 2019 novel coronavirus     JARED (obstructive sleep apnea)     JARED (obstructive sleep apnea)     PONV (postoperative nausea and vomiting)     Seizures (H)     frontal lobe seizures, controlled on Keppra, starts with smell, then de ja vu, nausea    Sleep apnea     uses CPAP    TIA (transient ischemic attack)     takes baby ASA    TMJ (temporomandibular joint syndrome)         Objective     PHYSICAL EXAM   GENERAL: alert and no distress  EYES: Eyes grossly normal to inspection.  No discharge or erythema, or obvious scleral/conjunctival  abnormalities.  HENT: Normal cephalic/atraumatic.  External ears, nose and mouth without ulcers or lesions.  No nasal drainage visible.  NECK: No asymmetry, visible masses or scars  RESP: No audible wheeze, cough, or visible cyanosis.    MS: No gross musculoskeletal defects noted.  Normal range of motion.  No visible edema.  SKIN: Visible skin clear. No significant rash, abnormal pigmentation or lesions.  NEURO: Cranial nerves grossly intact.  Mentation and speech appropriate for age.  PSYCH: Appropriate affect, tone, and pace of words     LABORATORY     Recent Labs   Lab Test 02/03/23  1318   COLOR Yellow   APPEARANCE Slightly Cloudy*   URINEGLC Negative   URINEBILI Negative   URINEKETONE Negative   SG 1.020   UBLD Negative   URINEPH 7.0   PROTEIN Negative   UROBILINOGEN 2.0*   NITRITE Negative   LEUKEST Negative     IMAGING     I personally reviewed the images.     CT Abdomen Pelvis w/o Contrast    Result Date: 2/2/2024  CT ABDOMEN AND PELVIS WITHOUT CONTRAST 2/2/2024 11:11 AM CLINICAL HISTORY: Question stone burden; Nephrolithiasis.  TECHNIQUE: CT scan of the abdomen and pelvis was performed without IV contrast. Multiplanar reformats were obtained. Dose reduction techniques were used. CONTRAST: None. COMPARISON: February 26, 2020 FINDINGS: LOWER CHEST: No infiltrates or effusions. HEPATOBILIARY: No significant mass or bile duct dilatation. No calcified gallstones. PANCREAS: No significant mass, duct dilatation, or inflammatory change. SPLEEN: Normal size. ADRENAL GLANDS: No significant nodules. KIDNEYS/BLADDER: 4 mm stone in the mid right kidney. No other urolithiasis. Stone burden similar to previous. No ureteral stones or hydronephrosis. BOWEL: No obstruction or inflammatory change. VASCULATURE: There are mild calcified atherosclerotic changes of the visualized aorta and its branches. There is no evidence of aortic aneurysm. PELVIC ORGANS: No pelvic masses. OTHER: No free air or free fluid. MUSCULOSKELETAL: No  frankly destructive bony lesions.     IMPRESSION: There is a single 4 mm stone in the right kidney. No other urolithiasis. Stone burden previous. GHASSAN GALVAN MD   SYSTEM ID:  [id number]     Assessment & Plan   1. Nephrolithiasis        I had the pleasure today of meeting with Ms. Wilder to discuss her follow-up for nephrolithiasis.  We reviewed her CT imaging which shows a stable residual stone noted in the right kidney.  This has not essentially not changed in over 4 years.  Patient has not passed any stone since that time.    Given the very large stone that required PCNL, would recommend continue to follow-up to ensure that the stone is not get to that size.  However, given the stability I think it is reasonable to push out follow-up.  Patient is in agreement with this plan.  Would likely plan on doing a CT given that her stone is not showing up on KUB.    -Follow-up in 1.5 years with CT of pelvis without contrast and return visit.    -General kidney stone prevention guidelines given.    Contact us in the interim with questions, concerns, or changes in symptomatology.    Signed by:       Maricruz Cervantes PA-C 2/12/2024 3:39 PM

## 2024-02-12 NOTE — NURSING NOTE
Is the patient currently in the state of [place]? YES    Visit mode:VIDEO    If the visit is dropped, the patient can be reconnected by: VIDEO VISIT: Text to cell phone:   Telephone Information:   Mobile [phone]       Will anyone else be joining the visit? NO  (If patient encounters technical issues they should call [phone])    How would you like to obtain your AVS? MyChart    Are changes needed to the allergy or medication list? No    Reason for visit: RECHECK    Nicole GOEL

## 2024-02-12 NOTE — PATIENT INSTRUCTIONS
-Follow-up in 1.5 years with CT of pelvis without contrast and return visit.    Standard recommendations on kidney stone prevention:  -These include maintaining fluid intake of 3 liters per day or more with a goal of making 2.5 or more liters of urine per day.  If alcoholic or caffeinated beverages are consumed, you need to drink water along with these beverages to maintain hydration.    -A few ounces of lemon juice concentrate a day diluted in water can help prevent stones (citrate is a stone inhibitor).  Can also try Crystal Light.  -Sodium influences calcium excretion in the urine.  Try to limit sodium to 6850-5289 mg/day.   Limit intake of red meat, salt, and salty processed foods.  Limit animal protein to 0.8-1.3 g/kg/day.    -Uric acid-high levels in the blood can lead to kidney stones and gout, especially if the urine pH is low.  Reduce her protein intake, especially red meats.  -Weight loss, if overweight, can reduce the recurrence of kidney stones.  -Diabetes-if diabetic, you are at a greater risk of having kidney stones.  -Maintain calcium intake through DIETARY source with continued consumption of dairy products etc.  Normal intake of calcium is 800-1200 mg/day.  -Limit foods that are high in oxalate such as spinach, sweet potatoes, dark chocolate, soy products, and some nuts such as peanuts.   -Medications that can increase risk of kidney stones: Ephedrine, Guaifenesin, Triamterene, Lasix, Diamox, Topamax, Zonegran, laxatives.     Contact us in the interim with questions, concerns, or changes in symptomatology.  [phone]

## 2024-02-12 NOTE — PROGRESS NOTES
Virtual Visit Details    Type of service:  Video Visit   Video Start Time:  1531  Video End Time: 1538    Originating Location (pt. Location): Home    Distant Location (provider location):  On-site  Platform used for Video Visit: Mirza    CHIEF COMPLAINT/REASON FOR VISIT   Follow up on nephrolithiasis     HISTORY OF PRESENT ILLNESS   Ms. Wilder is very pleasant 58 year old year old female, who presents today for follow-up on nephrolithiasis.  I last saw her on 02/03/2023.  She underwent PCNL for a 1.4 cm stone within the right kidney in February 2022.  Stone analysis was primarily calcium oxalate monohydrate.  CT imaging after that procedure showed a residual approximately 4 to 5 mm stone.    Since that time, she has not had passage of any additional stones, including over the last year.  She has had issues with stones over the last year.  Family history of nephrolithiasis in her sister.    Her previous KUBs did not show any stones.  Recommendation given that we knew she did have a stone was for CT imaging, which she completed prior to this visit.    Patient continues to try to stay well-hydrated and always has her water bottle with her.    Patient continues to have a lot of follow-up visits for her history of meningioma.  She does note significant fatigue.  She denies any hematuria or flank pain.    The following portions of the patient's history were reviewed and updated as appropriate: allergies, current medications, past family history, past medical history, past social history, past surgical history, and problem list.     REVIEW OF SYSTEMS   Review of Systems   Constitutional:  Positive for fatigue. Negative for chills and fever.   Respiratory:  Negative for shortness of breath.    Cardiovascular:  Negative for chest pain.   Gastrointestinal:  Negative for nausea and vomiting.   Genitourinary:  Negative for dysuria, flank pain and hematuria.      Per HPI.     Patient Active Problem List   Diagnosis    Calculus  of kidney    CARDIOVASCULAR SCREENING; LDL GOAL LESS THAN 160    HTN (hypertension)    Meningioma (H)    Word finding difficulty    Hyperlipidemia LDL goal <130    Partial symptomatic epilepsy with simple partial seizures, intractable, without status epilepticus (H)    Musculoskeletal abnormal finding on examination    Arthritis, rheumatoid (H)    Arthralgia of temporomandibular joint    Brain tumor (H)    Carpal tunnel syndrome on both sides    Central hypothyroidism    Fibromyositis    Focal seizure (H)    Growth hormone deficiency (H24)    Hyperreflexia    Myofascial pain    Numbness    JARED (obstructive sleep apnea)    Vision abnormalities    Vitamin D deficiency    Essential hypertension    Renal stones    Morbid obesity (H)    Pneumonia due to 2019 novel coronavirus    Acute respiratory failure with hypoxia (H)    Benign neoplasm of cerebral meninges (H)      Past Medical History:   Diagnosis Date    Arthritis     atypical rheumatoid arthritis    Brain tumor (H)     Calculus of kidney 2002    Gout     HLD (hyperlipidemia)     Hypertension     Hypothyroidism     Infection due to 2019 novel coronavirus     JARED (obstructive sleep apnea)     JARED (obstructive sleep apnea)     PONV (postoperative nausea and vomiting)     Seizures (H)     frontal lobe seizures, controlled on Keppra, starts with smell, then de ja vu, nausea    Sleep apnea     uses CPAP    TIA (transient ischemic attack)     takes baby ASA    TMJ (temporomandibular joint syndrome)         Objective      PHYSICAL EXAM   GENERAL: alert and no distress  EYES: Eyes grossly normal to inspection.  No discharge or erythema, or obvious scleral/conjunctival abnormalities.  HENT: Normal cephalic/atraumatic.  External ears, nose and mouth without ulcers or lesions.  No nasal drainage visible.  NECK: No asymmetry, visible masses or scars  RESP: No audible wheeze, cough, or visible cyanosis.    MS: No gross musculoskeletal defects noted.  Normal range of motion.  No  visible edema.  SKIN: Visible skin clear. No significant rash, abnormal pigmentation or lesions.  NEURO: Cranial nerves grossly intact.  Mentation and speech appropriate for age.  PSYCH: Appropriate affect, tone, and pace of words     LABORATORY     Recent Labs   Lab Test 02/03/23  1318   COLOR Yellow   APPEARANCE Slightly Cloudy*   URINEGLC Negative   URINEBILI Negative   URINEKETONE Negative   SG 1.020   UBLD Negative   URINEPH 7.0   PROTEIN Negative   UROBILINOGEN 2.0*   NITRITE Negative   LEUKEST Negative     IMAGING     I personally reviewed the images.     CT Abdomen Pelvis w/o Contrast    Result Date: 2/2/2024  CT ABDOMEN AND PELVIS WITHOUT CONTRAST 2/2/2024 11:11 AM CLINICAL HISTORY: Question stone burden; Nephrolithiasis.  TECHNIQUE: CT scan of the abdomen and pelvis was performed without IV contrast. Multiplanar reformats were obtained. Dose reduction techniques were used. CONTRAST: None. COMPARISON: February 26, 2020 FINDINGS: LOWER CHEST: No infiltrates or effusions. HEPATOBILIARY: No significant mass or bile duct dilatation. No calcified gallstones. PANCREAS: No significant mass, duct dilatation, or inflammatory change. SPLEEN: Normal size. ADRENAL GLANDS: No significant nodules. KIDNEYS/BLADDER: 4 mm stone in the mid right kidney. No other urolithiasis. Stone burden similar to previous. No ureteral stones or hydronephrosis. BOWEL: No obstruction or inflammatory change. VASCULATURE: There are mild calcified atherosclerotic changes of the visualized aorta and its branches. There is no evidence of aortic aneurysm. PELVIC ORGANS: No pelvic masses. OTHER: No free air or free fluid. MUSCULOSKELETAL: No frankly destructive bony lesions.     IMPRESSION: There is a single 4 mm stone in the right kidney. No other urolithiasis. Stone burden previous. GHASSAN GALVAN MD   SYSTEM ID:  [id number]     Assessment & Plan    1. Nephrolithiasis        I had the pleasure today of meeting with Ms. Wilder to discuss her  follow-up for nephrolithiasis.  We reviewed her CT imaging which shows a stable residual stone noted in the right kidney.  This has not essentially not changed in over 4 years.  Patient has not passed any stone since that time.    Given the very large stone that required PCNL, would recommend continue to follow-up to ensure that the stone is not get to that size.  However, given the stability I think it is reasonable to push out follow-up.  Patient is in agreement with this plan.  Would likely plan on doing a CT given that her stone is not showing up on KUB.    -Follow-up in 1.5 years with CT of pelvis without contrast and return visit.    -General kidney stone prevention guidelines given.    Contact us in the interim with questions, concerns, or changes in symptomatology.    Signed by:       Maricruz Cervantes PA-C 2/12/2024 3:39 PM

## 2024-03-19 ENCOUNTER — TELEPHONE (OUTPATIENT)
Dept: NEUROSURGERY | Facility: CLINIC | Age: 59
End: 2024-03-19
Payer: COMMERCIAL

## 2024-03-19 NOTE — TELEPHONE ENCOUNTER
Patient calling stating 2 separate forms have been sent to Dr Calero for her Disability.  Will research and get back to patient.

## 2024-03-20 NOTE — TELEPHONE ENCOUNTER
Spoke with patient who is out of town at this time.  Patient will send foms over My Chart next week when returning home.    Voices understanding

## 2024-03-20 NOTE — PLAN OF CARE
Patient hospitalized for pneumonia d/t suspected COVID-19. Cleared for discharge to home today per MD. Discharge instructions, medications, and follow-ups reviewed with patient in detail. Patient verbalized understanding of discharge instructions. Belongings were returned to patient at time of discharge. Patients  Camilo providing transport home.      Yes

## 2024-03-25 ENCOUNTER — MYC MEDICAL ADVICE (OUTPATIENT)
Dept: NEUROSURGERY | Facility: CLINIC | Age: 59
End: 2024-03-25
Payer: COMMERCIAL

## 2024-03-28 ENCOUNTER — DOCUMENTATION ONLY (OUTPATIENT)
Dept: NEUROSURGERY | Facility: CLINIC | Age: 59
End: 2024-03-28
Payer: COMMERCIAL

## 2024-03-28 NOTE — PROGRESS NOTES
PAPERWORK DOCUMENTATION    How Paperwork is received:  [employer]    Type of Paperwork: STD    Who is the paperwork for:  Patient    Received Date March 25th, 2024    Who Received the paperwork:  Danica    Form to be Completed by:  Danica    Anticipated Completion Date: March 29th, 2024-April 1st, 2024.     Date Completed Form Faxed to Requested Entity: Writer faxed paperwork to [place] [facility] @ [phone] on March 28th, 2024.  A copy was scanned into the patient's EMR. Patient will be informed via [employer] message.     CURTIS Vidales  Neurosurgery nurse navigator.

## 2024-04-02 ENCOUNTER — TELEPHONE (OUTPATIENT)
Dept: NEUROSURGERY | Facility: CLINIC | Age: 59
End: 2024-04-02
Payer: COMMERCIAL

## 2024-04-02 NOTE — TELEPHONE ENCOUNTER
"Patient calling with question regarding STD paperwork completed and signed by Dr Calero on 3/29/24.  Pateint states [facility] called her stating they only got page \"2\" and Page 1 did not go through.  Looking through the Media tab, Page 2 is the only page scanned.    Will send note to Danica who works on the paperwork to see if there was a \"page 1\".    Voices understanding.    "

## 2024-04-09 ENCOUNTER — LAB REQUISITION (OUTPATIENT)
Dept: LAB | Facility: CLINIC | Age: 59
End: 2024-04-09

## 2024-04-09 DIAGNOSIS — Z01.419 ENCOUNTER FOR GYNECOLOGICAL EXAMINATION (GENERAL) (ROUTINE) WITHOUT ABNORMAL FINDINGS: ICD-10-CM

## 2024-04-09 PROCEDURE — G0145 SCR C/V CYTO,THINLAYER,RESCR: HCPCS | Performed by: OBSTETRICS & GYNECOLOGY

## 2024-04-09 PROCEDURE — 87624 HPV HI-RISK TYP POOLED RSLT: CPT | Performed by: OBSTETRICS & GYNECOLOGY

## 2024-04-12 ENCOUNTER — TELEPHONE (OUTPATIENT)
Dept: NEUROSURGERY | Facility: CLINIC | Age: 59
End: 2024-04-12
Payer: COMMERCIAL

## 2024-04-12 NOTE — TELEPHONE ENCOUNTER
Pt returned call to reschedule.  The next available with Dr. Calero is not until 7/30.  Pt states that she is due early June and does not want to schedule that far out.  She is requesting a call back from someone on the care team that can schedule her in the appropriate time frame.  Thanks.

## 2024-04-12 NOTE — TELEPHONE ENCOUNTER
Left Voicemail (1st Attempt) and Sent Mychart (1st Attempt) for the patient to call back and schedule the following:    Appointment type: Return   Provider:   Return date: around 6/4/2024  Specialty phone number: [phone]  Additional appointment(s) needed:   -MRI prior to visit   Additonal Notes: WQ- reschedule         Tracy Nayak on 4/12/2024 at 11:55 AM

## 2024-04-15 ENCOUNTER — TELEPHONE (OUTPATIENT)
Dept: NEUROSURGERY | Facility: CLINIC | Age: 59
End: 2024-04-15
Payer: COMMERCIAL

## 2024-05-22 ENCOUNTER — TRANSFERRED RECORDS (OUTPATIENT)
Dept: HEALTH INFORMATION MANAGEMENT | Facility: CLINIC | Age: 59
End: 2024-05-22
Payer: COMMERCIAL

## 2024-06-04 ENCOUNTER — ANCILLARY PROCEDURE (OUTPATIENT)
Dept: MRI IMAGING | Facility: CLINIC | Age: 59
End: 2024-06-04
Attending: NEUROLOGICAL SURGERY
Payer: COMMERCIAL

## 2024-06-04 DIAGNOSIS — D32.0 CEREBRAL MENINGIOMA (H): ICD-10-CM

## 2024-06-04 PROCEDURE — 70553 MRI BRAIN STEM W/O & W/DYE: CPT | Mod: GC | Performed by: RADIOLOGY

## 2024-06-04 PROCEDURE — A9585 GADOBUTROL INJECTION: HCPCS | Performed by: RADIOLOGY

## 2024-06-04 RX ORDER — GADOBUTROL 604.72 MG/ML
15 INJECTION INTRAVENOUS ONCE
Status: COMPLETED | OUTPATIENT
Start: 2024-06-04 | End: 2024-06-04

## 2024-06-04 RX ADMIN — GADOBUTROL 13 ML: 604.72 INJECTION INTRAVENOUS at 11:39

## 2024-06-04 NOTE — DISCHARGE INSTRUCTIONS
MRI Contrast Discharge Instructions    The IV contrast you received today will pass out of your body in your  urine. This will happen in the next 24 hours. You will not feel this process.  Your urine will not change color.    Drink at least 4 extra glasses of water or juice today (unless your doctor  has restricted your fluids). This reduces the stress on your kidneys.  You may take your regular medicines.    If you are on dialysis: It is best to have dialysis today.    If you have a reaction: Most reactions happen right away. If you have  any new symptoms after leaving the hospital (such as hives or swelling),  call your hospital at the correct number below. Or call your family doctor.  If you have breathing distress or wheezing, call 911.    Special instructions: ***    I have read and understand the above information.    Signature:______________________________________ Date:___________    Staff:__________________________________________ Date:___________     Time:__________    [place] [facility] Departments:    ___[place]: [phone]  ___[facility]: [phone]  ___[place]: [phone] ___[facility]: [phone]  ___[facility]: [phone]  ___[facility]: [phone]  ___[phone]  ___[facility]: [phone]  ___[phone]

## 2024-06-11 ENCOUNTER — MYC MEDICAL ADVICE (OUTPATIENT)
Dept: NEUROSURGERY | Facility: CLINIC | Age: 59
End: 2024-06-11

## 2024-06-11 ENCOUNTER — VIRTUAL VISIT (OUTPATIENT)
Dept: NEUROSURGERY | Facility: CLINIC | Age: 59
End: 2024-06-11
Payer: COMMERCIAL

## 2024-06-11 DIAGNOSIS — D32.0 BENIGN NEOPLASM OF CEREBRAL MENINGES (H): Primary | ICD-10-CM

## 2024-06-11 PROCEDURE — 99212 OFFICE O/P EST SF 10 MIN: CPT | Mod: 93 | Performed by: NEUROLOGICAL SURGERY

## 2024-06-11 NOTE — PROGRESS NOTES
Virtual Visit Details  Date of service: 2024  Type of service:  Telephone Visit   Phone call duration: 15 minutes   Originating Location (pt. Location): Home    Distant Location (provider location):  On-site    Carlos Enrique Neal MD  [facility]  [address], [address]  [place] [address]     RE:     Marlyn Wilder  MRN:  [id number]  :   1965     Dear Dr. Neal:    We spoke to Mrs. Wilder as part of a telephone follow-up in cranial neurosurgery clinic today.  She has a very complex history of a very large left medial sphenoid wing meningioma, for which she underwent multiple resections and radiation therapy.  She is about 3 years out from last operation, in which we debulked the component causing mass effect on the anterior temporal lobe.    In general, she has been stable over the past year.  Her biggest treatment siobhan constant fatigue.  She also has frequent GI nausea, but she has had no vomiting.  She also denies any headaches.  Her short-term memory remains diminished but stable.  She continues to teach  school.  As read by Dr. Madrid, she has had no obvious seizures over the past year.  She remains on Vimpat.  She is also on levothyroxine.    Her most recent eye exam at [facility] was not available to us.  Mrs. Wilder cannot give the specifics, but she reported that her vision was down bilaterally.    Over the phone, she seemed to be in good spirits.  Her speech, language, and phonation were normal.      Reviewed her MRI from 2024 and compared it to previous studies.  The residual left medial sphenoid wing meningioma and the clinic changes in the left anterior temporal lobe are unchanged.  The dimensions of the residual tumor and the official report are misleading.  There is a thin layer of tumor along the medial pretemporal space on the left side that contributes to the longest dimension of the tumor.  The bulk of the residual tumor is centered around the left  orbital apex.  While the left optic nerve remains compressed and encased, the right optic nerve appears affected.  However, the tumor comes close to the right optic nerve.     We will obtain her eye exam records.  The main reason to consider additional surgery would be involvement of the right optic nerve.  For now, we will continue observation with an MRI in 1 year.  She will continue follow-up with Dr. Madrid and have annual eye exams as scheduled.  Please nested contact us with questions.    Sincerely,      Pan Calero MD  Department of Neurosurgery

## 2024-06-11 NOTE — TELEPHONE ENCOUNTER
Attn: Razia Campos, RNCC for Dr. Calero   *Patient sent information on Endocrinologist.     Temitope Fried LPN  Neurosurgery

## 2024-06-11 NOTE — LETTER
2024       RE: Marlyn Wilder  [id number]  [place] [address]     Dear Colleague,    Thank you for referring your patient, Marlyn Wilder, to the [facility] NEUROSURGERY CLINIC [place] at [facility]. Please see a copy of my visit note below.      Carlos Enrique Neal MD  [facility]  [address]  [place] [address]     RE:     Marlyn Wilder  MRN:  [id number]  :   1965     Dear Dr. Neal:    We spoke to Mrs. Wilder as part of a telephone follow-up in cranial neurosurgery clinic today.  She has a very complex history of a very large left medial sphenoid wing meningioma, for which she underwent multiple resections and radiation therapy.  She is about 3 years out from last operation, in which we debulked the component causing mass effect on the anterior temporal lobe.    In general, she has been stable over the past year.  Her biggest treatment siobhan constant fatigue.  She also has frequent GI nausea, but she has had no vomiting.  She also denies any headaches.  Her short-term memory remains diminished but stable.  She continues to teach  school.  As read by Dr. Madrid, she has had no obvious seizures over the past year.  She remains on Vimpat.  She is also on levothyroxine.    Her most recent eye exam at [facility] was not available to us.  Mrs. Wilder cannot give the specifics, but she reported that her vision was down bilaterally.    Over the phone, she seemed to be in good spirits.  Her speech, language, and phonation were normal.      Reviewed her MRI from 2024 and compared it to previous studies.  The residual left medial sphenoid wing meningioma and the clinic changes in the left anterior temporal lobe are unchanged.  The dimensions of the residual tumor and the official report are misleading.  There is a thin layer of tumor along the medial pretemporal space on the left side that contributes  to the longest dimension of the tumor.  The bulk of the residual tumor is centered around the left orbital apex.  While the left optic nerve remains compressed and encased, the right optic nerve appears affected.  However, the tumor comes close to the right optic nerve.     We will obtain her eye exam records.  The main reason to consider additional surgery would be involvement of the right optic nerve.  For now, we will continue observation with an MRI in 1 year.  She will continue follow-up with Dr. Madrid and have annual eye exams as scheduled.  Please nested contact us with questions.          Again, thank you for allowing me to participate in the care of your patient.      Sincerely,    Pan Calero MD

## 2024-06-11 NOTE — NURSING NOTE
Is the patient currently in the state of [place]? YES    Visit mode:TELEPHONE    If the visit is dropped, the patient can be reconnected by: TELEPHONE VISIT: Phone number:   Telephone Information:   Mobile [phone]       Will anyone else be joining the visit? NO  (If patient encounters technical issues they should call [phone]    How would you like to obtain your AVS? MyChart    Are changes needed to the allergy or medication list? Pt stated no med changes    Are refills needed on medications prescribed by this physician? NO    Reason for visit: Telephone (1 year follow-up cerebral aneurysm )    Sheela GOEL

## 2024-06-15 NOTE — PATIENT INSTRUCTIONS
Repeat MRI brain with contrast in one year     Follow up after MRI    Notify us of any new symptoms in the interim    We will obtain records from the [facility]

## 2024-06-17 NOTE — TELEPHONE ENCOUNTER
Outbound call to [facility], faxing records from last Eye office clinic to .

## 2024-06-17 NOTE — TELEPHONE ENCOUNTER
----- Message from Pan Calero MD sent at 6/15/2024  8:59 AM CDT -----  Regarding: Clinic follow up  Sepideh Randle,    Would the clinic staff be able to get eye exam records for this patient. The exam was done at [facility] in the past month.    Thanks    RT

## 2024-06-19 NOTE — TELEPHONE ENCOUNTER
Vision report from [facility] completed.  And in Media tab, note sent to RT

## 2024-07-05 DIAGNOSIS — G40.119 PARTIAL SYMPTOMATIC EPILEPSY WITH SIMPLE PARTIAL SEIZURES, INTRACTABLE, WITHOUT STATUS EPILEPTICUS (H): ICD-10-CM

## 2024-07-06 RX ORDER — LACOSAMIDE 150 MG/1
150 TABLET ORAL 2 TIMES DAILY
Qty: 180 TABLET | Refills: 1 | Status: SHIPPED | OUTPATIENT
Start: 2024-07-06

## 2024-08-10 ENCOUNTER — HEALTH MAINTENANCE LETTER (OUTPATIENT)
Age: 59
End: 2024-08-10

## 2024-09-17 ENCOUNTER — OFFICE VISIT (OUTPATIENT)
Dept: NEUROLOGY | Facility: CLINIC | Age: 59
End: 2024-09-17
Payer: COMMERCIAL

## 2024-09-17 VITALS
SYSTOLIC BLOOD PRESSURE: 139 MMHG | RESPIRATION RATE: 16 BRPM | OXYGEN SATURATION: 96 % | HEART RATE: 84 BPM | DIASTOLIC BLOOD PRESSURE: 84 MMHG

## 2024-09-17 DIAGNOSIS — G40.119 PARTIAL SYMPTOMATIC EPILEPSY WITH SIMPLE PARTIAL SEIZURES, INTRACTABLE, WITHOUT STATUS EPILEPTICUS (H): Primary | ICD-10-CM

## 2024-09-17 PROCEDURE — 99215 OFFICE O/P EST HI 40 MIN: CPT | Performed by: INTERNAL MEDICINE

## 2024-09-17 PROCEDURE — G2211 COMPLEX E/M VISIT ADD ON: HCPCS | Performed by: INTERNAL MEDICINE

## 2024-09-17 NOTE — NURSING NOTE
Chief Complaint   Patient presents with    RECHECK     Here for consult, confirmed with patient     Barbi Pulido

## 2024-09-17 NOTE — LETTER
2024       RE: Marlyn Wilder  [address] [place] [address]     Dear Colleague,    Thank you for referring your patient, Marlyn Wilder, to the [facility] NEUROLOGY CLINIC [place] at [facility]. Please see a copy of my visit note below.    [facility] Epilepsy Care Progress Note    Patient:  Marlyn Wilder  :  1965   Age:  59 year old   Today's Office Visit:  2024  INTERVAL HISTORY  Ms Wilder is here with ehr  for follow-up. Her  provides corroborative history reminding her er  of new episodes which started recently. She said she has had about 3 - 4 of these episodes since her last visit and the last one was 2 - 3 weeks ago. She describes it as a fleeting feeling of dread/ sick to her stomach occasionally associated with nausea lasting less than less than 30 seconds. She retains awareness and consciousness. She has really never tried to talk during the episode but thinks she has retained speech.   She is compliant with her medications. She denies any headaches, associated hand or mouth movements, auditory/gistatory/olfactory/visual hallucinations. An MRI brain had been obtained after the onset of the episodes and showed stable tumor.   She continues to report persistent fatigue, no significant changes even with discontinuation of levetiracetam. She said she is almost always sleepy but doesn't seem to get enough sleep when she doses off. She has JARED and uses her CPAP religiously. She says she takes several naps in the day and only seems to be able to keep her eyes open between 5 - 9/10 pm.   When I asked about menopause, she had attained instantaneous menopause following one of her surgeries.     Epilepsy Data:  Ms Wilder is a 59 year old right handed woman with a medical history significant for meningioma s/p several resections and resultant focal epilepsy.   She was followed by Dr. Madrid.   In summary, she had a single  febrile convulsion of early childhood, but otherwise had no seizures until the initial manifestation of her meningioma in 2011.  She has had only 1 type of seizure since 2011.       The patient has had exclusively simple partial seizures.  These first began in 2011, and have continued since then.  They have been quite stable in semiology and frequency.  She has at least 20 per year, but has only up to 2 on a single day and usually only one at a time.  The full form of the aura lasts almost 1 minute and features a froilan vu phenomenon associated with a sense that she has just viewed a complex visual scene briefly, although she cannot remember the nature of the scene, with an olfactory hallucination of burning electronics, and waves of intense nausea.  She does not have a postictal state with this.  She is always able to speak if she is with someone.  Her  has seen many of these, which she described the auras in detail to him and she is always able to respond to him quickly.  She never has any automatic behaviors as best he can determine.  Sometimes she has briefer events that feature only 1-2 of her 3 aura phenomena.       The patient has not had any staring spells with unresponsiveness or confusion, and has not had any grand mal seizures following that of early childhood.  She only has involuntary jerks on falling asleep in the evening.        Epilepsy-seizure predispositions:   The patient's epilepsy risk factor is a left middle fossa meningioma with extension into the left parasellar space.  This was resected 3 times in 2012, and pathology showed that there was a grade I meningothelial meningioma.  The portion that is in the parasellar space has surrounded the left optic nerve, and she does have visual deficits on the left.  She had radiation therapy following the last resection by Dr. Calero.  She has had follow-up scans since that time, which showed a stable lesion as recently as 05/2017.          Pan Calero performed a fourth de-bulking of the recurrent, left-sided, medial sphenoid wing-paraclinoid meningioma on 2021.  She did well post-operatively.  Isolated auras ceased, except for on that occurred when she missed AED doses in mid-2021.      There is no family history of seizures or epilepsy.       The patient had a single febrile convulsion of early childhood when she was approximately 1 years old.       She has no history of gestational or  injury, developmental delay, stroke, meningitis, encephalitis, and significant head injury.  She denied a history of physical or sexual abuse by an adult during her childhood or adulthood.       Laboratory evaluations:   The most recent brain MRI was performed at [facility] on 2017, and was reported to show a stable left medial middle fossa lesion extending into the parasellar space in the left orbital apex, with mild mass effect and with enhancement.       A routine EEG was performed in [place] in , and the report indicated left hemispheric slowing and spikes.   An outpatient EEG at [facility] on 06/15/2016 was abnormal due to left frontotemporal delta slowing and breach effect.     Last EEG in 10/2022- left frontotemporal neuronal dysfunction and are consistent with the reported left middle fossa surgical procedure. No seizures.     Last Vimpat level on 2022 was 9.5.      Epilepsy therapeutics:   The only anti-seizure medication that the patient has used has been levetiracetam.  This was started in .  It has been consistently associated with irritability.  The patient was able to control her irritability, to the extent that she was able to function well as an  for the [facility].   Divalproex and lacosamide were later used adjunctively with levetiracetam.  Divalproex was associated with hair breakage and was discontinued for this reason.    Levetiracetam was discontinued in view of excessive drowsiness during  the day.      PAST MEDICAL-SURGICAL HISTORY:    1. 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   2. History of left middle fossa meningothelial meningioma, grade 1, with extension to the left parasellar area, left orbital apex and cavernous sinus; status post resections (9536-2073) and radiation therapy.   3. History of probable transient ischemic attack with aphasia (2013).   4. Obstructive sleep apnea, treated with CPAP.   5. Systemic hypertension, treated.   6. Hyperlipidemia.   7. History of recurring renal calculi.   8. History of arthralgia attributed to atypical rheumatoid arthritis or fibromyalgia.   9. Hypothyroidism.   10. Status post left carpal tunnel release.   11. History of TMJ syndrome.      PERSONAL AND SOCIAL HISTORY:  The patient completed her education through a [employer] degree.  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  She was placed on total disability when her memory problems had progressed to the point that she no longer could perform necessary duties as an  for a [employer] court.    She lives with her .  They have 3 adult children, no longer in the home.       REVIEW OF SYSTEMS:    As per [facility] [place]     MEDICATIONS:  Lacosamide 150 mg b.i.d., and other medications as per the electronic medical record.      PHYSICAL EXAMINATION:    The patient was an alert woman in no apparent distress. There was a skull defect consistent with reported surgery, with well-healed scars of craniotomies.  Neck was supple, without signs of meningeal irritation.       On neurological examination, the patient appeared mildly lethargic, and was fully oriented to person, place, time, and reason for visit.  Speech showed normal articulation, fluency, repetitions, naming, syntax and comprehension.  Cranial nerves III through XII were normal.  Muscle masses, tones and strengths were normal throughout.  There was no pronator drift.  Sequential fine finger movements were  performed normally with each hand.  No spontaneous tremors, myoclonus, or other abnormal movements were observed.  Sensations of light touch, pinprick, vibration and proprioception were reportedly normal throughout.  The rapid alternating movements and finger-nose-finger and heel-shin maneuvers were performed normally bilaterally.  Romberg maneuver was negative.  Regular, heel, toe, tandem and reverse tandem walking were normal.  Deep tendon reflexes were normal and symmetric throughout.  Toes were downgoing bilaterally.      IMPRESSION  Mrs Wilder is a 59-year-old female is here for a follow-up regarding her epilepsy. She has focal epilepsy likely related to a meningioma that has been debulked several times, with residual meningioma in the left paraclinoid area extending into the cavernous sinus, the left aspect of the sella, and continuing along the anteromedial aspect of the middle cranial fossa. Her  is present today and provides corroborative history about a new episode that appears to be consistent with a focal seizure without impaired awareness.    I suspect she has suboptimal antiepileptic medication (ASM) coverage, especially following the recent discontinuation of levetiracetam due to fatigue. I suggested increasing the dosage of lacosamide, but they would like to see how things progress first. She reports ongoing fatigue and sleepiness, which have not improved since stopping levetiracetam. She plans to follow up with sleep medicine doctors for further evaluation.    I also proposed that, although lacosamide is one of the best-tolerated medications, other options like lamotrigine or perampanel might be considered. If the fatigue persists, I wonder if this could be a post-menopausal phenomenon, in which case I can consult my colleagues in women's health for their input.    Memory issues continue to be a challenge, and I have provided resources for the [employer] program.    Focal epilepsy 2/2  Meningioma, currently having subtle focal seizures without impaired, never had a GTC  Memory impairment  Sleepiness and Fatigue    PLAN  Lacosamide trough level before next visit   -Continue current dose of Lacosamide 150 mg BID  Would follow-up with sleep medicine  Consider addition of another ASM or increasing dose of LAC  Consider referral to women's health for possible HRT      I spent 50 minutes in total today to provide comprehensive  medical care.   I spent 10 minutes writing the note and placing orders.   I spent 10 minutes  reviewing the chart.      The rest of the time was spent with the patient in face to face interview. During this time key medical decisions were made with review of medical chart prior to visit, visit with patient, counseling/education, and post visit work, including documentation of note on the day of visit. I addressed all questions the patient/caregiver raised in regards to epilepsy or related medical questions.      The longitudinal plan of care for the diagnosis(es)/condition(s) as documented were addressed during this visit. Due to the added complexity in care, I will continue to support Marlyn in the subsequent management and with ongoing continuity of care.        Again, thank you for allowing me to participate in the care of your patient.      Sincerely,    Savannah Goodrich MD

## 2024-09-18 ENCOUNTER — MYC MEDICAL ADVICE (OUTPATIENT)
Dept: NEUROLOGY | Facility: CLINIC | Age: 59
End: 2024-09-18

## 2024-09-19 NOTE — PROGRESS NOTES
[facility] [place] [facility]    Patient:  Marlyn Wilder  :  1965   Age:  59 year old   Today's Office Visit:  2024  INTERVAL HISTORY  Ms Wilder is here with ehr  for follow-up. Her  provides corroborative history reminding her er  of new episodes which started recently. She said she has had about 3 - 4 of these episodes since her last visit and the last one was 2 - 3 weeks ago. She describes it as a fleeting feeling of dread/ sick to her stomach occasionally associated with nausea lasting less than less than 30 seconds. She retains awareness and consciousness. She has really never tried to talk during the episode but thinks she has retained speech.   She is compliant with her medications. She denies any headaches, associated hand or mouth movements, auditory/gistatory/olfactory/visual hallucinations. An MRI brain had been obtained after the onset of the episodes and showed stable tumor.   She continues to report persistent fatigue, no significant changes even with discontinuation of levetiracetam. She said she is almost always sleepy but doesn't seem to get enough sleep when she doses off. She has JARED and uses her CPAP religiously. She says she takes several naps in the day and only seems to be able to keep her eyes open between 5 - 9/10 pm.   When I asked about menopause, she had attained instantaneous menopause following one of her surgeries.     Epilepsy Data:  Ms Wilder is a 59 year old right handed woman with a medical history significant for meningioma s/p several resections and resultant focal epilepsy.   She was followed by Dr. Madrid.   In summary, she had a single febrile convulsion of early childhood, but otherwise had no seizures until the initial manifestation of her meningioma in .  She has had only 1 type of seizure since .       The patient has had exclusively simple partial seizures.  These first began in , and have continued since then.  They have been  quite stable in semiology and frequency.  She has at least 20 per year, but has only up to 2 on a single day and usually only one at a time.  The full form of the aura lasts almost 1 minute and features a froilan vu phenomenon associated with a sense that she has just viewed a complex visual scene briefly, although she cannot remember the nature of the scene, with an olfactory hallucination of burning electronics, and waves of intense nausea.  She does not have a postictal state with this.  She is always able to speak if she is with someone.  Her  has seen many of these, which she described the auras in detail to him and she is always able to respond to him quickly.  She never has any automatic behaviors as best he can determine.  Sometimes she has briefer events that feature only 1-2 of her 3 aura phenomena.       The patient has not had any staring spells with unresponsiveness or confusion, and has not had any grand mal seizures following that of early childhood.  She only has involuntary jerks on falling asleep in the evening.        Epilepsy-seizure predispositions:   The patient's epilepsy risk factor is a left middle fossa meningioma with extension into the left parasellar space.  This was resected 3 times in 2012, and pathology showed that there was a grade I meningothelial meningioma.  The portion that is in the parasellar space has surrounded the left optic nerve, and she does have visual deficits on the left.  She had radiation therapy following the last resection by Dr. Calero.  She has had follow-up scans since that time, which showed a stable lesion as recently as 05/2017.        Dr. Pan Calero performed a fourth de-bulking of the recurrent, left-sided, medial sphenoid wing-paraclinoid meningioma on 08/26/2021.  She did well post-operatively.  Isolated auras ceased, except for on that occurred when she missed AED doses in mid-September 2021.      There is no family history of seizures or  epilepsy.       The patient had a single febrile convulsion of early childhood when she was approximately 1 years old.       She has no history of gestational or  injury, developmental delay, stroke, meningitis, encephalitis, and significant head injury.  She denied a history of physical or sexual abuse by an adult during her childhood or adulthood.       Laboratory evaluations:   The most recent brain MRI was performed at [facility] on 2017, and was reported to show a stable left medial middle fossa lesion extending into the parasellar space in the left orbital apex, with mild mass effect and with enhancement.       A routine EEG was performed in [place] in , and the report indicated left hemispheric slowing and spikes.   An outpatient EEG at [facility] on 06/15/2016 was abnormal due to left frontotemporal delta slowing and breach effect.     Last EEG in 10/2022- left frontotemporal neuronal dysfunction and are consistent with the reported left middle fossa surgical procedure. No seizures.     Last Vimpat level on 2022 was 9.5.      Epilepsy therapeutics:   The only anti-seizure medication that the patient has used has been levetiracetam.  This was started in .  It has been consistently associated with irritability.  The patient was able to control her irritability, to the extent that she was able to function well as an  for the [facility] [employer].   Divalproex and lacosamide were later used adjunctively with levetiracetam.  Divalproex was associated with hair breakage and was discontinued for this reason.    Levetiracetam was discontinued in view of excessive drowsiness during the day.      PAST MEDICAL-SURGICAL HISTORY:    1. 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   2. History of left middle fossa meningothelial meningioma, grade 1, with extension to the left parasellar area, left orbital apex and cavernous sinus; status post resections ([id number]) and radiation therapy.    3. History of probable transient ischemic attack with aphasia (2013).   4. Obstructive sleep apnea, treated with CPAP.   5. Systemic hypertension, treated.   6. Hyperlipidemia.   7. History of recurring renal calculi.   8. History of arthralgia attributed to atypical rheumatoid arthritis or fibromyalgia.   9. Hypothyroidism.   10. Status post left carpal tunnel release.   11. History of TMJ syndrome.      PERSONAL AND SOCIAL HISTORY:  The patient completed her education through a [employer] degree.  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  She was placed on total disability when her memory problems had progressed to the point that she no longer could perform necessary duties as an  for a [employer].    She lives with her .  They have 3 adult children, no longer in the home.       REVIEW OF SYSTEMS:    As per [facility]     MEDICATIONS:  Lacosamide 150 mg b.i.d., and other medications as per the electronic medical record.      PHYSICAL EXAMINATION:    The patient was an alert woman in no apparent distress. There was a skull defect consistent with reported surgery, with well-healed scars of craniotomies.  Neck was supple, without signs of meningeal irritation.       On neurological examination, the patient appeared mildly lethargic, and was fully oriented to person, place, time, and reason for visit.  Speech showed normal articulation, fluency, repetitions, naming, syntax and comprehension.  Cranial nerves III through XII were normal.  Muscle masses, tones and strengths were normal throughout.  There was no pronator drift.  Sequential fine finger movements were performed normally with each hand.  No spontaneous tremors, myoclonus, or other abnormal movements were observed.  Sensations of light touch, pinprick, vibration and proprioception were reportedly normal throughout.  The rapid alternating movements and finger-nose-finger and heel-shin maneuvers were performed normally  bilaterally.  Romberg maneuver was negative.  Regular, heel, toe, tandem and reverse tandem walking were normal.  Deep tendon reflexes were normal and symmetric throughout.  Toes were downgoing bilaterally.      IMPRESSION  Mrs Wilder is a 59-year-old female is here for a follow-up regarding her epilepsy. She has focal epilepsy likely related to a meningioma that has been debulked several times, with residual meningioma in the left paraclinoid area extending into the cavernous sinus, the left aspect of the sella, and continuing along the anteromedial aspect of the middle cranial fossa. Her  is present today and provides corroborative history about a new episode that appears to be consistent with a focal seizure without impaired awareness.    I suspect she has suboptimal antiepileptic medication (ASM) coverage, especially following the recent discontinuation of levetiracetam due to fatigue. I suggested increasing the dosage of lacosamide, but they would like to see how things progress first. She reports ongoing fatigue and sleepiness, which have not improved since stopping levetiracetam. She plans to follow up with sleep medicine doctors for further evaluation.    I also proposed that, although lacosamide is one of the best-tolerated medications, other options like lamotrigine or perampanel might be considered. If the fatigue persists, I wonder if this could be a post-menopausal phenomenon, in which case I can consult my colleagues in women's health for their input.    Memory issues continue to be a challenge, and I have provided resources for the [employer] program.    Focal epilepsy 2/2 Meningioma, currently having subtle focal seizures without impaired, never had a GTC  Memory impairment  Sleepiness and Fatigue    PLAN  Lacosamide trough level before next visit   -Continue current dose of Lacosamide 150 mg BID  Would follow-up with sleep medicine  Consider addition of another ASM or increasing dose of  LAC  Consider referral to women's health for possible HRT      I spent 50 minutes in total today to provide comprehensive  medical care.   I spent 10 minutes writing the note and placing orders.   I spent 10 minutes  reviewing the chart.      The rest of the time was spent with the patient in face to face interview. During this time key medical decisions were made with review of medical chart prior to visit, visit with patient, counseling/education, and post visit work, including documentation of note on the day of visit. I addressed all questions the patient/caregiver raised in regards to epilepsy or related medical questions.      The longitudinal plan of care for the diagnosis(es)/condition(s) as documented were addressed during this visit. Due to the added complexity in care, I will continue to support Marlyn in the subsequent management and with ongoing continuity of care.

## 2024-09-26 ENCOUNTER — TELEPHONE (OUTPATIENT)
Dept: NEUROLOGY | Facility: CLINIC | Age: 59
End: 2024-09-26
Payer: COMMERCIAL

## 2024-09-26 NOTE — TELEPHONE ENCOUNTER
Patient confirmed scheduled appointment:  Date: 3/17/2025  Time: 12:30 pm  Visit type: Return Seizure  Provider: Kp  [place] [facility]  Testing/imaging: NA  Additional notes: 6 month follow up    Brandie Mathur on 9/26/2024 at 5:05 PM

## 2024-10-04 ENCOUNTER — TELEPHONE (OUTPATIENT)
Dept: NEUROSURGERY | Facility: CLINIC | Age: 59
End: 2024-10-04
Payer: COMMERCIAL

## 2024-10-04 NOTE — TELEPHONE ENCOUNTER
[facility] FAXED, COMPLETED AND SENT FOR SCANNING.  Found in Media Tab not completed.   Outbound call to patient leaving a message [facility] completed and returned by fax to

## 2024-10-04 NOTE — TELEPHONE ENCOUNTER
03/25/24 Form in Media,  Spoke with patient will complete and get back to patient.  Thanks Razia

## 2025-01-05 DIAGNOSIS — G40.119 PARTIAL SYMPTOMATIC EPILEPSY WITH SIMPLE PARTIAL SEIZURES, INTRACTABLE, WITHOUT STATUS EPILEPTICUS (H): ICD-10-CM

## 2025-01-05 NOTE — TELEPHONE ENCOUNTER
RX Authorization    Medication: Lacosamide (VIMPAT) 150 MG TABS tablet    Date last refill ordered: 7/6/2024    Quantity ordered: 180    # refills: 1    Date of last clinic visit with ordering provider: 9/17/ 2024    Date of next clinic visit with ordering provider:    All pertinent protocol data (lab date/result):    Include pertinent information from patients message:

## 2025-01-06 RX ORDER — LACOSAMIDE 150 MG/1
150 TABLET ORAL 2 TIMES DAILY
Qty: 180 TABLET | Refills: 1 | Status: SHIPPED | OUTPATIENT
Start: 2025-01-06

## 2025-02-26 ENCOUNTER — TRANSFERRED RECORDS (OUTPATIENT)
Dept: HEALTH INFORMATION MANAGEMENT | Facility: CLINIC | Age: 60
End: 2025-02-26

## 2025-02-26 NOTE — TELEPHONE ENCOUNTER
Writer received report from [facility]. Faxed to HIM.     Temitope Fried LPN  Neurosurgery

## 2025-03-11 ENCOUNTER — TELEPHONE (OUTPATIENT)
Dept: NEUROSURGERY | Facility: CLINIC | Age: 60
End: 2025-03-11
Payer: COMMERCIAL

## 2025-03-11 NOTE — TELEPHONE ENCOUNTER
Left Voicemail (1st Attempt) and Sent Mychart (1st Attempt) for the patient to call back and schedule the following:    Appointment type: Return tumor  Provider: Dr Calero  Return date: Around June 15th  Specialty phone number: [phone]  Additional appointment(s) needed: MRI prior  Additonal Notes: In person or virtual

## 2025-03-13 ENCOUNTER — DOCUMENTATION ONLY (OUTPATIENT)
Dept: NEUROSURGERY | Facility: CLINIC | Age: 60
End: 2025-03-13
Payer: COMMERCIAL

## 2025-03-13 NOTE — PROGRESS NOTES
Paperwork from [facility] was fax to [phone] and sent to be scanned in chart        [facility]

## 2025-03-17 ENCOUNTER — OFFICE VISIT (OUTPATIENT)
Dept: NEUROLOGY | Facility: CLINIC | Age: 60
End: 2025-03-17
Payer: COMMERCIAL

## 2025-03-17 VITALS
SYSTOLIC BLOOD PRESSURE: 142 MMHG | OXYGEN SATURATION: 98 % | RESPIRATION RATE: 16 BRPM | WEIGHT: 285.1 LBS | HEART RATE: 67 BPM | BODY MASS INDEX: 43.21 KG/M2 | DIASTOLIC BLOOD PRESSURE: 59 MMHG | HEIGHT: 68 IN

## 2025-03-17 DIAGNOSIS — D32.0 BENIGN NEOPLASM OF CEREBRAL MENINGES (H): Primary | ICD-10-CM

## 2025-03-17 DIAGNOSIS — G40.119 PARTIAL SYMPTOMATIC EPILEPSY WITH SIMPLE PARTIAL SEIZURES, INTRACTABLE, WITHOUT STATUS EPILEPTICUS (H): ICD-10-CM

## 2025-03-17 RX ORDER — LACOSAMIDE 100 MG/1
200 TABLET ORAL 2 TIMES DAILY
Qty: 360 TABLET | Refills: 3 | Status: SHIPPED | OUTPATIENT
Start: 2025-03-17 | End: 2026-03-17

## 2025-03-17 ASSESSMENT — PAIN SCALES - GENERAL: PAINLEVEL_OUTOF10: MILD PAIN (1)

## 2025-03-17 NOTE — NURSING NOTE
"Chief Complaint   Patient presents with    RECHECK     Return Seizure         BP (!) 142/59 (BP Location: Right arm, Patient Position: Sitting, Cuff Size: Adult Regular)   Pulse 67   Resp 16   Ht 1.727 m (5' 8\")   Wt 129.3 kg (285 lb 1.6 oz)   LMP 10/17/2012   SpO2 98%   BMI 43.35 kg/m        Josy Crisostomo    "

## 2025-03-17 NOTE — PROGRESS NOTES
[facility]    Patient:  Marlyn Wilder  :  1965   Age:  59 year old   Today's Office Visit:  3/17/2025      INTERVAL HISTORY  Ms Wilder is here with her  for follow-up. She reports having had at least two episodes described as a brief episodes of dejavu after which she is hit with severe fatigue in a single day in January. She reports probably having more than taht episode.   She has seen the sleep physicians and they didn't have any suggestons on how to improve her tiredness.      Epilepsy Data:  Ms Wilder is a 59 year old right handed woman with a medical history significant for meningioma s/p several resections and resultant focal epilepsy.   She was followed by Dr. Madrid.   In summary, she had a single febrile convulsion of early childhood, but otherwise had no seizures until the initial manifestation of her meningioma in .  She has had only 1 type of seizure since .       The patient has had exclusively simple partial seizures.  These first began in , and have continued since then.  They have been quite stable in semiology and frequency.  She has at least 20 per year, but has only up to 2 on a single day and usually only one at a time.  The full form of the aura lasts almost 1 minute and features a froilan vu phenomenon associated with a sense that she has just viewed a complex visual scene briefly, although she cannot remember the nature of the scene, with an olfactory hallucination of burning electronics, and waves of intense nausea.  She does not have a postictal state with this.  She is always able to speak if she is with someone.  Her  has seen many of these, which she described the auras in detail to him and she is always able to respond to him quickly.  She never has any automatic behaviors as best he can determine.  Sometimes she has briefer events that feature only 1-2 of her 3 aura phenomena.       The patient has not had any staring spells with  unresponsiveness or confusion, and has not had any grand mal seizures following that of early childhood.  She only has involuntary jerks on falling asleep in the evening.        Epilepsy-seizure predispositions:   The patient's epilepsy risk factor is a left middle fossa meningioma with extension into the left parasellar space.  This was resected 3 times in , and pathology showed that there was a grade I meningothelial meningioma.  The portion that is in the parasellar space has surrounded the left optic nerve, and she does have visual deficits on the left.  She had radiation therapy following the last resection by Dr. Calero.  She has had follow-up scans since that time, which showed a stable lesion as recently as 2017.        Dr. Pan Calero performed a fourth de-bulking of the recurrent, left-sided, medial sphenoid wing-paraclinoid meningioma on 2021.  She did well post-operatively.  Isolated auras ceased, except for on that occurred when she missed AED doses in mid-2021.      There is no family history of seizures or epilepsy.       The patient had a single febrile convulsion of early childhood when she was approximately 1 years old.       She has no history of gestational or  injury, developmental delay, stroke, meningitis, encephalitis, and significant head injury.  She denied a history of physical or sexual abuse by an adult during her childhood or adulthood.       Laboratory evaluations:   The most recent brain MRI was performed at [facility] on 2017, and was reported to show a stable left medial middle fossa lesion extending into the parasellar space in the left orbital apex, with mild mass effect and with enhancement.       A routine EEG was performed in [place] in , and the report indicated left hemispheric slowing and spikes.   An outpatient EEG [facility] on 06/15/2016 was abnormal due to left frontotemporal delta slowing and breach effect.     Last EEG in  10/2022- left frontotemporal neuronal dysfunction and are consistent with the reported left middle fossa surgical procedure. No seizures.     Last Vimpat level on 9/20/2022 was 9.5.      Epilepsy therapeutics:   The only anti-seizure medication that the patient has used has been levetiracetam.  This was started in 2012.  It has been consistently associated with irritability.  The patient was able to control her irritability, to the extent that she was able to function well as an  for the [employer].   Divalproex and lacosamide were later used adjunctively with levetiracetam.  Divalproex was associated with hair breakage and was discontinued for this reason.    Levetiracetam was discontinued in view of excessive drowsiness during the day.      PAST MEDICAL-SURGICAL HISTORY:    1. 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   2. History of left middle fossa meningothelial meningioma, grade 1, with extension to the left parasellar area, left orbital apex and cavernous sinus; status post resections ([id number]) and radiation therapy.   3. History of probable transient ischemic attack with aphasia (2013).   4. Obstructive sleep apnea, treated with CPAP.   5. Systemic hypertension, treated.   6. Hyperlipidemia.   7. History of recurring renal calculi.   8. History of arthralgia attributed to atypical rheumatoid arthritis or fibromyalgia.   9. Hypothyroidism.   10. Status post left carpal tunnel release.   11. History of TMJ syndrome.      PERSONAL AND SOCIAL HISTORY:  The patient completed her education through a [employer] degree.  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  She was placed on total disability when her memory problems had progressed to the point that she no longer could perform necessary duties as an  for a [place] [employer].    She lives with her .  They have 3 adult children, no longer in the home.       REVIEW OF SYSTEMS:    As per HPI      MEDICATIONS:  Lacosamide 150 mg b.i.d., and other medications as per the electronic medical record.      PHYSICAL EXAMINATION:    The patient was an alert woman in no apparent distress. There was a skull defect consistent with reported surgery, with well-healed scars of craniotomies.  Neck was supple, without signs of meningeal irritation.       On neurological examination, the patient appeared mildly lethargic, and was fully oriented to person, place, time, and reason for visit.  Speech showed normal articulation, fluency, repetitions, naming, syntax and comprehension.  Cranial nerves III through XII were normal.  Muscle masses, tones and strengths were normal throughout.  There was no pronator drift.  Sequential fine finger movements were performed normally with each hand.  No spontaneous tremors, myoclonus, or other abnormal movements were observed.  Sensations of light touch, pinprick, vibration and proprioception were reportedly normal throughout.  The rapid alternating movements and finger-nose-finger and heel-shin maneuvers were performed normally bilaterally.  Romberg maneuver was negative.  Regular, heel, toe, tandem and reverse tandem walking were normal.  Deep tendon reflexes were normal and symmetric throughout.  Toes were downgoing bilaterally.       IMPRESSION  Mrs Wilder is a 59-year-old female is here for a follow-up regarding her epilepsy. She has focal epilepsy likely related to a meningioma that has been debulked several times, with residual meningioma in the left paraclinoid area extending into the cavernous sinus, the left aspect of the sella, and continuing along the anteromedial aspect of the middle cranial fossa. Her  is present today and provides corroborative history about a new episode that appears to be consistent with a focal seizure without impaired awareness.    I suspect she has suboptimal antiepileptic medication (ASM) coverage, especially following the recent discontinuation  of levetiracetam due to fatigue. She has continued to have infrequent small seizures and we decided to increase lacosamide from 150 mg BID to 200 mg BID to see if that helps with the breakthrough events.     Memory issues continue to be a challenge, and I have provided resources for the [employer] program.     Focal epilepsy 2/2 Meningioma, currently having subtle focal seizures without impaired, never had a GTC  Memory impairment  Sleepiness and Fatigue     PLAN  Lacosamide trough level before next visit   increase dose of Lacosamide 150 mg BID to 200 mg BID   Consider referral to women's health for possible HRT        I spent 20 minutes in total today to provide comprehensive  medical care.      The rest of the time was spent with the patient in face to face interview. During this time key medical decisions were made with review of medical chart prior to visit, visit with patient, counseling/education, and post visit work, including documentation of note on the day of visit. I addressed all questions the patient/caregiver raised in regards to epilepsy or related medical questions.       The longitudinal plan of care for the diagnosis(es)/condition(s) as documented were addressed during this visit. Due to the added complexity in care, I will continue to support Marlyn in the subsequent management and with ongoing continuity of care.
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
## 2025-05-24 ENCOUNTER — HEALTH MAINTENANCE LETTER (OUTPATIENT)
Age: 60
End: 2025-05-24

## 2025-06-04 ENCOUNTER — TELEPHONE (OUTPATIENT)
Dept: NEUROLOGY | Facility: CLINIC | Age: 60
End: 2025-06-04
Payer: COMMERCIAL

## 2025-06-04 NOTE — TELEPHONE ENCOUNTER
Patient confirmed scheduled appointment:  Date: 7/14/25  Time: 12:30  Visit type: return seizure  Provider: Dr. Goodrich  [place] [facility] [place]  Testing/imaging: NA  Additional notes: Rescheduled

## 2025-06-05 ENCOUNTER — HOSPITAL ENCOUNTER (EMERGENCY)
Facility: CLINIC | Age: 60
Discharge: HOME | End: 2025-06-05
Attending: EMERGENCY MEDICINE | Admitting: EMERGENCY MEDICINE
Payer: COMMERCIAL

## 2025-06-05 VITALS
DIASTOLIC BLOOD PRESSURE: 66 MMHG | HEART RATE: 65 BPM | TEMPERATURE: 97.8 F | OXYGEN SATURATION: 98 % | RESPIRATION RATE: 18 BRPM | HEIGHT: 67 IN | WEIGHT: 282.63 LBS | BODY MASS INDEX: 44.36 KG/M2 | SYSTOLIC BLOOD PRESSURE: 168 MMHG

## 2025-06-05 DIAGNOSIS — H57.02 ANISOCORIA: ICD-10-CM

## 2025-06-05 DIAGNOSIS — R03.0 ELEVATED BLOOD PRESSURE READING: ICD-10-CM

## 2025-06-05 DIAGNOSIS — S50.01XA CONTUSION OF RIGHT ELBOW, INITIAL ENCOUNTER: ICD-10-CM

## 2025-06-05 PROCEDURE — 99282 EMERGENCY DEPT VISIT SF MDM: CPT

## 2025-06-05 ASSESSMENT — COLUMBIA-SUICIDE SEVERITY RATING SCALE - C-SSRS
6. HAVE YOU EVER DONE ANYTHING, STARTED TO DO ANYTHING, OR PREPARED TO DO ANYTHING TO END YOUR LIFE?: NO
1. IN THE PAST MONTH, HAVE YOU WISHED YOU WERE DEAD OR WISHED YOU COULD GO TO SLEEP AND NOT WAKE UP?: NO
2. HAVE YOU ACTUALLY HAD ANY THOUGHTS OF KILLING YOURSELF IN THE PAST MONTH?: NO

## 2025-06-05 ASSESSMENT — VISUAL ACUITY
OS: 20/50;WITH CORRECTIVE LENSES
OU: WITH CORRECTIVE LENSES;20/20
OU: LEFT TO RIGHT
OD: 20/25;WITH CORRECTIVE LENSES

## 2025-06-05 ASSESSMENT — ACTIVITIES OF DAILY LIVING (ADL): ADLS_ACUITY_SCORE: 52
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
## 2025-06-17 ENCOUNTER — DOCUMENTATION ONLY (OUTPATIENT)
Dept: NEUROSURGERY | Facility: CLINIC | Age: 60
End: 2025-06-17
Payer: COMMERCIAL

## 2025-06-17 NOTE — PROGRESS NOTES
PAPERWORK DOCUMENTATION    How Paperwork is received:  Right Fax    Type of Paperwork: Other   behavioral functional ability    Who is the paperwork for:  Patient    Received Date June 17, 2025    Who Received the paperwork:  other sashais    Form to be Completed by:  Razia    Anticipated Completion Date:     Date Completed Form Faxed to Requested Entity      Paperwork was put in  folder

## 2025-07-14 ENCOUNTER — LAB (OUTPATIENT)
Dept: LAB | Facility: CLINIC | Age: 60
End: 2025-07-14
Payer: COMMERCIAL

## 2025-07-14 ENCOUNTER — OFFICE VISIT (OUTPATIENT)
Dept: NEUROLOGY | Facility: CLINIC | Age: 60
End: 2025-07-14
Payer: COMMERCIAL

## 2025-07-14 VITALS
RESPIRATION RATE: 16 BRPM | DIASTOLIC BLOOD PRESSURE: 83 MMHG | SYSTOLIC BLOOD PRESSURE: 163 MMHG | HEART RATE: 62 BPM | OXYGEN SATURATION: 97 %

## 2025-07-14 DIAGNOSIS — G40.119 PARTIAL SYMPTOMATIC EPILEPSY WITH SIMPLE PARTIAL SEIZURES, INTRACTABLE, WITHOUT STATUS EPILEPTICUS (H): ICD-10-CM

## 2025-07-14 DIAGNOSIS — G40.119 PARTIAL SYMPTOMATIC EPILEPSY WITH SIMPLE PARTIAL SEIZURES, INTRACTABLE, WITHOUT STATUS EPILEPTICUS (H): Primary | ICD-10-CM

## 2025-07-14 PROCEDURE — 99000 SPECIMEN HANDLING OFFICE-LAB: CPT | Performed by: PATHOLOGY

## 2025-07-14 PROCEDURE — 99213 OFFICE O/P EST LOW 20 MIN: CPT | Performed by: INTERNAL MEDICINE

## 2025-07-14 PROCEDURE — 80235 DRUG ASSAY LACOSAMIDE: CPT | Mod: 90 | Performed by: PATHOLOGY

## 2025-07-14 PROCEDURE — 1126F AMNT PAIN NOTED NONE PRSNT: CPT | Performed by: INTERNAL MEDICINE

## 2025-07-14 PROCEDURE — 3077F SYST BP >= 140 MM HG: CPT | Performed by: INTERNAL MEDICINE

## 2025-07-14 PROCEDURE — 3079F DIAST BP 80-89 MM HG: CPT | Performed by: INTERNAL MEDICINE

## 2025-07-14 PROCEDURE — 36415 COLL VENOUS BLD VENIPUNCTURE: CPT | Performed by: PATHOLOGY

## 2025-07-14 RX ORDER — ADALIMUMAB 40MG/0.8ML
40 KIT SUBCUTANEOUS
COMMUNITY

## 2025-07-14 ASSESSMENT — PAIN SCALES - GENERAL: PAINLEVEL_OUTOF10: NO PAIN (0)

## 2025-07-14 NOTE — NURSING NOTE
Chief Complaint   Patient presents with    RECHECK     Return Seizure        BP (!) 163/83 (BP Location: Right arm, Patient Position: Sitting, Cuff Size: Adult Regular)   Pulse 62   Resp 16   LMP 10/17/2012   SpO2 97%   Josy Crisostomo
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
## 2025-07-15 ENCOUNTER — OFFICE VISIT (OUTPATIENT)
Dept: NEUROSURGERY | Facility: CLINIC | Age: 60
End: 2025-07-15
Attending: NEUROLOGICAL SURGERY
Payer: COMMERCIAL

## 2025-07-15 ENCOUNTER — ANCILLARY PROCEDURE (OUTPATIENT)
Dept: MRI IMAGING | Facility: CLINIC | Age: 60
End: 2025-07-15
Attending: NEUROLOGICAL SURGERY
Payer: COMMERCIAL

## 2025-07-15 VITALS — OXYGEN SATURATION: 99 % | DIASTOLIC BLOOD PRESSURE: 76 MMHG | HEART RATE: 72 BPM | SYSTOLIC BLOOD PRESSURE: 141 MMHG

## 2025-07-15 DIAGNOSIS — D32.0 BENIGN NEOPLASM OF CEREBRAL MENINGES (H): Primary | ICD-10-CM

## 2025-07-15 DIAGNOSIS — D32.0 BENIGN NEOPLASM OF CEREBRAL MENINGES (H): ICD-10-CM

## 2025-07-15 PROCEDURE — 3078F DIAST BP <80 MM HG: CPT | Performed by: NEUROLOGICAL SURGERY

## 2025-07-15 PROCEDURE — 3077F SYST BP >= 140 MM HG: CPT | Performed by: NEUROLOGICAL SURGERY

## 2025-07-15 PROCEDURE — 99213 OFFICE O/P EST LOW 20 MIN: CPT | Performed by: NEUROLOGICAL SURGERY

## 2025-07-15 PROCEDURE — A9585 GADOBUTROL INJECTION: HCPCS | Performed by: RADIOLOGY

## 2025-07-15 PROCEDURE — 70553 MRI BRAIN STEM W/O & W/DYE: CPT | Performed by: RADIOLOGY

## 2025-07-15 RX ORDER — GADOBUTROL 604.72 MG/ML
15 INJECTION INTRAVENOUS ONCE
Status: COMPLETED | OUTPATIENT
Start: 2025-07-15 | End: 2025-07-15

## 2025-07-15 RX ADMIN — GADOBUTROL 13 ML: 604.72 INJECTION INTRAVENOUS at 11:16

## 2025-07-15 NOTE — PATIENT INSTRUCTIONS
Complete Eye appointment and My Chart Razia MEZA when completed    Refer to your PCP regarding a stimulant for low energy.    Follow up with Dr Calero in clinic in one year with MRI Brain with and without contrast.    Call Razia MEZA  Neurosurgery Care Coordinator with questions/concerns     Thank you for using [facility]
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
## 2025-07-16 LAB — LACOSAMIDE SERPL-MCNC: 9.4 UG/ML
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
## 2025-07-22 ENCOUNTER — DOCUMENTATION ONLY (OUTPATIENT)
Dept: NEUROSURGERY | Facility: CLINIC | Age: 60
End: 2025-07-22
Payer: COMMERCIAL

## 2025-07-22 NOTE — PROGRESS NOTES
PAPERWORK DOCUMENTATION    How Paperwork is received:  My Chart, 2 separate Forms    Type of Paperwork: LTD  [place] [employer]  Behavior Functional Ability [employer] [place]    Who is the paperwork for:  Patient    Received Date July 22, 2025    Who Received the paperwork:  Razia    Form to be Completed by:  [employer]    Anticipated Completion Date:     Date Completed Form Faxed to Requested Entity

## 2025-07-22 NOTE — PROGRESS NOTES
Neuroscience Clinic Task Note    TASK    Disability Form  Date received (m/d/y) 7/22/2025   Action needed To be completed    Date faxed (m/d/y)       Writer received Disability Form and Behavioral Functional Statement in printed form from RAFAEL Mcgovern  Paperwork to be completed by writer.       FOLLOW-UP     ADDITIONAL COMMENTS  Patient provider: Dr. Calero.        Hannah Fried LPN

## 2025-07-24 ENCOUNTER — DOCUMENTATION ONLY (OUTPATIENT)
Dept: NEUROSURGERY | Facility: CLINIC | Age: 60
End: 2025-07-24
Payer: COMMERCIAL

## 2025-07-24 NOTE — PROGRESS NOTES
[facility] Task Note    TASK    Return Call to Patient   Date/time 7/24/2025 at 11:24 AM   Reason for call Inform paperwork status      Writer called patient to let her know [employer] Disability form and [place] [employer] long-term Continuing Disability form completed, faxed and scanned to [facility]. Will be available in her MyChart.     FOLLOW-UP     ADDITIONAL COMMENTS       Hannah Fried LPN

## 2025-07-24 NOTE — PROGRESS NOTES
[facility] Task Note    TASK    Continuing Disability Report   Date received (m/d/y) 7/23/2025   Action needed Faxed [phone]   Date faxed (m/d/y) 7/24/2025       PAPERWORK DOCUMENTATION    How paperwork is received: In Person  Type of paperwork: Other:   Who is paperwork for: Patient    Received date: 07/23/2025  Received by: Razia Campos RN  Completed by: Temitope Fried LPN  Completion date: July 24, 2025    Paperwork sent to: Third party via fax  Sent to scan: Yes    Notes:    Continuing Disability Report faxed to [place] [employer].   Fax: [phone]     FOLLOW-UP    ADDITIONAL COMMENTS       Hannah Fried LPN

## 2025-07-24 NOTE — PROGRESS NOTES
[facility] Task Note    TASK    Disability Form  Date received (m/d/y) 7/23/2025   Action needed Faxed [phone]   Date faxed (m/d/y) 7/24/2025     PAPERWORK DOCUMENTATION    How paperwork is received: In Person  Type of paperwork: Long Term Disability  Who is paperwork for: Patient    Received date: 07/23/2025  Received by: Razia Campos RN  Completed by: Temitope Fried LPN  Completion date: July 24, 2025    Paperwork sent to: Third party via fax  Sent to scan: Yes    Notes:    Behavioral Functional Form   [facility]   Fax: [phone]     FOLLOW-UP       ADDITIONAL COMMENTS       Hannah Fried LPN

## (undated) DEVICE — DECANTER BAG 2002S

## (undated) DEVICE — SYR 30ML LL W/O NDL 302832

## (undated) DEVICE — SOL WATER IRRIG 1000ML BOTTLE 2F7114

## (undated) DEVICE — DRAPE POUCH INSTRUMENT 1018

## (undated) DEVICE — DRAPE NEURO TIBURON W/XL FLUID CONTROL POUCH

## (undated) DEVICE — LINEN TOWEL PACK X30 5481

## (undated) DEVICE — Device

## (undated) DEVICE — PAD CHUX UNDERPAD 23X24" 7136

## (undated) DEVICE — MARKER SPHERES PASSIVE MEDT PACK 5 8801075

## (undated) DEVICE — PACK CRANIOTOMY

## (undated) DEVICE — CATH FOLEY COUNCIL 22FR 5ML LATEX 0196SI22

## (undated) DEVICE — RX BACITRACIN OINTMENT 0.9G 1/32OZ CUR001109

## (undated) DEVICE — GLOVE PROTEXIS MICRO 7.0  2D73PM70

## (undated) DEVICE — SYR EAR BULB 3OZ 0035830

## (undated) DEVICE — SYR 10ML SLIP TIP W/O NDL

## (undated) DEVICE — ESU CORD BIPOLAR AND IRR TUBING AESCULAP US355

## (undated) DEVICE — RETR ELASTIC STAYS LONE STAR BLUNT DUAL LEAD 3550-1G

## (undated) DEVICE — TUBING IRRIG CYSTO/BLADDER SET 81" LF 2C4040

## (undated) DEVICE — DRAPE FLUID WARMING 52"X66" ORS-301

## (undated) DEVICE — ESU GROUND PAD ADULT W/CORD E7507

## (undated) DEVICE — PREP DURAPREP 26ML APL 8630

## (undated) DEVICE — PACK CYSTOSCOPY SBA15CYFSI

## (undated) DEVICE — BUR STRK CARBIDE ROUND 3.0MM EXT 5820-110-330C

## (undated) DEVICE — PACK GOWN 3/PK DISP XL SBA32GPFCB

## (undated) DEVICE — DECANTER VIAL 2006S

## (undated) DEVICE — ESU ELEC BLADE 2.75" COATED/INSULATED E1455

## (undated) DEVICE — SUCTION MANIFOLD NEPTUNE 2 SYS 4 PORT 0702-020-000

## (undated) DEVICE — GUIDEWIRE TERUMO .035X180 ANG GR3508

## (undated) DEVICE — BUR STRK CARBIDE ROUND 5.0MM 5820-110-050C

## (undated) DEVICE — SPONGE COTTONOID 1X3" 20-10S

## (undated) DEVICE — SU SILK 0 FSL 18" 678H

## (undated) DEVICE — BUR STRK 2.3MM TAPERED ROUTER - FA2 5407-FA2-023

## (undated) DEVICE — GLOVE PROTEXIS W/NEU-THERA 8.0  2D73TE80

## (undated) DEVICE — PREP POVIDONE IODINE SOLUTION 10% 4OZ

## (undated) DEVICE — DRAPE CRANIOTOMY W/POUCH 9450

## (undated) DEVICE — NDL ANGIOCATH 14GA 1.25" 4048

## (undated) DEVICE — DRAPE SHEET REV FOLD 3/4 9349

## (undated) DEVICE — GOWN XXLG REINFORCED 9071EL

## (undated) DEVICE — SPONGE COTTONOID 1/2X1/2" 20-04S

## (undated) DEVICE — DRSG ABDOMINAL 07 1/2X8" 7197D

## (undated) DEVICE — SU VICRYL 2-0 CT-2 CR 8X18" J726D

## (undated) DEVICE — SYR 10ML FINGER CONTROL W/O NDL 309695

## (undated) DEVICE — CUSA 36KHZ WRENCH TORQUE CLARITY C7602

## (undated) DEVICE — ESU CORD BIPOLAR GREEN 10-4000

## (undated) DEVICE — PREP CHLORAPREP CLEAR 3ML 260400

## (undated) DEVICE — CUSA 36KHZ TIP CVD EXT MICROTIP CLARITY C74115

## (undated) DEVICE — MANIFOLD NEPTUNE 4 PORT 700-20

## (undated) DEVICE — CATH BALLOON DILATION X FORCE 30FR 10MMX15CM 996101

## (undated) DEVICE — BASIN SET SINGLE STERILE 13752-624

## (undated) DEVICE — PREP SKIN SCRUB TRAY 4461A

## (undated) DEVICE — BASKET STONE RETRIEVAL NTNL ZERO TIP 1.9FRX90CM M0063901050

## (undated) DEVICE — SPONGE COTTONOID 1/4X1/4" 20-01S

## (undated) DEVICE — DRAPE MICROSCOPE LEICA 54X120" 09-MK653

## (undated) DEVICE — DRAPE MAYO STAND 23X54 8337

## (undated) DEVICE — PIN SKULL MAYFIELD ADULT TITANIUM 3/PK A1120

## (undated) DEVICE — GUIDEWIRE SENSOR DUAL FLEX STR 0.035"X150CM M0066703080

## (undated) DEVICE — LINEN TOWEL PACK X6 WHITE 5487

## (undated) DEVICE — TAPE MEDIPORE 4"X10YD 2964

## (undated) DEVICE — TUBING IRRIG TUR Y TYPE 96" LF 6543-01

## (undated) DEVICE — SPONGE BALL KERLIX ROUND XL W/O STRING LATEX 4935

## (undated) DEVICE — WIPES FOLEY CARE SURESTEP PROVON DFC100

## (undated) DEVICE — SOL NACL 0.9% IRRIG 1500ML BOTTLE 07138-36

## (undated) DEVICE — SPONGE COTTONOID 1/2X1 1/2" 20-06S

## (undated) DEVICE — PACK SPECIAL PROCEDURE CUSTOM

## (undated) DEVICE — SPONGE SURGIFOAM 100 1974

## (undated) DEVICE — SU SILK 0 24" TIE SA76G

## (undated) DEVICE — SOL WATER IRRIG 3000ML BAG 2B7117

## (undated) DEVICE — SPONGE COTTONOID 1/2X3" 20-07S

## (undated) DEVICE — CATH ANGIO IMPRESS 5FRX65CM BERENSTEIN 56538BER

## (undated) DEVICE — IOM SUPPLY

## (undated) DEVICE — TAPE DRSG UNIVERSAL CLOTH 3" WHITE LATEX 881-3

## (undated) DEVICE — SURGICEL HEMOSTAT 4X8" 1952

## (undated) DEVICE — TUBING SUCTION 12"X1/4" N612

## (undated) DEVICE — STPL SKIN 35W ROTATING HEAD PRW35

## (undated) DEVICE — PREP POVIDONE IODINE SCRUB 7.5% 4OZ APL82212

## (undated) DEVICE — SPONGE SURGIFOAM 01GM POWDER 1978

## (undated) DEVICE — DRAPE SHEET MED 44X70" 9355

## (undated) DEVICE — INTRO KIT ACUSTICK II 21GA .018-.038" 20-703

## (undated) DEVICE — SU NUROLON 4-0 TF CR 8X18" C584D

## (undated) DEVICE — CUSA TUBE QUICK CONNECT CLARITY C7300

## (undated) DEVICE — PROBE DOPPLER MICRO MIZUHO

## (undated) DEVICE — GUIDEWIRE AMPLATZ SUPER STIFF 0.035"X180CM M001465250

## (undated) DEVICE — LINEN TOWEL PACK X5 5464

## (undated) DEVICE — CATH URETERAL FLEX TIP TIGERTAIL 06FRX70CM 139006

## (undated) DEVICE — COVER CAMERA VIDEO LASER 9X96" 04-CC227

## (undated) DEVICE — TUBING EXTENSION SET 20" B1327

## (undated) DEVICE — SOL RINGERS LACTATED 1000ML BAG 07953-09

## (undated) RX ORDER — NEOSTIGMINE METHYLSULFATE 1 MG/ML
VIAL (ML) INJECTION
Status: DISPENSED
Start: 2020-02-25

## (undated) RX ORDER — ESMOLOL HYDROCHLORIDE 10 MG/ML
INJECTION INTRAVENOUS
Status: DISPENSED
Start: 2021-08-26

## (undated) RX ORDER — ONDANSETRON 2 MG/ML
INJECTION INTRAMUSCULAR; INTRAVENOUS
Status: DISPENSED
Start: 2021-08-26

## (undated) RX ORDER — HYDROMORPHONE HYDROCHLORIDE 1 MG/ML
INJECTION, SOLUTION INTRAMUSCULAR; INTRAVENOUS; SUBCUTANEOUS
Status: DISPENSED
Start: 2021-08-26

## (undated) RX ORDER — PROTAMINE SULFATE 10 MG/ML
INJECTION, SOLUTION INTRAVENOUS
Status: DISPENSED
Start: 2021-07-21

## (undated) RX ORDER — ACETAMINOPHEN 325 MG/1
TABLET ORAL
Status: DISPENSED
Start: 2021-08-26

## (undated) RX ORDER — HYDROMORPHONE HYDROCHLORIDE 1 MG/ML
INJECTION, SOLUTION INTRAMUSCULAR; INTRAVENOUS; SUBCUTANEOUS
Status: DISPENSED
Start: 2020-02-25

## (undated) RX ORDER — PROPOFOL 10 MG/ML
INJECTION, EMULSION INTRAVENOUS
Status: DISPENSED
Start: 2021-08-26

## (undated) RX ORDER — EPHEDRINE SULFATE 50 MG/ML
INJECTION, SOLUTION INTRAMUSCULAR; INTRAVENOUS; SUBCUTANEOUS
Status: DISPENSED
Start: 2021-08-26

## (undated) RX ORDER — FENTANYL CITRATE 50 UG/ML
INJECTION, SOLUTION INTRAMUSCULAR; INTRAVENOUS
Status: DISPENSED
Start: 2021-08-26

## (undated) RX ORDER — VECURONIUM BROMIDE 1 MG/ML
INJECTION, POWDER, LYOPHILIZED, FOR SOLUTION INTRAVENOUS
Status: DISPENSED
Start: 2020-02-25

## (undated) RX ORDER — FENTANYL CITRATE 50 UG/ML
INJECTION, SOLUTION INTRAMUSCULAR; INTRAVENOUS
Status: DISPENSED
Start: 2021-07-07

## (undated) RX ORDER — BUPIVACAINE HYDROCHLORIDE AND EPINEPHRINE 5; 5 MG/ML; UG/ML
INJECTION, SOLUTION PERINEURAL
Status: DISPENSED
Start: 2021-08-26

## (undated) RX ORDER — SODIUM CHLORIDE 9 MG/ML
INJECTION, SOLUTION INTRAVENOUS
Status: DISPENSED
Start: 2021-08-26

## (undated) RX ORDER — LIDOCAINE HYDROCHLORIDE 20 MG/ML
INJECTION, SOLUTION EPIDURAL; INFILTRATION; INTRACAUDAL; PERINEURAL
Status: DISPENSED
Start: 2021-08-26

## (undated) RX ORDER — BACITRACIN 500 [USP'U]/G
OINTMENT OPHTHALMIC
Status: DISPENSED
Start: 2021-08-26

## (undated) RX ORDER — FENTANYL CITRATE 50 UG/ML
INJECTION, SOLUTION INTRAMUSCULAR; INTRAVENOUS
Status: DISPENSED
Start: 2020-02-25

## (undated) RX ORDER — LIDOCAINE HYDROCHLORIDE 20 MG/ML
INJECTION, SOLUTION EPIDURAL; INFILTRATION; INTRACAUDAL; PERINEURAL
Status: DISPENSED
Start: 2020-02-25

## (undated) RX ORDER — SODIUM CHLORIDE 9 MG/ML
INJECTION, SOLUTION INTRAVENOUS
Status: DISPENSED
Start: 2021-07-07

## (undated) RX ORDER — LIDOCAINE HYDROCHLORIDE 10 MG/ML
INJECTION, SOLUTION EPIDURAL; INFILTRATION; INTRACAUDAL; PERINEURAL
Status: DISPENSED
Start: 2021-07-21

## (undated) RX ORDER — PROPOFOL 10 MG/ML
INJECTION, EMULSION INTRAVENOUS
Status: DISPENSED
Start: 2020-02-25

## (undated) RX ORDER — SODIUM CHLORIDE 9 MG/ML
INJECTION, SOLUTION INTRAVENOUS
Status: DISPENSED
Start: 2021-07-21

## (undated) RX ORDER — ONDANSETRON 2 MG/ML
INJECTION INTRAMUSCULAR; INTRAVENOUS
Status: DISPENSED
Start: 2020-02-25

## (undated) RX ORDER — GLYCOPYRROLATE 0.2 MG/ML
INJECTION, SOLUTION INTRAMUSCULAR; INTRAVENOUS
Status: DISPENSED
Start: 2020-02-25

## (undated) RX ORDER — HEPARIN SODIUM 1000 [USP'U]/ML
INJECTION, SOLUTION INTRAVENOUS; SUBCUTANEOUS
Status: DISPENSED
Start: 2021-07-21

## (undated) RX ORDER — FENTANYL CITRATE-0.9 % NACL/PF 10 MCG/ML
PLASTIC BAG, INJECTION (ML) INTRAVENOUS
Status: DISPENSED
Start: 2021-08-26

## (undated) RX ORDER — ACETAMINOPHEN 325 MG/1
TABLET ORAL
Status: DISPENSED
Start: 2021-07-21

## (undated) RX ORDER — CEFAZOLIN SODIUM IN 0.9 % NACL 3 G/100 ML
INTRAVENOUS SOLUTION, PIGGYBACK (ML) INTRAVENOUS
Status: DISPENSED
Start: 2021-08-26

## (undated) RX ORDER — CEFAZOLIN SODIUM IN 0.9 % NACL 3 G/100 ML
INTRAVENOUS SOLUTION, PIGGYBACK (ML) INTRAVENOUS
Status: DISPENSED
Start: 2020-02-25

## (undated) RX ORDER — GLYCOPYRROLATE 0.2 MG/ML
INJECTION, SOLUTION INTRAMUSCULAR; INTRAVENOUS
Status: DISPENSED
Start: 2021-07-21

## (undated) RX ORDER — FENTANYL CITRATE 50 UG/ML
INJECTION, SOLUTION INTRAMUSCULAR; INTRAVENOUS
Status: DISPENSED
Start: 2021-07-21

## (undated) RX ORDER — GLYCOPYRROLATE 0.2 MG/ML
INJECTION, SOLUTION INTRAMUSCULAR; INTRAVENOUS
Status: DISPENSED
Start: 2021-08-26

## (undated) RX ORDER — HEPARIN SODIUM 200 [USP'U]/100ML
INJECTION, SOLUTION INTRAVENOUS
Status: DISPENSED
Start: 2021-07-21

## (undated) RX ORDER — MANNITOL 20 G/100ML
INJECTION, SOLUTION INTRAVENOUS
Status: DISPENSED
Start: 2021-08-26

## (undated) RX ORDER — DEXAMETHASONE SODIUM PHOSPHATE 4 MG/ML
INJECTION, SOLUTION INTRA-ARTICULAR; INTRALESIONAL; INTRAMUSCULAR; INTRAVENOUS; SOFT TISSUE
Status: DISPENSED
Start: 2020-02-25

## (undated) RX ORDER — DEXAMETHASONE SODIUM PHOSPHATE 4 MG/ML
INJECTION, SOLUTION INTRA-ARTICULAR; INTRALESIONAL; INTRAMUSCULAR; INTRAVENOUS; SOFT TISSUE
Status: DISPENSED
Start: 2021-08-26

## (undated) RX ORDER — ALBUTEROL SULFATE 90 UG/1
AEROSOL, METERED RESPIRATORY (INHALATION)
Status: DISPENSED
Start: 2021-08-26

## (undated) RX ORDER — LIDOCAINE HYDROCHLORIDE 10 MG/ML
INJECTION, SOLUTION EPIDURAL; INFILTRATION; INTRACAUDAL; PERINEURAL
Status: DISPENSED
Start: 2021-07-07